# Patient Record
Sex: MALE | Race: BLACK OR AFRICAN AMERICAN | Employment: OTHER | URBAN - METROPOLITAN AREA
[De-identification: names, ages, dates, MRNs, and addresses within clinical notes are randomized per-mention and may not be internally consistent; named-entity substitution may affect disease eponyms.]

---

## 2020-09-08 RX ORDER — BENZTROPINE MESYLATE 0.5 MG/1
TABLET ORAL
Qty: 90 TAB | Refills: 0 | Status: SHIPPED | OUTPATIENT
Start: 2020-09-08 | End: 2020-11-23

## 2020-09-08 RX ORDER — TRAZODONE HYDROCHLORIDE 100 MG/1
TABLET ORAL
Qty: 180 TAB | Refills: 0 | Status: SHIPPED | OUTPATIENT
Start: 2020-09-08 | End: 2020-11-23

## 2020-10-12 RX ORDER — BUSPIRONE HYDROCHLORIDE 5 MG/1
TABLET ORAL
Qty: 180 TAB | Refills: 0 | Status: SHIPPED | OUTPATIENT
Start: 2020-10-12 | End: 2020-12-22

## 2020-10-13 PROBLEM — G47.33 OBSTRUCTIVE SLEEP APNEA: Status: ACTIVE | Noted: 2020-10-13

## 2020-10-13 PROBLEM — F31.31: Status: ACTIVE | Noted: 2020-10-13

## 2020-10-13 RX ORDER — RISPERIDONE 2 MG/1
2 TABLET, FILM COATED ORAL 2 TIMES DAILY
COMMUNITY
Start: 2020-08-26 | End: 2020-12-20

## 2020-10-13 RX ORDER — ISOSORBIDE DINITRATE 5 MG/1
5 TABLET ORAL DAILY
COMMUNITY
Start: 2020-09-21

## 2020-10-13 RX ORDER — MONTELUKAST SODIUM 10 MG/1
10 TABLET ORAL DAILY
COMMUNITY
Start: 2020-08-14

## 2020-10-13 RX ORDER — AMLODIPINE BESYLATE 10 MG/1
10 TABLET ORAL DAILY
COMMUNITY
Start: 2020-08-18

## 2020-10-13 RX ORDER — ALBUTEROL SULFATE 90 UG/1
AEROSOL, METERED RESPIRATORY (INHALATION)
COMMUNITY
Start: 2020-08-18

## 2020-10-13 RX ORDER — SERTRALINE HYDROCHLORIDE 100 MG/1
200 TABLET, FILM COATED ORAL DAILY
COMMUNITY
Start: 2020-08-29 | End: 2021-04-19 | Stop reason: SDUPTHER

## 2020-10-13 RX ORDER — PANTOPRAZOLE SODIUM 40 MG/1
40 TABLET, DELAYED RELEASE ORAL DAILY
COMMUNITY
Start: 2020-08-19

## 2020-10-14 ENCOUNTER — VIRTUAL VISIT (OUTPATIENT)
Dept: BEHAVIORAL/MENTAL HEALTH CLINIC | Age: 55
End: 2020-10-14

## 2020-10-14 DIAGNOSIS — F33.9 EPISODE OF RECURRENT MAJOR DEPRESSIVE DISORDER, UNSPECIFIED DEPRESSION EPISODE SEVERITY (HCC): Primary | ICD-10-CM

## 2020-10-15 NOTE — PROGRESS NOTES
Law Lam is a 62 y.o. male who presents today for the following:  Chief Complaint   Patient presents with    Follow-up     Patient was unable for appointment. Allergies   Allergen Reactions    Codeine Unknown (comments)    Penicillins Unknown (comments)       Current Outpatient Medications   Medication Sig    albuterol (PROVENTIL HFA, VENTOLIN HFA, PROAIR HFA) 90 mcg/actuation inhaler     amLODIPine (NORVASC) 10 mg tablet Take 10 mg by mouth daily.  isosorbide dinitrate (ISORDIL) 5 mg tablet Take 5 mg by mouth daily.  montelukast (SINGULAIR) 10 mg tablet Take 10 mg by mouth daily.  pantoprazole (PROTONIX) 40 mg tablet Take 40 mg by mouth daily.  risperiDONE (RisperDAL) 2 mg tablet Take 2 mg by mouth two (2) times a day.  sertraline (ZOLOFT) 100 mg tablet Take 200 mg by mouth daily.  busPIRone (BUSPAR) 5 mg tablet TAKE 1 TABLET TWICE DAILY    traZODone (DESYREL) 100 mg tablet TAKE 2 TABLETS AT BEDTIME    benztropine (COGENTIN) 0.5 mg tablet TAKE 1 TABLET AT BEDTIME     No current facility-administered medications for this visit. Past Medical History:   Diagnosis Date    Anxiety     Arthritis     GERD (gastroesophageal reflux disease)     Hypercholesterolemia     Hypertension     Mild depressed bipolar 1 disorder ([facility] 10/13/2020    Obstructive sleep apnea 10/13/2020    [facility]     Seizures ([facility]     [facility]        No past surgical history on file.     Family History   Problem Relation Age of Onset    Depression Mother     No Known Problems Father        Social History     Socioeconomic History    Marital status:      Spouse name: Not on file    Number of children: Not on file    Years of education: Not on file    Highest education level: Not on file   Occupational History    Not on file   Social Needs    Financial resource strain: Not on file    Food insecurity     Worry: Not on file     Inability: Not on file   Daniela Angel Transportation needs     Medical: Not on file     Non-medical: Not on file   Tobacco Use    Smoking status: Former Smoker    Smokeless tobacco: Never Used   Substance and Sexual Activity    Alcohol use: Not on file    Drug use: Not on file    Sexual activity: Not on file   Lifestyle    Physical activity     Days per week: Not on file     Minutes per session: Not on file    Stress: Not on file   Relationships    Social connections     Talks on phone: Not on file     Gets together: Not on file     Attends Synagogue service: Not on file     Active member of club or organization: Not on file     Attends meetings of clubs or organizations: Not on file     Relationship status: Not on file    Intimate partner violence     Fear of current or ex partner: Not on file     Emotionally abused: Not on file     Physically abused: Not on file     Forced sexual activity: Not on file   Other Topics Concern    Not on file   Social History Narrative    Not on file         HPI      ROS      There were no vitals taken for this visit. Physical Exam       There are no diagnoses linked to this encounter.

## 2020-11-09 ENCOUNTER — HOSPITAL ENCOUNTER (OUTPATIENT)
Age: 55
Setting detail: OBSERVATION
Discharge: HOME | End: 2020-11-10
Attending: EMERGENCY MEDICINE | Admitting: HOSPITALIST
Payer: MEDICARE

## 2020-11-09 ENCOUNTER — APPOINTMENT (OUTPATIENT)
Dept: GENERAL RADIOLOGY | Age: 55
End: 2020-11-09
Attending: EMERGENCY MEDICINE
Payer: MEDICARE

## 2020-11-09 DIAGNOSIS — R07.9 CHEST PAIN, UNSPECIFIED TYPE: Primary | ICD-10-CM

## 2020-11-09 LAB
ALBUMIN SERPL-MCNC: 3.5 G/DL (ref 3.5–5)
ALBUMIN/GLOB SERPL: 1.1 {RATIO} (ref 1.1–2.2)
ALP SERPL-CCNC: 59 U/L (ref 45–117)
ALT SERPL-CCNC: 21 U/L (ref 12–78)
AMPHET UR QL SCN: NEGATIVE
ANION GAP SERPL CALC-SCNC: 6 MMOL/L (ref 5–15)
APPEARANCE UR: CLEAR
AST SERPL W P-5'-P-CCNC: 19 U/L (ref 15–37)
ATRIAL RATE: 63 BPM
BARBITURATES UR QL SCN: NEGATIVE
BASOPHILS # BLD: 0 K/UL (ref 0–0.2)
BASOPHILS NFR BLD: 1 % (ref 0–2.5)
BENZODIAZ UR QL: NEGATIVE
BILIRUB SERPL-MCNC: 0.4 MG/DL (ref 0.2–1)
BILIRUB UR QL: NEGATIVE
BNP SERPL-MCNC: 131 PG/ML
BUN SERPL-MCNC: 11 MG/DL (ref 6–20)
BUN/CREAT SERPL: 9 (ref 12–20)
CA-I BLD-MCNC: 8.7 MG/DL (ref 8.5–10.1)
CALCULATED P AXIS, ECG09: 42 DEGREES
CALCULATED R AXIS, ECG10: 15 DEGREES
CALCULATED T AXIS, ECG11: 51 DEGREES
CANNABINOIDS UR QL SCN: NEGATIVE
CHLORIDE SERPL-SCNC: 100 MMOL/L (ref 97–108)
CO2 SERPL-SCNC: 31 MMOL/L (ref 21–32)
COCAINE UR QL SCN: POSITIVE
COLOR UR: NORMAL
CREAT SERPL-MCNC: 1.2 MG/DL (ref 0.7–1.3)
DIAGNOSIS, 93000: NORMAL
DRUG SCRN COMMENT,DRGCM: ABNORMAL
ECSTASY, ECST: POSITIVE
EOSINOPHIL # BLD: 0.1 K/UL (ref 0–0.7)
EOSINOPHIL NFR BLD: 3 % (ref 0.9–2.9)
ERYTHROCYTE [DISTWIDTH] IN BLOOD BY AUTOMATED COUNT: 13.4 % (ref 11.5–14.5)
GLOBULIN SER CALC-MCNC: 3.1 G/DL (ref 2–4)
GLUCOSE SERPL-MCNC: 172 MG/DL (ref 65–100)
GLUCOSE UR STRIP.AUTO-MCNC: NEGATIVE MG/DL
HCT VFR BLD AUTO: 39.6 % (ref 41–53)
HGB BLD-MCNC: 13.6 G/DL (ref 13.5–17.5)
HGB UR QL STRIP: NEGATIVE
INR PPP: 1.1 (ref 0.9–1.1)
KETONES UR QL STRIP.AUTO: NEGATIVE MG/DL
LEUKOCYTE ESTERASE UR QL STRIP.AUTO: NEGATIVE
LYMPHOCYTES # BLD: 1.1 K/UL (ref 1–4.8)
LYMPHOCYTES NFR BLD: 32 % (ref 20.5–51.1)
MAGNESIUM SERPL-MCNC: 1.9 MG/DL (ref 1.6–2.4)
MCH RBC QN AUTO: 33 PG (ref 31–34)
MCHC RBC AUTO-ENTMCNC: 34.4 G/DL (ref 31–36)
MCV RBC AUTO: 96.2 FL (ref 80–100)
METHADONE UR QL: NEGATIVE
MONOCYTES # BLD: 0.3 K/UL (ref 0.2–2.4)
MONOCYTES NFR BLD: 9 % (ref 1.7–9.3)
NEUTS SEG # BLD: 1.9 K/UL (ref 1.8–7.7)
NEUTS SEG NFR BLD: 55 % (ref 42–75)
NITRITE UR QL STRIP.AUTO: NEGATIVE
NRBC # BLD: 0 K/UL
NRBC BLD-RTO: 0 PER 100 WBC
OPIATES UR QL: NEGATIVE
P-R INTERVAL, ECG05: 170 MS
PCP UR QL: NEGATIVE
PH UR STRIP: 5.5 [PH] (ref 5–8)
PLATELET # BLD AUTO: 160 K/UL
PMV BLD AUTO: 7.9 FL (ref 6.5–11.5)
POTASSIUM SERPL-SCNC: 3 MMOL/L (ref 3.5–5.1)
PROT SERPL-MCNC: 6.6 G/DL (ref 6.4–8.2)
PROT UR STRIP-MCNC: NEGATIVE MG/DL
PROTHROMBIN TIME: 10.8 SEC (ref 9–11.1)
Q-T INTERVAL, ECG07: 484 MS
QRS DURATION, ECG06: 104 MS
QTC CALCULATION (BEZET), ECG08: 500 MS
RBC # BLD AUTO: 4.12 M/UL (ref 4.5–5.9)
SODIUM SERPL-SCNC: 137 MMOL/L (ref 136–145)
SP GR UR REFRACTOMETRY: 1.02 (ref 1–1.03)
TROPONIN I SERPL-MCNC: <0.05 NG/ML
UROBILINOGEN UR QL STRIP.AUTO: 0.2 EU/DL (ref 0.2–1)
VENTRICULAR RATE, ECG03: 64 BPM
WBC # BLD AUTO: 3.4 K/UL (ref 4.4–11.3)

## 2020-11-09 PROCEDURE — 96372 THER/PROPH/DIAG INJ SC/IM: CPT

## 2020-11-09 PROCEDURE — 74011250636 HC RX REV CODE- 250/636: Performed by: NURSE PRACTITIONER

## 2020-11-09 PROCEDURE — 74011250637 HC RX REV CODE- 250/637: Performed by: EMERGENCY MEDICINE

## 2020-11-09 PROCEDURE — 84484 ASSAY OF TROPONIN QUANT: CPT

## 2020-11-09 PROCEDURE — 99285 EMERGENCY DEPT VISIT HI MDM: CPT

## 2020-11-09 PROCEDURE — 36415 COLL VENOUS BLD VENIPUNCTURE: CPT

## 2020-11-09 PROCEDURE — 80307 DRUG TEST PRSMV CHEM ANLYZR: CPT

## 2020-11-09 PROCEDURE — 93005 ELECTROCARDIOGRAM TRACING: CPT

## 2020-11-09 PROCEDURE — 83735 ASSAY OF MAGNESIUM: CPT

## 2020-11-09 PROCEDURE — 81003 URINALYSIS AUTO W/O SCOPE: CPT

## 2020-11-09 PROCEDURE — 99218 HC RM OBSERVATION: CPT

## 2020-11-09 PROCEDURE — 74011250637 HC RX REV CODE- 250/637: Performed by: NURSE PRACTITIONER

## 2020-11-09 PROCEDURE — 83880 ASSAY OF NATRIURETIC PEPTIDE: CPT

## 2020-11-09 PROCEDURE — 80053 COMPREHEN METABOLIC PANEL: CPT

## 2020-11-09 PROCEDURE — 71045 X-RAY EXAM CHEST 1 VIEW: CPT

## 2020-11-09 PROCEDURE — 85025 COMPLETE CBC W/AUTO DIFF WBC: CPT

## 2020-11-09 PROCEDURE — 85610 PROTHROMBIN TIME: CPT

## 2020-11-09 RX ORDER — POTASSIUM CHLORIDE 750 MG/1
40 TABLET, FILM COATED, EXTENDED RELEASE ORAL
Status: COMPLETED | OUTPATIENT
Start: 2020-11-09 | End: 2020-11-09

## 2020-11-09 RX ORDER — MONTELUKAST SODIUM 10 MG/1
10 TABLET ORAL DAILY
Status: DISCONTINUED | OUTPATIENT
Start: 2020-11-10 | End: 2020-11-10 | Stop reason: HOSPADM

## 2020-11-09 RX ORDER — NITROGLYCERIN 0.4 MG/1
0.4 TABLET SUBLINGUAL
Status: DISCONTINUED | OUTPATIENT
Start: 2020-11-09 | End: 2020-11-10 | Stop reason: HOSPADM

## 2020-11-09 RX ORDER — RISPERIDONE 1 MG/1
2 TABLET, FILM COATED ORAL EVERY 12 HOURS
Status: DISCONTINUED | OUTPATIENT
Start: 2020-11-09 | End: 2020-11-10 | Stop reason: HOSPADM

## 2020-11-09 RX ORDER — MORPHINE SULFATE 2 MG/ML
2 INJECTION, SOLUTION INTRAMUSCULAR; INTRAVENOUS
Status: DISCONTINUED | OUTPATIENT
Start: 2020-11-09 | End: 2020-11-10 | Stop reason: HOSPADM

## 2020-11-09 RX ORDER — SODIUM CHLORIDE 0.9 % (FLUSH) 0.9 %
5-40 SYRINGE (ML) INJECTION AS NEEDED
Status: DISCONTINUED | OUTPATIENT
Start: 2020-11-09 | End: 2020-11-10 | Stop reason: HOSPADM

## 2020-11-09 RX ORDER — GUAIFENESIN 100 MG/5ML
81 LIQUID (ML) ORAL DAILY
Status: DISCONTINUED | OUTPATIENT
Start: 2020-11-10 | End: 2020-11-10 | Stop reason: HOSPADM

## 2020-11-09 RX ORDER — ACETAMINOPHEN 325 MG/1
650 TABLET ORAL
Status: DISCONTINUED | OUTPATIENT
Start: 2020-11-09 | End: 2020-11-10 | Stop reason: HOSPADM

## 2020-11-09 RX ORDER — ENOXAPARIN SODIUM 100 MG/ML
40 INJECTION SUBCUTANEOUS EVERY 24 HOURS
Status: DISCONTINUED | OUTPATIENT
Start: 2020-11-09 | End: 2020-11-10 | Stop reason: HOSPADM

## 2020-11-09 RX ORDER — SERTRALINE HYDROCHLORIDE 50 MG/1
200 TABLET, FILM COATED ORAL DAILY
Status: DISCONTINUED | OUTPATIENT
Start: 2020-11-10 | End: 2020-11-10 | Stop reason: HOSPADM

## 2020-11-09 RX ORDER — TRAZODONE HYDROCHLORIDE 50 MG/1
100 TABLET ORAL
Status: DISCONTINUED | OUTPATIENT
Start: 2020-11-09 | End: 2020-11-10 | Stop reason: HOSPADM

## 2020-11-09 RX ORDER — PANTOPRAZOLE SODIUM 40 MG/1
40 TABLET, DELAYED RELEASE ORAL
Status: DISCONTINUED | OUTPATIENT
Start: 2020-11-10 | End: 2020-11-10 | Stop reason: HOSPADM

## 2020-11-09 RX ORDER — ISOSORBIDE DINITRATE 10 MG/1
5 TABLET ORAL DAILY
Status: DISCONTINUED | OUTPATIENT
Start: 2020-11-10 | End: 2020-11-10 | Stop reason: HOSPADM

## 2020-11-09 RX ORDER — BUSPIRONE HYDROCHLORIDE 5 MG/1
5 TABLET ORAL EVERY 12 HOURS
Status: DISCONTINUED | OUTPATIENT
Start: 2020-11-09 | End: 2020-11-10 | Stop reason: HOSPADM

## 2020-11-09 RX ORDER — AMLODIPINE BESYLATE 10 MG/1
10 TABLET ORAL DAILY
Status: DISCONTINUED | OUTPATIENT
Start: 2020-11-10 | End: 2020-11-10 | Stop reason: HOSPADM

## 2020-11-09 RX ORDER — ALBUTEROL SULFATE 90 UG/1
1 AEROSOL, METERED RESPIRATORY (INHALATION)
Status: DISCONTINUED | OUTPATIENT
Start: 2020-11-09 | End: 2020-11-09

## 2020-11-09 RX ORDER — ONDANSETRON 4 MG/1
4 TABLET, ORALLY DISINTEGRATING ORAL
Status: DISCONTINUED | OUTPATIENT
Start: 2020-11-09 | End: 2020-11-10 | Stop reason: HOSPADM

## 2020-11-09 RX ORDER — NITROGLYCERIN 0.4 MG/1
0.4 TABLET SUBLINGUAL
Status: COMPLETED | OUTPATIENT
Start: 2020-11-09 | End: 2020-11-09

## 2020-11-09 RX ORDER — BENZTROPINE MESYLATE 0.5 MG/1
0.5 TABLET ORAL
Status: DISCONTINUED | OUTPATIENT
Start: 2020-11-09 | End: 2020-11-10 | Stop reason: HOSPADM

## 2020-11-09 RX ORDER — ASPIRIN 325 MG
325 TABLET ORAL ONCE
Status: DISCONTINUED | OUTPATIENT
Start: 2020-11-09 | End: 2020-11-09

## 2020-11-09 RX ADMIN — BUSPIRONE HYDROCHLORIDE 5 MG: 5 TABLET ORAL at 20:52

## 2020-11-09 RX ADMIN — NITROGLYCERIN 0.4 MG: 0.4 TABLET, ORALLY DISINTEGRATING SUBLINGUAL at 11:29

## 2020-11-09 RX ADMIN — BENZTROPINE MESYLATE 0.5 MG: 0.5 TABLET ORAL at 20:53

## 2020-11-09 RX ADMIN — ENOXAPARIN SODIUM 40 MG: 40 INJECTION SUBCUTANEOUS at 18:26

## 2020-11-09 RX ADMIN — RISPERIDONE 2 MG: 1 TABLET, FILM COATED ORAL at 20:52

## 2020-11-09 RX ADMIN — POTASSIUM CHLORIDE 40 MEQ: 750 TABLET, FILM COATED, EXTENDED RELEASE ORAL at 18:26

## 2020-11-09 RX ADMIN — TRAZODONE HYDROCHLORIDE 100 MG: 50 TABLET ORAL at 20:52

## 2020-11-09 NOTE — ED TRIAGE NOTES
Pt was on the way home from office visit this morning, started having chest pain  Given 324 aspirin  Pt reports chest pain, non radiating, sharp but now dull  Cardiac hx

## 2020-11-09 NOTE — ROUTINE PROCESS
Pt admitted to room 211. Monitor applied. Ate well. No pain. No n/v. No resp distress. Sitting up in bed.

## 2020-11-09 NOTE — ED PROVIDER NOTES
EMERGENCY DEPARTMENT HISTORY AND PHYSICAL EXAM      Date: 11/9/2020  Patient Name: Dipak Castanon      History of Presenting Illness     Chief Complaint   Patient presents with    Chest Pain       History Provided By: Patient    HPI: Dipak Castanon, 62 y.o. male with a past medical history significant hypertension, hyperlipidemia, myocardial infarction and stroke presents to the ED with cc of chest pain sharp mid substernal nonradiating initially 7 out of 10; patient was a passenger in a car when symptoms initially started; patient denies sensation of nausea, no shortness of breath, no dizziness; patient received 4 aspirins in route pain now 3 out of 10; patient reports swelling in in feet over the last week and a half; patient had a similar episode 2 months ago but was not evaluated by any medical professional    There are no other complaints, changes, or physical findings at this time. PCP: Casey De La Rosa MD    Current Facility-Administered Medications   Medication Dose Route Frequency Provider Last Rate Last Dose    nitroglycerin (NITROSTAT) tablet 0.4 mg  0.4 mg SubLINGual NOW Denise Cosby MD         Current Outpatient Medications   Medication Sig Dispense Refill    albuterol (PROVENTIL HFA, VENTOLIN HFA, PROAIR HFA) 90 mcg/actuation inhaler       amLODIPine (NORVASC) 10 mg tablet Take 10 mg by mouth daily.  isosorbide dinitrate (ISORDIL) 5 mg tablet Take 5 mg by mouth daily.  montelukast (SINGULAIR) 10 mg tablet Take 10 mg by mouth daily.  pantoprazole (PROTONIX) 40 mg tablet Take 40 mg by mouth daily.  risperiDONE (RisperDAL) 2 mg tablet Take 2 mg by mouth two (2) times a day.  sertraline (ZOLOFT) 100 mg tablet Take 200 mg by mouth daily.       busPIRone (BUSPAR) 5 mg tablet TAKE 1 TABLET TWICE DAILY 180 Tab 0    traZODone (DESYREL) 100 mg tablet TAKE 2 TABLETS AT BEDTIME 180 Tab 0    benztropine (COGENTIN) 0.5 mg tablet TAKE 1 TABLET AT BEDTIME 90 Tab 0       Past History     Past Medical History:  Past Medical History:   Diagnosis Date    Anxiety     Arthritis     GERD (gastroesophageal reflux disease)     Hypercholesterolemia     Hypertension     Mild depressed bipolar [facility] disorder ([facility] 10/13/2020    Myocardial infarction ([facility]     Obstructive sleep apnea 10/13/2020    Psychotic disorder (HCC)     Seizures (HCC)     Stroke [facility])        Past Surgical History:  Past Surgical History:   Procedure Laterality Date    ABDOMEN SURGERY PROC UNLISTED         Family History:  Family History   Problem Relation Age of Onset    Depression Mother     No Known Problems Father        Social History:  Social History     Tobacco Use    Smoking status: Former Smoker    Smokeless tobacco: Never Used   Substance Use Topics    Alcohol use: Yes     Alcohol/week: 2.0 standard drinks     Types: 2 Cans of beer per week    Drug use: Never       Allergies: Allergies   Allergen Reactions    Codeine Unknown (comments)    Penicillins Unknown (comments)         Review of Systems     Review of Systems   Constitutional: Negative for chills and fever. HENT: Negative for rhinorrhea and sore throat. Eyes: Negative for pain and visual disturbance. Respiratory: Negative for chest tightness and shortness of breath. Cardiovascular: Positive for chest pain and leg swelling. Negative for palpitations. Gastrointestinal: Negative for abdominal pain and nausea. Endocrine: Negative for polydipsia and polyuria. Genitourinary: Negative for dysuria and urgency. Musculoskeletal: Negative for arthralgias and myalgias. Skin: Negative for color change and pallor. Neurological: Negative for weakness and numbness. Psychiatric/Behavioral: Negative. Physical Exam     Physical Exam  Vitals signs and nursing note reviewed. HENT:      Head: Normocephalic and atraumatic. Eyes:      Extraocular Movements: Extraocular movements intact.       Pupils: Pupils are equal, round, and reactive to light. Neck:      Musculoskeletal: Normal range of motion and neck supple. Cardiovascular:      Rate and Rhythm: Normal rate and regular rhythm. Heart sounds: Normal heart sounds. Pulmonary:      Effort: Pulmonary effort is normal.      Breath sounds: Normal breath sounds. Chest:      Chest wall: No tenderness. Abdominal:      General: Bowel sounds are normal.      Palpations: Abdomen is soft. Musculoskeletal: Normal range of motion. Right lower le+ Edema present. Left lower le+ Edema present. Skin:     General: Skin is warm and dry. Capillary Refill: Capillary refill takes less than 2 seconds. Neurological:      General: No focal deficit present. Mental Status: He is alert and oriented to person, place, and time. Psychiatric:         Mood and Affect: Mood normal.         Behavior: Behavior normal.         Lab and Diagnostic Study Results     Labs -     Recent Results (from the past 12 hour(s))   EKG, 12 LEAD, INITIAL    Collection Time: 20 10:55 AM   Result Value Ref Range    Ventricular Rate 64 BPM    Atrial Rate 63 BPM    P-R Interval 170 ms    QRS Duration 104 ms    Q-T Interval 484 ms    QTC Calculation (Bezet) 500 ms    Calculated P Axis 42 degrees    Calculated R Axis 15 degrees    Calculated T Axis 51 degrees    Diagnosis Sinus rhythm  Borderline prolonged QT interval          Radiologic Studies -   [unfilled]  CT Results  (Last 48 hours)    None        CXR Results  (Last 48 hours)    None          Medical Decision Making and ED Course   - I am the first and primary provider for this patient. - I reviewed the vital signs, available nursing notes, past medical history, past surgical history, family history and social history. - Initial assessment performed. The patients presenting problems have been discussed, and the staff are in agreement with the care plan formulated and outlined with them.   I have encouraged them to ask questions as they arise throughout their visit. Vital Signs-Reviewed the patient's vital signs. Patient Vitals for the past 12 hrs:   Temp Pulse Resp BP SpO2   11/09/20 1054 98.4 °F (36.9 °C) 64 18 (!) 142/89 98 %             Records Reviewed: Nursing Notes    The patient presents with back pain with a differential diagnosis of ACS, myocardial infarction, pneumonia    ED Course:       ED Course as of Nov 09 1559   Mon Nov 09, 2020   1100 EKG rate 64, NM interval 170, QT interval 500, normal axis, no ST elevations and depressions    [SB]      ED Course User Index  [SB] Cedrick Doty MD         Provider Notes (Medical Decision Making): MDM           Consultations:       Consultations: -    Hospitalist Consultant: Dr. Danielle Garcia: We have asked for emergent assistance with regard to this patient. We have discussed the patients HPI, ROS, PE and results this far. They will come and evaluate the patient for admission. Procedures and Critical Care       Performed by: Mavis Hi MD  PROCEDURES:  Procedures             Mavis Hi MD        Disposition     Disposition: Condition stable and improved  Admitted to Observation Unit the case was discussed with the admitting physician MIGUEL        Remove if not discharged  DISCHARGE PLAN:  1. Current Discharge Medication List      CONTINUE these medications which have NOT CHANGED    Details   albuterol (PROVENTIL HFA, VENTOLIN HFA, PROAIR HFA) 90 mcg/actuation inhaler       amLODIPine (NORVASC) 10 mg tablet Take 10 mg by mouth daily. isosorbide dinitrate (ISORDIL) 5 mg tablet Take 5 mg by mouth daily. montelukast (SINGULAIR) 10 mg tablet Take 10 mg by mouth daily. pantoprazole (PROTONIX) 40 mg tablet Take 40 mg by mouth daily. risperiDONE (RisperDAL) 2 mg tablet Take 2 mg by mouth two (2) times a day. sertraline (ZOLOFT) 100 mg tablet Take 200 mg by mouth daily.       busPIRone (BUSPAR) 5 mg tablet TAKE 1 TABLET TWICE DAILY  Qty: 180 Tab, Refills: 0      traZODone (DESYREL) 100 mg tablet TAKE 2 TABLETS AT BEDTIME  Qty: 180 Tab, Refills: 0      benztropine (COGENTIN) 0.5 mg tablet TAKE 1 TABLET AT BEDTIME  Qty: 90 Tab, Refills: 0           2. Follow-up Information    None       3. Return to ED if worse   4. Current Discharge Medication List          Diagnosis     Clinical Impression: No diagnosis found. Attestations:    Zain Cade MD    Please note that this dictation was completed with [employer], the [employer] voice recognition software. Quite often unanticipated grammatical, syntax, homophones, and other interpretive errors are inadvertently transcribed by the computer software. Please disregard these errors. Please excuse any errors that have escaped final proofreading. Thank you.

## 2020-11-09 NOTE — H&P
History and Physical    Subjective:     Harjinder Herrera is a 62 y.o. male  with a PMHx significant for HTN, MI, CABG, Stroke, Dyslipidemia, GERD, and DANNIELLE (uses Cpap), who presents to the ED today with complaints of chest pain onset since 7 am today, described as sharp mid substernal nonradiating initially 7 out of 10; patient was a passenger in a car when symptoms initially started; patient denies any associated nausea, vomiting, shortness of breath, or dizziness, patient received 4 aspirins en route, chest pain currently rated 2 out of 10; patient reported similar episode 2 months ago but was not evaluated by any medical professional, he however denies any fever, chills, or general malaise. No sick contacts. In the ED, his ECG shows a SR, no ischemic changes, his troponin is < 0.05 x 2, bnp 131. CXR unremarkable. Hospitalist was summoned for further evaluation. Past Medical History:   Diagnosis Date    Anxiety     Arthritis     GERD (gastroesophageal reflux disease)     Hypercholesterolemia     Hypertension     Mild depressed bipolar 1 disorder ([facility] 10/13/2020    [facility]     Obstructive sleep apnea 10/13/2020    Psychotic disorder ([facility]     Seizures (HCC)     Stroke [facility])       Past Surgical History:   Procedure Laterality Date    ABDOMEN SURGERY PROC UNLISTED       Family History   Problem Relation Age of Onset    Depression Mother     No Known Problems Father       Social History     Tobacco Use    Smoking status: Former Smoker    Smokeless tobacco: Never Used   Substance Use Topics    Alcohol use: Yes     Alcohol/week: 2.0 standard drinks     Types: 2 Cans of beer per week       Prior to Admission medications    Medication Sig Start Date End Date Taking? Authorizing Provider   albuterol (PROVENTIL HFA, VENTOLIN HFA, PROAIR HFA) 90 mcg/actuation inhaler  8/18/20   Provider, Historical   amLODIPine (NORVASC) 10 mg tablet Take 10 mg by mouth daily.  8/18/20 Provider, Historical   isosorbide dinitrate (ISORDIL) 5 mg tablet Take 5 mg by mouth daily. 9/21/20   Provider, Historical   montelukast (SINGULAIR) 10 mg tablet Take 10 mg by mouth daily. 8/14/20   Provider, Historical   pantoprazole (PROTONIX) 40 mg tablet Take 40 mg by mouth daily. 8/19/20   Provider, Historical   risperiDONE (RisperDAL) 2 mg tablet Take 2 mg by mouth two (2) times a day. 8/26/20   Provider, Historical   sertraline (ZOLOFT) 100 mg tablet Take 200 mg by mouth daily. 8/29/20   Provider, Historical   busPIRone (BUSPAR) 5 mg tablet TAKE 1 TABLET TWICE DAILY 10/12/20 1/10/21  Camille Paige, NP   traZODone (DESYREL) 100 mg tablet TAKE 2 TABLETS AT BEDTIME 9/8/20 12/7/20  Alivia Alejo, NP   benztropine (COGENTIN) 0.5 mg tablet TAKE 1 TABLET AT BEDTIME 9/8/20   Camille Paige, JOSE     Allergies   Allergen Reactions    Codeine Unknown (comments)    Penicillins Unknown (comments)        Review of Systems:  14 point ROS reviewed and was reported negative except as mentioned in HPI. Objective:     Vitals:  Visit Vitals  BP (!) 155/102   Pulse (!) 56   Temp 98.4 °F (36.9 °C)   Resp 18   Ht 6' 2\" (1.88 m)   Wt 110.7 kg (244 lb)   SpO2 96%   BMI 31.33 kg/m²       Physical Exam:  General: Alert, awake, oriented x 4, cooperative, no acute distress. Head:  Normocephalic, without obvious abnormality, atraumatic. Eyes:  Conjunctivae/corneas clear. Pupils equal, round, reactive to light. Extraocular movements intact. Lungs:  Clear to auscultation bilaterally, no wheezes, no crackles. Chest wall: No tenderness or deformity. Heart:  Regular rate and rhythm, S1, S2 normal, no murmur, click, rub, or gallop. Abdomen:  Soft, non-tender. Hx abdominal hernia, Bowel sounds normal. No masses. No organomegaly. Back:  No spine tenderness to palpation  Extremities: Extremities normal, atraumatic, no cyanosis or peripheral edema. Pulses: Symmetric all extremities.   Skin: Skin color, texture, turgor normal.   Lymph nodes: Cervical nodes normal.  Neurologic: Awake and oriented,  X 4. CN II-XII intact. No focal neuro deficits. Labs:  Recent Results (from the past 24 hour(s))   EKG, 12 LEAD, INITIAL    Collection Time: 11/09/20 10:55 AM   Result Value Ref Range    Ventricular Rate 64 BPM    Atrial Rate 63 BPM    P-R Interval 170 ms    QRS Duration 104 ms    Q-T Interval 484 ms    QTC Calculation (Bezet) 500 ms    Calculated P Axis 42 degrees    Calculated R Axis 15 degrees    Calculated T Axis 51 degrees    Diagnosis       Sinus rhythm  Borderline prolonged QT interval    Confirmed by Tiffanie Pruitt ([id number]) on 11/9/2020 2:05:25 PM     DRUG SCREEN, URINE    Collection Time: 11/09/20 11:15 AM   Result Value Ref Range    AMPHETAMINES Negative Negative      BARBITURATES Negative Negative      BENZODIAZEPINES Negative Negative      COCAINE Positive (A) Negative      ECSTASY, MDMA Positive (A) Negative      METHADONE Negative Negative      OPIATES Negative Negative      PCP(PHENCYCLIDINE) Negative Negative      THC (TH-CANNABINOL) Negative Negative      Drug screen comment        This test is a screen for drugs of abuse in a medical setting only (i.e., they are unconfirmed results and as such must not be used for non-medical purposes, e.g.,employment testing, legal testing). Due to its inherent nature, false positive (FP) and false negative (FN) results may be obtained. Therefore, if necessary for medical care, recommend confirmation of positive findings by GC/MS.    URINALYSIS W/ RFLX MICROSCOPIC    Collection Time: 11/09/20 11:15 AM   Result Value Ref Range    Color Yellow/Straw      Appearance Clear Clear      Specific gravity 1.020 1.003 - 1.030      pH (UA) 5.5 5.0 - 8.0      Protein Negative Negative mg/dL    Glucose Negative Negative mg/dL    Ketone Negative Negative mg/dL    Bilirubin Negative Negative      Blood Negative Negative      Urobilinogen 0.2 0.2 - 1.0 EU/dL    Nitrites Negative Negative Leukocyte Esterase Negative Negative     CBC WITH AUTOMATED DIFF    Collection Time: 11/09/20 11:19 AM   Result Value Ref Range    WBC 3.4 (L) 4.4 - 11.3 K/uL    RBC 4.12 (L) 4.50 - 5.90 M/uL    HGB 13.6 13.5 - 17.5 g/dL    HCT 39.6 (L) 41 - 53 %    MCV 96.2 80 - 100 FL    MCH 33.0 31 - 34 PG    MCHC 34.4 31.0 - 36.0 g/dL    RDW 13.4 11.5 - 14.5 %    PLATELET 226 K/uL    MPV 7.9 6.5 - 11.5 FL    NRBC 0.0  WBC    ABSOLUTE NRBC 0.00 K/uL    NEUTROPHILS 55 42 - 75 %    LYMPHOCYTES 32 20.5 - 51.1 %    MONOCYTES 9 1.7 - 9.3 %    EOSINOPHILS 3 (H) 0.9 - 2.9 %    BASOPHILS 1 0.0 - 2.5 %    ABS. NEUTROPHILS 1.9 1.8 - 7.7 K/UL    ABS. LYMPHOCYTES 1.1 1.0 - 4.8 K/UL    ABS. MONOCYTES 0.3 0.2 - 2.4 K/UL    ABS. EOSINOPHILS 0.1 0.0 - 0.7 K/UL    ABS. BASOPHILS 0.0 0.0 - 0.2 K/UL   PROTHROMBIN TIME + INR    Collection Time: 11/09/20 11:19 AM   Result Value Ref Range    Prothrombin time 10.8 9.0 - 11.1 sec    INR 1.1 0.9 - 1.1     METABOLIC PANEL, COMPREHENSIVE    Collection Time: 11/09/20 11:19 AM   Result Value Ref Range    Sodium 137 136 - 145 mmol/L    Potassium 3.0 (L) 3.5 - 5.1 mmol/L    Chloride 100 97 - 108 mmol/L    CO2 31 21 - 32 mmol/L    Anion gap 6 5 - 15 mmol/L    Glucose 172 (H) 65 - 100 mg/dL    BUN 11 6 - 20 mg/dL    Creatinine 1.20 0.70 - 1.30 mg/dL    BUN/Creatinine ratio 9 (L) 12 - 20      GFR est AA >60 >60 ml/min/1.73m2    GFR est non-AA >60 >60 ml/min/1.73m2    Calcium 8.7 8.5 - 10.1 mg/dL    Bilirubin, total 0.4 0.2 - 1.0 mg/dL    AST (SGOT) 19 15 - 37 U/L    ALT (SGPT) 21 12 - 78 U/L    Alk.  phosphatase 59 45 - 117 U/L    Protein, total 6.6 6.4 - 8.2 g/dL    Albumin 3.5 3.5 - 5.0 g/dL    Globulin 3.1 2.0 - 4.0 g/dL    A-G Ratio 1.1 1.1 - 2.2     TROPONIN I    Collection Time: 11/09/20 11:19 AM   Result Value Ref Range    Troponin-I, Qt. <0.05 <0.05 ng/mL   MAGNESIUM    Collection Time: 11/09/20 11:19 AM   Result Value Ref Range    Magnesium 1.9 1.6 - 2.4 mg/dL   BNP    Collection Time: 11/09/20 11:19 AM   Result Value Ref Range    NT pro- (H) <125 pg/mL   TROPONIN I    Collection Time: 11/09/20  1:03 PM   Result Value Ref Range    Troponin-I, Qt. <0.05 <0.05 ng/mL       Imaging:  Xr Chest Port    Result Date: 11/9/2020  Chest, frontal view, 11/9/2020 History: Chest pain. Comparison: Including chest 2/8/2019. Findings: The patient is status post median sternotomy. The cardiac silhouette is within normal limits. The lungs are adequately expanded. Emphysematous changes are suspected. No hydrostatic edema is present. There is blunting of the left costophrenic angle which could be due to overlying body habitus or atelectasis. Pleural effusion is not excluded. No pneumothorax is identified. Degenerative changes are present in the right shoulder. Impression: Blunting of the left costophrenic angle as above. Assessment & Plan:     #1: Chest Pain:  -62 yr old male with significant cardiac history MI, and CABG presenting with chest pain. -In the ED, ECG -NSR/rate 64, no ischemic changes. Trop <0.05 x 2. ,  -CXR unremarkable.   -Admit to observation, on telemetry.  -Received 4 baby aspirin en route to ED.   -Continue aspirin 81 mg daily.  -He is chronically on isosorbide dinitrate which will be continued. -Trend cardiac enzymes. -Lipid panel, HgA1c  in a.m.   -Echo in a.m.  -Cardiology consult. #2: Hypokalemia:  -Presents with k level of 3  -Give potassium 40 mEq.   -Mg level is 1.9.  -BMP in a.m. #3: Hypertension:  -Chronic condition. Controlled. -Continue amlodipine. #4: Bipolar disorder  -Chronic condition.  -Continue home medications. #5: Obstructive Sleep apnea:  -Chronic condition  -Cpap q hs per home routine. #6: Substance Use:  -UDS positive with cocaine and Ectasy. #7: GERD:  -continue PPI. VTE Prophylaxis: Lovenox  Code status: Full code.

## 2020-11-09 NOTE — ED NOTES
TRANSFER - OUT REPORT:    Verbal report given to Debra Michael  being transferred to inpatient  for routine progression of care       Report consisted of patients Situation, Background, Assessment and   Recommendations(SBAR). Information from the following report(s) SBAR was reviewed with the receiving nurse. Opportunity for questions and clarification was provided.       Patient transported with:   Registered Nurse

## 2020-11-09 NOTE — ED NOTES
Admission orders in. Patient room assigned.  Attempted report but nurse unavailable at this time, will call back

## 2020-11-10 ENCOUNTER — APPOINTMENT (OUTPATIENT)
Dept: VASCULAR SURGERY | Age: 55
End: 2020-11-10
Attending: HOSPITALIST
Payer: MEDICARE

## 2020-11-10 ENCOUNTER — APPOINTMENT (OUTPATIENT)
Dept: CT IMAGING | Age: 55
End: 2020-11-10
Attending: FAMILY MEDICINE
Payer: MEDICARE

## 2020-11-10 VITALS
OXYGEN SATURATION: 97 % | RESPIRATION RATE: 20 BRPM | BODY MASS INDEX: 31.32 KG/M2 | HEART RATE: 58 BPM | SYSTOLIC BLOOD PRESSURE: 119 MMHG | TEMPERATURE: 97.4 F | WEIGHT: 244 LBS | DIASTOLIC BLOOD PRESSURE: 85 MMHG | HEIGHT: 74 IN

## 2020-11-10 LAB
ANION GAP SERPL CALC-SCNC: 6 MMOL/L (ref 5–15)
BUN SERPL-MCNC: 11 MG/DL (ref 6–20)
BUN/CREAT SERPL: 10 (ref 12–20)
CA-I BLD-MCNC: 8.5 MG/DL (ref 8.5–10.1)
CHLORIDE SERPL-SCNC: 104 MMOL/L (ref 97–108)
CHOLEST SERPL-MCNC: 175 MG/DL
CO2 SERPL-SCNC: 30 MMOL/L (ref 21–32)
CREAT SERPL-MCNC: 1.13 MG/DL (ref 0.7–1.3)
ECHO AO ROOT DIAM: 4.05 CM
ECHO AV AREA PEAK VELOCITY: 4.44 CM2
ECHO AV AREA PEAK VELOCITY: 4.44 CM2
ECHO AV AREA VTI: 4.62 CM2
ECHO AV AREA VTI: 4.62 CM2
ECHO AV MEAN GRADIENT: 2.22 MMHG
ECHO AV MEAN VELOCITY: 68.09 CM/S
ECHO AV PEAK GRADIENT: 4.48 MMHG
ECHO AV PEAK VELOCITY: 105.86 CM/S
ECHO AV VTI: 22.15 CM
ECHO EST RA PRESSURE: 3 MMHG
ECHO LA VOL 2C: 43.23 ML (ref 18–58)
ECHO LA VOL 4C: 45.14 ML (ref 18–58)
ECHO LA VOL BP: 54.51 ML (ref 18–58)
ECHO LA VOL BP: 54.51 ML (ref 18–58)
ECHO LA VOLUME INDEX A2C: 18.28 ML/M2 (ref 16–28)
ECHO LA VOLUME INDEX A4C: 19.09 ML/M2 (ref 16–28)
ECHO LV E' LATERAL VELOCITY: 70 CM/S
ECHO LV EDV A2C: 148.96 ML
ECHO LV EDV A2C: 148.96 ML
ECHO LV EDV BP: 106.5 ML (ref 67–155)
ECHO LV EDV BP: 106.5 ML (ref 67–155)
ECHO LV EJECTION FRACTION A2C: 38 PERCENT
ECHO LV EJECTION FRACTION A2C: 38 PERCENT
ECHO LV EJECTION FRACTION A4C: 38 PERCENT
ECHO LV EJECTION FRACTION A4C: 38 PERCENT
ECHO LV EJECTION FRACTION BIPLANE: 35.1 PERCENT (ref 55–100)
ECHO LV EJECTION FRACTION BIPLANE: 35.1 PERCENT (ref 55–100)
ECHO LV ESV A2C: 74.97 ML
ECHO LV ESV BP: 69.14 ML (ref 22–58)
ECHO LV ESV BP: 69.14 ML (ref 22–58)
ECHO LV ESV INDEX A2C: 31.7 ML/M2
ECHO LV INTERNAL DIMENSION DIASTOLIC: 5.52 CM (ref 4.2–5.9)
ECHO LV INTERNAL DIMENSION SYSTOLIC: 4.75 CM
ECHO LV IVSD: 0.93 CM (ref 0.6–1)
ECHO LV MASS 2D: 208.9 G (ref 88–224)
ECHO LV MASS INDEX 2D: 88.3 G/M2 (ref 49–115)
ECHO LV POSTERIOR WALL DIASTOLIC: 1.03 CM (ref 0.6–1)
ECHO LVOT DIAM: 2.74 CM
ECHO LVOT PEAK GRADIENT: 2.54 MMHG
ECHO LVOT PEAK VELOCITY: 79.63 CM/S
ECHO LVOT SV: 61.1 ML
ECHO LVOT SV: 61.1 ML
ECHO LVOT VTI: 17.33 CM
ECHO MV A VELOCITY: 67.93 CM/S
ECHO MV AREA PHT: 3.65 CM2
ECHO MV AREA PHT: 3.65 CM2
ECHO MV E DECELERATION TIME (DT): 207.6 MS
ECHO MV E VELOCITY: 49.44 CM/S
ECHO MV E/A RATIO: 0.73
ECHO MV E/E' LATERAL: 0.71
ECHO MV PRESSURE HALF TIME (PHT): 60.2 MS
ECHO RA AREA 4C: 19 CM2
ECHO RIGHT VENTRICULAR SYSTOLIC PRESSURE (RVSP): 17 MMHG
ECHO RV INTERNAL DIMENSION: 3.2 CM
ECHO TV REGURGITANT MAX VELOCITY: 171.69 CM/S
ECHO TV REGURGITANT MAX VELOCITY: 183.44 CM/S
ECHO TV REGURGITANT MAX VELOCITY: 183.44 CM/S
ECHO TV REGURGITANT PEAK GRADIENT: 11.79 MMHG
ECHO TV REGURGITANT PEAK GRADIENT: 13.46 MMHG
ECHO TV REGURGITANT PEAK GRADIENT: 13.46 MMHG
EST. AVERAGE GLUCOSE BLD GHB EST-MCNC: 100 MG/DL
GLUCOSE SERPL-MCNC: 89 MG/DL (ref 65–100)
HBA1C MFR BLD: 5.1 % (ref 4–5.6)
HDLC SERPL-MCNC: 60 MG/DL
HDLC SERPL: 2.9 {RATIO} (ref 0–5)
LDLC SERPL CALC-MCNC: 98.2 MG/DL (ref 0–100)
LIPID PROFILE,FLP: NORMAL
LVOT MG: 1.4 MMHG
POTASSIUM SERPL-SCNC: 3.5 MMOL/L (ref 3.5–5.1)
SODIUM SERPL-SCNC: 140 MMOL/L (ref 136–145)
TRIGL SERPL-MCNC: 84 MG/DL (ref ?–150)
TROPONIN I SERPL-MCNC: 0.06 NG/ML
TROPONIN I SERPL-MCNC: <0.05 NG/ML
VLDLC SERPL CALC-MCNC: 16.8 MG/DL

## 2020-11-10 PROCEDURE — 93306 TTE W/DOPPLER COMPLETE: CPT

## 2020-11-10 PROCEDURE — 74011000636 HC RX REV CODE- 636: Performed by: HOSPITALIST

## 2020-11-10 PROCEDURE — 84484 ASSAY OF TROPONIN QUANT: CPT

## 2020-11-10 PROCEDURE — 99218 HC RM OBSERVATION: CPT

## 2020-11-10 PROCEDURE — 83036 HEMOGLOBIN GLYCOSYLATED A1C: CPT

## 2020-11-10 PROCEDURE — 71275 CT ANGIOGRAPHY CHEST: CPT

## 2020-11-10 PROCEDURE — 80061 LIPID PANEL: CPT

## 2020-11-10 PROCEDURE — 74011250637 HC RX REV CODE- 250/637: Performed by: NURSE PRACTITIONER

## 2020-11-10 PROCEDURE — 80048 BASIC METABOLIC PNL TOTAL CA: CPT

## 2020-11-10 RX ADMIN — BUSPIRONE HYDROCHLORIDE 5 MG: 5 TABLET ORAL at 08:13

## 2020-11-10 RX ADMIN — ASPIRIN 81 MG: 81 TABLET, CHEWABLE ORAL at 08:12

## 2020-11-10 RX ADMIN — IOPAMIDOL 100 ML: 755 INJECTION, SOLUTION INTRAVENOUS at 11:24

## 2020-11-10 RX ADMIN — ISOSORBIDE DINITRATE 5 MG: 10 TABLET ORAL at 08:13

## 2020-11-10 RX ADMIN — MONTELUKAST SODIUM 10 MG: 10 TABLET ORAL at 08:12

## 2020-11-10 RX ADMIN — RISPERIDONE 2 MG: 1 TABLET, FILM COATED ORAL at 08:13

## 2020-11-10 RX ADMIN — SERTRALINE HYDROCHLORIDE 200 MG: 50 TABLET ORAL at 08:13

## 2020-11-10 RX ADMIN — AMLODIPINE BESYLATE 10 MG: 10 TABLET ORAL at 08:13

## 2020-11-10 RX ADMIN — PANTOPRAZOLE SODIUM 40 MG: 40 TABLET, DELAYED RELEASE ORAL at 08:13

## 2020-11-10 NOTE — DISCHARGE INSTRUCTIONS
Patient Education   activity as tolerated  Heart healthy diet     Chest Pain: Care Instructions  Your Care Instructions     There are many things that can cause chest pain. Some are not serious and will get better on their own in a few days. But some kinds of chest pain need more testing and treatment. Your doctor may have recommended a follow-up visit in the next 8 to 12 hours. If you are not getting better, you may need more tests or treatment. Even though your doctor has released you, you still need to watch for any problems. The doctor carefully checked you, but sometimes problems can develop later. If you have new symptoms or if your symptoms do not get better, get medical care right away. If you have worse or different chest pain or pressure that lasts more than 5 minutes or you passed out (lost consciousness), call 911 or seek other emergency help right away. A medical visit is only one step in your treatment. Even if you feel better, you still need to do what your doctor recommends, such as going to all suggested follow-up appointments and taking medicines exactly as directed. This will help you recover and help prevent future problems. How can you care for yourself at home? · Rest until you feel better. · Take your medicine exactly as prescribed. Call your doctor if you think you are having a problem with your medicine. · Do not drive after taking a prescription pain medicine. When should you call for help? Call 911 if:     · You passed out (lost consciousness).     · You have severe difficulty breathing.     · You have symptoms of a heart attack. These may include:  ? Chest pain or pressure, or a strange feeling in your chest.  ? Sweating. ? Shortness of breath. ? Nausea or vomiting. ? Pain, pressure, or a strange feeling in your back, neck, jaw, or upper belly or in one or both shoulders or arms. ? Lightheadedness or sudden weakness. ? A fast or irregular heartbeat.   After you call 911, the  may tell you to chew 1 adult-strength or 2 to 4 low-dose aspirin. Wait for an ambulance. Do not try to drive yourself. Call your doctor today if:     · You have any trouble breathing.     · Your chest pain gets worse.     · You are dizzy or lightheaded, or you feel like you may faint.     · You are not getting better as expected.     · You are having new or different chest pain. Where can you learn more? Go to http://www.murray.com/  Enter A120 in the search box to learn more about \"Chest Pain: Care Instructions. \"  Current as of: June 26, 2019               Content Version: 12.6  © 1418-1305 [employer]. Care instructions adapted under license by [employer] (which disclaims liability or warranty for this information). If you have questions about a medical condition or this instruction, always ask your healthcare professional. [address] any warranty or liability for your use of this information.

## 2020-11-10 NOTE — PROGRESS NOTES
Spiritual Care Assessment/Progress Note  [facility]      NAME: Bryce Rodas      MRN: [id number]  AGE: 62 y.o.  SEX: male  Congregation Affiliation: No preference   Language: English     11/10/2020     Total Time (in minutes): 20     Spiritual Assessment begun in [facility] [address] through conversation with:         [x]Patient        [] Family    [] Friend(s)        Reason for Consult: Initial/Spiritual assessment, patient floor     Spiritual beliefs: (Please include comment if needed)     [] Identifies with a tami tradition:         [] Supported by a tami community:            [] Claims no spiritual orientation:           [] Seeking spiritual identity:                [] Adheres to an individual form of spirituality:           [x] Not able to assess:                           Identified resources for coping:      [] Prayer                               [] Music                  [] Guided Imagery     [x] Family/friends                 [] Pet visits     [] Devotional reading                         [] Unknown     [] Other:                                               Interventions offered during this visit: (See comments for more details)    Patient Interventions: Affirmation of emotions/emotional suffering, Bridging, Catharsis/review of pertinent events in supportive environment, Initial/Spiritual assessment, patient floor, Interdisciplinary rounds, Normalization of emotional/spiritual concerns, Prayer (actual)           Plan of Care:     [] Support spiritual and/or cultural needs    [] Support AMD and/or advance care planning process      [] Support grieving process   [] Coordinate Rites and/or Rituals    [] Coordination with community clergy   [] No spiritual needs identified at this time   [] Detailed Plan of Care below (See Comments)  [] Make referral to Music Therapy  [] Make referral to Pet Therapy     [] Make referral to Addiction services  [] Make referral to Psychiatric hospital Passages  [] Make referral to Spiritual Care Partner  [] No future visits requested        [x] Follow up visits as needed     Comments:  Visited patient in [facility] acute care [facility]. Only patient was present during the visit. Patient shared about his present medical condition as well as his medical history which seemed to be his main concern. Sierra Anderson also shared about his relationship with his brother who seemed to be a source of support. Sierra Anderson said he has hard time hearing. IDT came in during visit and gave him an update. I provided silent support while nurse and IDT provided care. I facilitated storytelling and provided empathic presence and listening. I normalized his concern for well being. I offered to pray which he accepted and provided prayer. I advised of  availability. Chaplains will follow as able and/or needed. rylee Escalona M.Div.    can be reached by calling the  at [facility]  [phone]

## 2020-11-10 NOTE — ROUTINE PROCESS
Bedside shift change report given to [facility] LPN (oncoming nurse) by Joel Conklin RN (offgoing nurse). Report included the following information SBAR.

## 2020-11-10 NOTE — CONSULTS
CONSULTATION    REASON FOR CONSULT:  Chest Pain    REQUESTING PROVIDER:  Lewis Chao NP [facility] HM Team)    CHIEF COMPLAINT:    Chief Complaint   Patient presents with    Chest Pain         HISTORY OF PRESENT ILLNESS:  Mary Reese is a 62 y.o. 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 Pain improved to 2 out of 10 with ASA administration. Patient reported to admitting provider similar episode 2 months ago but did not seek tx. Workup in ED showed EKG with SR and no acute ischemic changes, negative troponin trends, , Potassium 3.0, unremarkable CXR, and UDS + for Cocaine and Ecstasy. Though Ecstasy likely s/t Antipsychotics on board. Patient denies any sx this am.  Reports he is feeling better. Used to follow with Dr. Leslie Chan [facility]), but has not seen him in over a year. Records from hospital admission course thus far reviewed. Telemetry Review: SR with occ PVC      PAST MEDICAL HISTORY:    Past Medical History:   Diagnosis Date    Anxiety     Arthritis     GERD (gastroesophageal reflux disease)     Hypercholesterolemia     Hypertension     Mild depressed bipolar 1 disorder ([facility] [address] 10/13/2020    Myocardial infarction ([facility] [address]     Obstructive sleep apnea 10/13/2020    Psychotic disorder (HCC)     Seizures ([facility] [address]     Stroke [facility])          HOME MEDICATIONS:    Prior to Admission Medications   Prescriptions Last Dose Informant Patient Reported? Taking?    albuterol (PROVENTIL HFA, VENTOLIN HFA, PROAIR HFA) 90 mcg/actuation inhaler   Yes No   amLODIPine (NORVASC) 10 mg tablet 11/9/2020 at Unknown time  Yes Yes   Sig: Take 10 mg by mouth daily. benztropine (COGENTIN) 0.5 mg tablet 11/8/2020 at Unknown time  No Yes   Sig: TAKE 1 TABLET AT BEDTIME   busPIRone (BUSPAR) 5 mg tablet 11/8/2020 at Unknown time  No Yes   Sig: TAKE 1 TABLET TWICE DAILY   isosorbide dinitrate (ISORDIL) 5 mg tablet 11/8/2020 at Unknown time  Yes Yes   Sig: Take 5 mg by mouth daily. montelukast (SINGULAIR) 10 mg tablet 11/8/2020 at Unknown time  Yes Yes   Sig: Take 10 mg by mouth daily. pantoprazole (PROTONIX) 40 mg tablet 11/8/2020 at Unknown time  Yes Yes   Sig: Take 40 mg by mouth daily. risperiDONE (RisperDAL) 2 mg tablet 11/8/2020 at Unknown time  Yes Yes   Sig: Take 2 mg by mouth two (2) times a day. sertraline (ZOLOFT) 100 mg tablet 11/8/2020 at Unknown time  Yes Yes   Sig: Take 200 mg by mouth daily. traZODone (DESYREL) 100 mg tablet 11/8/2020 at Unknown time  No Yes   Sig: TAKE 2 TABLETS AT BEDTIME      Facility-Administered Medications: None         ALLERGIES:    Allergies   Allergen Reactions    Codeine Unknown (comments)    Penicillins Unknown (comments)         FAMILY HISTORY:    Family History   Problem Relation Age of Onset    Depression Mother     No Known Problems Father          SOCIAL HISTORY:    Tobacco: + former  Drugs: + (UDS + for Cocaine)  ETOH: Beer weekly      REVIEW OF SYSTEMS:  Complete review of systems performed, pertinents noted above, all other systems are negative.     Patient Vitals for the past 24 hrs:   Temp Pulse Resp BP SpO2   11/10/20 0800  63      11/10/20 0728 97.7 °F (36.5 °C) 63 20 (!) 138/103 98 %   11/10/20 0400  63      11/10/20 0000  67      11/09/20 2000  68      11/09/20 1801 98 °F (36.7 °C) (!) 55 18 (!) 151/98 97 %   11/09/20 1630  (!) 56  (!) 155/102 96 %   11/09/20 1530  (!) 53  (!) 148/104 97 %   11/09/20 1330  (!) 56  (!) 147/98 99 %   11/09/20 1230  60  (!) 138/94 98 %   11/09/20 1054 98.4 °F (36.9 °C) 64 18 (!) 142/89 98 %       PHYSICAL EXAMINATION:   General: Well nourished lying in bed, NAD, A&O  HEENT: Normocephalic, PERRL, no drainage  Neck: Supple, Trachea midline, No JVD  RESP: CTA bilaterally with symmetrical chest movement. No SOB or distress. On RA  Cardiovascular: RRR no MRG  PVS: No rubor, cyanosis, no edema, Radial, DP, PT pulses equal bilaterally  ABD: normal , soft NT, Normoactive BS  Derm: Warm/Dry/Intact with no lesions, normal turgor  Neuro: A&O PPTS, cranial nerves II- XII grossly intact via interaction with patient. No focal deficits  PSYCH: No anxiety or agitation        Recent labs results and imaging reviewed. Recent Results (from the past 24 hour(s))   EKG, 12 LEAD, INITIAL    Collection Time: 11/09/20 10:55 AM   Result Value Ref Range    Ventricular Rate 64 BPM    Atrial Rate 63 BPM    P-R Interval 170 ms    QRS Duration 104 ms    Q-T Interval 484 ms    QTC Calculation (Bezet) 500 ms    Calculated P Axis 42 degrees    Calculated R Axis 15 degrees    Calculated T Axis 51 degrees    Diagnosis       Sinus rhythm  Borderline prolonged QT interval    Confirmed by [place] Masters ([id number]) on 11/9/2020 2:05:25 PM     DRUG SCREEN, URINE    Collection Time: 11/09/20 11:15 AM   Result Value Ref Range    AMPHETAMINES Negative Negative      BARBITURATES Negative Negative      BENZODIAZEPINES Negative Negative      COCAINE Positive (A) Negative      ECSTASY, MDMA Positive (A) Negative      METHADONE Negative Negative      OPIATES Negative Negative      PCP(PHENCYCLIDINE) Negative Negative      THC (TH-CANNABINOL) Negative Negative      Drug screen comment        This test is a screen for drugs of abuse in a medical setting only (i.e., they are unconfirmed results and as such must not be used for non-medical purposes, e.g.,employment testing, legal testing). Due to its inherent nature, false positive (FP) and false negative (FN) results may be obtained. Therefore, if necessary for medical care, recommend confirmation of positive findings by GC/MS.    URINALYSIS W/ RFLX MICROSCOPIC    Collection Time: 11/09/20 11:15 AM   Result Value Ref Range    Color Yellow/Straw      Appearance Clear Clear      Specific gravity 1.020 1.003 - 1.030      pH (UA) 5.5 5.0 - 8.0      Protein Negative Negative mg/dL    Glucose Negative Negative mg/dL    Ketone Negative Negative mg/dL    Bilirubin Negative Negative      Blood Negative Negative      Urobilinogen 0.2 0.2 - 1.0 EU/dL    Nitrites Negative Negative      Leukocyte Esterase Negative Negative     CBC WITH AUTOMATED DIFF    Collection Time: 11/09/20 11:19 AM   Result Value Ref Range    WBC 3.4 (L) 4.4 - 11.3 K/uL    RBC 4.12 (L) 4.50 - 5.90 M/uL    HGB 13.6 13.5 - 17.5 g/dL    HCT 39.6 (L) 41 - 53 %    MCV 96.2 80 - 100 FL    MCH 33.0 31 - 34 PG    MCHC 34.4 31.0 - 36.0 g/dL    RDW 13.4 11.5 - 14.5 %    PLATELET 991 K/uL    MPV 7.9 6.5 - 11.5 FL    NRBC 0.0  WBC    ABSOLUTE NRBC 0.00 K/uL    NEUTROPHILS 55 42 - 75 %    LYMPHOCYTES 32 20.5 - 51.1 %    MONOCYTES 9 1.7 - 9.3 %    EOSINOPHILS 3 (H) 0.9 - 2.9 %    BASOPHILS 1 0.0 - 2.5 %    ABS. NEUTROPHILS 1.9 1.8 - 7.7 K/UL    ABS. LYMPHOCYTES 1.1 1.0 - 4.8 K/UL    ABS. MONOCYTES 0.3 0.2 - 2.4 K/UL    ABS. EOSINOPHILS 0.1 0.0 - 0.7 K/UL    ABS.  BASOPHILS 0.0 0.0 - 0.2 K/UL   PROTHROMBIN TIME + INR    Collection Time: 11/09/20 11:19 AM   Result Value Ref Range    Prothrombin time 10.8 9.0 - 11.1 sec    INR 1.1 0.9 - 1.1     METABOLIC PANEL, COMPREHENSIVE    Collection Time: 11/09/20 11:19 AM   Result Value Ref Range    Sodium 137 136 - 145 mmol/L    Potassium 3.0 (L) 3.5 - 5.1 mmol/L    Chloride 100 97 - 108 mmol/L    CO2 31 21 - 32 mmol/L    Anion gap 6 5 - 15 mmol/L    Glucose 172 (H) 65 - 100 mg/dL    BUN 11 6 - 20 mg/dL    Creatinine 1.20 0.70 - 1.30 mg/dL    BUN/Creatinine ratio 9 (L) 12 - 20      GFR est AA >60 >60 ml/min/1.73m2    GFR est non-AA >60 >60 ml/min/1.73m2    Calcium 8.7 8.5 - 10.1 mg/dL    Bilirubin, total 0.4 0.2 - 1.0 mg/dL    AST (SGOT) 19 15 - 37 U/L    ALT (SGPT) 21 12 - 78 U/L    Alk. phosphatase 59 45 - 117 U/L    Protein, total 6.6 6.4 - 8.2 g/dL    Albumin 3.5 3.5 - 5.0 g/dL    Globulin 3.1 2.0 - 4.0 g/dL    A-G Ratio 1.1 1.1 - 2.2     TROPONIN I    Collection Time: 11/09/20 11:19 AM   Result Value Ref Range    Troponin-I, Qt. <0.05 <0.05 ng/mL   MAGNESIUM    Collection Time: 11/09/20 11:19 AM   Result Value Ref Range    Magnesium 1.9 1.6 - 2.4 mg/dL   BNP    Collection Time: 11/09/20 11:19 AM   Result Value Ref Range    NT pro- (H) <125 pg/mL   TROPONIN I    Collection Time: 11/09/20  1:03 PM   Result Value Ref Range    Troponin-I, Qt. <0.05 <0.05 ng/mL   TROPONIN I    Collection Time: 11/09/20  8:00 PM   Result Value Ref Range    Troponin-I, Qt. <0.05 <0.05 ng/mL   TROPONIN I    Collection Time: 11/10/20  6:13 AM   Result Value Ref Range    Troponin-I, Qt. <0.05 <2.94 ng/mL   METABOLIC PANEL, BASIC    Collection Time: 11/10/20  6:13 AM   Result Value Ref Range    Sodium 140 136 - 145 mmol/L    Potassium 3.5 3.5 - 5.1 mmol/L    Chloride 104 97 - 108 mmol/L    CO2 30 21 - 32 mmol/L    Anion gap 6 5 - 15 mmol/L    Glucose 89 65 - 100 mg/dL    BUN 11 6 - 20 mg/dL    Creatinine 1.13 0.70 - 1.30 mg/dL    BUN/Creatinine ratio 10 (L) 12 - 20      GFR est AA >60 >60 ml/min/1.73m2    GFR est non-AA >60 >60 ml/min/1.73m2    Calcium 8.5 8.5 - 10.1 mg/dL   TROPONIN I    Collection Time: 11/10/20  7:47 AM   Result Value Ref Range    Troponin-I, Qt. 0.06 (H) <0.05 ng/mL   ECHO ADULT COMPLETE    Collection Time: 11/10/20  9:06 AM   Result Value Ref Range    LV ED Vol A2C 148. 96 mL    IVSd 0.93 0.6 - 1.0 cm    LVIDd 5.52 4.2 - 5.9 cm    LVIDs 4.75 cm    LVOT d 2.74 cm    LVPWd 1.03 (A) 0.6 - 1.0 cm    LVOT SV 61.1 mL    LVOT SV 61.1 mL    BP EF 35.1 (A) 55 - 100 percent    LV Ejection Fraction MOD 2C 38 percent    LV Ejection Fraction MOD 4C 38 percent    LV ED Vol .50 67 - 155 mL    LV ES Vol A2C 74.97 mL    LV ES Vol BP 69.14 (A) 22 - 58 mL    LVOT Peak Gradient 2.54 mmHg    Left Ventricular Outflow Tract Mean Gradient 1.40 mmHg    LVOT Peak Velocity 79.63 cm/s    LVOT VTI 17.33 cm    LA Volume 54.51 18 - 58 mL    LA Vol 2C 43.23 18 - 58 mL    LA Vol 4C 45.14 18 - 58 mL    Aortic Valve Area by Continuity of Peak Velocity 4.44 cm2    Aortic Valve Area by Continuity of VTI 4.62 cm2    AoV PG 4.48 mmHg    Aortic Valve Systolic Mean Gradient 9.62 mmHg    Aortic Valve Systolic Peak Velocity 240.08 cm/s    Aortic valve mean velocity 68.09 cm/s    AoV VTI 22.15 cm    MV A Vlad 67.93 cm/s    Mitral Valve E Wave Deceleration Time 207.60 ms    MV E Vlad 49.44 cm/s    MV E/A 0.73     Mitral Valve Pressure Half-time 60.20 ms    MVA (PHT) 3.65 cm2    Triscuspid Valve Regurgitation Peak Gradient 13.46 mmHg    TR Max Velocity 183.44 cm/s    Ao Root D 4.05 cm    LV E' Lateral Velocity 70.00 cm/s    RVIDd 3.2 cm    LV Mass .9 88 - 224 g    LV Mass AL Index 88.3 49 - 115 g/m2    E/E' lateral 0.71     Right Atrial Area 4C 19.0 cm2    RVSP 17.0 mmHg    LVES Vol Index BP 29.2 mL/m2    LVED Vol Index BP 45.0 mL/m2    Est. RA Pressure 3.0 mmHg    LA Vol Index 23.05 16 - 28 ml/m2    LA Vol Index 18.28 16 - 28 ml/m2    LA Vol Index 19.09 16 - 28 ml/m2    LVED Vol Index A2C 63.0 mL/m2    LVES Vol Index A2C 31.7 mL/m2    GAVINO/BSA Pk Vlad 1.9 cm2/m2    GAVINO/BSA VTI 2.0 cm2/m2       XR Results (maximum last 3): Results from [place] encounter on 11/09/20   XR CHEST PORT    Impression Impression: Blunting of the left costophrenic angle as above.            Current Facility-Administered Medications:     sodium chloride (NS) flush 5-40 mL, 5-40 mL, IntraVENous, PRN, Oba, Oluwabunmi S, NP    nitroglycerin (NITROSTAT) tablet 0.4 mg, 0.4 mg, SubLINGual, Q5MIN PRN, Oba, [place], NP    acetaminophen (TYLENOL) tablet 650 mg, 650 mg, Oral, Q4H PRN, Oba, Evertonuwabunmi S, NP    morphine injection 2 mg, 2 mg, IntraVENous, Q4H PRN, Oba, Evertonuwablindsay VAUGHN, NP    ondansetron (ZOFRAN ODT) tablet 4 mg, 4 mg, Oral, Q6H PRN, Oba, Oluwabunmi S, NP    enoxaparin (LOVENOX) injection 40 mg, 40 mg, SubCUTAneous, Q24H, Oba, Oluwabunmi S, NP, 40 mg at 11/09/20 1826    amLODIPine (NORVASC) tablet 10 mg, 10 mg, Oral, DAILY, Oba, Oluwabunmi S, NP, 10 mg at 11/10/20 0813    benztropine (COGENTIN) tablet 0.5 mg, 0.5 mg, Oral, QHS, Oba, Oluwabunmi S, NP, 0.5 mg at 11/09/20 2053    busPIRone (BUSPAR) tablet 5 mg, 5 mg, Oral, Q12H, Oba, Oluwabunmi S, NP, 5 mg at 11/10/20 0813    isosorbide dinitrate (ISORDIL) tablet 5 mg, 5 mg, Oral, DAILY, Oba, Oluwabunmi S, NP, 5 mg at 11/10/20 0813    montelukast (SINGULAIR) tablet 10 mg, 10 mg, Oral, DAILY, Oba, Oluwabunmi S, NP, 10 mg at 11/10/20 0956    pantoprazole (PROTONIX) tablet 40 mg, 40 mg, Oral, ACB, Oba, Oluwabunmi S, NP, 40 mg at 11/10/20 0813    risperiDONE (RisperDAL) tablet 2 mg, 2 mg, Oral, Q12H, Oba, Oluwabunmi S, NP, 2 mg at 11/10/20 0813    sertraline (ZOLOFT) tablet 200 mg, 200 mg, Oral, DAILY, Oba, Oluwabunmi S, NP, 200 mg at 11/10/20 0813    traZODone (DESYREL) tablet 100 mg, 100 mg, Oral, QHS, Oba, Oluwabunmi S, NP, 100 mg at 11/09/20 2052    aspirin chewable tablet 81 mg, 81 mg, Oral, DAILY, Oba, Oluwabunmi S, NP, 81 mg at 11/10/20 3096          Case discussed with collaborating physician Dr. Arline Robisn and our impression and recommendations are as follows:   1. Chest Pain - ? Drug related as UDS + cocaine. ACS has been ruled out with EKG and Troponin critieria. Echo showing decreased EF 35%, but unknown patient's baseline as he is overall poor historian. Would recommend close follow-up with his established Cardiologist in next 1-2 weeks for further workup. Continue nitrates and risk factor modification in mean time. Discussed drug abstinence. 2. Hypokalemia - replete to goal 4.5 - 5.   3. HTN - BP above goal. Would increase Isosorbide Dinitrate to 10mg PO daily. 4. HLD - awaiting Lipid panel. Further adjustment to regimen @ OP f/u.   5. HFrEF - ?chronicity. Well compensated currently and euvolemic. Recommend further workup in OP setting. Continue GDMT. Avoid BB currently given Cocaine use. Consider starting ACE/ARB @ OP follow-up. 6. Congenital heart defect - reports open heart surgery but again pt not aware of specifics. Aortic root mildly dilated on Echo this am.  Given sharp nature of chest pain recommend CTA  Chest prior to DC to ensure stability of great vessels. Thank you for involving us in the care of this patient. Please do not hesitate to call if additional questions arise.  If after hours please call [phone]

## 2020-11-10 NOTE — DISCHARGE SUMMARY
Discharge Summary       PATIENT ID: Mary Reese  MRN: [id number]   YOB: 1965    DATE OF ADMISSION: 11/9/2020 10:51 AM    DATE OF DISCHARGE: 11/10/2020   PRIMARY CARE PROVIDER: Bebo Kitchen MD     ATTENDING PHYSICIAN: Valerie Gottlieb  DISCHARGING PROVIDER: Liana Wells MD    To contact this individual call [phone] and ask the  to page. If unavailable ask to be transferred the Adult Hospitalist Department. CONSULTATIONS: IP CONSULT TO CARDIOLOGY    PROCEDURES/SURGERIES: * No surgery found *    ADMITTING [address] [place] [address] COURSE:     HPI  Mary Reese is a 62 y.o. male  with a PMHx significant for HTN, MI, CABG, Stroke, Dyslipidemia, GERD, and DANNIELLE (uses Cpap), who presents to the ED today with complaints of chest pain onset since 7 am today, described as sharp mid substernal nonradiating initially 7 out of 10; patient was a passenger in a car when symptoms initially started; patient denies any associated nausea, vomiting, shortness of breath, or dizziness, patient received 4 aspirins en route, chest pain currently rated 2 out of 10; patient reported similar episode 2 months ago but was not evaluated by any medical professional, he however denies any fever, chills, or general malaise. No sick contacts. In the ED, his ECG shows a SR, no ischemic changes, his troponin is < 0.05 x 2, bnp 131. CXR unremarkable. Hospitalist was summoned for further evaluation. Hospital Course  And presents with onset of chest pain in the morning of the date of admission. Patient's chest pain has resolved after the admission. Patient with negative cardiac enzymes and EKG unremarkable for ischemic changes. Echo shows EF of 35%, no prior dated to compare. Patient follows with a cardiologist at [facility]. Appears compensated without signs or symptoms of fluid overload. Patient had CT of the chest shows no PE and shows mild dilated aortic root without dissection.   Patient discharged to home and recommended follow-up with his cardiologist for further evaluation if the pain persists. DISCHARGE DIAGNOSES / PLAN:      1. Chest pain  2. Aortic root dilatation  3. Cardiomyopathy  4. Hypertension  5. Bipolar disorder  6. Obstructive sleep apnea  7. Drug abuse     ADDITIONAL CARE RECOMMENDATIONS:     None    PENDING TEST RESULTS:   At the time of discharge the following test results are still pending: None    FOLLOW UP APPOINTMENTS:    Follow-up Information     Follow up With Specialties Details Why Contact Info    Geremias Matt MD Internal Medicine   [facility]  [phone]               DIET: Cardiac Diet  Oral Nutritional Supplements: No Oral Supplement prescribed    ACTIVITY: Activity as tolerated    WOUND CARE: None    EQUIPMENT needed: None      DISCHARGE MEDICATIONS:  Current Discharge Medication List      CONTINUE these medications which have NOT CHANGED    Details   amLODIPine (NORVASC) 10 mg tablet Take 10 mg by mouth daily. isosorbide dinitrate (ISORDIL) 5 mg tablet Take 5 mg by mouth daily. montelukast (SINGULAIR) 10 mg tablet Take 10 mg by mouth daily. pantoprazole (PROTONIX) 40 mg tablet Take 40 mg by mouth daily. risperiDONE (RisperDAL) 2 mg tablet Take 2 mg by mouth two (2) times a day. sertraline (ZOLOFT) 100 mg tablet Take 200 mg by mouth daily. busPIRone (BUSPAR) 5 mg tablet TAKE 1 TABLET TWICE DAILY  Qty: 180 Tab, Refills: 0      traZODone (DESYREL) 100 mg tablet TAKE 2 TABLETS AT BEDTIME  Qty: 180 Tab, Refills: 0      benztropine (COGENTIN) 0.5 mg tablet TAKE 1 TABLET AT BEDTIME  Qty: 90 Tab, Refills: 0      albuterol (PROVENTIL HFA, VENTOLIN HFA, PROAIR HFA) 90 mcg/actuation inhaler                NOTIFY YOUR PHYSICIAN FOR ANY OF THE FOLLOWING:   Fever over 101 degrees for 24 hours. Chest pain, shortness of breath, fever, chills, nausea, vomiting, diarrhea, change in mentation, falling, weakness, bleeding.  Severe pain or pain not relieved by medications. Or, any other signs or symptoms that you may have questions about. DISPOSITION:    Home With:   OT  PT  HH  RN       Long term SNF/Inpatient Rehab    Independent/assisted living    Hospice    Other:       PATIENT CONDITION AT DISCHARGE:     Functional status    Poor     Deconditioned     Independent      Cognition     Lucid     Forgetful     Dementia      Catheters/lines (plus indication)    Jimenez     PICC     PEG     None      Code status     Full code     DNR      PHYSICAL EXAMINATION AT DISCHARGE:  General:          Alert, cooperative, no distress, appears stated age. HEENT:           Atraumatic, anicteric sclerae, pink conjunctivae                          No oral ulcers, mucosa moist, throat clear, dentition fair  Neck:               Supple, symmetrical  Lungs:             Clear to auscultation bilaterally. No Wheezing or Rhonchi. No rales. Chest wall:      No tenderness  No Accessory muscle use. Heart:              Regular  rhythm,  No  murmur   No edema  Abdomen:        Soft, non-tender. Not distended. Bowel sounds normal  Extremities:     No cyanosis. No clubbing,                            Skin turgor normal, Capillary refill normal  Skin:                Not pale. Not Jaundiced  No rashes   Psych:             Not anxious or agitated.   Neurologic:      Alert, moves all extremities, answers questions appropriately and responds to commands       CHRONIC MEDICAL DIAGNOSES:  Problem List as of 11/10/2020 Never Reviewed          Codes Class Noted - Resolved    Chest pain ICD-10-CM: R07.9  ICD-9-CM: 786.50  11/9/2020 - Present        Mild depressed bipolar 1 disorder ([facility] ICD-10-CM: F31.31  ICD-9-CM: 296.51  10/13/2020 - Present        Obstructive sleep apnea ICD-10-CM: G47.33  ICD-9-CM: 327.23  10/13/2020 - Present              Greater than 60 minutes were spent with the patient on counseling and coordination of care    Signed:   Jeison James MD  11/10/2020  12:31 PM

## 2020-11-23 RX ORDER — BENZTROPINE MESYLATE 0.5 MG/1
TABLET ORAL
Qty: 90 TAB | Refills: 0 | Status: SHIPPED | OUTPATIENT
Start: 2020-11-23 | End: 2021-01-28

## 2020-11-23 RX ORDER — TRAZODONE HYDROCHLORIDE 100 MG/1
TABLET ORAL
Qty: 180 TAB | Refills: 0 | Status: SHIPPED | OUTPATIENT
Start: 2020-11-23 | End: 2021-01-28

## 2020-12-20 RX ORDER — RISPERIDONE 2 MG/1
TABLET, FILM COATED ORAL
Qty: 180 TAB | Refills: 0 | Status: SHIPPED | OUTPATIENT
Start: 2020-12-20 | End: 2021-02-18

## 2020-12-22 RX ORDER — BUSPIRONE HYDROCHLORIDE 5 MG/1
TABLET ORAL
Qty: 180 TAB | Refills: 0 | Status: SHIPPED | OUTPATIENT
Start: 2020-12-22 | End: 2021-02-23

## 2021-01-28 RX ORDER — BENZTROPINE MESYLATE 0.5 MG/1
TABLET ORAL
Qty: 90 TAB | Refills: 0 | Status: SHIPPED | OUTPATIENT
Start: 2021-01-28 | End: 2021-04-19 | Stop reason: SDUPTHER

## 2021-01-28 RX ORDER — TRAZODONE HYDROCHLORIDE 100 MG/1
TABLET ORAL
Qty: 180 TAB | Refills: 0 | Status: SHIPPED | OUTPATIENT
Start: 2021-01-28 | End: 2021-04-19 | Stop reason: SDUPTHER

## 2021-02-18 RX ORDER — RISPERIDONE 2 MG/1
TABLET, FILM COATED ORAL
Qty: 180 TAB | Refills: 0 | Status: SHIPPED | OUTPATIENT
Start: 2021-02-18 | End: 2021-04-19 | Stop reason: SDUPTHER

## 2021-02-23 RX ORDER — BUSPIRONE HYDROCHLORIDE 5 MG/1
TABLET ORAL
Qty: 180 TAB | Refills: 0 | Status: SHIPPED | OUTPATIENT
Start: 2021-02-23 | End: 2021-04-19 | Stop reason: SDUPTHER

## 2021-04-19 RX ORDER — TRAZODONE HYDROCHLORIDE 100 MG/1
TABLET ORAL
Qty: 180 TAB | Refills: 0 | Status: SHIPPED | OUTPATIENT
Start: 2021-04-19 | End: 2021-08-24

## 2021-04-19 RX ORDER — SERTRALINE HYDROCHLORIDE 100 MG/1
200 TABLET, FILM COATED ORAL DAILY
Qty: 180 TAB | Refills: 0 | Status: SHIPPED | OUTPATIENT
Start: 2021-04-19 | End: 2021-07-18

## 2021-04-19 RX ORDER — RISPERIDONE 2 MG/1
TABLET, FILM COATED ORAL
Qty: 180 TAB | Refills: 0 | Status: SHIPPED | OUTPATIENT
Start: 2021-04-19 | End: 2021-08-24

## 2021-04-19 RX ORDER — BUSPIRONE HYDROCHLORIDE 5 MG/1
TABLET ORAL
Qty: 180 TAB | Refills: 0 | Status: SHIPPED | OUTPATIENT
Start: 2021-04-19 | End: 2021-06-21

## 2021-04-19 RX ORDER — BENZTROPINE MESYLATE 0.5 MG/1
TABLET ORAL
Qty: 90 TAB | Refills: 0 | Status: SHIPPED | OUTPATIENT
Start: 2021-04-19 | End: 2021-08-24

## 2021-04-19 NOTE — TELEPHONE ENCOUNTER
Requested Prescriptions     Pending Prescriptions Disp Refills    traZODone (DESYREL) 100 mg tablet 180 Tab 0    sertraline (ZOLOFT) 100 mg tablet       Sig: Take 2 Tabs by mouth daily.  risperiDONE (RisperDAL) 2 mg tablet 180 Tab 0    benztropine (COGENTIN) 0.5 mg tablet 90 Tab 0    busPIRone (BUSPAR) 5 mg tablet 180 Tab 0     Patient said that he was out of all his medication but did not have all the names of them. Please check and see if these are correct and see if I missed anything.

## 2021-06-21 RX ORDER — BUSPIRONE HYDROCHLORIDE 5 MG/1
TABLET ORAL
Qty: 180 TABLET | Refills: 0 | Status: SHIPPED | OUTPATIENT
Start: 2021-06-21 | End: 2021-06-28 | Stop reason: SDUPTHER

## 2021-06-28 ENCOUNTER — OFFICE VISIT (OUTPATIENT)
Dept: BEHAVIORAL/MENTAL HEALTH CLINIC | Age: 56
End: 2021-06-28
Payer: MEDICARE

## 2021-06-28 VITALS — BODY MASS INDEX: 32.69 KG/M2 | WEIGHT: 254.6 LBS

## 2021-06-28 DIAGNOSIS — F33.1 MODERATE EPISODE OF RECURRENT MAJOR DEPRESSIVE DISORDER (HCC): Primary | ICD-10-CM

## 2021-06-28 PROCEDURE — G9717 DOC PT DX DEP/BP F/U NT REQ: HCPCS | Performed by: NURSE PRACTITIONER

## 2021-06-28 PROCEDURE — 99214 OFFICE O/P EST MOD 30 MIN: CPT | Performed by: NURSE PRACTITIONER

## 2021-06-28 PROCEDURE — G8427 DOCREV CUR MEDS BY ELIG CLIN: HCPCS | Performed by: NURSE PRACTITIONER

## 2021-06-28 PROCEDURE — G8417 CALC BMI ABV UP PARAM F/U: HCPCS | Performed by: NURSE PRACTITIONER

## 2021-06-28 PROCEDURE — 3017F COLORECTAL CA SCREEN DOC REV: CPT | Performed by: NURSE PRACTITIONER

## 2021-06-28 RX ORDER — BUSPIRONE HYDROCHLORIDE 10 MG/1
TABLET ORAL
Qty: 180 TABLET | Refills: 0 | Status: SHIPPED | OUTPATIENT
Start: 2021-06-28 | End: 2021-08-24

## 2021-06-28 NOTE — PROGRESS NOTES
Narciso Mathur is a 60 y.o. male who presents today for the following:  Chief Complaint   Patient presents with    Follow-up     \"I'm so so. I've been getting depression every now and then. \"    Depression    Anxiety       Allergies   Allergen Reactions    Codeine Unknown (comments)    Penicillins Unknown (comments)       Current Outpatient Medications   Medication Sig    busPIRone (BUSPAR) 10 mg tablet Take 1 tablet twice daily    traZODone (DESYREL) 100 mg tablet TAKE 2 TABLETS AT BEDTIME    sertraline (ZOLOFT) 100 mg tablet Take 2 Tabs by mouth daily for 90 days.  risperiDONE (RisperDAL) 2 mg tablet TAKE 1 TABLET TWICE DAILY    benztropine (COGENTIN) 0.5 mg tablet TAKE 1 TABLET EVERY NIGHT AT BEDTIME    albuterol (PROVENTIL HFA, VENTOLIN HFA, PROAIR HFA) 90 mcg/actuation inhaler     amLODIPine (NORVASC) 10 mg tablet Take 10 mg by mouth daily.  isosorbide dinitrate (ISORDIL) 5 mg tablet Take 5 mg by mouth daily.  montelukast (SINGULAIR) 10 mg tablet Take 10 mg by mouth daily.  pantoprazole (PROTONIX) 40 mg tablet Take 40 mg by mouth daily. No current facility-administered medications for this visit.        Past Medical History:   Diagnosis Date    Anxiety     Arthritis     GERD (gastroesophageal reflux disease)     Hypercholesterolemia     Hypertension     Mild depressed bipolar 1 disorder ([facility] 10/13/2020    Myocardial infarction ([facility]     Obstructive sleep apnea 10/13/2020    Psychotic disorder (HCC)     Seizures (HCC)     Stroke [facility])        Past Surgical History:   Procedure Laterality Date    [place] ABDOMEN SURGERY PROC UNLISTED         Family History   Problem Relation Age of Onset    Depression Mother     No Known Problems Father        Social History     Socioeconomic History    Marital status:      Spouse name: Not on file    Number of children: Not on file    Years of education: Not on file    Highest education level: Not on file   Occupational History    Not on file   Tobacco Use    Smoking status: Former Smoker    Smokeless tobacco: Never Used   Substance and Sexual Activity    Alcohol use: Yes     Alcohol/week: 2.0 standard drinks     Types: 2 Cans of beer per week    Drug use: Never    Sexual activity: Yes   Other Topics Concern    Not on file   Social History Narrative    Not on file     Social Determinants of Health     Financial Resource Strain:     Difficulty of Paying Living Expenses:    Food Insecurity:     Worried About Running Out of Food in the Last Year:     [address] in the Last Year:    Transportation Needs:     Lack of Transportation (Medical):  Lack of Transportation (Non-Medical):    Physical Activity:     Days of Exercise per Week:     Minutes of Exercise per Session:    Stress:     Feeling of Stress :    Social Connections:     Frequency of Communication with Friends and Family:     Frequency of Social Gatherings with Friends and Family:     Attends Yazidi Services:     Active Member of Clubs or Organizations:     Attends Club or Organization Meetings:     Marital Status:    Intimate Partner Violence:     Fear of Current or Ex-Partner:     Emotionally Abused:     Physically Abused:     Sexually Abused:          Mr. Lana Matos follows up in clinic for depressed bipolar 1 disorder. Patient last visited the clinic January 24, 2020 which BuSpar was added for depression and anxiety symptoms. He continues Zoloft 100 mg take 2 tablets daily, risperidone 2 mg tablet take 1 tablet twice daily and trazodone 100 mg take 2 tablets at bedtime as needed for sleep. Presents to the clinic unaccompanied, fully alert and oriented. Gait is stable with assistance of a walker. Appearance-mildly disheveled. Patient reports that he has been depressed since the loss of his cousin about 2 months ago. He reports having crying spells and feeling depressed and having trouble sleeping. Appetite-is stable.   He reports seeing shadows occasionally. He denies suicidal/homicidal thinking, risk for suicide is low to moderate based on history but there is no acute concern at this time. No observations of patient responding to internal stimuli. Patient is requesting \"upping\" his medication to help deal with depressive symptoms. He reports good family support. It is noted patient lives alone and family lives close by so he can visit them regularly. No alcohol or drug use reported. Review of Systems   HENT: Positive for hearing loss. Musculoskeletal: Positive for back pain and joint pain (knees). Psychiatric/Behavioral: Positive for depression. All other systems reviewed and are negative. Visit Vitals  Wt 115.5 kg (254 lb 9.6 oz)   BMI 32.69 kg/m²     Physical Exam  Psychiatric:         Attention and Perception: Attention and perception normal.         Mood and Affect: Mood is depressed. Affect is flat. Speech: Speech normal.         Behavior: Behavior is withdrawn. Thought Content: Thought content normal.         Cognition and Memory: Cognition and memory normal.         Judgment: Judgment normal.            Plan:    Change BuSpar to 10 mg take 1 tablet twice daily. He may continue the above medications as prescribed. Advised patient to avoid smoking, alcohol and drug use. Follow-up with medical provider as appropriate. For emergencies-call 911 or go to the emergency department for suicidal/homicidal thinking.

## 2021-08-23 DIAGNOSIS — F33.1 MODERATE EPISODE OF RECURRENT MAJOR DEPRESSIVE DISORDER (HCC): ICD-10-CM

## 2021-08-24 RX ORDER — BUSPIRONE HYDROCHLORIDE 10 MG/1
TABLET ORAL
Qty: 180 TABLET | Refills: 0 | Status: SHIPPED | OUTPATIENT
Start: 2021-08-24 | End: 2021-12-09 | Stop reason: SDUPTHER

## 2021-08-24 RX ORDER — RISPERIDONE 2 MG/1
TABLET, FILM COATED ORAL
Qty: 180 TABLET | Refills: 0 | Status: SHIPPED | OUTPATIENT
Start: 2021-08-24 | End: 2021-12-09 | Stop reason: SDUPTHER

## 2021-08-24 RX ORDER — TRAZODONE HYDROCHLORIDE 100 MG/1
TABLET ORAL
Qty: 180 TABLET | Refills: 0 | Status: SHIPPED | OUTPATIENT
Start: 2021-08-24 | End: 2021-12-09 | Stop reason: SDUPTHER

## 2021-08-24 RX ORDER — BENZTROPINE MESYLATE 0.5 MG/1
TABLET ORAL
Qty: 90 TABLET | Refills: 0 | Status: SHIPPED | OUTPATIENT
Start: 2021-08-24 | End: 2021-12-09 | Stop reason: SDUPTHER

## 2021-09-16 RX ORDER — SERTRALINE HYDROCHLORIDE 100 MG/1
TABLET, FILM COATED ORAL
Qty: 180 TABLET | Refills: 1 | Status: SHIPPED | OUTPATIENT
Start: 2021-09-16 | End: 2022-05-23

## 2021-12-09 DIAGNOSIS — G47.33 OBSTRUCTIVE SLEEP APNEA: ICD-10-CM

## 2021-12-09 DIAGNOSIS — F31.31 MILD DEPRESSED BIPOLAR 1 DISORDER (HCC): Primary | ICD-10-CM

## 2021-12-09 DIAGNOSIS — F33.1 MODERATE EPISODE OF RECURRENT MAJOR DEPRESSIVE DISORDER (HCC): ICD-10-CM

## 2021-12-09 RX ORDER — BUSPIRONE HYDROCHLORIDE 10 MG/1
10 TABLET ORAL 2 TIMES DAILY
Qty: 180 TABLET | Refills: 0 | Status: SHIPPED | OUTPATIENT
Start: 2021-12-09 | End: 2022-03-08

## 2021-12-09 RX ORDER — BENZTROPINE MESYLATE 0.5 MG/1
TABLET ORAL
Qty: 90 TABLET | Refills: 0 | Status: SHIPPED | OUTPATIENT
Start: 2021-12-09 | End: 2022-03-08

## 2021-12-09 RX ORDER — RISPERIDONE 2 MG/1
2 TABLET, FILM COATED ORAL 2 TIMES DAILY
Qty: 180 TABLET | Refills: 0 | Status: SHIPPED | OUTPATIENT
Start: 2021-12-09 | End: 2022-03-08

## 2021-12-09 RX ORDER — TRAZODONE HYDROCHLORIDE 100 MG/1
TABLET ORAL
Qty: 180 TABLET | Refills: 0 | Status: SHIPPED | OUTPATIENT
Start: 2021-12-09 | End: 2022-03-08

## 2022-03-07 DIAGNOSIS — F31.31 MILD DEPRESSED BIPOLAR 1 DISORDER (HCC): ICD-10-CM

## 2022-03-07 DIAGNOSIS — F33.1 MODERATE EPISODE OF RECURRENT MAJOR DEPRESSIVE DISORDER (HCC): ICD-10-CM

## 2022-03-07 DIAGNOSIS — G47.33 OBSTRUCTIVE SLEEP APNEA: ICD-10-CM

## 2022-03-08 RX ORDER — BUSPIRONE HYDROCHLORIDE 10 MG/1
TABLET ORAL
Qty: 180 TABLET | Refills: 0 | Status: SHIPPED | OUTPATIENT
Start: 2022-03-08 | End: 2022-05-23

## 2022-03-08 RX ORDER — TRAZODONE HYDROCHLORIDE 100 MG/1
TABLET ORAL
Qty: 180 TABLET | Refills: 0 | Status: SHIPPED | OUTPATIENT
Start: 2022-03-08 | End: 2022-05-23

## 2022-03-08 RX ORDER — BENZTROPINE MESYLATE 0.5 MG/1
TABLET ORAL
Qty: 90 TABLET | Refills: 0 | Status: SHIPPED | OUTPATIENT
Start: 2022-03-08 | End: 2022-05-23

## 2022-03-08 RX ORDER — RISPERIDONE 2 MG/1
TABLET, FILM COATED ORAL
Qty: 180 TABLET | Refills: 0 | Status: SHIPPED | OUTPATIENT
Start: 2022-03-08 | End: 2022-05-23

## 2022-03-18 PROBLEM — F31.31: Status: ACTIVE | Noted: 2020-10-13

## 2022-03-19 PROBLEM — G47.33 OBSTRUCTIVE SLEEP APNEA: Status: ACTIVE | Noted: 2020-10-13

## 2022-03-19 PROBLEM — R07.9 CHEST PAIN: Status: ACTIVE | Noted: 2020-11-09

## 2022-05-20 DIAGNOSIS — F31.31 MILD DEPRESSED BIPOLAR 1 DISORDER (HCC): ICD-10-CM

## 2022-05-20 DIAGNOSIS — F33.1 MODERATE EPISODE OF RECURRENT MAJOR DEPRESSIVE DISORDER (HCC): ICD-10-CM

## 2022-05-20 DIAGNOSIS — G47.33 OBSTRUCTIVE SLEEP APNEA: ICD-10-CM

## 2022-05-23 RX ORDER — BENZTROPINE MESYLATE 0.5 MG/1
TABLET ORAL
Qty: 90 TABLET | Refills: 0 | Status: SHIPPED | OUTPATIENT
Start: 2022-05-23

## 2022-05-23 RX ORDER — RISPERIDONE 2 MG/1
TABLET, FILM COATED ORAL
Qty: 180 TABLET | Refills: 0 | Status: SHIPPED | OUTPATIENT
Start: 2022-05-23 | End: 2022-06-14 | Stop reason: SDUPTHER

## 2022-05-23 RX ORDER — SERTRALINE HYDROCHLORIDE 100 MG/1
TABLET, FILM COATED ORAL
Qty: 180 TABLET | Refills: 1 | Status: SHIPPED | OUTPATIENT
Start: 2022-05-23 | End: 2022-06-14 | Stop reason: SDUPTHER

## 2022-05-23 RX ORDER — BUSPIRONE HYDROCHLORIDE 10 MG/1
TABLET ORAL
Qty: 180 TABLET | Refills: 0 | Status: SHIPPED | OUTPATIENT
Start: 2022-05-23 | End: 2022-06-14 | Stop reason: SDUPTHER

## 2022-05-23 RX ORDER — TRAZODONE HYDROCHLORIDE 100 MG/1
TABLET ORAL
Qty: 180 TABLET | Refills: 0 | Status: SHIPPED | OUTPATIENT
Start: 2022-05-23 | End: 2022-06-14 | Stop reason: SDUPTHER

## 2022-06-14 ENCOUNTER — OFFICE VISIT (OUTPATIENT)
Dept: BEHAVIORAL/MENTAL HEALTH CLINIC | Age: 57
End: 2022-06-14
Payer: MEDICARE

## 2022-06-14 VITALS — WEIGHT: 236 LBS | BODY MASS INDEX: 30.3 KG/M2

## 2022-06-14 DIAGNOSIS — G47.33 OBSTRUCTIVE SLEEP APNEA: ICD-10-CM

## 2022-06-14 DIAGNOSIS — F33.1 MODERATE EPISODE OF RECURRENT MAJOR DEPRESSIVE DISORDER (HCC): ICD-10-CM

## 2022-06-14 DIAGNOSIS — F31.31 MILD DEPRESSED BIPOLAR 1 DISORDER (HCC): ICD-10-CM

## 2022-06-14 PROCEDURE — G8427 DOCREV CUR MEDS BY ELIG CLIN: HCPCS | Performed by: NURSE PRACTITIONER

## 2022-06-14 PROCEDURE — 99214 OFFICE O/P EST MOD 30 MIN: CPT | Performed by: NURSE PRACTITIONER

## 2022-06-14 PROCEDURE — 3017F COLORECTAL CA SCREEN DOC REV: CPT | Performed by: NURSE PRACTITIONER

## 2022-06-14 PROCEDURE — G9717 DOC PT DX DEP/BP F/U NT REQ: HCPCS | Performed by: NURSE PRACTITIONER

## 2022-06-14 PROCEDURE — G8417 CALC BMI ABV UP PARAM F/U: HCPCS | Performed by: NURSE PRACTITIONER

## 2022-06-14 RX ORDER — RISPERIDONE 2 MG/1
2 TABLET, FILM COATED ORAL 2 TIMES DAILY
Qty: 180 TABLET | Refills: 0 | Status: SHIPPED | OUTPATIENT
Start: 2022-06-14 | End: 2022-09-12

## 2022-06-14 RX ORDER — TRAZODONE HYDROCHLORIDE 100 MG/1
TABLET ORAL
Qty: 180 TABLET | Refills: 1 | Status: SHIPPED | OUTPATIENT
Start: 2022-06-14

## 2022-06-14 RX ORDER — BUSPIRONE HYDROCHLORIDE 15 MG/1
15 TABLET ORAL 2 TIMES DAILY
Qty: 180 TABLET | Refills: 1 | Status: SHIPPED | OUTPATIENT
Start: 2022-06-14 | End: 2022-09-12

## 2022-06-14 RX ORDER — SERTRALINE HYDROCHLORIDE 100 MG/1
TABLET, FILM COATED ORAL
Qty: 180 TABLET | Refills: 1 | Status: SHIPPED | OUTPATIENT
Start: 2022-06-14

## 2022-06-14 NOTE — PROGRESS NOTES
Franko Borja is a 60 y.o. male who presents today for the following:  Chief Complaint   Patient presents with    Depression     \"I think I'm immune to the medicine, certain things can make me cry. \"    Follow-up    Medication Management       Allergies   Allergen Reactions    Codeine Unknown (comments)    Penicillins Unknown (comments)       Current Outpatient Medications   Medication Sig    busPIRone (BUSPAR) 15 mg tablet Take 1 Tablet by mouth two (2) times a day for 90 days.  risperiDONE (RisperDAL) 2 mg tablet Take 1 Tablet by mouth two (2) times a day for 90 days.  sertraline (ZOLOFT) 100 mg tablet TAKE 2 TABLETS EVERY DAY    traZODone (DESYREL) 100 mg tablet Take 2 tablets by mouth at bedtime as needed for sleep    benztropine (COGENTIN) 0.5 mg tablet TAKE 1 TABLET AT BEDTIME    albuterol (PROVENTIL HFA, VENTOLIN HFA, PROAIR HFA) 90 mcg/actuation inhaler     amLODIPine (NORVASC) 10 mg tablet Take 10 mg by mouth daily.  isosorbide dinitrate (ISORDIL) 5 mg tablet Take 5 mg by mouth daily.  montelukast (SINGULAIR) 10 mg tablet Take 10 mg by mouth daily.  pantoprazole (PROTONIX) 40 mg tablet Take 40 mg by mouth daily. No current facility-administered medications for this visit.        Past Medical History:   Diagnosis Date    Anxiety     Arthritis     GERD (gastroesophageal reflux disease)     Hypercholesterolemia     Hypertension     Mild depressed bipolar 1 disorder ([facility] 10/13/2020    Myocardial infarction ([facility]     Obstructive sleep apnea 10/13/2020    Psychotic disorder (HCC)     Seizures (HCC)     Stroke [facility])        Past Surgical History:   Procedure Laterality Date    [place] ABDOMEN SURGERY PROC UNLISTED         Family History   Problem Relation Age of Onset    Depression Mother     No Known Problems Father        Social History     Socioeconomic History    Marital status:      Spouse name: Not on file    Number of children: Not on file    Years of education: Not on file    Highest education level: Not on file   Occupational History    Not on file   Tobacco Use    Smoking status: Former Smoker    Smokeless tobacco: Never Used   Substance and Sexual Activity    Alcohol use: Yes     Alcohol/week: 2.0 standard drinks     Types: 2 Cans of beer per week    Drug use: Never    Sexual activity: Yes   Other Topics Concern    Not on file   Social History Narrative    Not on file     Social Determinants of Health     Financial Resource Strain:     Difficulty of Paying Living Expenses: Not on file   Food Insecurity:     Worried About Running Out of Food in the Last Year: Not on file    [place] of Food in the Last Year: Not on file   Transportation Needs:     Lack of Transportation (Medical): Not on file    Lack of Transportation (Non-Medical): Not on file   Physical Activity:     Days of Exercise per Week: Not on file    Minutes of Exercise per Session: Not on file   Stress:     Feeling of Stress : Not on file   Social Connections:     Frequency of Communication with Friends and Family: Not on file    Frequency of Social Gatherings with Friends and Family: Not on file    Attends Druze Services: Not on file    Active Member of [address] [place] [address] [employer] or Organizations: Not on file    Attends Club or Organization Meetings: Not on file    Marital Status: Not on file   Intimate Partner Violence:     Fear of Current or Ex-Partner: Not on file    Emotionally Abused: Not on file    Physically Abused: Not on file    Sexually Abused: Not on file   Housing Stability:     Unable to Pay for Housing in the Last Year: Not on file    Number of [place] in the Last Year: Not on file    Unstable Housing in the Last Year: Not on file         Mr. Cummins follows up in clinic for depressed bipolar 1 disorder and anxiety disorder. Patient last visited the clinic June 28, 2021.   He continues Zoloft 100 mg take 2 tablets daily, risperidone 2 mg tablet take 1 tablet twice daily, BuSpar 10 mg take 1 tablet twice daily, and trazodone 100 mg take 2 tablets at bedtime as needed for sleep. Patient presents to the clinic unaccompanied, fully alert and oriented. Gait is stable. Mood is mildly depressed and anxious without suicidal/homicidal thinking, risk for suicide is moderate based on history but there is no acute concern at this time. He reports feeling depressed for the past 2 months. He reports reduced appetite and fluctuating sleep. No psychotic symptoms observed or reported. He is tolerating medications well. He shows no signs of EPS or TD. On today's visit, he is requesting medication adjustment. No drug or alcohol use reported. Patient lives at home with his brother in a stable home environment. Review of Systems   Gastrointestinal: Positive for abdominal pain. Psychiatric/Behavioral: Positive for depression. The patient has insomnia (\"on and off.\"). All other systems reviewed and are negative. Visit Vitals  Wt 107 kg (236 lb)   BMI 30.30 kg/m²     Physical Exam  Psychiatric:         Attention and Perception: Attention and perception normal.         Mood and Affect: Mood is depressed. Speech: Speech normal.         Behavior: Behavior normal. Behavior is cooperative. Thought Content: Thought content normal.         Cognition and Memory: Cognition and memory normal.         Judgment: Judgment normal.            Plan:    Change BuSpar to 15 mg take 1 tablet twice daily. He may continue all other medications as prescribed. Patient mentions that he has not refilled his blood pressure/heart medications after two requests made by the pharmacy. Patient follows up with Mrs. Renato Krueger for primary care which her office was notified. Nurse reported that patient no showed for his last appointment, however they will refill his medication for a month until he can make his next appointment.   We discussed the importance of following up with appointments as appropriate in order to continue prescriptions as prescribed. For emergencies-call 911 or go to the emergency department. Therapy is recommended. Follow-up with medical provider as appropriate.

## 2022-10-06 ENCOUNTER — APPOINTMENT (OUTPATIENT)
Dept: CT IMAGING | Age: 57
End: 2022-10-06
Attending: EMERGENCY MEDICINE
Payer: MEDICARE

## 2022-10-06 ENCOUNTER — HOSPITAL ENCOUNTER (EMERGENCY)
Age: 57
Discharge: HOME | End: 2022-10-06
Attending: EMERGENCY MEDICINE
Payer: MEDICARE

## 2022-10-06 VITALS
TEMPERATURE: 98 F | BODY MASS INDEX: 28.21 KG/M2 | WEIGHT: 219.8 LBS | RESPIRATION RATE: 22 BRPM | HEART RATE: 50 BPM | HEIGHT: 74 IN | DIASTOLIC BLOOD PRESSURE: 104 MMHG | SYSTOLIC BLOOD PRESSURE: 168 MMHG | OXYGEN SATURATION: 98 %

## 2022-10-06 DIAGNOSIS — R10.84 GENERALIZED ABDOMINAL PAIN: Primary | ICD-10-CM

## 2022-10-06 DIAGNOSIS — N30.90 CYSTITIS: ICD-10-CM

## 2022-10-06 LAB
ALBUMIN SERPL-MCNC: 4 G/DL (ref 3.5–5)
ALBUMIN/GLOB SERPL: 1.2 {RATIO} (ref 1.1–2.2)
ALP SERPL-CCNC: 60 U/L (ref 45–117)
ALT SERPL-CCNC: 17 U/L (ref 12–78)
ANION GAP SERPL CALC-SCNC: 12 MMOL/L (ref 5–15)
APPEARANCE UR: CLEAR
AST SERPL W P-5'-P-CCNC: 19 U/L (ref 15–37)
BACTERIA URNS QL MICRO: ABNORMAL /HPF
BASOPHILS # BLD: 0 K/UL (ref 0–0.1)
BASOPHILS NFR BLD: 1 % (ref 0–1)
BILIRUB SERPL-MCNC: 0.7 MG/DL (ref 0.2–1)
BILIRUB UR QL CFM: NEGATIVE
BUN SERPL-MCNC: 11 MG/DL (ref 6–20)
BUN/CREAT SERPL: 8 (ref 12–20)
CA-I BLD-MCNC: 9.2 MG/DL (ref 8.5–10.1)
CHLORIDE SERPL-SCNC: 101 MMOL/L (ref 97–108)
CO2 SERPL-SCNC: 27 MMOL/L (ref 21–32)
COLOR UR: ABNORMAL
CREAT SERPL-MCNC: 1.43 MG/DL (ref 0.7–1.3)
DIFFERENTIAL METHOD BLD: ABNORMAL
EOSINOPHIL # BLD: 0 K/UL (ref 0–0.4)
EOSINOPHIL NFR BLD: 0 % (ref 0–7)
ERYTHROCYTE [DISTWIDTH] IN BLOOD BY AUTOMATED COUNT: 12.6 % (ref 11.5–14.5)
GLOBULIN SER CALC-MCNC: 3.4 G/DL (ref 2–4)
GLUCOSE SERPL-MCNC: 117 MG/DL (ref 65–100)
GLUCOSE UR STRIP.AUTO-MCNC: NEGATIVE MG/DL
HCT VFR BLD AUTO: 41.8 % (ref 36.6–50.3)
HGB BLD-MCNC: 14.4 G/DL (ref 12.1–17)
HGB UR QL STRIP: NEGATIVE
IMM GRANULOCYTES # BLD AUTO: 0 K/UL (ref 0–0.04)
IMM GRANULOCYTES NFR BLD AUTO: 0 % (ref 0–0.5)
KETONES UR QL STRIP.AUTO: 15 MG/DL
LACTATE SERPL-SCNC: 1.5 MMOL/L (ref 0.4–2)
LEUKOCYTE ESTERASE UR QL STRIP.AUTO: NEGATIVE
LIPASE SERPL-CCNC: 35 U/L (ref 73–393)
LYMPHOCYTES # BLD: 0.6 K/UL (ref 0.8–3.5)
LYMPHOCYTES NFR BLD: 15 % (ref 12–49)
MCH RBC QN AUTO: 32.1 PG (ref 26–34)
MCHC RBC AUTO-ENTMCNC: 34.4 G/DL (ref 30–36.5)
MCV RBC AUTO: 93.1 FL (ref 80–99)
MONOCYTES # BLD: 0.4 K/UL (ref 0–1)
MONOCYTES NFR BLD: 9 % (ref 5–13)
NEUTS SEG # BLD: 2.8 K/UL (ref 1.8–8)
NEUTS SEG NFR BLD: 75 % (ref 32–75)
NITRITE UR QL STRIP.AUTO: NEGATIVE
NRBC # BLD: 0 K/UL (ref 0–0.01)
NRBC BLD-RTO: 0 PER 100 WBC
PH UR STRIP: 6 [PH] (ref 5–8)
PLATELET # BLD AUTO: 194 K/UL (ref 150–400)
PMV BLD AUTO: 9.8 FL (ref 8.9–12.9)
POTASSIUM SERPL-SCNC: 3.1 MMOL/L (ref 3.5–5.1)
PROT SERPL-MCNC: 7.4 G/DL (ref 6.4–8.2)
PROT UR STRIP-MCNC: 30 MG/DL
RBC # BLD AUTO: 4.49 M/UL (ref 4.1–5.7)
RBC #/AREA URNS HPF: ABNORMAL /HPF (ref 0–3)
SODIUM SERPL-SCNC: 140 MMOL/L (ref 136–145)
SP GR UR REFRACTOMETRY: 1.02 (ref 1–1.03)
TROPONIN-HIGH SENSITIVITY: 13 NG/L (ref 0–76)
UROBILINOGEN UR QL STRIP.AUTO: 1 EU/DL (ref 0.2–1)
WBC # BLD AUTO: 3.8 K/UL (ref 4.1–11.1)
WBC URNS QL MICRO: ABNORMAL /HPF (ref 0–5)

## 2022-10-06 PROCEDURE — 96375 TX/PRO/DX INJ NEW DRUG ADDON: CPT

## 2022-10-06 PROCEDURE — 99285 EMERGENCY DEPT VISIT HI MDM: CPT

## 2022-10-06 PROCEDURE — 83605 ASSAY OF LACTIC ACID: CPT

## 2022-10-06 PROCEDURE — 85025 COMPLETE CBC W/AUTO DIFF WBC: CPT

## 2022-10-06 PROCEDURE — 74011250636 HC RX REV CODE- 250/636: Performed by: EMERGENCY MEDICINE

## 2022-10-06 PROCEDURE — 74011000636 HC RX REV CODE- 636: Performed by: EMERGENCY MEDICINE

## 2022-10-06 PROCEDURE — 36415 COLL VENOUS BLD VENIPUNCTURE: CPT

## 2022-10-06 PROCEDURE — 80053 COMPREHEN METABOLIC PANEL: CPT

## 2022-10-06 PROCEDURE — 96376 TX/PRO/DX INJ SAME DRUG ADON: CPT

## 2022-10-06 PROCEDURE — 81001 URINALYSIS AUTO W/SCOPE: CPT

## 2022-10-06 PROCEDURE — 74011250637 HC RX REV CODE- 250/637: Performed by: EMERGENCY MEDICINE

## 2022-10-06 PROCEDURE — 74177 CT ABD & PELVIS W/CONTRAST: CPT

## 2022-10-06 PROCEDURE — 83690 ASSAY OF LIPASE: CPT

## 2022-10-06 PROCEDURE — 84484 ASSAY OF TROPONIN QUANT: CPT

## 2022-10-06 PROCEDURE — 96374 THER/PROPH/DIAG INJ IV PUSH: CPT

## 2022-10-06 RX ORDER — ONDANSETRON 4 MG/1
4 TABLET, ORALLY DISINTEGRATING ORAL
Qty: 15 TABLET | Refills: 0 | Status: SHIPPED | OUTPATIENT
Start: 2022-10-06

## 2022-10-06 RX ORDER — MORPHINE SULFATE 4 MG/ML
4 INJECTION INTRAVENOUS ONCE
Status: COMPLETED | OUTPATIENT
Start: 2022-10-06 | End: 2022-10-06

## 2022-10-06 RX ORDER — ACETAMINOPHEN 500 MG
1000 TABLET ORAL
Status: COMPLETED | OUTPATIENT
Start: 2022-10-06 | End: 2022-10-06

## 2022-10-06 RX ORDER — DOXYCYCLINE HYCLATE 100 MG
100 TABLET ORAL 2 TIMES DAILY
Qty: 20 TABLET | Refills: 0 | Status: SHIPPED | OUTPATIENT
Start: 2022-10-06 | End: 2022-10-16

## 2022-10-06 RX ORDER — ONDANSETRON 2 MG/ML
4 INJECTION INTRAMUSCULAR; INTRAVENOUS
Status: COMPLETED | OUTPATIENT
Start: 2022-10-06 | End: 2022-10-06

## 2022-10-06 RX ORDER — SULFAMETHOXAZOLE AND TRIMETHOPRIM 800; 160 MG/1; MG/1
1 TABLET ORAL EVERY 12 HOURS
Status: DISCONTINUED | OUTPATIENT
Start: 2022-10-06 | End: 2022-10-07 | Stop reason: HOSPADM

## 2022-10-06 RX ORDER — ONDANSETRON 2 MG/ML
4 INJECTION INTRAMUSCULAR; INTRAVENOUS ONCE
Status: COMPLETED | OUTPATIENT
Start: 2022-10-06 | End: 2022-10-06

## 2022-10-06 RX ADMIN — ACETAMINOPHEN 1000 MG: 500 TABLET ORAL at 19:56

## 2022-10-06 RX ADMIN — IOPAMIDOL 100 ML: 755 INJECTION, SOLUTION INTRAVENOUS at 18:30

## 2022-10-06 RX ADMIN — ONDANSETRON 4 MG: 2 INJECTION INTRAMUSCULAR; INTRAVENOUS at 17:30

## 2022-10-06 RX ADMIN — ONDANSETRON 4 MG: 2 INJECTION INTRAMUSCULAR; INTRAVENOUS at 22:03

## 2022-10-06 RX ADMIN — MORPHINE SULFATE 4 MG: 4 INJECTION, SOLUTION INTRAMUSCULAR; INTRAVENOUS at 17:29

## 2022-10-06 RX ADMIN — SULFAMETHOXAZOLE AND TRIMETHOPRIM 1 TABLET: 800; 160 TABLET ORAL at 23:18

## 2022-10-06 NOTE — ED TRIAGE NOTES
Patient reports having mold on his wall at his residence that has not been taken care of. Patient reports he is nauseous with vomiting, patient reports abdominal pain and headache as well.   Same has been going on x2 months

## 2022-10-07 NOTE — DISCHARGE INSTRUCTIONS
Follow-up with primary care doctor next week and urologist Dr. Carley Lopez urologist. Call [phone] to get an appointment with the urologist.

## 2022-10-07 NOTE — ED PROVIDER NOTES
EMERGENCY DEPARTMENT HISTORY AND PHYSICAL EXAM      Date: 10/6/2022  Patient Name: Laura Campos    History of Presenting Illness     Chief Complaint   Patient presents with    Vomiting    Abdominal Pain       History Provided By: Patient    HPI: Laura Campos, 60 y.o. male with a history of depression, seizure, hypertension, GERD and anxiety and stroke and CAD who presents to the emergency department today complaining of multiple things mostly of nausea and vomiting as well as some lower abdominal pains. States this has been ongoing for the past 2 months but has been worse over the last several days. He believes his symptoms are associated with mold on his walls at his home. He does not associate his symptoms with any particular time of day or being in his home versus not being in his home. No chest pain though he does report some intermittent shortness of breath none currently. Nothing seems to make his symptoms worse or better. There are no other complaints, changes, or physical findings at this time. PCP: Bobby Bond MD    No current facility-administered medications on file prior to encounter. Current Outpatient Medications on File Prior to Encounter   Medication Sig Dispense Refill    sertraline (ZOLOFT) 100 mg tablet TAKE 2 TABLETS EVERY  Tablet 1    traZODone (DESYREL) 100 mg tablet Take 2 tablets by mouth at bedtime as needed for sleep 180 Tablet 1    benztropine (COGENTIN) 0.5 mg tablet TAKE 1 TABLET AT BEDTIME 90 Tablet 0    albuterol (PROVENTIL HFA, VENTOLIN HFA, PROAIR HFA) 90 mcg/actuation inhaler       amLODIPine (NORVASC) 10 mg tablet Take 10 mg by mouth daily. isosorbide dinitrate (ISORDIL) 5 mg tablet Take 5 mg by mouth daily. montelukast (SINGULAIR) 10 mg tablet Take 10 mg by mouth daily. pantoprazole (PROTONIX) 40 mg tablet Take 40 mg by mouth daily.          Past History     Past Medical History:  Past Medical History:   Diagnosis Date Anxiety     Arthritis     GERD (gastroesophageal reflux disease)     Hypercholesterolemia     Hypertension     Mild depressed bipolar 1 disorder ([facility] [address] 10/13/2020    Myocardial infarction [facility])     Obstructive sleep apnea 10/13/2020    [facility]otic disorder ([facility]     Seizures ([facility]     Stroke [facility])        Past Surgical History:  Past Surgical History:   Procedure Laterality Date    UT ABDOMEN SURGERY PROC UNLISTED         Family History:  Family History   Problem Relation Age of Onset    Depression Mother     No Known Problems Father        Social History:  Social History     Tobacco Use    Smoking status: Former    Smokeless tobacco: Never   Substance Use Topics    Alcohol use: Yes     Alcohol/week: 2.0 standard drinks     Types: 2 Cans of beer per week    Drug use: Never       Allergies: Allergies   Allergen Reactions    Codeine Unknown (comments)    Penicillins Unknown (comments)       Review of Systems   Review of Systems   Constitutional:  Negative for chills and fever. HENT:  Negative for rhinorrhea and sore throat. Eyes:  Negative for pain and visual disturbance. Respiratory:  Negative for chest tightness. Cardiovascular:  Negative for chest pain, palpitations and leg swelling. Gastrointestinal:  Positive for abdominal pain, diarrhea and nausea. Endocrine: Negative for polydipsia and polyuria. Genitourinary:  Negative for dysuria and urgency. Musculoskeletal:  Negative for arthralgias and myalgias. Skin:  Negative for color change and pallor. Neurological:  Negative for weakness and numbness. Psychiatric/Behavioral: Negative. Physical Exam   Physical Exam  Vitals and nursing note reviewed. HENT:      Head: Normocephalic and atraumatic. Eyes:      Extraocular Movements: Extraocular movements intact. Pupils: Pupils are equal, round, and reactive to light. Cardiovascular:      Rate and Rhythm: Normal rate and regular rhythm. Heart sounds: Normal heart sounds. Pulmonary:      Effort: Pulmonary effort is normal.      Breath sounds: Normal breath sounds. Chest:      Chest wall: No tenderness. Abdominal:      General: Bowel sounds are normal.      Palpations: Abdomen is soft. Tenderness: There is abdominal tenderness in the left upper quadrant and left lower quadrant. Comments: Pain seems somewhat out of proportion to exam.  Tenderness with even light touch   Musculoskeletal:         General: Normal range of motion. Cervical back: Normal range of motion and neck supple. Skin:     General: Skin is warm and dry. Capillary Refill: Capillary refill takes less than 2 seconds. Neurological:      General: No focal deficit present. Mental Status: He is alert and oriented to person, place, and time. Psychiatric:         Mood and Affect: Mood normal.         Behavior: Behavior normal.       Lab and Diagnostic Study Results   Labs -     Recent Results (from the past 12 hour(s))   CBC WITH AUTOMATED DIFF    Collection Time: 10/06/22  5:26 PM   Result Value Ref Range    WBC 3.8 (L) 4.1 - 11.1 K/uL    RBC 4.49 4. 10 - 5.70 M/uL    HGB 14.4 12.1 - 17.0 g/dL    HCT 41.8 36.6 - 50.3 %    MCV 93.1 80.0 - 99.0 FL    MCH 32.1 26.0 - 34.0 PG    MCHC 34.4 30.0 - 36.5 g/dL    RDW 12.6 11.5 - 14.5 %    PLATELET 889 439 - 419 K/uL    MPV 9.8 8.9 - 12.9 FL    NRBC 0.0 0.0  WBC    ABSOLUTE NRBC 0.00 0.00 - 0.01 K/uL    NEUTROPHILS 75 32 - 75 %    LYMPHOCYTES 15 12 - 49 %    MONOCYTES 9 5 - 13 %    EOSINOPHILS 0 0 - 7 %    BASOPHILS 1 0 - 1 %    IMMATURE GRANULOCYTES 0 0 - 0.5 %    ABS. NEUTROPHILS 2.8 1.8 - 8.0 K/UL    ABS. LYMPHOCYTES 0.6 (L) 0.8 - 3.5 K/UL    ABS. MONOCYTES 0.4 0.0 - 1.0 K/UL    ABS. EOSINOPHILS 0.0 0.0 - 0.4 K/UL    ABS. BASOPHILS 0.0 0.0 - 0.1 K/UL    ABS. IMM.  GRANS. 0.0 0.00 - 0.04 K/UL    DF AUTOMATED     METABOLIC PANEL, COMPREHENSIVE    Collection Time: 10/06/22  5:26 PM   Result Value Ref Range    Sodium 140 136 - 145 mmol/L Potassium 3.1 (L) 3.5 - 5.1 mmol/L    Chloride 101 97 - 108 mmol/L    CO2 27 21 - 32 mmol/L    Anion gap 12 5 - 15 mmol/L    Glucose 117 (H) 65 - 100 mg/dL    BUN 11 6 - 20 mg/dL    Creatinine 1.43 (H) 0.70 - 1.30 mg/dL    BUN/Creatinine ratio 8 (L) 12 - 20      eGFR 57 (L) >60 ml/min/1.73m2    Calcium 9.2 8.5 - 10.1 mg/dL    Bilirubin, total 0.7 0.2 - 1.0 mg/dL    AST (SGOT) 19 15 - 37 U/L    ALT (SGPT) 17 12 - 78 U/L    Alk. phosphatase 60 45 - 117 U/L    Protein, total 7.4 6.4 - 8.2 g/dL    Albumin 4.0 3.5 - 5.0 g/dL    Globulin 3.4 2.0 - 4.0 g/dL    A-G Ratio 1.2 1.1 - 2.2     TROPONIN-HIGH SENSITIVITY    Collection Time: 10/06/22  5:26 PM   Result Value Ref Range    Troponin-High Sensitivity 13 0 - 76 ng/L   LIPASE    Collection Time: 10/06/22  5:26 PM   Result Value Ref Range    Lipase 35 (L) 73 - 393 U/L   LACTIC ACID    Collection Time: 10/06/22  5:26 PM   Result Value Ref Range    Lactic acid 1.5 0.4 - 2.0 mmol/L       Radiologic Studies -   @[email or web address]@  CT Results  (Last 48 hours)                 10/06/22 1829  CT ABD PELV W CONT Final result    Impression:  Markedly limited by motion   1. Mural thickening in the bladder; recommend clinical correlation for cystitis. 2. Incidental findings as above. Narrative:  EXAM:  CT ABDOMEN PELVIS WITH CONTRAST   INDICATION:  Left lower quadrant pain. Pain out of proportion to exam..   Additional history:   COMPARISON: None. Limitations: Markedly limited by motion   . TECHNIQUE:    Multislice helical CT was performed from the diaphragm to the symphysis pubis   with oral and intravenous contrast administration. Contiguous 5 mm axial images   were reconstructed and lung and soft tissue windows were generated. Coronal and   sagittal reformations were generated. CT dose reduction was achieved through use of a standardized protocol tailored   for this examination and automatic exposure control for dose modulation. Tari Campuzano    FINDINGS:   INCIDENTALLY IMAGED CHEST:   Heart/vessels: Medically. Lungs/Pleura: Respiratory motion without gross abnormality. .   ABDOMEN:   Liver: There are subcentimeter, low-attenuation lesions which are too small to   accurately characterize and statistically likely benign. Diffuse, diminished   attenuation throughout the liver suggesting hepatic steatosis   Gallbladder/Biliary: The gallbladder is not well visualized. Spleen: Grossly unremarkable. Pancreas: Grossly unremarkable. Adrenals: Grossly unremarkable. Kidneys: Grossly unremarkable. Peritoneum/Mesenteries: Within normal limits. Extraperitoneum: Within normal limits. Gastrointestinal tract: Anastomotic suture line at the junction of the   descending and sigmoid colon. Diverticulosis in the distal descending colon. Vascular: Within normal limits. Connie [place] PELVIS:   Extraperitoneum: Within normal limits. Ureters: Within normal limits. Bladder: Mural thickening. Reproductive System: Calcifications in the prostate. .   MSK:    Anterior abdominal wall hernia containing fat and loops of bowel. Degenerative   disc disease at L4/L5 and L5/S1   . CXR Results  (Last 48 hours)      None            Medical Decision Making and ED Course   Differential Diagnosis & Medical Decision Making Provider Note:   47-year-old male presenting to the emergency department with diffuse abdominal pain complaints. CT scan is showing some over distention of the bladder. After additional discussion he does note some urinary frequency over the past several days as well however states he has trouble giving urine currently. Reports that the pain in his abdomen is better however he now he has a headache. White count is low suggesting potential viral etiology for his symptoms however he reports has been ongoing for several months. CT of the abdomen shows no acute abnormalities. If UA is negative will consider discharge home.     Sign ou to Dr. Loly Rocha pending UA      - I am the first provider for this patient. I reviewed the vital signs, available nursing notes, past medical history, past surgical history, family history and social history. The patients presenting problems have been discussed, and they are in agreement with the care plan formulated and outlined with them. I have encouraged them to ask questions as they arise throughout their visit. Vital Signs-Reviewed the patient's vital signs. Patient Vitals for the past 12 hrs:   Temp Pulse Resp BP SpO2   10/06/22 1608 98 °F (36.7 °C) 86 20 (!) 165/110 98 %       ED Course:           Disposition   Disposition: Condition stable  DC- Adult Discharges: All of the diagnostic tests were reviewed and questions answered. Diagnosis, care plan and treatment options were discussed. The patient understands the instructions and will follow up as directed. The patients results have been reviewed with them. They have been counseled regarding their diagnosis. The patient verbally convey understanding and agreement of the signs, symptoms, diagnosis, treatment and prognosis and additionally agrees to follow up as recommended with their PCP in 24 - 48 hours. They also agree with the care-plan and convey that all of their questions have been answered. I have also put together some discharge instructions for them that include: 1) educational information regarding their diagnosis, 2) how to care for their diagnosis at home, as well a 3) list of reasons why they would want to return to the ED prior to their follow-up appointment, should their condition change. DC-The patient was given verbal abdominal pain warning signs and, dehydration, and follow-up instructions  DC- Pain Control DC Home plan: Nonsteroidals, Tylenol, and Referral Family Medicine/PCP    DISCHARGE PLAN:  1.    Current Discharge Medication List        CONTINUE these medications which have NOT CHANGED    Details   sertraline (ZOLOFT) 100 mg tablet TAKE 2 TABLETS EVERY DAY  Qty: 180 Tablet, Refills: 1    Associated Diagnoses: Moderate episode of recurrent major depressive disorder (HCC)      traZODone (DESYREL) 100 mg tablet Take 2 tablets by mouth at bedtime as needed for sleep  Qty: 180 Tablet, Refills: 1    Associated Diagnoses: Obstructive sleep apnea      benztropine (COGENTIN) 0.5 mg tablet TAKE 1 TABLET AT BEDTIME  Qty: 90 Tablet, Refills: 0    Associated Diagnoses: Moderate episode of recurrent major depressive disorder (HCC)      albuterol (PROVENTIL HFA, VENTOLIN HFA, PROAIR HFA) 90 mcg/actuation inhaler       amLODIPine (NORVASC) 10 mg tablet Take 10 mg by mouth daily. isosorbide dinitrate (ISORDIL) 5 mg tablet Take 5 mg by mouth daily. montelukast (SINGULAIR) 10 mg tablet Take 10 mg by mouth daily. pantoprazole (PROTONIX) 40 mg tablet Take 40 mg by mouth daily. 2.   Follow-up Information    None       3. Return to ED if worse   4. Current Discharge Medication List         Remove if admitted/transferred    Diagnosis/Clinical Impression     Clinical Impression:     ICD-10-CM ICD-9-CM    1. Generalized abdominal pain  R10.84 789.07       2. Cystitis  N30.90 595.9              Attestations: Vivian Ledezma MD, am the primary clinician of record. Please note that this dictation was completed with [employer], the [email or web address] voice recognition software. Quite often unanticipated grammatical, syntax, homophones, and other interpretive errors are inadvertently transcribed by the computer software. Please disregard these errors. Please excuse any errors that have escaped final proofreading. Thank you.

## 2022-10-23 ENCOUNTER — HOSPITAL ENCOUNTER (EMERGENCY)
Age: 57
Discharge: HOME | End: 2022-10-24
Attending: EMERGENCY MEDICINE
Payer: MEDICARE

## 2022-10-23 ENCOUNTER — APPOINTMENT (OUTPATIENT)
Dept: CT IMAGING | Age: 57
End: 2022-10-23
Attending: EMERGENCY MEDICINE
Payer: MEDICARE

## 2022-10-23 ENCOUNTER — APPOINTMENT (OUTPATIENT)
Dept: GENERAL RADIOLOGY | Age: 57
End: 2022-10-23
Attending: EMERGENCY MEDICINE
Payer: MEDICARE

## 2022-10-23 VITALS
DIASTOLIC BLOOD PRESSURE: 113 MMHG | HEART RATE: 72 BPM | BODY MASS INDEX: 29.13 KG/M2 | TEMPERATURE: 98.4 F | HEIGHT: 74 IN | SYSTOLIC BLOOD PRESSURE: 182 MMHG | WEIGHT: 227 LBS | OXYGEN SATURATION: 100 % | RESPIRATION RATE: 20 BRPM

## 2022-10-23 DIAGNOSIS — T07.XXXA MULTIPLE ABRASIONS: ICD-10-CM

## 2022-10-23 DIAGNOSIS — S62.91XA CLOSED FRACTURE OF RIGHT HAND, INITIAL ENCOUNTER: Primary | ICD-10-CM

## 2022-10-23 PROCEDURE — 70450 CT HEAD/BRAIN W/O DYE: CPT

## 2022-10-23 PROCEDURE — 90714 TD VACC NO PRESV 7 YRS+ IM: CPT | Performed by: EMERGENCY MEDICINE

## 2022-10-23 PROCEDURE — 73130 X-RAY EXAM OF HAND: CPT

## 2022-10-23 PROCEDURE — 72125 CT NECK SPINE W/O DYE: CPT

## 2022-10-23 PROCEDURE — 74011250637 HC RX REV CODE- 250/637: Performed by: EMERGENCY MEDICINE

## 2022-10-23 PROCEDURE — 99284 EMERGENCY DEPT VISIT MOD MDM: CPT

## 2022-10-23 PROCEDURE — 90471 IMMUNIZATION ADMIN: CPT

## 2022-10-23 PROCEDURE — 74011250636 HC RX REV CODE- 250/636: Performed by: EMERGENCY MEDICINE

## 2022-10-23 PROCEDURE — 75810000053 HC SPLINT APPLICATION

## 2022-10-23 RX ORDER — HYDROCODONE BITARTRATE AND ACETAMINOPHEN 5; 325 MG/1; MG/1
1 TABLET ORAL
Qty: 12 TABLET | Refills: 0 | Status: SHIPPED | OUTPATIENT
Start: 2022-10-23 | End: 2022-10-26

## 2022-10-23 RX ORDER — HYDROCODONE BITARTRATE AND ACETAMINOPHEN 5; 325 MG/1; MG/1
1 TABLET ORAL
Status: COMPLETED | OUTPATIENT
Start: 2022-10-23 | End: 2022-10-23

## 2022-10-23 RX ADMIN — TETANUS AND DIPHTHERIA TOXOIDS ADSORBED 0.5 ML: 2; 2 INJECTION INTRAMUSCULAR at 22:47

## 2022-10-23 RX ADMIN — HYDROCODONE BITARTRATE AND ACETAMINOPHEN 1 TABLET: 5; 325 TABLET ORAL at 22:47

## 2022-10-24 PROCEDURE — 75810000053 HC SPLINT APPLICATION

## 2022-10-24 NOTE — ED NOTES
Pt a/o x4. Without notable acute distress. Verbalized understanding of discharge instructions and pt education. Ambulated out of ED with steady gait.

## 2022-10-24 NOTE — ED TRIAGE NOTES
Two small scratches to right upper cheek area. Approximately 0.5 inch in length. Without active bleeding. Cleansed with wound cleanse. Left open to air. Upper lip with small laceration approximately 0.5 inch in length. Cleansed with wound cleanser left open to air. Right hand with notable swelling. Appearing small hematoma. Ice applied under cloth. Pt reports all injuries occurred with fall. This evening.

## 2022-10-24 NOTE — ED PROVIDER NOTES
EMERGENCY DEPARTMENT HISTORY AND PHYSICAL EXAM  ?    Date: 10/23/2022  Patient Name: Yonas Strauss    History of Presenting Illness    Patient presents with:  Fall      History Provided By: Patient    HPI: Yonas Strauss, 60 y.o. male with a past medical history significant for hypertension, hyperlipidemia, myocardial infarction, stroke, COPD, and seizure presents to the ED with cc of injuries from a fall tonight. He was walking and fell off the sidewalk and landed on the right side of his face and right hand, posterior neck pain. Hand pain is the worse and is rated 9 out of 10. Neck pain is mild. He is not on anticoagulant. He is unsure about LOC. There are no other complaints, changes, or physical findings at this time. PCP: Marya Zuluaga MD    Current Outpatient Medications:  ondansetron (Zofran ODT) 4 mg disintegrating tablet, Take 1 Tablet by mouth every eight (8) hours as needed for Nausea or Vomiting., Disp: 15 Tablet, Rfl: 0  sertraline (ZOLOFT) 100 mg tablet, TAKE 2 TABLETS EVERY DAY, Disp: 180 Tablet, Rfl: 1  traZODone (DESYREL) 100 mg tablet, Take 2 tablets by mouth at bedtime as needed for sleep, Disp: 180 Tablet, Rfl: 1  benztropine (COGENTIN) 0.5 mg tablet, TAKE 1 TABLET AT BEDTIME, Disp: 90 Tablet, Rfl: 0  albuterol (PROVENTIL HFA, VENTOLIN HFA, PROAIR HFA) 90 mcg/actuation inhaler, , Disp: , Rfl:   amLODIPine (NORVASC) 10 mg tablet, Take 10 mg by mouth daily. , Disp: , Rfl:    isosorbide dinitrate (ISORDIL) 5 mg tablet, Take 5 mg by mouth daily. , Disp: , Rfl:   montelukast (SINGULAIR) 10 mg tablet, Take 10 mg by mouth daily. , Disp: , Rfl:   pantoprazole (PROTONIX) 40 mg tablet, Take 40 mg by mouth daily. , Disp: , Rfl:         Past History    Past Medical History:  Past Medical History:  No date:  Anxiety  No date: Arthritis  No date: GERD (gastroesophageal reflux disease)  No date: Hypercholesterolemia  No date: Hypertension  10/13/2020: Mild depressed bipolar 1 disorder ([facility]  No date: Myocardial infarction ([facility]  10/13/2020: Obstructive sleep apnea  No date: Psychotic disorder (HCC)  No date: Seizures ([facility]  No date: Stroke [facility])    Past Surgical History:  Past Surgical History:  No date: PA ABDOMEN SURGERY PROC UNLISTED    Family History:  Review of patient's family history indicates:  Problem: Depression      Relation: Mother          Age of Onset: (Not Specified)  Problem: No Known Problems      Relation: Father          Age of Onset: (Not Specified)      Social History:  Social History    Tobacco Use      Smoking status: Former      Smokeless tobacco: Never    Alcohol use: Yes      Alcohol/week: 2.0 standard drinks      Types: 2 Cans of beer per week    Drug use: Never      Allergies:   -- Codeine -- Unknown (comments)   -- Penicillins -- Unknown (comments)      Review of Systems  @[facility]@    Physical Exam  @[facility]    Diagnostic Study Results    Labs -   No results found for this or any previous visit (from the past 12 hour(s)). Radiologic Studies -   XR HAND RT MIN 3 V    (Results Pending)  CT SPINE CERV WO CONT    (Results Pending)  CT HEAD WO CONT    (Results Pending)  CT Results  (Last 48 hours)    None      CXR Results  (Last 48 hours)    None        Medical Decision Making and ED Course  I am the first provider for this patient. I reviewed the vital signs, available nursing notes, past medical history, past surgical history, family history and social history. Vital Signs-Reviewed the patient's vital signs. Empty flowsheet group. Records Reviewed: Nursing Notes    Provider Notes (Medical Decision Making):   CT head. Xray hand. ED Course:   Initial assessment performed. The patients presenting problems have been discussed, and they are in agreement with the care plan formulated and outlined with them. I have encouraged them to ask questions as they arise throughout their visit.              Disposition    Discharged        DISCHARGE PLAN:  1. Current Discharge Medication List    CONTINUE these medications which have NOT CHANGED    ondansetron (Zofran ODT) 4 mg disintegrating tablet  Take 1 Tablet by mouth every eight (8) hours as needed for Nausea or Vomiting. Qty: 15 Tablet Refills: 0    sertraline (ZOLOFT) 100 mg tablet  TAKE 2 TABLETS EVERY DAY  Qty: 180 Tablet Refills: 1  Associated Diagnoses: Moderate episode of recurrent major depressive disorder (HCC)    traZODone (DESYREL) 100 mg tablet  Take 2 tablets by mouth at bedtime as needed for sleep  Qty: 180 Tablet Refills: 1  Associated Diagnoses:Obstructive sleep apnea    benztropine (COGENTIN) 0.5 mg tablet  TAKE 1 TABLET AT BEDTIME  Qty: 90 Tablet Refills: 0  Associated Diagnoses: Moderate episode of recurrent major depressive disorder (HCC)    albuterol (PROVENTIL HFA, VENTOLIN HFA, PROAIR HFA) 90 mcg/actuation inhaler       amLODIPine (NORVASC) 10 mg tablet  Take 10 mg by mouth daily. isosorbide dinitrate (ISORDIL) 5 mg tablet  Take 5 mg by mouth daily. montelukast (SINGULAIR) 10 mg tablet  Take 10 mg by mouth daily. pantoprazole (PROTONIX) 40 mg tablet  Take 40 mg by mouth daily. 2. Follow-up Information    None     3. Return to ED if worse     Diagnosis    Clinical Impression: Facial abrasions, right fifth metacarpal bone fracture  Attestations:    Dejah Littlejohn MD    Please note that this dictation was completed with [employer], the computer voice recognition software. Quite often unanticipated grammatical, syntax, homophones, and other interpretive errors are inadvertently transcribed by the computer software. Please disregard these errors. Please excuse any errors that have escaped final proofreading. Thank you.    ?           Past Medical History:   Diagnosis Date    Anxiety     Arthritis     GERD (gastroesophageal reflux disease)     Hypercholesterolemia     Hypertension     Mild depressed bipolar 1 disorder ([facility] [address] 10/13/2020    Myocardial infarction ([facility] [address] Obstructive sleep apnea 10/13/2020    Psychotic disorder (HCC)     Seizures (HCC)     Stroke [facility])        Past Surgical History:   Procedure Laterality Date    PA ABDOMEN SURGERY PROC UNLISTED           Family History:   Problem Relation Age of Onset    Depression Mother     No Known Problems Father        Social History     Socioeconomic History    Marital status:      Spouse name: Not on file    Number of children: Not on file    Years of education: Not on file    Highest education level: Not on file   Occupational History    Not on file   Tobacco Use    Smoking status: Former    Smokeless tobacco: Never   Substance and Sexual Activity    Alcohol use: Yes     Alcohol/week: 2.0 standard drinks     Types: 2 Cans of beer per week    Drug use: Never    Sexual activity: Yes   Other Topics Concern    Not on file   Social History Narrative    Not on file     Social Determinants of Health     Financial Resource Strain: Not on file   Food Insecurity: Not on file   Transportation Needs: Not on file   Physical Activity: Not on file   Stress: Not on file   Social Connections: Not on file   Intimate Partner Violence: Not on file   Housing Stability: Not on file         ALLERGIES: Codeine and Penicillins    Review of Systems   Constitutional: Negative. HENT:          Facial pain    Posterior neck pain   Eyes: Negative. Respiratory: Negative. Cardiovascular: Negative. Gastrointestinal: Negative. Endocrine: Negative. Genitourinary: Negative. Musculoskeletal:         Right hand pain   Allergic/Immunologic: Negative. Neurological: Negative. Hematological: Negative. Psychiatric/Behavioral: Negative. Vitals:    10/23/22 1949 10/23/22 1957   BP:  (!) 182/113   Pulse: 72    Resp: 20    Temp: 98.4 °F (36.9 °C)    SpO2: 100%    Weight: 103 kg (227 lb)    Height: 6' 2\" (1.88 m)             Physical Exam  Vitals and nursing note reviewed. Constitutional:       Appearance: Normal appearance.  He is normal weight. HENT:      Head: Normocephalic. Abrasion present. Right Ear: Tympanic membrane, ear canal and external ear normal.      Left Ear: Tympanic membrane, ear canal and external ear normal.      Nose: Nose normal.      Mouth/Throat:      Mouth: Mucous membranes are moist.      Pharynx: Oropharynx is clear. Comments: abrasion  Eyes:      Extraocular Movements: Extraocular movements intact. Conjunctiva/sclera: Conjunctivae normal.      Pupils: Pupils are equal, round, and reactive to light. Neck:      Trachea: Trachea normal.     Cardiovascular:      Rate and Rhythm: Normal rate and regular rhythm. Pulses: Normal pulses. Heart sounds: Normal heart sounds. Pulmonary:      Effort: Pulmonary effort is normal.      Breath sounds: Normal breath sounds. Abdominal:      General: Abdomen is flat. Bowel sounds are normal.      Palpations: Abdomen is soft. Musculoskeletal:         General: Swelling present. Normal range of motion. Hands:       Cervical back: Normal range of motion and neck supple. Tenderness present. Spinous process tenderness present. No muscular tenderness. Skin:     General: Skin is warm and dry. Neurological:      General: No focal deficit present. Mental Status: He is alert and oriented to person, place, and time.    Psychiatric:         Mood and Affect: Mood normal.         Behavior: Behavior normal.        MDM  Risk of Complications, Morbidity, and/or Mortality  Presenting problems: high  Diagnostic procedures: high  Management options: moderate    Patient Progress  Patient progress: stable         Procedures

## 2023-05-24 RX ORDER — BENZTROPINE MESYLATE 0.5 MG/1
1 TABLET ORAL NIGHTLY
Status: ON HOLD | COMMUNITY
Start: 2022-05-23 | End: 2023-07-14 | Stop reason: HOSPADM

## 2023-05-24 RX ORDER — SERTRALINE HYDROCHLORIDE 100 MG/1
TABLET, FILM COATED ORAL
Status: ON HOLD | COMMUNITY
Start: 2022-06-14 | End: 2023-07-14 | Stop reason: HOSPADM

## 2023-05-24 RX ORDER — TRAZODONE HYDROCHLORIDE 100 MG/1
TABLET ORAL
Status: ON HOLD | COMMUNITY
Start: 2022-06-14 | End: 2023-07-14 | Stop reason: HOSPADM

## 2023-05-24 RX ORDER — PANTOPRAZOLE SODIUM 40 MG/1
40 TABLET, DELAYED RELEASE ORAL DAILY
COMMUNITY
Start: 2020-08-19

## 2023-05-24 RX ORDER — ISOSORBIDE DINITRATE 5 MG/1
5 TABLET ORAL DAILY
COMMUNITY
Start: 2020-09-21

## 2023-05-24 RX ORDER — MONTELUKAST SODIUM 10 MG/1
10 TABLET ORAL DAILY
COMMUNITY
Start: 2020-08-14

## 2023-05-24 RX ORDER — ONDANSETRON 4 MG/1
4 TABLET, ORALLY DISINTEGRATING ORAL EVERY 8 HOURS PRN
Status: ON HOLD | COMMUNITY
Start: 2022-10-06 | End: 2023-07-14 | Stop reason: HOSPADM

## 2023-05-24 RX ORDER — ALBUTEROL SULFATE 90 UG/1
AEROSOL, METERED RESPIRATORY (INHALATION)
COMMUNITY
Start: 2020-08-18

## 2023-05-24 RX ORDER — AMLODIPINE BESYLATE 10 MG/1
10 TABLET ORAL DAILY
Status: ON HOLD | COMMUNITY
Start: 2020-08-18 | End: 2023-07-14 | Stop reason: HOSPADM

## 2023-07-04 ENCOUNTER — HOSPITAL ENCOUNTER (INPATIENT)
Facility: HOSPITAL | Age: 58
LOS: 10 days | Discharge: HOME | DRG: 885 | End: 2023-07-14
Attending: PSYCHIATRY & NEUROLOGY | Admitting: PSYCHIATRY & NEUROLOGY
Payer: MEDICARE

## 2023-07-04 ENCOUNTER — HOSPITAL ENCOUNTER (INPATIENT)
Facility: HOSPITAL | Age: 58
LOS: 1 days | Discharge: PSYCHIATRIC OR SUBSTANCE USE FACILITY | DRG: 885 | End: 2023-07-04
Attending: EMERGENCY MEDICINE | Admitting: PSYCHIATRY & NEUROLOGY
Payer: MEDICARE

## 2023-07-04 VITALS
HEIGHT: 74 IN | BODY MASS INDEX: 27.85 KG/M2 | WEIGHT: 217 LBS | OXYGEN SATURATION: 100 % | TEMPERATURE: 97.5 F | DIASTOLIC BLOOD PRESSURE: 97 MMHG | RESPIRATION RATE: 20 BRPM | SYSTOLIC BLOOD PRESSURE: 142 MMHG | HEART RATE: 73 BPM

## 2023-07-04 DIAGNOSIS — F14.90 COCAINE USE: ICD-10-CM

## 2023-07-04 DIAGNOSIS — R45.851 DEPRESSION WITH SUICIDAL IDEATION: Primary | ICD-10-CM

## 2023-07-04 DIAGNOSIS — F32.A DEPRESSION WITH SUICIDAL IDEATION: Primary | ICD-10-CM

## 2023-07-04 PROBLEM — I25.10 CVD (CARDIOVASCULAR DISEASE): Status: ACTIVE | Noted: 2023-07-04

## 2023-07-04 PROBLEM — I67.9 CVD (CEREBROVASCULAR DISEASE): Chronic | Status: ACTIVE | Noted: 2023-07-04

## 2023-07-04 PROBLEM — E78.5 HYPERLIPIDEMIA: Chronic | Status: ACTIVE | Noted: 2023-07-04

## 2023-07-04 PROBLEM — F33.9 MAJOR DEPRESSIVE DISORDER, RECURRENT (HCC): Status: ACTIVE | Noted: 2023-07-04

## 2023-07-04 PROBLEM — Z98.890 H/O AORTIC ROOT REPAIR: Chronic | Status: ACTIVE | Noted: 2023-07-04

## 2023-07-04 PROBLEM — G47.33 OBSTRUCTIVE SLEEP APNEA: Status: ACTIVE | Noted: 2020-10-13

## 2023-07-04 PROBLEM — I10 HTN (HYPERTENSION): Chronic | Status: ACTIVE | Noted: 2023-07-04

## 2023-07-04 PROBLEM — G43.909 MIGRAINE: Chronic | Status: ACTIVE | Noted: 2023-07-04

## 2023-07-04 LAB
ALBUMIN SERPL-MCNC: 3.4 G/DL (ref 3.5–5)
ALBUMIN/GLOB SERPL: 1.2 (ref 1.1–2.2)
ALP SERPL-CCNC: 67 U/L (ref 45–117)
ALT SERPL-CCNC: 32 U/L (ref 12–78)
AMPHET UR QL SCN: NEGATIVE
ANION GAP SERPL CALC-SCNC: 11 MMOL/L (ref 5–15)
APAP SERPL-MCNC: <10 UG/ML (ref 10–30)
APPEARANCE UR: CLEAR
AST SERPL W P-5'-P-CCNC: 31 U/L (ref 15–37)
BACTERIA URNS QL MICRO: NEGATIVE /HPF
BARBITURATES UR QL SCN: NEGATIVE
BASOPHILS # BLD: 0 K/UL (ref 0–0.1)
BASOPHILS NFR BLD: 1 % (ref 0–1)
BENZODIAZ UR QL: NEGATIVE
BILIRUB SERPL-MCNC: 0.8 MG/DL (ref 0.2–1)
BILIRUB UR QL: NEGATIVE
BUN SERPL-MCNC: 14 MG/DL (ref 6–20)
BUN/CREAT SERPL: 13 (ref 12–20)
CA-I BLD-MCNC: 8.2 MG/DL (ref 8.5–10.1)
CANNABINOIDS UR QL SCN: NEGATIVE
CHLORIDE SERPL-SCNC: 104 MMOL/L (ref 97–108)
CO2 SERPL-SCNC: 27 MMOL/L (ref 21–32)
COCAINE UR QL SCN: POSITIVE
COLOR UR: ABNORMAL
CREAT SERPL-MCNC: 1.08 MG/DL (ref 0.7–1.3)
DATE LAST DOSE: ABNORMAL
DATE LAST DOSE: ABNORMAL
DIFFERENTIAL METHOD BLD: ABNORMAL
DOSE AMOUNT: ABNORMAL UNITS
DOSE AMOUNT: ABNORMAL UNITS
EOSINOPHIL # BLD: 0.1 K/UL (ref 0–0.4)
EOSINOPHIL NFR BLD: 2 % (ref 0–7)
ERYTHROCYTE [DISTWIDTH] IN BLOOD BY AUTOMATED COUNT: 13.2 % (ref 11.5–14.5)
ETHANOL SERPL-MCNC: <10 MG/DL (ref 0–0.08)
GLOBULIN SER CALC-MCNC: 2.9 G/DL (ref 2–4)
GLUCOSE SERPL-MCNC: 97 MG/DL (ref 65–100)
GLUCOSE UR STRIP.AUTO-MCNC: NEGATIVE MG/DL
HCT VFR BLD AUTO: 34.1 % (ref 36.6–50.3)
HGB BLD-MCNC: 11.8 G/DL (ref 12.1–17)
HGB UR QL STRIP: NEGATIVE
IMM GRANULOCYTES # BLD AUTO: 0 K/UL (ref 0–0.04)
IMM GRANULOCYTES NFR BLD AUTO: 0 % (ref 0–0.5)
KETONES UR QL STRIP.AUTO: NEGATIVE MG/DL
LEUKOCYTE ESTERASE UR QL STRIP.AUTO: NEGATIVE
LYMPHOCYTES # BLD: 1.5 K/UL (ref 0.8–3.5)
LYMPHOCYTES NFR BLD: 39 % (ref 12–49)
Lab: ABNORMAL
MAGNESIUM SERPL-MCNC: 1.9 MG/DL (ref 1.6–2.4)
MCH RBC QN AUTO: 33.4 PG (ref 26–34)
MCHC RBC AUTO-ENTMCNC: 34.6 G/DL (ref 30–36.5)
MCV RBC AUTO: 96.6 FL (ref 80–99)
MDMA URINE: NEGATIVE
METHADONE UR QL: NEGATIVE
MONOCYTES # BLD: 0.5 K/UL (ref 0–1)
MONOCYTES NFR BLD: 13 % (ref 5–13)
NEUTS SEG # BLD: 1.8 K/UL (ref 1.8–8)
NEUTS SEG NFR BLD: 45 % (ref 32–75)
NITRITE UR QL STRIP.AUTO: NEGATIVE
NRBC # BLD: 0 K/UL (ref 0–0.01)
NRBC BLD-RTO: 0 PER 100 WBC
OPIATES UR QL: NEGATIVE
PCP UR QL: NEGATIVE
PH UR STRIP: 6 (ref 5–8)
PLATELET # BLD AUTO: 139 K/UL (ref 150–400)
PMV BLD AUTO: 9 FL (ref 8.9–12.9)
POTASSIUM SERPL-SCNC: 3.1 MMOL/L (ref 3.5–5.1)
PROT SERPL-MCNC: 6.3 G/DL (ref 6.4–8.2)
PROT UR STRIP-MCNC: ABNORMAL MG/DL
RBC # BLD AUTO: 3.53 M/UL (ref 4.1–5.7)
RBC #/AREA URNS HPF: NORMAL /HPF (ref 0–3)
SALICYLATES SERPL-MCNC: 2.3 MG/DL (ref 2.8–20)
SARS-COV-2 RNA RESP QL NAA+PROBE: NOT DETECTED
SODIUM SERPL-SCNC: 142 MMOL/L (ref 136–145)
SP GR UR REFRACTOMETRY: >1.03 (ref 1–1.03)
SPECIMEN SOURCE: NORMAL
UROBILINOGEN UR QL STRIP.AUTO: 1 EU/DL (ref 0.2–1)
WBC # BLD AUTO: 4 K/UL (ref 4.1–11.1)
WBC URNS QL MICRO: NORMAL /HPF (ref 0–5)

## 2023-07-04 PROCEDURE — 93005 ELECTROCARDIOGRAM TRACING: CPT | Performed by: EMERGENCY MEDICINE

## 2023-07-04 PROCEDURE — 99285 EMERGENCY DEPT VISIT HI MDM: CPT

## 2023-07-04 PROCEDURE — 85025 COMPLETE CBC W/AUTO DIFF WBC: CPT

## 2023-07-04 PROCEDURE — 80307 DRUG TEST PRSMV CHEM ANLYZR: CPT

## 2023-07-04 PROCEDURE — 83735 ASSAY OF MAGNESIUM: CPT

## 2023-07-04 PROCEDURE — 1100000000 HC RM PRIVATE

## 2023-07-04 PROCEDURE — 87635 SARS-COV-2 COVID-19 AMP PRB: CPT

## 2023-07-04 PROCEDURE — 81001 URINALYSIS AUTO W/SCOPE: CPT

## 2023-07-04 PROCEDURE — 80053 COMPREHEN METABOLIC PANEL: CPT

## 2023-07-04 PROCEDURE — 80179 DRUG ASSAY SALICYLATE: CPT

## 2023-07-04 PROCEDURE — 1140000000 HC RM PRIVATE PSYCH

## 2023-07-04 PROCEDURE — 6370000000 HC RX 637 (ALT 250 FOR IP): Performed by: INTERNAL MEDICINE

## 2023-07-04 PROCEDURE — 6370000000 HC RX 637 (ALT 250 FOR IP): Performed by: PSYCHIATRY & NEUROLOGY

## 2023-07-04 PROCEDURE — 6370000000 HC RX 637 (ALT 250 FOR IP): Performed by: EMERGENCY MEDICINE

## 2023-07-04 PROCEDURE — 82077 ASSAY SPEC XCP UR&BREATH IA: CPT

## 2023-07-04 PROCEDURE — 80143 DRUG ASSAY ACETAMINOPHEN: CPT

## 2023-07-04 RX ORDER — AMLODIPINE BESYLATE 10 MG/1
10 TABLET ORAL DAILY
Status: DISCONTINUED | OUTPATIENT
Start: 2023-07-04 | End: 2023-07-14 | Stop reason: HOSPADM

## 2023-07-04 RX ORDER — AMLODIPINE BESYLATE 10 MG/1
10 TABLET ORAL DAILY
Qty: 30 TABLET | Refills: 11 | Status: ON HOLD | COMMUNITY
Start: 2023-03-10 | End: 2024-03-09

## 2023-07-04 RX ORDER — DIPHENHYDRAMINE HYDROCHLORIDE 50 MG/ML
50 INJECTION INTRAMUSCULAR; INTRAVENOUS EVERY 4 HOURS PRN
Status: DISCONTINUED | OUTPATIENT
Start: 2023-07-04 | End: 2023-07-06

## 2023-07-04 RX ORDER — SPIRONOLACTONE 25 MG/1
25 TABLET ORAL DAILY
Status: DISCONTINUED | OUTPATIENT
Start: 2023-07-04 | End: 2023-07-14 | Stop reason: HOSPADM

## 2023-07-04 RX ORDER — ASPIRIN 81 MG/1
81 TABLET ORAL DAILY
Status: DISCONTINUED | OUTPATIENT
Start: 2023-07-04 | End: 2023-07-14 | Stop reason: HOSPADM

## 2023-07-04 RX ORDER — MONTELUKAST SODIUM 10 MG/1
10 TABLET ORAL DAILY
Status: DISCONTINUED | OUTPATIENT
Start: 2023-07-04 | End: 2023-07-14 | Stop reason: HOSPADM

## 2023-07-04 RX ORDER — LISINOPRIL 10 MG/1
20 TABLET ORAL DAILY
Status: DISCONTINUED | OUTPATIENT
Start: 2023-07-04 | End: 2023-07-14 | Stop reason: HOSPADM

## 2023-07-04 RX ORDER — CYCLOBENZAPRINE HCL 10 MG
TABLET ORAL
Status: ON HOLD | COMMUNITY
Start: 2023-07-02

## 2023-07-04 RX ORDER — TRAZODONE HYDROCHLORIDE 50 MG/1
50 TABLET ORAL NIGHTLY PRN
Status: DISCONTINUED | OUTPATIENT
Start: 2023-07-04 | End: 2023-07-08

## 2023-07-04 RX ORDER — HALOPERIDOL 5 MG/ML
5 INJECTION INTRAMUSCULAR EVERY 4 HOURS PRN
Status: DISCONTINUED | OUTPATIENT
Start: 2023-07-04 | End: 2023-07-06

## 2023-07-04 RX ORDER — ATORVASTATIN CALCIUM 40 MG/1
TABLET, FILM COATED ORAL
Status: ON HOLD | COMMUNITY
Start: 2023-06-26

## 2023-07-04 RX ORDER — POLYETHYLENE GLYCOL 3350 17 G/17G
17 POWDER, FOR SOLUTION ORAL DAILY PRN
Status: DISCONTINUED | OUTPATIENT
Start: 2023-07-04 | End: 2023-07-14 | Stop reason: HOSPADM

## 2023-07-04 RX ORDER — CYCLOBENZAPRINE HCL 10 MG
10 TABLET ORAL 3 TIMES DAILY PRN
Status: DISCONTINUED | OUTPATIENT
Start: 2023-07-04 | End: 2023-07-14 | Stop reason: HOSPADM

## 2023-07-04 RX ORDER — ATORVASTATIN CALCIUM 40 MG/1
40 TABLET, FILM COATED ORAL NIGHTLY
Status: DISCONTINUED | OUTPATIENT
Start: 2023-07-04 | End: 2023-07-14 | Stop reason: HOSPADM

## 2023-07-04 RX ORDER — MAGNESIUM HYDROXIDE/ALUMINUM HYDROXICE/SIMETHICONE 120; 1200; 1200 MG/30ML; MG/30ML; MG/30ML
30 SUSPENSION ORAL EVERY 6 HOURS PRN
Status: DISCONTINUED | OUTPATIENT
Start: 2023-07-04 | End: 2023-07-14 | Stop reason: HOSPADM

## 2023-07-04 RX ORDER — ISOSORBIDE DINITRATE 10 MG/1
5 TABLET ORAL 3 TIMES DAILY
Status: DISCONTINUED | OUTPATIENT
Start: 2023-07-04 | End: 2023-07-14 | Stop reason: HOSPADM

## 2023-07-04 RX ORDER — ACETAMINOPHEN 325 MG/1
650 TABLET ORAL EVERY 4 HOURS PRN
Status: DISCONTINUED | OUTPATIENT
Start: 2023-07-04 | End: 2023-07-14 | Stop reason: HOSPADM

## 2023-07-04 RX ORDER — POTASSIUM CHLORIDE 20 MEQ/1
40 TABLET, EXTENDED RELEASE ORAL
Status: COMPLETED | OUTPATIENT
Start: 2023-07-04 | End: 2023-07-04

## 2023-07-04 RX ORDER — ALBUTEROL SULFATE 2.5 MG/3ML
2.5 SOLUTION RESPIRATORY (INHALATION) EVERY 6 HOURS PRN
Status: DISCONTINUED | OUTPATIENT
Start: 2023-07-04 | End: 2023-07-14 | Stop reason: HOSPADM

## 2023-07-04 RX ORDER — HALOPERIDOL 5 MG/1
5 TABLET ORAL EVERY 4 HOURS PRN
Status: DISCONTINUED | OUTPATIENT
Start: 2023-07-04 | End: 2023-07-06

## 2023-07-04 RX ORDER — HYDROXYZINE HYDROCHLORIDE 25 MG/1
50 TABLET, FILM COATED ORAL 3 TIMES DAILY PRN
Status: DISCONTINUED | OUTPATIENT
Start: 2023-07-04 | End: 2023-07-14 | Stop reason: HOSPADM

## 2023-07-04 RX ORDER — BUSPIRONE HYDROCHLORIDE 15 MG/1
TABLET ORAL
Status: ON HOLD | COMMUNITY
Start: 2023-04-13

## 2023-07-04 RX ORDER — LISINOPRIL 20 MG/1
TABLET ORAL
Status: ON HOLD | COMMUNITY
Start: 2023-05-20

## 2023-07-04 RX ORDER — ALBUTEROL SULFATE 90 UG/1
2 AEROSOL, METERED RESPIRATORY (INHALATION) EVERY 6 HOURS PRN
Status: DISCONTINUED | OUTPATIENT
Start: 2023-07-04 | End: 2023-07-04

## 2023-07-04 RX ORDER — SPIRONOLACTONE 25 MG/1
25 TABLET ORAL DAILY
Status: ON HOLD | COMMUNITY
Start: 2023-05-03

## 2023-07-04 RX ORDER — PANTOPRAZOLE SODIUM 40 MG/1
40 TABLET, DELAYED RELEASE ORAL
Status: DISCONTINUED | OUTPATIENT
Start: 2023-07-05 | End: 2023-07-14 | Stop reason: HOSPADM

## 2023-07-04 RX ORDER — POTASSIUM CHLORIDE 750 MG/1
10 TABLET, FILM COATED, EXTENDED RELEASE ORAL 2 TIMES DAILY
Status: DISPENSED | OUTPATIENT
Start: 2023-07-04 | End: 2023-07-09

## 2023-07-04 RX ADMIN — HYDROXYZINE HYDROCHLORIDE 50 MG: 25 TABLET ORAL at 20:54

## 2023-07-04 RX ADMIN — SPIRONOLACTONE 25 MG: 25 TABLET ORAL at 20:50

## 2023-07-04 RX ADMIN — POTASSIUM CHLORIDE 40 MEQ: 1500 TABLET, EXTENDED RELEASE ORAL at 08:27

## 2023-07-04 RX ADMIN — TRAZODONE HYDROCHLORIDE 50 MG: 50 TABLET ORAL at 20:50

## 2023-07-04 RX ADMIN — MONTELUKAST 10 MG: 10 TABLET, FILM COATED ORAL at 15:57

## 2023-07-04 RX ADMIN — ASPIRIN 81 MG: 81 TABLET, COATED ORAL at 20:50

## 2023-07-04 RX ADMIN — ISOSORBIDE DINITRATE 5 MG: 10 TABLET ORAL at 20:50

## 2023-07-04 RX ADMIN — POTASSIUM CHLORIDE 10 MEQ: 750 TABLET, FILM COATED, EXTENDED RELEASE ORAL at 20:50

## 2023-07-04 RX ADMIN — HALOPERIDOL 5 MG: 5 TABLET ORAL at 20:49

## 2023-07-04 RX ADMIN — ATORVASTATIN CALCIUM 40 MG: 40 TABLET, FILM COATED ORAL at 20:50

## 2023-07-04 RX ADMIN — AMLODIPINE BESYLATE 10 MG: 10 TABLET ORAL at 15:57

## 2023-07-04 RX ADMIN — LISINOPRIL 20 MG: 10 TABLET ORAL at 15:57

## 2023-07-04 ASSESSMENT — SLEEP AND FATIGUE QUESTIONNAIRES
AVERAGE NUMBER OF SLEEP HOURS: 4
SLEEP PATTERN: DIFFICULTY FALLING ASLEEP
DO YOU HAVE DIFFICULTY SLEEPING: YES
DO YOU USE A SLEEP AID: NO

## 2023-07-04 ASSESSMENT — PAIN SCALES - GENERAL
PAINLEVEL_OUTOF10: 9
PAINLEVEL_OUTOF10: 3
PAINLEVEL_OUTOF10: 8

## 2023-07-04 ASSESSMENT — LIFESTYLE VARIABLES
HOW OFTEN DO YOU HAVE A DRINK CONTAINING ALCOHOL: MONTHLY OR LESS
HOW MANY STANDARD DRINKS CONTAINING ALCOHOL DO YOU HAVE ON A TYPICAL DAY: 1 OR 2

## 2023-07-04 ASSESSMENT — PAIN - FUNCTIONAL ASSESSMENT: PAIN_FUNCTIONAL_ASSESSMENT: NONE - DENIES PAIN

## 2023-07-04 ASSESSMENT — PAIN DESCRIPTION - DESCRIPTORS
DESCRIPTORS: SHARP;OTHER (COMMENT)
DESCRIPTORS: SHARP;OTHER (COMMENT)

## 2023-07-04 ASSESSMENT — PAIN DESCRIPTION - LOCATION
LOCATION: LEG
LOCATION: LEG

## 2023-07-04 ASSESSMENT — ENCOUNTER SYMPTOMS
EYES NEGATIVE: 1
RESPIRATORY NEGATIVE: 1

## 2023-07-04 ASSESSMENT — PAIN DESCRIPTION - ORIENTATION
ORIENTATION: LEFT;RIGHT
ORIENTATION: LEFT;RIGHT

## 2023-07-04 NOTE — ED NOTES
Pt is changed into paper scrubs and hospital socks. Pt is in a safety room, property inventoried with pt and in a secure bag. Pt clothing placed in bag and contains a pair of white sneakers, khaki cargo pants, and a gray sleeveless shirt.       [id number] [place] BARKLEY, RN  07/04/23 7775

## 2023-07-04 NOTE — ED NOTES
Patient ambulated to the restroom at this without difficulty and provided with orange juice per request     Gorge Dumont RN  07/04/23 5220

## 2023-07-04 NOTE — CONSULTS
[address] Admission History & Physical        7/4/2023 6:49 PM  Patient: Elisa Calabrese 1965  PCP: Noah Chery MD    HISTORY  Chief Complaint: No chief complaint on file. HPI: 59 y.o. male presenting for admission to [facility] for further evaluation and treatment for Major depressive disorder, recurrent ([address]). He  has a past medical history of Anxiety, Arthritis, GERD (gastroesophageal reflux disease), Hypercholesterolemia, Hypertension, Mild depressed bipolar 1 disorder ([address]), Myocardial infarction ([address]), Obstructive sleep apnea, Psychotic disorder ([address]), Seizures ([address]), and Stroke ([address]). Pt presents for admission to [address] [place] [address]. Unit voluntarily in transfer from the ED at [facility]. Patient is followed by a PCP in [facility] but sees his cardiologist at [facility] in [facility]. Has had recent cardiac clearance for planned ventral hernia repair. He has a history of obstructive sleep apnea previously on CPAP therapy. CPAP unit is nonfunctional and he is attempting to reschedule for testing and treatment. He has a history of extensive cardiac disease and required major surgery to repair the aortic root and complete bypass surgery. Currently denies complaints of chest pain palpitation or dizziness. He does have some dyspnea and cough. Had recent follow-up for leg pain and was placed on Flexeril. He is not fully aware of his medications but notes he takes about 12 pills in the morning and 6 pills in the evening. He manages his medicines independently.       Past Medical History:  Past Medical History:   Diagnosis Date    Anxiety     Arthritis     GERD (gastroesophageal reflux disease)     Hypercholesterolemia     Hypertension     Mild depressed bipolar 1 disorder ([address]) 10/13/2020    Myocardial infarction [facility])     Obstructive sleep apnea 10/13/2020    Psychotic disorder ([address])     Seizures ([address] )     Stroke [facility])        Past Surgical

## 2023-07-04 NOTE — ED NOTES
Patient provided with lunch tray at this time and made aware of bed assignment    Reports called to [facility] MIESHA, RN  07/04/23 2841

## 2023-07-04 NOTE — BH NOTE
[address] [facility]  Master Treatment Plan for Shazia Bigsg    Date Treatment Plan Initiated: 07/04/2023    Treatment Plan Modalities:  Type of Modality Amount  (x minutes) Frequency (x/week) Duration (x days) Name of Responsible Staff   Community & wrap-up meetings to encourage peer interactions 30 14 1 DOMINGUEZ Kevingita Dey [address], [address] [place] [address] psychotherapy to assist in building coping skills and internal controls [address] [facility]., [facility]  Ankit Rashid., [facility]   Therapeutic activity groups to build coping skills 60 7 1 Philip Hearn., [facility]     Psychoeducation in group setting to address:   Medication education  Coping Skills  Symptom Management   60 7 1 Rios Weinstein., [facility]  Ankit Rashid., [facility]  Jacqulin Galeazzi., RN  Marcelino Heard RN   Discharge planning   60 2 1 Varghese Geiger., [facility]   Spirituality    60 2 1 Traci Estrella   Physician medication management   15 7 1 CHUCKIE Spivey MD                                         Treatment Team Signatures    I have participated in the development of this plan of treatment and agree to its implementation.     Patient Signature       Patient Printed Name Date/Time   Social Work/Therapist Signature       Social Work/Therapist Printed Name Date/Time   RN Signature    Alberto Dove RN   RN Printed Name    Alberto Dove RN Date/Time  07/04/2023 @ 9312   Other Signature     Other Signature Date/Time   MD Signature       MD Printed Name Date/Time

## 2023-07-04 NOTE — ED NOTES
[facility] EMERGENCY DEPT  EMERGENCY DEPARTMENT ENCOUNTER      Pt Name: Bailey Villar  MRN: [id number]  Birthdate 1965  Date of evaluation: 7/4/2023  Provider: Irvin Soto. MD Hubert    CHIEF COMPLAINT       Chief Complaint   Patient presents with    Suicidal         HISTORY OF PRESENT ILLNESS   (Location/Symptom, Timing/Onset, Context/Setting, Quality, Duration, Modifying Factors, Severity)  Note limiting factors. Bailey Villar is a 59 y.o. male who presents to the emergency department with complaint of depression and suicidal ideation for 1 month. He had an appointment with a psychiatrist but it was canceled on the day of appointment and not rescheduled. He is a  and disabled. He admits to having used crack in the past but not currently. Nursing Notes were reviewed. REVIEW OF SYSTEMS    (2-9 systems for level 4, 10 or more for level 5)     Review of Systems   Constitutional: Negative. HENT: Negative. Eyes: Negative. Respiratory: Negative. Cardiovascular: Negative. Endocrine: Negative. Genitourinary: Negative. Musculoskeletal: Negative. Neurological: Negative. Hematological: Negative. Psychiatric/Behavioral:  Positive for dysphoric mood and suicidal ideas. Except as noted above the remainder of the review of systems was reviewed and negative.        PAST MEDICAL HISTORY     Past Medical History:   Diagnosis Date    Anxiety     Arthritis     GERD (gastroesophageal reflux disease)     Hypercholesterolemia     Hypertension     Mild depressed bipolar 1 disorder ([address] [facility]) 10/13/2020    Myocardial infarction [facility])     Obstructive sleep apnea 10/13/2020    Psychotic disorder ([address] [facility])     Seizures ([facility])     Stroke ([address] [facility])          SURGICAL HISTORY       Past Surgical History:   Procedure Laterality Date    AR UNLISTED PROCEDURE ABDOMEN PERITONEUM & OMENTUM           CURRENT MEDICATIONS       Previous Medications    ALBUTEROL SULFATE HFA (PROVENTIL;VENTOLIN;PROAIR) 108

## 2023-07-04 NOTE — ED TRIAGE NOTES
Pt states that he is feeling depressed and is having thoughts of harming himself. Pt doesn't know how he will hurt himself but that if he goes home he might hurt himself. Pt states that he has been having these thoughts for the last month and had an appointment to see a provider about the issue but the appointment had to be rescheduled. He states that the thoughts got worse today, and when asked to explain he just states its because of everything and won't elaborate. Pt states that he did not take his meds today and that he left them at home.

## 2023-07-04 NOTE — PROGRESS NOTES
TRANSFER - IN REPORT:    Verbal report received from Valeria Villalobos RN on Destinee Navarro  @ 1212 pm per telephone being received from [facility] for routine progression of patient care      Report consisted of patient's Situation, Background, Assessment and   Recommendations(SBAR). Information from the following report(s) Nurse Handoff Report was reviewed with the receiving nurse. Opportunity for questions and clarification was provided. Assessment completed upon patient's arrival to unit and care assumed. Pt arrived to unit @ 15:07 per wheelchair with transport personal and security.

## 2023-07-04 NOTE — BSMART NOTE
Comprehensive Assessment Form Part 1      Section I - Disposition    The Medical Doctor to Psychiatrist conference was not completed. The Medical Doctor is in agreement with intake's disposition because patient presents with increased depressive Sxs, suicidal ideation, and does not feel safe to return home at this time. The plan is voluntary admission pending medical clearance. The on-call Psychiatrist consulted was Jane Ha. The admitting Psychiatrist will be Dr. Shandra Costa. The admitting Diagnosis is major depressive disorder. Section II - Integrated Summary    Patient assessed in ER room 3 at [facility] via teladoc. Patient dressed in blue paper scrubs, sitting up in hospital stretcher during assessment. Patient cooperative throughout assessment. Patient appears unkempt, but in NAD. Patient presents as depressed and somewhat guarded. Patient presents with overall flat affect. Patient presents with poor eye contact and slurred speech. Patient appears to be hard of hearing requiring this writer to repeat herself. Patient presents with blocking thought process. Patient appears to have little insight and lacks appropriate judgement. Patient states that he walked to ER from his home due to having worsening depression and SI. Patient states \"I have lost all purpose\" and \"i'd be better off dead. \" Patient denies suicide plan. He denies HI and AVH. Per chart, patient with Hx of depression and bipolar disorder. Patient states that he has been admitted to [facility] x4, [facility] x3, and [address] [facility]. Patient states that he was being followed by Dr. Aquilino Velazquez, but he retied. Patient states that he had an appointment for \"a new place in [place],\" but states that the office recently cancelled his appointment and he has not followed up to schedule new appointment. Patient states that he is compliant with his medications, but does not know the names of the medications.  Patient reports Hx of numerous suicide attempts No

## 2023-07-04 NOTE — BH NOTE
Admission Note:     Patient arrived on [facility] in patient unit @ 15:07 per wheelchair from [facility] [place], [facility] as a Voluntary admission. Patient was escorted on the unit  by [facility] and  [facility] personnel. Chacha King and Nursing Supervisor were notified of patient's arrival. Consult placed for Medical H&P done by Hospitalist,Dr. Leisa Ríos @ 18:05. Patient alert and oriented x 4. Calm and cooperative. Denies SI/HI/AVH. No delusional statements made. Patient denied having a license with [employer]. Pt denies smoking cigarettes or using any nicotine products. Patient denies ETOH use and admits cocaine substance abuse when he is depressed. Admitted for depression and increased suicidal ideation in the last month. Pt is alert and oriented x 4. Speech latent but is clear and logical.  He denies SI, HI, and hallucinations. He endorses that he feels suicidal, but not really in here at this time as pt states he feels safe in here. Stated that if he were not here he does not know if he would act on his hopeless feelings and intermittent SI. Phone provided for patient to make call to his sister.

## 2023-07-04 NOTE — BSMART NOTE
Per Sonia Cavazos, Access, pt accepted  [facility], [address]  Rm 230-1  Nurse to Nurse [phone]  Dr Browne [facility] notified

## 2023-07-04 NOTE — ED NOTES
Spoke with Guanakito Sanchez from [place] and she will call back to assess pt.       [address] BARKLEY, RN  07/04/23 2975

## 2023-07-04 NOTE — ED NOTES
Called [facility]achi to get transport for patient spoke to Doug Dhillon, she is going to call me back with an [address] [place] [address] Avenue  07/04/23 9486

## 2023-07-05 PROBLEM — F31.31: Status: RESOLVED | Noted: 2020-10-13 | Resolved: 2023-07-05

## 2023-07-05 PROBLEM — F33.2 SEVERE RECURRENT MAJOR DEPRESSION WITHOUT PSYCHOTIC FEATURES (HCC): Status: ACTIVE | Noted: 2023-07-04

## 2023-07-05 LAB
EKG ATRIAL RATE: 0 BPM
EKG DIAGNOSIS: NORMAL
EKG P AXIS: 53 DEGREES
EKG P-R INTERVAL: 196 MS
EKG Q-T INTERVAL: 517 MS
EKG QRS DURATION: 109 MS
EKG QTC CALCULATION (BAZETT): 472 MS
EKG R AXIS: 30 DEGREES
EKG T AXIS: 41 DEGREES
EKG VENTRICULAR RATE: 50 BPM

## 2023-07-05 PROCEDURE — 6370000000 HC RX 637 (ALT 250 FOR IP): Performed by: PSYCHIATRY & NEUROLOGY

## 2023-07-05 PROCEDURE — 90792 PSYCH DIAG EVAL W/MED SRVCS: CPT | Performed by: PSYCHIATRY & NEUROLOGY

## 2023-07-05 PROCEDURE — 6370000000 HC RX 637 (ALT 250 FOR IP): Performed by: INTERNAL MEDICINE

## 2023-07-05 PROCEDURE — 1140000000 HC RM PRIVATE PSYCH

## 2023-07-05 RX ORDER — VENLAFAXINE HYDROCHLORIDE 75 MG/1
75 CAPSULE, EXTENDED RELEASE ORAL
Status: DISCONTINUED | OUTPATIENT
Start: 2023-07-06 | End: 2023-07-07

## 2023-07-05 RX ORDER — VENLAFAXINE HYDROCHLORIDE 37.5 MG/1
37.5 CAPSULE, EXTENDED RELEASE ORAL
Status: COMPLETED | OUTPATIENT
Start: 2023-07-05 | End: 2023-07-05

## 2023-07-05 RX ADMIN — MONTELUKAST 10 MG: 10 TABLET, FILM COATED ORAL at 09:00

## 2023-07-05 RX ADMIN — SPIRONOLACTONE 25 MG: 25 TABLET ORAL at 09:30

## 2023-07-05 RX ADMIN — CYCLOBENZAPRINE 10 MG: 10 TABLET, FILM COATED ORAL at 02:38

## 2023-07-05 RX ADMIN — ATORVASTATIN CALCIUM 40 MG: 40 TABLET, FILM COATED ORAL at 21:20

## 2023-07-05 RX ADMIN — LISINOPRIL 20 MG: 10 TABLET ORAL at 08:59

## 2023-07-05 RX ADMIN — ACETAMINOPHEN 650 MG: 325 TABLET ORAL at 19:50

## 2023-07-05 RX ADMIN — ACETAMINOPHEN 650 MG: 325 TABLET ORAL at 00:27

## 2023-07-05 RX ADMIN — HYDROXYZINE HYDROCHLORIDE 50 MG: 25 TABLET ORAL at 12:40

## 2023-07-05 RX ADMIN — POTASSIUM CHLORIDE 10 MEQ: 750 TABLET, FILM COATED, EXTENDED RELEASE ORAL at 21:20

## 2023-07-05 RX ADMIN — ISOSORBIDE DINITRATE 5 MG: 10 TABLET ORAL at 09:00

## 2023-07-05 RX ADMIN — VENLAFAXINE HYDROCHLORIDE 37.5 MG: 37.5 CAPSULE, EXTENDED RELEASE ORAL at 09:00

## 2023-07-05 RX ADMIN — ASPIRIN 81 MG: 81 TABLET, COATED ORAL at 08:58

## 2023-07-05 RX ADMIN — AMLODIPINE BESYLATE 10 MG: 10 TABLET ORAL at 08:59

## 2023-07-05 RX ADMIN — PANTOPRAZOLE SODIUM 40 MG: 40 TABLET, DELAYED RELEASE ORAL at 06:25

## 2023-07-05 RX ADMIN — POTASSIUM CHLORIDE 10 MEQ: 750 TABLET, FILM COATED, EXTENDED RELEASE ORAL at 08:59

## 2023-07-05 RX ADMIN — ISOSORBIDE DINITRATE 5 MG: 10 TABLET ORAL at 17:09

## 2023-07-05 RX ADMIN — TRAZODONE HYDROCHLORIDE 50 MG: 50 TABLET ORAL at 21:20

## 2023-07-05 RX ADMIN — ACETAMINOPHEN 650 MG: 325 TABLET ORAL at 12:39

## 2023-07-05 RX ADMIN — ISOSORBIDE DINITRATE 5 MG: 10 TABLET ORAL at 13:01

## 2023-07-05 ASSESSMENT — PAIN DESCRIPTION - DESCRIPTORS
DESCRIPTORS: ACHING
DESCRIPTORS: ACHING
DESCRIPTORS: OTHER (COMMENT)

## 2023-07-05 ASSESSMENT — PAIN DESCRIPTION - ORIENTATION
ORIENTATION: RIGHT;LEFT
ORIENTATION: RIGHT;LEFT

## 2023-07-05 ASSESSMENT — PAIN DESCRIPTION - LOCATION
LOCATION: HEAD
LOCATION: HEAD
LOCATION: LEG
LOCATION: LEG

## 2023-07-05 ASSESSMENT — PAIN SCALES - GENERAL
PAINLEVEL_OUTOF10: 7
PAINLEVEL_OUTOF10: 3
PAINLEVEL_OUTOF10: 0
PAINLEVEL_OUTOF10: 3

## 2023-07-05 NOTE — BH NOTE
Affect constricted, mood depressed/anxious. Patient alert and oriented x 4. Denies pain. No SI/HI/AVH. No delusional or paranoid statements made. Patient cooperative and pleasant. Medication compliant. Appetite good eats 100% of all meals plus snacks. Patient in no apparent distress. Vitals signs within normal limits. PRN Medication Documentation    Specific patient behavior that led to need for PRN medication: Patient requested PRN Tylenol for headache. Staff interventions attempted prior to PRN being given: Assessment done. PRN medication given: Tylenol 650 mg po at 1239. Patient response/effectiveness of PRN medication: Positive effects. PRN Medication Documentation    Specific patient behavior that led to need for PRN medication: Patient requested PRN Atarax for anxiety. Staff interventions attempted prior to PRN being given: Assessment done. PRN medication given: Atarax 50 mg po at 1240. Patient response/effectiveness of PRN medication: Positive effects.

## 2023-07-05 NOTE — H&P
[facility] HISTORY AND PHYSICAL    Name:  Sabrina Telles  MR#:  [id number]  :  1965  ACCOUNT #:  [de-identified]  ADMIT DATE:  2023      [facility]    HISTORY OF PRESENT ILLNESS:  The patient is a 61-year-old  male who has a lengthy past psychiatric history of episodes of major depression, as well as some occasionally but milder polysubstance use, particularly alcohol and cocaine. He was admitted voluntarily from the [facility] after presenting there stating he had been feeling increasingly suicidal for the past month. He had no specific plan, but he stated that he quite \"might hurt myself\" if he went home. In the course of his interview, it appeared as though he almost had no memory for any details, although it was not thought that he was necessarily trying to be overly evasive. He states that he has been off medication for a number of months and realized that his mood was progressively getting worse, and he had actually made an appointment to see a psychiatrist back in May, but when he went to that appointment, they had canceled it on him and would not reschedule. He states he has continued to go without any treatment, and his mood has progressively gotten worse. His neurovegetative functioning has deteriorated notably. He states he is not sleeping well with significant middle insomnia, only getting 2-3 hours per night, most of the time. His energy level has been very low, but he has also not been eating and believes he has lost about 60 pounds in the past 6 months, unintentionally. He has been very dysphoric and very anhedonic, also has been increasingly withdrawn and isolative. He feels helpless and hopeless much of the time and has been having increased suicidal thoughts.   It should be noted that he has taken numerous overdoses in the past and also the fact that he walked out in front of traffic at

## 2023-07-05 NOTE — GROUP NOTE
Group Therapy Note    Pt came into the group room but quickly fell asleep and was not able to participate today.

## 2023-07-05 NOTE — BH NOTE
Behavioral Health Interdisciplinary Rounds     Patient Name: Razia Linder  Age: 59 y.o. Room/Bed:  230/01  Primary Diagnosis: Severe recurrent major depression without psychotic features ([address] )   Admission Status: Voluntary     Readmission within 30 days: No  Power of  in place: No  Patient requires a blocked bed: No14}          Reason for blocked bed:     Sleep hours: 3       Participation in Care/Groups:  No  Medication Compliant?: Yes  PRNS (last 24 hours): Antianxiety, Sleep Aid, and Pain    Restraints (last 24 hours):  No  Substance Abuse:  Yes    24 hour chart check complete: Yes    Patient goal(s) for today: to improve mood, get started on medications  Treatment team focus/goals:  Will have no self-injury during hospital stay  Progress note: Medications will be started and adjusted, patient will be referred in his area for follow up    LOS:  1  Expected LOS: 5-7    Financial concerns/prescription coverage:  No  Family contact: no      Family requesting physician contact today:  No  Discharge plan: return home  Access to weapons: No       Outpatient provider(s): none will be referred to [facility]  Patient's preferred phone number for follow up call: [phone]     Participating treatment team members: Taylor Claire, Dr. Lo WILSON), Yi Gonzales

## 2023-07-05 NOTE — BH NOTE
Patient was admitted to the unit voluntarily for depressed mood and suicidal ideations. Patient reports he has had several attempts in the past. Patient did not feel safe to return home. His past Psychiatrist retired and he had an appointment with another provider and the office cancelled and did not reschedule. Patient has a place to return to. He states he is normally compliant with his medications. Patient is  and does not have any children. He is able to care for self. He plans to return home and will be scheduled with outpatient follow up.

## 2023-07-05 NOTE — BH NOTE
Mood is calm. He express feeling depressed. Denies SI/HI, reports he hears voices on and off, but denied actively hearing voices at time asked. He denies visual hallucinations. He denies pain, reports to writer that he has difficulty sleeping. He did consume snack, and was compliant with staff interventions this shift. No unsafe behaviors.      PRN Medication Documentation    Specific patient behavior that led to need for PRN medication: c/o \"feeling edgy, like my nerves is going, maybe getting a little agitated\"  Staff interventions attempted prior to PRN being given: emotional support, and offered prescribed medication  PRN medication given: Haldol 5 mg by mouth @2049  Patient response/effectiveness of PRN medication: effective    PRN Medication Documentation    Specific patient behavior that led to need for PRN medication: c/o trouble sleeping  Staff interventions attempted prior to PRN being given: provided emotional support, calm environment, offered medication for sleep  PRN medication given: Trazodone 50 mg by [email or web address]  Patient response/effectiveness of PRN medication: effective    PRN Medication Documentation    Specific patient behavior that led to need for PRN medication: c/o increased anxiety  Staff interventions attempted prior to PRN being given: emotional support  PRN medication given: Atarax 50 mg by mouth @2054  Patient response/effectiveness of PRN medication: effective , sleeping, no distress noted     PRN Medication Documentation    Specific patient behavior that led to need for PRN medication: c/o pain bilateral legs  Staff interventions attempted prior to PRN being given: request pain medication  PRN medication given: Tylenol 650 mg by mouth @0027  Patient response/effectiveness of PRN medication: effective      PRN Medication Documentation    Specific patient behavior that led to need for PRN medication: c/o pain, spasms bilateral legs   Staff interventions attempted prior to PRN being given:

## 2023-07-06 PROCEDURE — 6370000000 HC RX 637 (ALT 250 FOR IP): Performed by: PSYCHIATRY & NEUROLOGY

## 2023-07-06 PROCEDURE — 1140000000 HC RM PRIVATE PSYCH

## 2023-07-06 PROCEDURE — 6370000000 HC RX 637 (ALT 250 FOR IP): Performed by: INTERNAL MEDICINE

## 2023-07-06 PROCEDURE — 94640 AIRWAY INHALATION TREATMENT: CPT

## 2023-07-06 PROCEDURE — 6360000002 HC RX W HCPCS: Performed by: INTERNAL MEDICINE

## 2023-07-06 PROCEDURE — 99232 SBSQ HOSP IP/OBS MODERATE 35: CPT | Performed by: PSYCHIATRY & NEUROLOGY

## 2023-07-06 RX ADMIN — ALBUTEROL SULFATE 2.5 MG: 2.5 SOLUTION RESPIRATORY (INHALATION) at 21:40

## 2023-07-06 RX ADMIN — MONTELUKAST 10 MG: 10 TABLET, FILM COATED ORAL at 08:43

## 2023-07-06 RX ADMIN — PANTOPRAZOLE SODIUM 40 MG: 40 TABLET, DELAYED RELEASE ORAL at 06:10

## 2023-07-06 RX ADMIN — LISINOPRIL 20 MG: 10 TABLET ORAL at 08:45

## 2023-07-06 RX ADMIN — ASPIRIN 81 MG: 81 TABLET, COATED ORAL at 08:44

## 2023-07-06 RX ADMIN — ISOSORBIDE DINITRATE 5 MG: 10 TABLET ORAL at 08:44

## 2023-07-06 RX ADMIN — ATORVASTATIN CALCIUM 40 MG: 40 TABLET, FILM COATED ORAL at 20:09

## 2023-07-06 RX ADMIN — VENLAFAXINE HYDROCHLORIDE 75 MG: 75 CAPSULE, EXTENDED RELEASE ORAL at 08:44

## 2023-07-06 RX ADMIN — AMLODIPINE BESYLATE 10 MG: 10 TABLET ORAL at 08:45

## 2023-07-06 RX ADMIN — ALBUTEROL SULFATE 2.5 MG: 2.5 SOLUTION RESPIRATORY (INHALATION) at 01:40

## 2023-07-06 RX ADMIN — POTASSIUM CHLORIDE 10 MEQ: 750 TABLET, FILM COATED, EXTENDED RELEASE ORAL at 20:09

## 2023-07-06 RX ADMIN — POTASSIUM CHLORIDE 10 MEQ: 750 TABLET, FILM COATED, EXTENDED RELEASE ORAL at 08:44

## 2023-07-06 RX ADMIN — ISOSORBIDE DINITRATE 5 MG: 10 TABLET ORAL at 17:57

## 2023-07-06 RX ADMIN — SPIRONOLACTONE 25 MG: 25 TABLET ORAL at 08:45

## 2023-07-06 RX ADMIN — HYDROXYZINE HYDROCHLORIDE 50 MG: 25 TABLET ORAL at 01:30

## 2023-07-06 RX ADMIN — ISOSORBIDE DINITRATE 5 MG: 10 TABLET ORAL at 13:21

## 2023-07-06 ASSESSMENT — PAIN SCALES - GENERAL
PAINLEVEL_OUTOF10: 0
PAINLEVEL_OUTOF10: 0

## 2023-07-06 NOTE — GROUP NOTE
Group Therapy Note    Pt did not attend group today as he was talking with his family.   I will continue to encourage attendance in the future

## 2023-07-06 NOTE — BH NOTE
Affect flat/constricted, mood depressed/anxious. Patient alert and oriented x 4. Denies pain. No SI/HI/AVH. No delusional or paranoid statements made. Patient cooperative and pleasant. Medication compliant and no PRN's given or requested. Patient appetite good eats 100% of all meals plus snacks. Patient attended and engaged in groups with prompting. Patient in no apparent distress. Vitals signs within normal limits.

## 2023-07-06 NOTE — BH NOTE
PSYCHOSOCIAL ASSESSMENT  :Patient identifying info:   Dorita Cohen is a 59 y.o., male admitted 7/4/2023  2:56 PM     Presenting problem and precipitating factors: Pt was admitted voluntarily from the [facility] after presenting there stating he had been feeling increasingly suicidal for the past month. He  has a lengthy past psychiatric history of episodes of major depression. Pt has been without his medication for the last couple of months. His was to see a psychiatrist in May (as his provider retired)but was not able to and his mood has gotten progressively worse. Mental status assessment: Pt has lost approximately 60 pounds in the last 6 months as he has not been eating or sleeping very well . Pt has been increasingly withdrawn and isolative. He has felt hopeless and has increased suicidal thoughts. Pt has numerous hospitalizations including [facility] [address] [place] [address]     Strengths/Recreation/Coping Skills:Pt feels he is able to care for himself    Collateral information: Pt    Current psychiatric /substance abuse providers and contact info: No current treatment    Previous psychiatric/substance abuse providers and response to treatment: Dr Aquilino Velazquez, now retired    Family history of mental illness or substance abuse: denies    Substance abuse history:  alcohol/ cocaine  Social History     Tobacco Use    Smoking status: Former    Smokeless tobacco: Never   Substance Use Topics    Alcohol use: Yes     Alcohol/week: 2.0 standard drinks       History of biomedical complications associated with substance abuse: denies    Patient's current acceptance of treatment or motivation for change: Pt is motivated to get his medication stabilized and connect with outpatient treatment    Family constellation:  ,no children,     Is significant other involved?  His girlfriend lives in [place]    Describe support system: Limited    Describe living arrangements and home

## 2023-07-06 NOTE — BH NOTE
This writer spoke with patient again this morning since he was very drowsy when we spoke yesterday. He was very flat with little to say. Reassured him that this writer will link him with outpatient services to continue to treatment in his area. Patient was agreeable. Patient appeared to be sad, his thoughts were organized. He has been cooperative. He denied any feelings to harm himself and denied any AVH. He is withdrawn. He does plan to return home and outpatient treatment will be set up.

## 2023-07-06 NOTE — BH NOTE
Affect constricted, mood depressed. Alert and oriented x 3. Cooperative and guarded. Attended group. Denies SI/HI/AVH. Medication compliant. Ate 100% of snack. Medicated with prn Tylenol for headache rated 3/10 and Trazodone for sleep. Patient aroused around 0130 c/o shortness of breath. 97%RA, resp-20, lungs clear. PRN nebulizer treatment given by RT, also given Atarax at that time for anxiety. Rested in bed with eyes closed for 6 hours 45 minutes.        PRN Medication Documentation    Specific patient behavior that led to the need for PRN medication: c/o headache  Staff interventions attempted prior to PRN being given: patient requested  PRN medication given: Tylenol 650 mg by mouth at 1950  Patient responsiveness/effectiveness of PRN medication: effective    PRN Medication Documentation    Specific patient behavior that led to the need for PRN medication: restless  Staff interventions attempted prior to PRN being given: patient requested  PRN medication given: Trazodone 50 mg by mouth at 2120  Patient responsiveness/effectiveness of PRN medication: rested    PRN Medication Documentation    Specific patient behavior that led to the need for PRN medication: anxiety r/t SOB  Staff interventions attempted prior to PRN being given: one on on, informing  PRN medication given: Atarax 50 mg by mouth at 0130  Patient responsiveness/effectiveness of PRN medication: helped

## 2023-07-06 NOTE — PLAN OF CARE
Problem: Self Harm/Suicidality  Goal: Will have no self-injury during hospital stay  Description: INTERVENTIONS:  1. Ensure constant observer at bedside with Q15M safety checks  2. Maintain a safe environment  3. Secure patient belongings  4. Ensure family/visitors adhere to safety recommendations  5. Ensure safety tray has been added to patient's diet order  6. Every shift and PRN: Re-assess suicidal risk via Frequent Screener    Outcome: Progressing     Problem: Behavior  Goal: Pt/Family maintain appropriate behavior and adhere to behavioral management agreement, if implemented  Description: INTERVENTIONS:  1. Assess patient/family's coping skills and  non-compliant behavior (including use of illegal substances)  2. Notify security of behavior or suspected illegal substances which indicate the need for search of the family and/or belongings  3. Encourage verbalization of thoughts and concerns in a socially appropriate manner  4. Utilize positive, consistent limit setting strategies supporting safety of patient, staff and others  5. Encourage participation in the decision making process about the behavioral management agreement  6. If a visitor's behavior poses a threat to safety call refer to organization policy.   7. Initiate consult with , Psychosocial CNS, Spiritual Care as appropriate  7/6/2023 1444 by Cricket Hunter RN  Outcome: Progressing  7/6/2023 1444 by Cricket Hunter RN  Outcome: Progressing     Problem: Pain  Goal: Verbalizes/displays adequate comfort level or baseline comfort level  Outcome: Progressing     Problem: Respiratory - Adult  Goal: Achieves optimal ventilation and oxygenation  7/6/2023 0146 by Bernette Goltz, RT  Outcome: Progressing

## 2023-07-07 LAB
ALBUMIN SERPL-MCNC: 3.3 G/DL (ref 3.5–5)
ALBUMIN/GLOB SERPL: 1.1 (ref 1.1–2.2)
ALP SERPL-CCNC: 88 U/L (ref 45–117)
ALT SERPL-CCNC: 33 U/L (ref 12–78)
ANION GAP SERPL CALC-SCNC: 3 MMOL/L (ref 5–15)
AST SERPL-CCNC: 24 U/L (ref 15–37)
BASOPHILS # BLD: 0 K/UL (ref 0–0.1)
BASOPHILS NFR BLD: 1 % (ref 0–1)
BILIRUB SERPL-MCNC: 0.4 MG/DL (ref 0.2–1)
BUN SERPL-MCNC: 15 MG/DL (ref 6–20)
BUN/CREAT SERPL: 13 (ref 12–20)
CALCIUM SERPL-MCNC: 9 MG/DL (ref 8.5–10.1)
CHLORIDE SERPL-SCNC: 105 MMOL/L (ref 97–108)
CO2 SERPL-SCNC: 34 MMOL/L (ref 21–32)
CREAT SERPL-MCNC: 1.13 MG/DL (ref 0.7–1.3)
DIFFERENTIAL METHOD BLD: ABNORMAL
EOSINOPHIL # BLD: 0.1 K/UL (ref 0–0.4)
EOSINOPHIL NFR BLD: 4 % (ref 0–7)
ERYTHROCYTE [DISTWIDTH] IN BLOOD BY AUTOMATED COUNT: 13.3 % (ref 11.5–14.5)
GLOBULIN SER CALC-MCNC: 3.1 G/DL (ref 2–4)
GLUCOSE SERPL-MCNC: 125 MG/DL (ref 65–100)
HCT VFR BLD AUTO: 37.4 % (ref 36.6–50.3)
HGB BLD-MCNC: 12.6 G/DL (ref 12.1–17)
IMM GRANULOCYTES # BLD AUTO: 0 K/UL (ref 0–0.04)
IMM GRANULOCYTES NFR BLD AUTO: 1 % (ref 0–0.5)
LYMPHOCYTES # BLD: 1.2 K/UL (ref 0.8–3.5)
LYMPHOCYTES NFR BLD: 40 % (ref 12–49)
MCH RBC QN AUTO: 32.7 PG (ref 26–34)
MCHC RBC AUTO-ENTMCNC: 33.7 G/DL (ref 30–36.5)
MCV RBC AUTO: 97.1 FL (ref 80–99)
MONOCYTES # BLD: 0.3 K/UL (ref 0–1)
MONOCYTES NFR BLD: 9 % (ref 5–13)
NEUTS SEG # BLD: 1.4 K/UL (ref 1.8–8)
NEUTS SEG NFR BLD: 45 % (ref 32–75)
NRBC # BLD: 0 K/UL (ref 0–0.01)
NRBC BLD-RTO: 0 PER 100 WBC
PLATELET # BLD AUTO: 157 K/UL (ref 150–400)
PMV BLD AUTO: 9.2 FL (ref 8.9–12.9)
POTASSIUM SERPL-SCNC: 4.6 MMOL/L (ref 3.5–5.1)
PROT SERPL-MCNC: 6.4 G/DL (ref 6.4–8.2)
RBC # BLD AUTO: 3.85 M/UL (ref 4.1–5.7)
SODIUM SERPL-SCNC: 142 MMOL/L (ref 136–145)
WBC # BLD AUTO: 3 K/UL (ref 4.1–11.1)

## 2023-07-07 PROCEDURE — 80053 COMPREHEN METABOLIC PANEL: CPT

## 2023-07-07 PROCEDURE — G0103 PSA SCREENING: HCPCS

## 2023-07-07 PROCEDURE — 1140000000 HC RM PRIVATE PSYCH

## 2023-07-07 PROCEDURE — 99232 SBSQ HOSP IP/OBS MODERATE 35: CPT | Performed by: PSYCHIATRY & NEUROLOGY

## 2023-07-07 PROCEDURE — 6370000000 HC RX 637 (ALT 250 FOR IP): Performed by: PSYCHIATRY & NEUROLOGY

## 2023-07-07 PROCEDURE — 85025 COMPLETE CBC W/AUTO DIFF WBC: CPT

## 2023-07-07 PROCEDURE — 36415 COLL VENOUS BLD VENIPUNCTURE: CPT

## 2023-07-07 PROCEDURE — 6370000000 HC RX 637 (ALT 250 FOR IP): Performed by: INTERNAL MEDICINE

## 2023-07-07 RX ORDER — VENLAFAXINE HYDROCHLORIDE 150 MG/1
150 CAPSULE, EXTENDED RELEASE ORAL
Status: DISCONTINUED | OUTPATIENT
Start: 2023-07-08 | End: 2023-07-14 | Stop reason: HOSPADM

## 2023-07-07 RX ORDER — VENLAFAXINE HYDROCHLORIDE 75 MG/1
75 CAPSULE, EXTENDED RELEASE ORAL
Status: COMPLETED | OUTPATIENT
Start: 2023-07-07 | End: 2023-07-07

## 2023-07-07 RX ORDER — TAMSULOSIN HYDROCHLORIDE 0.4 MG/1
0.4 CAPSULE ORAL DAILY
Status: DISCONTINUED | OUTPATIENT
Start: 2023-07-07 | End: 2023-07-14 | Stop reason: HOSPADM

## 2023-07-07 RX ADMIN — CYCLOBENZAPRINE 10 MG: 10 TABLET, FILM COATED ORAL at 03:37

## 2023-07-07 RX ADMIN — ACETAMINOPHEN 650 MG: 325 TABLET ORAL at 03:37

## 2023-07-07 RX ADMIN — PANTOPRAZOLE SODIUM 40 MG: 40 TABLET, DELAYED RELEASE ORAL at 06:36

## 2023-07-07 RX ADMIN — ISOSORBIDE DINITRATE 5 MG: 10 TABLET ORAL at 17:53

## 2023-07-07 RX ADMIN — CYCLOBENZAPRINE 10 MG: 10 TABLET, FILM COATED ORAL at 16:31

## 2023-07-07 RX ADMIN — ATORVASTATIN CALCIUM 40 MG: 40 TABLET, FILM COATED ORAL at 20:36

## 2023-07-07 RX ADMIN — VENLAFAXINE HYDROCHLORIDE 75 MG: 75 CAPSULE, EXTENDED RELEASE ORAL at 10:01

## 2023-07-07 RX ADMIN — ISOSORBIDE DINITRATE 5 MG: 10 TABLET ORAL at 12:54

## 2023-07-07 RX ADMIN — POTASSIUM CHLORIDE 10 MEQ: 750 TABLET, FILM COATED, EXTENDED RELEASE ORAL at 20:36

## 2023-07-07 RX ADMIN — TAMSULOSIN HYDROCHLORIDE 0.4 MG: 0.4 CAPSULE ORAL at 10:01

## 2023-07-07 ASSESSMENT — PAIN DESCRIPTION - DESCRIPTORS
DESCRIPTORS: CRAMPING
DESCRIPTORS: CRAMPING

## 2023-07-07 ASSESSMENT — PAIN DESCRIPTION - LOCATION
LOCATION: LEG

## 2023-07-07 ASSESSMENT — PAIN SCALES - GENERAL
PAINLEVEL_OUTOF10: 4
PAINLEVEL_OUTOF10: 9
PAINLEVEL_OUTOF10: 0

## 2023-07-07 ASSESSMENT — PAIN - FUNCTIONAL ASSESSMENT
PAIN_FUNCTIONAL_ASSESSMENT: ACTIVITIES ARE NOT PREVENTED
PAIN_FUNCTIONAL_ASSESSMENT: ACTIVITIES ARE NOT PREVENTED

## 2023-07-07 ASSESSMENT — PAIN DESCRIPTION - ORIENTATION
ORIENTATION: LEFT;RIGHT
ORIENTATION: LEFT;RIGHT

## 2023-07-07 NOTE — BH NOTE
Behavioral Health Interdisciplinary Rounds     Patient Name: Aj Gill  Age: 59 y.o. Room/Bed:  230/01  Primary Diagnosis: Severe recurrent major depression without psychotic features ([address])   Admission Status: Voluntary     Readmission within 30 days: No  Power of  in place: No  Patient requires a blocked bed: No14}          Reason for blocked bed:     Sleep hours: 5       Participation in Care/Groups:  Yes  Medication Compliant?: Yes  PRNS (last 24 hours):  Antianxiety and Pain    Restraints (last 24 hours):  No  Substance Abuse:  Yes    24 hour chart check complete: Yes    Patient goal(s) for today: to feel better  Treatment team focus/goals: Discharge to home or other facility with appropriate resources  Progress note: labs ordered antidepressant increased, has not noticed much improvement    LOS:  3  Expected LOS: 5-7    Financial concerns/prescription coverage:  No  Family contact: no      Family requesting physician contact today:  No  Discharge plan: home  Access to weapons: No       Outpatient provider(s): will be referred to [facility]  Patient's preferred phone number for follow up call: [phone]    Participating treatment team members: Aj Gill, Xiomara Carlos, Dr. Celestino Fernandez (assigned SW), Jania Marr

## 2023-07-07 NOTE — PROGRESS NOTES
INTERVAL Hx:  Records and clinical information were reviewed. States he's still depressed, and still having crying spells at times. Hasn't noticed much improvement, although he has felt safe being here. He's also been having urinary frequency and urgency, likely due to BPH (up 5 x/night). Will check a PSA and start Flomax at 0.4 mg daily. No SE's with the meds--increasing the Effexor XR today. Still has some SOB and received nebulizers with good results. Slept 5 hours last night.      MEDS:  Current Facility-Administered Medications   Medication Dose Route Frequency    [START ON 7/8/2023] venlafaxine (EFFEXOR XR) extended release capsule 150 mg  150 mg Oral Daily with breakfast    venlafaxine (EFFEXOR XR) extended release capsule 75 mg  75 mg Oral NOW    tamsulosin (FLOMAX) capsule 0.4 mg  0.4 mg Oral Daily    acetaminophen (TYLENOL) tablet 650 mg  650 mg Oral Q4H PRN    polyethylene glycol (GLYCOLAX) packet 17 g  17 g Oral Daily PRN    aluminum & magnesium hydroxide-simethicone (MAALOX) 200-200-20 MG/5ML suspension 30 mL  30 mL Oral Q6H PRN    hydrOXYzine HCl (ATARAX) tablet 50 mg  50 mg Oral TID PRN    traZODone (DESYREL) tablet 50 mg  50 mg Oral Nightly PRN    amLODIPine (NORVASC) tablet 10 mg  10 mg Oral Daily    atorvastatin (LIPITOR) tablet 40 mg  40 mg Oral Nightly    lisinopril (PRINIVIL;ZESTRIL) tablet 20 mg  20 mg Oral Daily    montelukast (SINGULAIR) tablet 10 mg  10 mg Oral Daily    cyclobenzaprine (FLEXERIL) tablet 10 mg  10 mg Oral TID PRN    isosorbide dinitrate (ISORDIL) tablet 5 mg  5 mg Oral TID    pantoprazole (PROTONIX) tablet 40 mg  40 mg Oral QAM AC    spironolactone (ALDACTONE) tablet 25 mg  25 mg Oral Daily    albuterol (PROVENTIL) (2.5 MG/3ML) 0.083% nebulizer solution 2.5 mg  2.5 mg Nebulization Q6H PRN    aspirin EC tablet 81 mg  81 mg Oral Daily    potassium chloride (KLOR-CON) extended release tablet 10 mEq  10 mEq Oral BID       VITALS: Patient Vitals for the past 12 hrs:   Temp Pulse

## 2023-07-07 NOTE — BH NOTE
Epic was down and meds from [id number] were charted on paper. Affect blunted. Mood anxious/depressed. Alert and oriented x 4. [place]. Ate all of meals. Played solitaire with good concentration. Denies SI/HI/AVH/pain. Nicotine patch helping \"not to smoke. \" Wearing paper scrubs. Cooperative and med compliant. Did not attend group activity.     PRN Medication Documentation    Specific patient behavior that led to need for PRN medication: muscle pain in legs 4/10  Staff interventions attempted prior to PRN being given: assess  PRN medication given: Flexeril 10mg po given at 1631  Patient response/effectiveness of PRN medication: decreased pain in legs 0/10 by 1710

## 2023-07-07 NOTE — BH NOTE
Affect flat, mood cooperative and friendly. Patient a & o x 4. Denies pain. No SI, HI, AVH. Patient tolerated medications well. Patient was open when describing issues with family. Educated patient on talking to others to help cope. Stated talking to others was difficult. Currently sleeping during the time of this note.      Slept 5h      PRN Medication Documentation    Specific patient behavior that led to need for PRN medication: C/O SOB  Staff interventions attempted prior to PRN being given: Informed respiratory of patients desire for albuterol   PRN medication given: albuterol   Patient response/effectiveness of PRN medication: Medication was effective denies sob currently sleeping     PRN Medication Documentation    Specific patient behavior that led to need for PRN medication: C/O leg pain  Staff interventions attempted prior to PRN being given: Assessment   PRN medication given: Acetaminophen 50mg/flexeril 10mg  Patient response/effectiveness of PRN medication: Medication effective;currently in bed

## 2023-07-07 NOTE — GROUP NOTE
Group Therapy Note  Pt did not participate in group today.   I will continue to encourage him to attend in the future

## 2023-07-08 LAB — PSA SERPL-MCNC: 0.3 NG/ML (ref 0.01–4)

## 2023-07-08 PROCEDURE — 6370000000 HC RX 637 (ALT 250 FOR IP): Performed by: PSYCHIATRY & NEUROLOGY

## 2023-07-08 PROCEDURE — 6370000000 HC RX 637 (ALT 250 FOR IP): Performed by: INTERNAL MEDICINE

## 2023-07-08 PROCEDURE — 6360000002 HC RX W HCPCS: Performed by: INTERNAL MEDICINE

## 2023-07-08 PROCEDURE — 1240000000 HC EMOTIONAL WELLNESS R&B

## 2023-07-08 RX ORDER — TRAZODONE HYDROCHLORIDE 100 MG/1
100 TABLET ORAL NIGHTLY PRN
Status: DISCONTINUED | OUTPATIENT
Start: 2023-07-08 | End: 2023-07-11

## 2023-07-08 RX ADMIN — TAMSULOSIN HYDROCHLORIDE 0.4 MG: 0.4 CAPSULE ORAL at 09:48

## 2023-07-08 RX ADMIN — SPIRONOLACTONE 25 MG: 25 TABLET ORAL at 09:48

## 2023-07-08 RX ADMIN — CYCLOBENZAPRINE 10 MG: 10 TABLET, FILM COATED ORAL at 06:59

## 2023-07-08 RX ADMIN — ISOSORBIDE DINITRATE 5 MG: 10 TABLET ORAL at 17:24

## 2023-07-08 RX ADMIN — MONTELUKAST 10 MG: 10 TABLET, FILM COATED ORAL at 09:39

## 2023-07-08 RX ADMIN — ISOSORBIDE DINITRATE 5 MG: 10 TABLET ORAL at 09:39

## 2023-07-08 RX ADMIN — ASPIRIN 81 MG: 81 TABLET, COATED ORAL at 09:49

## 2023-07-08 RX ADMIN — POTASSIUM CHLORIDE 10 MEQ: 750 TABLET, FILM COATED, EXTENDED RELEASE ORAL at 09:48

## 2023-07-08 RX ADMIN — LISINOPRIL 20 MG: 10 TABLET ORAL at 07:15

## 2023-07-08 RX ADMIN — POTASSIUM CHLORIDE 10 MEQ: 750 TABLET, FILM COATED, EXTENDED RELEASE ORAL at 20:11

## 2023-07-08 RX ADMIN — ISOSORBIDE DINITRATE 5 MG: 10 TABLET ORAL at 11:41

## 2023-07-08 RX ADMIN — PANTOPRAZOLE SODIUM 40 MG: 40 TABLET, DELAYED RELEASE ORAL at 06:04

## 2023-07-08 RX ADMIN — ALBUTEROL SULFATE 2.5 MG: 2.5 SOLUTION RESPIRATORY (INHALATION) at 00:07

## 2023-07-08 RX ADMIN — ATORVASTATIN CALCIUM 40 MG: 40 TABLET, FILM COATED ORAL at 20:11

## 2023-07-08 RX ADMIN — VENLAFAXINE HYDROCHLORIDE 150 MG: 150 CAPSULE, EXTENDED RELEASE ORAL at 09:38

## 2023-07-08 RX ADMIN — AMLODIPINE BESYLATE 10 MG: 10 TABLET ORAL at 09:42

## 2023-07-08 ASSESSMENT — PAIN SCALES - GENERAL
PAINLEVEL_OUTOF10: 0
PAINLEVEL_OUTOF10: 0
PAINLEVEL_OUTOF10: 8
PAINLEVEL_OUTOF10: 0

## 2023-07-08 ASSESSMENT — PAIN DESCRIPTION - LOCATION: LOCATION: LEG

## 2023-07-08 NOTE — BH NOTE
Appearance: wearing scrubs; fair grooming  Behavior: calm and cooperative; no agitation  Motor: slightly slowed  Mood/Affect: still dysphoric and flat  Thought Process: coherent; organized  Thought Content: no delusions; no SI/HI  Perceptions: no hallucinations  Insight/Judgment: fair     ASSESSMENT:   1.  Major Depression, recurrent, severe (F33.2)  2. Cocaine use (F14.10)     PLAN:  1. Continue the Effexor XR to 150 mg daily. 2.  Check a PSA, CMP (low K+), and CBC today. 3.  Increase trazodone to 100mg QHS PRN  4.  Start Flomax at 0.4 mg daily. 4.  ELOS: 5-10 days, given severity of his depression. F/U with [facility] in [place].

## 2023-07-08 NOTE — BH NOTE
Affect normal, mood pleasant. Patient a & o x 4. Denies pain. No SI, HI, AVH. Patient tolerated medications well, cooperative. Ate snacks with patients and watched movies. Enjoyed playing bingo with staff and patients. Currently sleeping during the time of this note.        PRN Medication Documentation    Specific patient behavior that led to need for PRN medication: C/O SOB  Staff interventions attempted prior to PRN being given: Informed respiratory  PRN medication given: [email or web address]  Patient response/effectiveness of PRN medication: Medication effective       Slept 5.30h

## 2023-07-08 NOTE — PLAN OF CARE
Problem: Discharge Planning  Goal: Discharge to home or other facility with appropriate resources  Outcome: Progressing     Problem: ABCDS Injury Assessment  Goal: Absence of physical injury  Outcome: Progressing     Problem: Self Harm/Suicidality  Goal: Will have no self-injury during hospital stay  Description: INTERVENTIONS:  1. Ensure constant observer at bedside with Q15M safety checks  2. Maintain a safe environment  3. Secure patient belongings  4. Ensure family/visitors adhere to safety recommendations  5. Ensure safety tray has been added to patient's diet order  6. Every shift and PRN: Re-assess suicidal risk via Frequent Screener    7/7/2023 2306 by Rome Hillman RN  Outcome: Progressing     Problem: Behavior  Goal: Pt/Family maintain appropriate behavior and adhere to behavioral management agreement, if implemented  Description: INTERVENTIONS:  1. Assess patient/family's coping skills and  non-compliant behavior (including use of illegal substances)  2. Notify security of behavior or suspected illegal substances which indicate the need for search of the family and/or belongings  3. Encourage verbalization of thoughts and concerns in a socially appropriate manner  4. Utilize positive, consistent limit setting strategies supporting safety of patient, staff and others  5. Encourage participation in the decision making process about the behavioral management agreement  6. If a visitor's behavior poses a threat to safety call refer to organization policy.   7. Initiate consult with , Psychosocial CNS, Spiritual Care as appropriate  7/7/2023 2306 by Rome Hillman RN  Outcome: Progressing

## 2023-07-08 NOTE — BH NOTE
PRN Medication Documentation    Specific patient behavior that led to need for PRN medication: Patient request  Staff interventions attempted prior to PRN being given: Assessment    PRN medication given: [email or web address]  Patient response/effectiveness of PRN medication: [email or web address]

## 2023-07-08 NOTE — PLAN OF CARE
Problem: Self Harm/Suicidality  Goal: Will have no self-injury during hospital stay  Description: INTERVENTIONS:  1. Ensure constant observer at bedside with Q15M safety checks  2. Maintain a safe environment  3. Secure patient belongings  4. Ensure family/visitors adhere to safety recommendations  5. Ensure safety tray has been added to patient's diet order  6. Every shift and PRN: Re-assess suicidal risk via Frequent Screener    Outcome: Progressing     Problem: Behavior  Goal: Pt/Family maintain appropriate behavior and adhere to behavioral management agreement, if implemented  Description: INTERVENTIONS:  1. Assess patient/family's coping skills and  non-compliant behavior (including use of illegal substances)  2. Notify security of behavior or suspected illegal substances which indicate the need for search of the family and/or belongings  3. Encourage verbalization of thoughts and concerns in a socially appropriate manner  4. Utilize positive, consistent limit setting strategies supporting safety of patient, staff and others  5. Encourage participation in the decision making process about the behavioral management agreement  6. If a visitor's behavior poses a threat to safety call refer to organization policy.   7. Initiate consult with , Psychosocial CNS, Spiritual Care as appropriate  Outcome: Progressing     Problem: Chronic Conditions and Co-morbidities  Goal: Patient's chronic conditions and co-morbidity symptoms are monitored and maintained or improved  Outcome: Progressing  Flowsheets (Taken 7/8/2023 0800)  Care Plan - Patient's Chronic Conditions and Co-Morbidity Symptoms are Monitored and Maintained or Improved: Monitor and assess patient's chronic conditions and comorbid symptoms for stability, deterioration, or improvement     Problem: Pain  Goal: Verbalizes/displays adequate comfort level or baseline comfort level  Outcome: Progressing  Flowsheets  Taken 7/8/2023

## 2023-07-08 NOTE — BH NOTE
Affect blunted. Mood anxious/depressed. Alert and oriented x 4. [place]. Ate all of meals. Played solitaire with good concentration. P;ayed games with peers, laughing more today and talking more. Ate all of meals. Denies SI/HI/AVH/pain. Nicotine patch helping \"not to smoke. \" Wearing paper scrubs. Cooperative and med compliant.

## 2023-07-09 PROCEDURE — 6370000000 HC RX 637 (ALT 250 FOR IP): Performed by: PSYCHIATRY & NEUROLOGY

## 2023-07-09 PROCEDURE — 1240000000 HC EMOTIONAL WELLNESS R&B

## 2023-07-09 PROCEDURE — 6370000000 HC RX 637 (ALT 250 FOR IP): Performed by: INTERNAL MEDICINE

## 2023-07-09 PROCEDURE — 6370000000 HC RX 637 (ALT 250 FOR IP): Performed by: NURSE PRACTITIONER

## 2023-07-09 RX ADMIN — MONTELUKAST 10 MG: 10 TABLET, FILM COATED ORAL at 08:33

## 2023-07-09 RX ADMIN — HYDROXYZINE HYDROCHLORIDE 50 MG: 25 TABLET ORAL at 22:46

## 2023-07-09 RX ADMIN — ATORVASTATIN CALCIUM 40 MG: 40 TABLET, FILM COATED ORAL at 22:46

## 2023-07-09 RX ADMIN — AMLODIPINE BESYLATE 10 MG: 10 TABLET ORAL at 08:30

## 2023-07-09 RX ADMIN — SPIRONOLACTONE 25 MG: 25 TABLET ORAL at 08:35

## 2023-07-09 RX ADMIN — VENLAFAXINE HYDROCHLORIDE 150 MG: 150 CAPSULE, EXTENDED RELEASE ORAL at 08:36

## 2023-07-09 RX ADMIN — TAMSULOSIN HYDROCHLORIDE 0.4 MG: 0.4 CAPSULE ORAL at 08:35

## 2023-07-09 RX ADMIN — POTASSIUM CHLORIDE 10 MEQ: 750 TABLET, FILM COATED, EXTENDED RELEASE ORAL at 08:34

## 2023-07-09 RX ADMIN — TRAZODONE HYDROCHLORIDE 100 MG: 100 TABLET ORAL at 00:57

## 2023-07-09 RX ADMIN — LISINOPRIL 20 MG: 10 TABLET ORAL at 08:29

## 2023-07-09 RX ADMIN — PANTOPRAZOLE SODIUM 40 MG: 40 TABLET, DELAYED RELEASE ORAL at 06:13

## 2023-07-09 RX ADMIN — ISOSORBIDE DINITRATE 5 MG: 10 TABLET ORAL at 12:57

## 2023-07-09 RX ADMIN — ASPIRIN 81 MG: 81 TABLET, COATED ORAL at 08:31

## 2023-07-09 RX ADMIN — ISOSORBIDE DINITRATE 5 MG: 10 TABLET ORAL at 08:31

## 2023-07-09 RX ADMIN — ISOSORBIDE DINITRATE 5 MG: 10 TABLET ORAL at 17:36

## 2023-07-09 RX ADMIN — TRAZODONE HYDROCHLORIDE 100 MG: 100 TABLET ORAL at 22:46

## 2023-07-09 ASSESSMENT — PAIN SCALES - GENERAL
PAINLEVEL_OUTOF10: 0
PAINLEVEL_OUTOF10: 0

## 2023-07-09 NOTE — BH NOTE
Affect blunted. Mood anxious/depressed. Alert and oriented x 4. [place]. Ate all of meals. Played solitaire with good concentration. Played games with peers, laughing more today and talking more. Ate all of meals. Tidy. Helps others. Worked on [employer]. Denies SI/HI/AVH/pain. Wearing paper scrubs. Cooperative and med compliant.

## 2023-07-09 NOTE — PLAN OF CARE
Problem: Discharge Planning  Goal: Discharge to home or other facility with appropriate resources  Outcome: Progressing     Problem: ABCDS Injury Assessment  Goal: Absence of physical injury  Outcome: Progressing     Problem: Self Harm/Suicidality  Goal: Will have no self-injury during hospital stay  Description: INTERVENTIONS:  1. Ensure constant observer at bedside with Q15M safety checks  2. Maintain a safe environment  3. Secure patient belongings  4. Ensure family/visitors adhere to safety recommendations  5. Ensure safety tray has been added to patient's diet order  6. Every shift and PRN: Re-assess suicidal risk via Frequent Screener    Outcome: Progressing     Problem: Behavior  Goal: Pt/Family maintain appropriate behavior and adhere to behavioral management agreement, if implemented  Description: INTERVENTIONS:  1. Assess patient/family's coping skills and  non-compliant behavior (including use of illegal substances)  2. Notify security of behavior or suspected illegal substances which indicate the need for search of the family and/or belongings  3. Encourage verbalization of thoughts and concerns in a socially appropriate manner  4. Utilize positive, consistent limit setting strategies supporting safety of patient, staff and others  5. Encourage participation in the decision making process about the behavioral management agreement  6. If a visitor's behavior poses a threat to safety call refer to organization policy.   7. Initiate consult with , Psychosocial CNS, Spiritual Care as appropriate  Outcome: Progressing     Problem: Chronic Conditions and Co-morbidities  Goal: Patient's chronic conditions and co-morbidity symptoms are monitored and maintained or improved  Outcome: Progressing  Flowsheets (Taken 7/9/2023 0802)  Care Plan - Patient's Chronic Conditions and Co-Morbidity Symptoms are Monitored and Maintained or Improved: Monitor and assess patient's chronic conditions and

## 2023-07-09 NOTE — BH NOTE
Affect normal, mood appropriate. Patient a & o x 4. Denies pain. No SI, HI, AVH. Patient tolerated medications well, cooperative. Enjoyed activities in the dayroom. Watch a few movies. Currently sleeping during the time of this note.      PRN Medication Documentation    Specific patient behavior that led to need for PRN medication: Patient request  Staff interventions attempted prior to PRN being given: Assessment  PRN medication given: trazodone  Patient response/effectiveness of PRN medication: Tolerated medication well     Slept 5.30h

## 2023-07-09 NOTE — PLAN OF CARE
Problem: Discharge Planning  Goal: Discharge to home or other facility with appropriate resources  Outcome: Progressing     Problem: ABCDS Injury Assessment  Goal: Absence of physical injury  Outcome: Progressing     Problem: Self Harm/Suicidality  Goal: Will have no self-injury during hospital stay  Description: INTERVENTIONS:  1. Ensure constant observer at bedside with Q15M safety checks  2. Maintain a safe environment  3. Secure patient belongings  4. Ensure family/visitors adhere to safety recommendations  5. Ensure safety tray has been added to patient's diet order  6.   Every shift and PRN: Re-assess suicidal risk via Frequent Screener    7/8/2023 2307 by Mallika Hastings RN  Outcome: Progressing

## 2023-07-10 PROCEDURE — 99232 SBSQ HOSP IP/OBS MODERATE 35: CPT | Performed by: PSYCHIATRY & NEUROLOGY

## 2023-07-10 PROCEDURE — 6370000000 HC RX 637 (ALT 250 FOR IP): Performed by: NURSE PRACTITIONER

## 2023-07-10 PROCEDURE — 6370000000 HC RX 637 (ALT 250 FOR IP): Performed by: PSYCHIATRY & NEUROLOGY

## 2023-07-10 PROCEDURE — 6370000000 HC RX 637 (ALT 250 FOR IP): Performed by: INTERNAL MEDICINE

## 2023-07-10 PROCEDURE — 1240000000 HC EMOTIONAL WELLNESS R&B

## 2023-07-10 RX ADMIN — SPIRONOLACTONE 25 MG: 25 TABLET ORAL at 08:47

## 2023-07-10 RX ADMIN — VENLAFAXINE HYDROCHLORIDE 150 MG: 150 CAPSULE, EXTENDED RELEASE ORAL at 08:46

## 2023-07-10 RX ADMIN — ISOSORBIDE DINITRATE 5 MG: 10 TABLET ORAL at 13:29

## 2023-07-10 RX ADMIN — TAMSULOSIN HYDROCHLORIDE 0.4 MG: 0.4 CAPSULE ORAL at 08:46

## 2023-07-10 RX ADMIN — AMLODIPINE BESYLATE 10 MG: 10 TABLET ORAL at 08:48

## 2023-07-10 RX ADMIN — PANTOPRAZOLE SODIUM 40 MG: 40 TABLET, DELAYED RELEASE ORAL at 06:12

## 2023-07-10 RX ADMIN — ISOSORBIDE DINITRATE 5 MG: 10 TABLET ORAL at 08:48

## 2023-07-10 RX ADMIN — MONTELUKAST 10 MG: 10 TABLET, FILM COATED ORAL at 08:47

## 2023-07-10 RX ADMIN — ATORVASTATIN CALCIUM 40 MG: 40 TABLET, FILM COATED ORAL at 23:08

## 2023-07-10 RX ADMIN — ISOSORBIDE DINITRATE 5 MG: 10 TABLET ORAL at 17:28

## 2023-07-10 RX ADMIN — LISINOPRIL 20 MG: 10 TABLET ORAL at 08:48

## 2023-07-10 RX ADMIN — ASPIRIN 81 MG: 81 TABLET, COATED ORAL at 08:46

## 2023-07-10 RX ADMIN — TRAZODONE HYDROCHLORIDE 100 MG: 100 TABLET ORAL at 23:09

## 2023-07-10 ASSESSMENT — PAIN SCALES - GENERAL: PAINLEVEL_OUTOF10: 0

## 2023-07-10 NOTE — PLAN OF CARE
Problem: Discharge Planning  Goal: Discharge to home or other facility with appropriate resources  7/9/2023 2254 by Buell Gottron, RN  Outcome: Progressing     Problem: ABCDS Injury Assessment  Goal: Absence of physical injury  7/9/2023 2254 by Buell Gottron, RN  Outcome: Progressing     Problem: Self Harm/Suicidality  Goal: Will have no self-injury during hospital stay  Description: INTERVENTIONS:  1. Ensure constant observer at bedside with Q15M safety checks  2. Maintain a safe environment  3. Secure patient belongings  4. Ensure family/visitors adhere to safety recommendations  5. Ensure safety tray has been added to patient's diet order  6. Every shift and PRN: Re-assess suicidal risk via Frequent Screener    7/9/2023 2254 by Buell Gottron, RN  Outcome: Progressing     Problem: Behavior  Goal: Pt/Family maintain appropriate behavior and adhere to behavioral management agreement, if implemented  Description: INTERVENTIONS:  1. Assess patient/family's coping skills and  non-compliant behavior (including use of illegal substances)  2. Notify security of behavior or suspected illegal substances which indicate the need for search of the family and/or belongings  3. Encourage verbalization of thoughts and concerns in a socially appropriate manner  4. Utilize positive, consistent limit setting strategies supporting safety of patient, staff and others  5. Encourage participation in the decision making process about the behavioral management agreement  6. If a visitor's behavior poses a threat to safety call refer to organization policy.   7. Initiate consult with , Psychosocial CNS, Spiritual Care as appropriate  7/9/2023 2254 by Buell Gottron, RN  Outcome: Progressing

## 2023-07-10 NOTE — PLAN OF CARE
Problem: Behavior  Goal: Pt/Family maintain appropriate behavior and adhere to behavioral management agreement, if implemented  Description: INTERVENTIONS:  1. Assess patient/family's coping skills and  non-compliant behavior (including use of illegal substances)  2. Notify security of behavior or suspected illegal substances which indicate the need for search of the family and/or belongings  3. Encourage verbalization of thoughts and concerns in a socially appropriate manner  4. Utilize positive, consistent limit setting strategies supporting safety of patient, staff and others  5. Encourage participation in the decision making process about the behavioral management agreement  6. If a visitor's behavior poses a threat to safety call refer to organization policy.   7. Initiate consult with , Psychosocial CNS, Spiritual Care as appropriate  7/10/2023 1752 by Taylor Hickman RN  Outcome: Progressing  7/10/2023 1752 by Taylor Hickman RN  Outcome: Progressing     Problem: Respiratory - Adult  Goal: Achieves optimal ventilation and oxygenation  7/10/2023 1752 by Taylor Hickman RN  Outcome: Progressing  7/10/2023 1752 by Taylor Hickman RN  Outcome: Progressing

## 2023-07-10 NOTE — BH NOTE
Affect normal, mood appropriate. Patient a & o x 4. Denies pain. No SI, HI, AVH. Patient tolerated medications well, cooperative. Stated he was having a hard time sleeping but was heard snoring a few minutes later. Enjoys being in the dayroom playing cards and watching movies. Currently sleeping during the time of this note.      PRN Medication Documentation    Specific patient behavior that led to need for PRN medication: C/O Difficulty Sleeping  Staff interventions attempted prior to PRN being given: Assessment  PRN medication given: Vistaril/Trazodone  Patient response/effectiveness of PRN medication: Tolerated Well

## 2023-07-10 NOTE — GROUP NOTE
Group Therapy Note    Date: 7/10/2023    Group Start Time: 0900  Group End Time: 0950  Group Topic: Process Group - Inpatient    [facility]    Martha Ferrell, KELSEYW        Group Therapy Note      Focus of session was on our current ways of coping with emotions and problems and is they effective or not. We spoke about how we can tell what we are doing is effective and that is that our problems get resolved and/or our emotions lessen as a result. We spoke about ineffective coping skills such as suppression or avoidance, being passive and not doing anything about it, acting out or striking back, blaming others or circumstances. We spoke about what are effective coping skills and they include confronting the issues and face reality, seeking out help or information, setting our priorities, and establishing goals. We spoke about what are we currently doing that is working and what is not working. Patient's Goal:    Will have no self-injury during hospital stay             Notes:  Jose Cox participated in group and he spoke about how he knows that he is doing better now that he is back on his medications. He spoke about some issues he is having with his new apartment and what he is concerned about. He was open to feedback and support. Status After Intervention:  Unchanged    Participation Level:  Active Listener and Interactive    Participation Quality: Appropriate, Attentive, Sharing, and Supportive      Speech:  normal and pressured      Thought Process/Content: Logical      Affective Functioning: Congruent      Mood: angry, anxious, and depressed      Level of consciousness:  Alert, Oriented x4, and Attentive      Response to Learning: Able to verbalize current knowledge/experience, Able to retain information, Capable of insight, and Progressing to goal      Endings: None Reported    Modes of Intervention: Education, Support, Socialization, and Exploration      Discipline Responsible: /Counselor      Signature:   Jacqulin Galeazzi Souders, LCSW

## 2023-07-10 NOTE — BH NOTE
Behavioral Health Interdisciplinary Rounds     Patient Name: An Brown  Age: 59 y.o. Room/Bed:  232/02  Primary Diagnosis: Severe recurrent major depression without psychotic features ([address])   Admission Status: Voluntary     Readmission within 30 days: No  Power of  in place: No  Patient requires a blocked bed: No14}          Reason for blocked bed:     Sleep hours: 6.5       Participation in Care/Groups:  Yes  Medication Compliant?: Yes  PRNS (last 24 hours):  Antianxiety and Sleep Aid    Restraints (last 24 hours):  No  Substance Abuse:  Yes    24 hour chart check complete: Yes    Patient goal(s) for today: to prepare for discharge  Treatment team focus/goals: Discharge to home or other facility with appropriate resources  Progress note: continue current meds, patient reports feeling improvement in his mood    LOS:  6  Expected LOS: 1-3    Financial concerns/prescription coverage:  No  Family contact: no      Family requesting physician contact today:  No  Discharge plan: home  Access to weapons: No       Outpatient provider(s): will be referred for out patient care  Patient's preferred phone number for follow up call: [phone]    Participating treatment team members: Suzanna Hu, Dr. Scott Mahoney (assigned SW), Jan Vega

## 2023-07-10 NOTE — BH NOTE
Patient affect blunted and mood euthymic. Patient alert and oriented x 4. Denies pain. No SI/HI/AVH. No delusional or paranoid statements made. Patient cooperative and pleasant. Medication compliant and no PRN's requested or given. Patient hard of hearing but makes needs known to staff. Patient attended and engaged in groups with prompting. Patient in the dayroom most of the shift playing cards and interacting appropriately with peers. Patient in no apparent distress. Vitals signs within normal limits.

## 2023-07-10 NOTE — BH NOTE
Met with patient this day. Patient reports he is feeling better. He feels he is hopeful which is evident by no further cring spells and is brighter. He is glad to be on medications and is motivated to continue outpatient treatment in his area. This writer will [facility] and will have intake scheduled prior to discharge. He has been appropriate with peers and staff, compliant with treatment and medications. He is hopeful for discharge tomorrow. Patient will be returning back to his home.

## 2023-07-11 PROCEDURE — 6370000000 HC RX 637 (ALT 250 FOR IP): Performed by: PSYCHIATRY & NEUROLOGY

## 2023-07-11 PROCEDURE — 99231 SBSQ HOSP IP/OBS SF/LOW 25: CPT | Performed by: PSYCHIATRY & NEUROLOGY

## 2023-07-11 PROCEDURE — 6360000002 HC RX W HCPCS: Performed by: INTERNAL MEDICINE

## 2023-07-11 PROCEDURE — 94640 AIRWAY INHALATION TREATMENT: CPT

## 2023-07-11 PROCEDURE — 6370000000 HC RX 637 (ALT 250 FOR IP): Performed by: INTERNAL MEDICINE

## 2023-07-11 PROCEDURE — 1240000000 HC EMOTIONAL WELLNESS R&B

## 2023-07-11 RX ORDER — CETIRIZINE HYDROCHLORIDE 10 MG/1
10 TABLET ORAL DAILY
Status: DISCONTINUED | OUTPATIENT
Start: 2023-07-11 | End: 2023-07-14 | Stop reason: HOSPADM

## 2023-07-11 RX ADMIN — ISOSORBIDE DINITRATE 5 MG: 10 TABLET ORAL at 13:07

## 2023-07-11 RX ADMIN — ATORVASTATIN CALCIUM 40 MG: 40 TABLET, FILM COATED ORAL at 23:01

## 2023-07-11 RX ADMIN — ISOSORBIDE DINITRATE 5 MG: 10 TABLET ORAL at 17:36

## 2023-07-11 RX ADMIN — VENLAFAXINE HYDROCHLORIDE 150 MG: 150 CAPSULE, EXTENDED RELEASE ORAL at 08:51

## 2023-07-11 RX ADMIN — ASPIRIN 81 MG: 81 TABLET, COATED ORAL at 08:51

## 2023-07-11 RX ADMIN — MONTELUKAST 10 MG: 10 TABLET, FILM COATED ORAL at 08:51

## 2023-07-11 RX ADMIN — TRAZODONE HYDROCHLORIDE 150 MG: 100 TABLET ORAL at 23:01

## 2023-07-11 RX ADMIN — ALBUTEROL SULFATE 2.5 MG: 2.5 SOLUTION RESPIRATORY (INHALATION) at 16:18

## 2023-07-11 RX ADMIN — CETIRIZINE HYDROCHLORIDE 10 MG: 10 TABLET, FILM COATED ORAL at 10:32

## 2023-07-11 RX ADMIN — PANTOPRAZOLE SODIUM 40 MG: 40 TABLET, DELAYED RELEASE ORAL at 06:08

## 2023-07-11 RX ADMIN — ISOSORBIDE DINITRATE 5 MG: 10 TABLET ORAL at 08:51

## 2023-07-11 RX ADMIN — AMLODIPINE BESYLATE 10 MG: 10 TABLET ORAL at 08:51

## 2023-07-11 RX ADMIN — HYDROXYZINE HYDROCHLORIDE 50 MG: 25 TABLET ORAL at 00:56

## 2023-07-11 RX ADMIN — LISINOPRIL 20 MG: 10 TABLET ORAL at 08:51

## 2023-07-11 RX ADMIN — SPIRONOLACTONE 25 MG: 25 TABLET ORAL at 08:52

## 2023-07-11 RX ADMIN — TAMSULOSIN HYDROCHLORIDE 0.4 MG: 0.4 CAPSULE ORAL at 08:51

## 2023-07-11 ASSESSMENT — PAIN SCALES - GENERAL
PAINLEVEL_OUTOF10: 0
PAINLEVEL_OUTOF10: 0

## 2023-07-11 NOTE — BH NOTE
Affect less depressed, mood \"I do better after being in hospital\"  Alert and Oriented X 4. Cooperative. Attended and participated in group. Interacted with staff and peers. Makes needs known. Ate  snacks. Denied SI/HI/AVH and pain. Pt restless throughout the night. Hyper talkative at times with staff. Easily redirected. Pt c/o constipation and refused PRN glycolax packet stating powder form does not work. Writer encouraged pt to increase fluids and fiber rich diet. Writer will pass to day shift. .  Medication compliant. Stable with no s/s of distress. Laid in bed with eyes closed for 4 hours       PRN Medication Documentation    Specific patient behavior that led to need for PRN medication: insomnia   Staff interventions attempted prior to PRN being given: requested   PRN medication given: trazodone 100 mg po at 2309  Patient response/effectiveness of PRN medication: tolerated for several hours    PRN Medication Documentation    Specific patient behavior that led to need for PRN medication: anxious/restless   Staff interventions attempted prior to PRN being given: redirecting   PRN medication given: atarax 50 mg po at 0056  Patient response/effectiveness of PRN medication: tolerated for several hours.

## 2023-07-11 NOTE — BH NOTE
Patient reports feeling much better due to the support, structure and medications. He feels ready for discharge. His insurance does not cover transportation and he does not have anyone to provide transportation. He may have a possible ride on Thursday. This writer did secure a ride with [facility] for Friday which was the earliest day as a back up. He will be returning back to his home and will be referred to [facility] for follow up.

## 2023-07-11 NOTE — PROGRESS NOTES
INTERVAL Hx:  Records and clinical information were reviewed. Continues to report that he's feeling better and that he's more hopeful now, although he didn't sleep well last night. Says he usually gets better in the hospital due to the support and med adjustments. No longer feeling very depressed, and objectively he appears to be much brighter and more animated--more social and talkative now. No further crying spells now and no SI at all. Still having some urinary frequency but he's not having to strain to urinate now. No SE's with the meds at all. No further SOB either. Slept only 4 hours last night.      MEDS:  Current Facility-Administered Medications   Medication Dose Route Frequency    traZODone (DESYREL) tablet 100 mg  100 mg Oral Nightly PRN    venlafaxine (EFFEXOR XR) extended release capsule 150 mg  150 mg Oral Daily with breakfast    tamsulosin (FLOMAX) capsule 0.4 mg  0.4 mg Oral Daily    acetaminophen (TYLENOL) tablet 650 mg  650 mg Oral Q4H PRN    polyethylene glycol (GLYCOLAX) packet 17 g  17 g Oral Daily PRN    aluminum & magnesium hydroxide-simethicone (MAALOX) 200-200-20 MG/5ML suspension 30 mL  30 mL Oral Q6H PRN    hydrOXYzine HCl (ATARAX) tablet 50 mg  50 mg Oral TID PRN    amLODIPine (NORVASC) tablet 10 mg  10 mg Oral Daily    atorvastatin (LIPITOR) tablet 40 mg  40 mg Oral Nightly    lisinopril (PRINIVIL;ZESTRIL) tablet 20 mg  20 mg Oral Daily    montelukast (SINGULAIR) tablet 10 mg  10 mg Oral Daily    cyclobenzaprine (FLEXERIL) tablet 10 mg  10 mg Oral TID PRN    isosorbide dinitrate (ISORDIL) tablet 5 mg  5 mg Oral TID    pantoprazole (PROTONIX) tablet 40 mg  40 mg Oral QAM AC    spironolactone (ALDACTONE) tablet 25 mg  25 mg Oral Daily    albuterol (PROVENTIL) (2.5 MG/3ML) 0.083% nebulizer solution 2.5 mg  2.5 mg Nebulization Q6H PRN    aspirin EC tablet 81 mg  81 mg Oral Daily       VITALS: Patient Vitals for the past 12 hrs:   Temp Pulse Resp BP SpO2   07/11/23 0851 -- -- -- 136/76 --

## 2023-07-11 NOTE — GROUP NOTE
Group Therapy Note    Date: 2023    Group Start Time: 1030  Group End Time: 1100  Group Topic: Community Meeting    [address]        Group Therapy Note    Attendees: 1       Patient's Goal:  To keep his mood up    Notes:  ***    Status After Intervention:  {Status After Intervention:619997951}    Participation Level: {Participation Level:369436724}    Participation Quality: {[facility] PARTICIPATION QUALITY:261447602}      Speech:  {ED  CD_SPEECH:30609}      Thought Process/Content: {Thought Process/Content:393618937}      Affective Functioning: {Affective Functionin}      Mood: {Mood:256584844}      Level of consciousness:  {Level of consciousness:224092028}      Response to Learnin [facility] Responses to Learnin}      Endings: {[facility] Endings:54959}    Modes of Intervention: {[facility] Modes of Intervention:083588053}      Discipline Responsible: [facility]5 [facility] Multidisciplinary:[id number]}      Signature:  Anoop Butt

## 2023-07-11 NOTE — BH NOTE
Affect blunted, mood euthymic. Patient alert and oriented x 4. Denies pain. No SI/HI/AVH. No delusional or paranoid statements made. Patient cooperative and pleasant. Patient very talkative. Patient hard of hearing but makes needs known to staff. Medication compliant and no PRN's given. Patient appetite good eats 100% of all meals plus snacks. Patient attended and engaged in groups with prompting. Patient in the dayroom most of the shift playing card games and interacting appropriately with peers and staff. Patient in no apparent distress. Vitals signs within normal limits. No falls noted on this shift.

## 2023-07-11 NOTE — PLAN OF CARE
Problem: Self Harm/Suicidality  Goal: Will have no self-injury during hospital stay  Description: INTERVENTIONS:  1. Ensure constant observer at bedside with Q15M safety checks  2. Maintain a safe environment  3. Secure patient belongings  4. Ensure family/visitors adhere to safety recommendations  5. Ensure safety tray has been added to patient's diet order  6. Every shift and PRN: Re-assess suicidal risk via Frequent Screener    Outcome: Progressing     Problem: Behavior  Goal: Pt/Family maintain appropriate behavior and adhere to behavioral management agreement, if implemented  Description: INTERVENTIONS:  1. Assess patient/family's coping skills and  non-compliant behavior (including use of illegal substances)  2. Notify security of behavior or suspected illegal substances which indicate the need for search of the family and/or belongings  3. Encourage verbalization of thoughts and concerns in a socially appropriate manner  4. Utilize positive, consistent limit setting strategies supporting safety of patient, staff and others  5. Encourage participation in the decision making process about the behavioral management agreement  6. If a visitor's behavior poses a threat to safety call refer to organization policy.   7. Initiate consult with , Psychosocial CNS, Spiritual Care as appropriate  Outcome: Progressing

## 2023-07-12 PROCEDURE — 6370000000 HC RX 637 (ALT 250 FOR IP): Performed by: PSYCHIATRY & NEUROLOGY

## 2023-07-12 PROCEDURE — 99231 SBSQ HOSP IP/OBS SF/LOW 25: CPT | Performed by: PSYCHIATRY & NEUROLOGY

## 2023-07-12 PROCEDURE — 6370000000 HC RX 637 (ALT 250 FOR IP): Performed by: INTERNAL MEDICINE

## 2023-07-12 PROCEDURE — 1240000000 HC EMOTIONAL WELLNESS R&B

## 2023-07-12 RX ORDER — QUETIAPINE FUMARATE 25 MG/1
50 TABLET, FILM COATED ORAL NIGHTLY
Status: DISCONTINUED | OUTPATIENT
Start: 2023-07-12 | End: 2023-07-14 | Stop reason: HOSPADM

## 2023-07-12 RX ORDER — DIAPER,BRIEF,INFANT-TODD,DISP
EACH MISCELLANEOUS 2 TIMES DAILY
Status: DISCONTINUED | OUTPATIENT
Start: 2023-07-12 | End: 2023-07-14 | Stop reason: HOSPADM

## 2023-07-12 RX ADMIN — HYDROXYZINE HYDROCHLORIDE 50 MG: 25 TABLET ORAL at 22:41

## 2023-07-12 RX ADMIN — ISOSORBIDE DINITRATE 5 MG: 10 TABLET ORAL at 13:38

## 2023-07-12 RX ADMIN — TAMSULOSIN HYDROCHLORIDE 0.4 MG: 0.4 CAPSULE ORAL at 10:00

## 2023-07-12 RX ADMIN — SPIRONOLACTONE 25 MG: 25 TABLET ORAL at 10:01

## 2023-07-12 RX ADMIN — ASPIRIN 81 MG: 81 TABLET, COATED ORAL at 10:00

## 2023-07-12 RX ADMIN — ISOSORBIDE DINITRATE 5 MG: 10 TABLET ORAL at 08:01

## 2023-07-12 RX ADMIN — MONTELUKAST 10 MG: 10 TABLET, FILM COATED ORAL at 10:01

## 2023-07-12 RX ADMIN — PANTOPRAZOLE SODIUM 40 MG: 40 TABLET, DELAYED RELEASE ORAL at 06:14

## 2023-07-12 RX ADMIN — ISOSORBIDE DINITRATE 5 MG: 10 TABLET ORAL at 17:43

## 2023-07-12 RX ADMIN — HYDROCORTISONE: 0.01 CREAM TOPICAL at 11:30

## 2023-07-12 RX ADMIN — QUETIAPINE FUMARATE 50 MG: 25 TABLET ORAL at 22:40

## 2023-07-12 RX ADMIN — LISINOPRIL 20 MG: 10 TABLET ORAL at 10:03

## 2023-07-12 RX ADMIN — ATORVASTATIN CALCIUM 40 MG: 40 TABLET, FILM COATED ORAL at 22:41

## 2023-07-12 RX ADMIN — HYDROCORTISONE: 0.01 CREAM TOPICAL at 22:42

## 2023-07-12 RX ADMIN — CETIRIZINE HYDROCHLORIDE 10 MG: 10 TABLET, FILM COATED ORAL at 10:00

## 2023-07-12 RX ADMIN — HYDROXYZINE HYDROCHLORIDE 50 MG: 25 TABLET ORAL at 00:40

## 2023-07-12 RX ADMIN — VENLAFAXINE HYDROCHLORIDE 150 MG: 150 CAPSULE, EXTENDED RELEASE ORAL at 10:00

## 2023-07-12 RX ADMIN — AMLODIPINE BESYLATE 10 MG: 10 TABLET ORAL at 10:02

## 2023-07-12 ASSESSMENT — PAIN SCALES - GENERAL: PAINLEVEL_OUTOF10: 0

## 2023-07-12 NOTE — BH NOTE
Behavioral Health Interdisciplinary Rounds     Patient Name: Joan Mcrae  Age: 59 y.o. Room/Bed:  232/02  Primary Diagnosis: Severe recurrent major depression without psychotic features ([address] )   Admission Status: Voluntary     Readmission within 30 days: No  Power of  in place: No  Patient requires a blocked bed: No14}          Reason for blocked bed:     Sleep hours: 4.75       Participation in Care/Groups:  Yes  Medication Compliant?: Yes  PRNS (last 24 hours): Antianxiety    Restraints (last 24 hours):  No  Substance Abuse:  Yes    24 hour chart check complete: Yes    Patient goal(s) for today: to continue to feel good and get follow up secured  Treatment team focus/goals: Pt maintain appropriate behavior and adhere to behavioral management agreement, if implemented  Progress note: Continues to report that he feels \"pretty good\", and that his mood is much better His N/V functioning has been much better as well, except for his sleep---still didn't sleep well last night, and the Trazodone doesn't seem to help--will switch to Seroquel at 50 mg QHS. Much more social and engaged, and he's feeling more optimistic as well.     LOS:  8  Expected LOS: 2    Financial concerns/prescription coverage:  No  Family contact: no      Family requesting physician contact today:  No  Discharge plan: home  Access to weapons: No       Outpatient provider(s): wants to be referred to E[facility]  Patient's preferred phone number for follow up call: [phone]    Participating treatment team members: Ashwin Quispe, Dr. Rashid LOPEZ, Michael Becker

## 2023-07-12 NOTE — BH NOTE
Patient reports he is feeling much better. His only concern is his sleep, which will hopefully be corrected with a medication change. He has been out of the room, socializing appropriately with peers and staff. He is ready for discharge, however he has not been able to find transportation and Friday was the first available date that [facility] outpatient could transport patient home. This writer left a message with [facility] to set up follow up. Patient prefers to follow up with them and if they are unable to see patient he will be referred to  [facility].

## 2023-07-12 NOTE — BH NOTE
Patient affect blunted and mood euthymic. Patient alert and oriented x 4. Patient hyper-talkative. Denies pain. No SI/HI/AVH. No delusional or paranoid statements made. Patient cooperative and pleasant. No agitation noted. Medication compliant and no PRN's given. Patient  appetite good eats 100% of all meals plus snacks. Patient attended and engaged in groups with prompting. Patient in the dayroom the entire shift interacting appropriately with peers and staff. Patient in no apparent distress. Vitals signs within normal limits.

## 2023-07-12 NOTE — BH NOTE
Affect/mood slightly anxious. Pt restless when unable to contact female friend via phone. Alert and Oriented X 4. Cooperative. Attended and participated in group. Interacted with staff and peers. Makes needs known. Ate  snacks. Denied SI/HI/AVH and pain. Easily redirected. .  Medication compliant. Stable with no s/s of distress.    Laid in bed with eyes closed for 4 hours and 45 min    PRN Medication Documentation     Specific patient behavior that led to need for PRN medication: insomnia   Staff interventions attempted prior to PRN being given: requested   PRN medication given: trazodone 150 mg po at 2301  Patient response/effectiveness of PRN medication: tolerated for several hours     PRN Medication Documentation     Specific patient behavior that led to need for PRN medication: anxious/restless   Staff interventions attempted prior to PRN being given: redirecting   PRN medication given: atarax 50 mg po at 0040  Patient response/effectiveness of PRN medication: tolerated

## 2023-07-12 NOTE — GROUP NOTE
Group Therapy Note    Date: 7/12/2023    Group Start Time: 1030  Group End Time: 1100  Group Topic: Community Meeting    [address]        Group Therapy Note    Attendees: 1       Patient's Goal:  To be happy    Notes:  Patient appetite was good, no physical pain, no suicidal or self harm thoughts. Patient stated that he has depression level 2 and anxiety level 2. Patient was offered medication for his anxiety  Status After Intervention:  Improved    Participation Level:  Active Listener    Participation Quality: Appropriate      Speech:  normal      Thought Process/Content: Logical      Affective Functioning: Congruent      Mood: anxious      Level of consciousness:  Alert      Response to Learning: Able to verbalize current knowledge/experience      Endings: None Reported    Modes of Intervention: Education      Discipline Responsible: [address] [employer]      Signature:  Jacky Barker

## 2023-07-13 PROCEDURE — 6370000000 HC RX 637 (ALT 250 FOR IP): Performed by: PSYCHIATRY & NEUROLOGY

## 2023-07-13 PROCEDURE — 1240000000 HC EMOTIONAL WELLNESS R&B

## 2023-07-13 PROCEDURE — 99231 SBSQ HOSP IP/OBS SF/LOW 25: CPT | Performed by: PSYCHIATRY & NEUROLOGY

## 2023-07-13 PROCEDURE — 6370000000 HC RX 637 (ALT 250 FOR IP): Performed by: INTERNAL MEDICINE

## 2023-07-13 RX ADMIN — TAMSULOSIN HYDROCHLORIDE 0.4 MG: 0.4 CAPSULE ORAL at 09:06

## 2023-07-13 RX ADMIN — HYDROCORTISONE: 0.01 CREAM TOPICAL at 09:07

## 2023-07-13 RX ADMIN — ISOSORBIDE DINITRATE 5 MG: 10 TABLET ORAL at 18:01

## 2023-07-13 RX ADMIN — MONTELUKAST 10 MG: 10 TABLET, FILM COATED ORAL at 09:04

## 2023-07-13 RX ADMIN — LISINOPRIL 20 MG: 10 TABLET ORAL at 09:06

## 2023-07-13 RX ADMIN — CETIRIZINE HYDROCHLORIDE 10 MG: 10 TABLET, FILM COATED ORAL at 09:06

## 2023-07-13 RX ADMIN — ISOSORBIDE DINITRATE 5 MG: 10 TABLET ORAL at 13:23

## 2023-07-13 RX ADMIN — AMLODIPINE BESYLATE 10 MG: 10 TABLET ORAL at 09:06

## 2023-07-13 RX ADMIN — ATORVASTATIN CALCIUM 40 MG: 40 TABLET, FILM COATED ORAL at 22:55

## 2023-07-13 RX ADMIN — ASPIRIN 81 MG: 81 TABLET, COATED ORAL at 09:07

## 2023-07-13 RX ADMIN — QUETIAPINE FUMARATE 50 MG: 25 TABLET ORAL at 22:55

## 2023-07-13 RX ADMIN — VENLAFAXINE HYDROCHLORIDE 150 MG: 150 CAPSULE, EXTENDED RELEASE ORAL at 09:06

## 2023-07-13 RX ADMIN — ISOSORBIDE DINITRATE 5 MG: 10 TABLET ORAL at 09:04

## 2023-07-13 RX ADMIN — HYDROCORTISONE: 0.01 CREAM TOPICAL at 21:34

## 2023-07-13 RX ADMIN — SPIRONOLACTONE 25 MG: 25 TABLET ORAL at 09:04

## 2023-07-13 RX ADMIN — PANTOPRAZOLE SODIUM 40 MG: 40 TABLET, DELAYED RELEASE ORAL at 06:24

## 2023-07-13 ASSESSMENT — PAIN SCALES - GENERAL: PAINLEVEL_OUTOF10: 0

## 2023-07-13 NOTE — BH NOTE
Affect elated, mood appropriate. Patient a & o x 4. Denies pain. No SI, HI, AVH. Enjoys spending time in the dayroom and playing cards. Enjoys other activities as well. Stayed up later than usual but wasn't still in a pleasant mood. Patient tolerated medications well, cooperative. Currently sleeping during the time of this note.      PRN Medication Documentation    Specific patient behavior that led to need for PRN medication: Anxious  Staff interventions attempted prior to PRN being given: redirection  PRN medication given: Hydroxyzine  Patient response/effectiveness of PRN medication: Tolerated well

## 2023-07-13 NOTE — PLAN OF CARE
Problem: Discharge Planning  Goal: Discharge to home or other facility with appropriate resources  Outcome: Progressing     Problem: ABCDS Injury Assessment  Goal: Absence of physical injury  Outcome: Progressing     Problem: Self Harm/Suicidality  Goal: Will have no self-injury during hospital stay  Description: INTERVENTIONS:  1. Ensure constant observer at bedside with Q15M safety checks  2. Maintain a safe environment  3. Secure patient belongings  4. Ensure family/visitors adhere to safety recommendations  5. Ensure safety tray has been added to patient's diet order  6. Every shift and PRN: Re-assess suicidal risk via Frequent Screener    7/13/2023 0011 by Mitchell Lobo RN  Outcome: Progressing     Problem: Behavior  Goal: Pt/Family maintain appropriate behavior and adhere to behavioral management agreement, if implemented  Description: INTERVENTIONS:  1. Assess patient/family's coping skills and  non-compliant behavior (including use of illegal substances)  2. Notify security of behavior or suspected illegal substances which indicate the need for search of the family and/or belongings  3. Encourage verbalization of thoughts and concerns in a socially appropriate manner  4. Utilize positive, consistent limit setting strategies supporting safety of patient, staff and others  5. Encourage participation in the decision making process about the behavioral management agreement  6. If a visitor's behavior poses a threat to safety call refer to organization policy.   7. Initiate consult with , Psychosocial CNS, Spiritual Care as appropriate  7/13/2023 0011 by Mitchell Lobo RN  Outcome: Progressing

## 2023-07-13 NOTE — GROUP NOTE
Group Therapy Note    Date: 7/13/2023    Group Start Time: 0900  Group End Time: 1400  Group Topic: Process Group - Inpatient    [facility]    Mikayla Juarez LCSW        Group Therapy Note    Focus of session was on identifying the difference between mindless responses and mindful responses. We spoke about the impact that either has on our moods and our functioning. We spoke about the benefits of mindfulness which included being non judging, based on facts, can distance self from thoughts, being in an approach mode, can let go, and are more calm and effective in any given moment. Patient's Goal:     Pt/Family maintain appropriate behavior and adhere to behavioral management agreement, if implemented                 Notes:  Pt participated in the group activity and engaged with the other members today. Pt was alert and orientated. He shared his possible discharge plans and what could potentially happen with his apartment and his cousins. He acknowledged that he has gotten better since being in the hospital and wants to make sure he has a plan to get his medication and counseling assistance once he is discharged. He does not like when he becomes too assertive/aggressive as he does not want to hurt himself or someone else. Status After Intervention:  Improved    Participation Level:  Active Listener and Interactive    Participation Quality: Appropriate, Attentive, Sharing, and Supportive      Speech:  normal      Thought Process/Content: Logical      Affective Functioning: Congruent      Mood: brighter      Level of consciousness:  Alert and Attentive      Response to Learning: Able to verbalize current knowledge/experience, Able to verbalize/acknowledge new learning, Able to retain information, Capable of insight, Able to change behavior, and Progressing to goal      Endings: None Reported    Modes of Intervention:

## 2023-07-13 NOTE — BH NOTE
Patient will be discharged to home tomorrow and will be transported by [facility] outpatient. This writer assisted patient in setting up his follow up appointment with [facility] since they would not allow this writer to schedule appointment. Patient will be seen in the [facility] office on July 25 at 10:40. He has been out of his room all day. He is appropriate with peers and staff. He continues to report that he is doing well. No barriers to discharge at this time.

## 2023-07-13 NOTE — BH NOTE
Affect constricted, mood depressed/anxious. Alert and oriented X 4. Cooperative and pleasant. Attended and participated in group. Interacted with staff and peers. Makes needs known. Ate all meals and snacks. Denied SI/HI/AVH and pain. Medication compliant. Stable with no s/s of distress. Pt talking of discharge this Friday.

## 2023-07-14 VITALS
SYSTOLIC BLOOD PRESSURE: 140 MMHG | BODY MASS INDEX: 26.77 KG/M2 | OXYGEN SATURATION: 99 % | WEIGHT: 208.56 LBS | HEART RATE: 80 BPM | HEIGHT: 74 IN | TEMPERATURE: 98 F | RESPIRATION RATE: 17 BRPM | DIASTOLIC BLOOD PRESSURE: 94 MMHG

## 2023-07-14 PROBLEM — F33.1 MODERATE RECURRENT MAJOR DEPRESSION (HCC): Status: RESOLVED | Noted: 2023-07-14 | Resolved: 2023-07-14

## 2023-07-14 PROBLEM — F33.1 MODERATE RECURRENT MAJOR DEPRESSION (HCC): Status: ACTIVE | Noted: 2023-07-14

## 2023-07-14 PROCEDURE — 6370000000 HC RX 637 (ALT 250 FOR IP): Performed by: PSYCHIATRY & NEUROLOGY

## 2023-07-14 PROCEDURE — 6370000000 HC RX 637 (ALT 250 FOR IP): Performed by: INTERNAL MEDICINE

## 2023-07-14 PROCEDURE — 99239 HOSP IP/OBS DSCHRG MGMT >30: CPT | Performed by: PSYCHIATRY & NEUROLOGY

## 2023-07-14 RX ORDER — TAMSULOSIN HYDROCHLORIDE 0.4 MG/1
0.4 CAPSULE ORAL DAILY
Qty: 30 CAPSULE | Refills: 1 | Status: SHIPPED | OUTPATIENT
Start: 2023-07-14

## 2023-07-14 RX ORDER — ASPIRIN 81 MG/1
81 TABLET ORAL DAILY
Qty: 30 TABLET | Refills: 0 | Status: SHIPPED | COMMUNITY
Start: 2023-07-14

## 2023-07-14 RX ORDER — VENLAFAXINE HYDROCHLORIDE 150 MG/1
150 CAPSULE, EXTENDED RELEASE ORAL
Qty: 30 CAPSULE | Refills: 1 | Status: SHIPPED | OUTPATIENT
Start: 2023-07-14

## 2023-07-14 RX ORDER — QUETIAPINE FUMARATE 50 MG/1
50 TABLET, FILM COATED ORAL NIGHTLY
Qty: 30 TABLET | Refills: 1 | Status: SHIPPED | OUTPATIENT
Start: 2023-07-14

## 2023-07-14 RX ADMIN — ISOSORBIDE DINITRATE 5 MG: 10 TABLET ORAL at 08:32

## 2023-07-14 RX ADMIN — AMLODIPINE BESYLATE 10 MG: 10 TABLET ORAL at 08:33

## 2023-07-14 RX ADMIN — TAMSULOSIN HYDROCHLORIDE 0.4 MG: 0.4 CAPSULE ORAL at 08:33

## 2023-07-14 RX ADMIN — HYDROCORTISONE: 0.01 CREAM TOPICAL at 08:33

## 2023-07-14 RX ADMIN — ASPIRIN 81 MG: 81 TABLET, COATED ORAL at 08:31

## 2023-07-14 RX ADMIN — CETIRIZINE HYDROCHLORIDE 10 MG: 10 TABLET, FILM COATED ORAL at 08:32

## 2023-07-14 RX ADMIN — LISINOPRIL 20 MG: 10 TABLET ORAL at 08:30

## 2023-07-14 RX ADMIN — SPIRONOLACTONE 25 MG: 25 TABLET ORAL at 08:31

## 2023-07-14 RX ADMIN — HYDROXYZINE HYDROCHLORIDE 50 MG: 25 TABLET ORAL at 00:14

## 2023-07-14 RX ADMIN — PANTOPRAZOLE SODIUM 40 MG: 40 TABLET, DELAYED RELEASE ORAL at 06:23

## 2023-07-14 RX ADMIN — VENLAFAXINE HYDROCHLORIDE 150 MG: 150 CAPSULE, EXTENDED RELEASE ORAL at 08:34

## 2023-07-14 RX ADMIN — MONTELUKAST 10 MG: 10 TABLET, FILM COATED ORAL at 08:31

## 2023-07-14 ASSESSMENT — PAIN SCALES - GENERAL: PAINLEVEL_OUTOF10: 0

## 2023-07-14 NOTE — PLAN OF CARE
Problem: Discharge Planning  Goal: Discharge to home or other facility with appropriate resources  Outcome: Adequate for Discharge     Problem: ABCDS Injury Assessment  Goal: Absence of physical injury  Outcome: Adequate for Discharge     Problem: Self Harm/Suicidality  Goal: Will have no self-injury during hospital stay  Description: INTERVENTIONS:  1. Ensure constant observer at bedside with Q15M safety checks  2. Maintain a safe environment  3. Secure patient belongings  4. Ensure family/visitors adhere to safety recommendations  5. Ensure safety tray has been added to patient's diet order  6. Every shift and PRN: Re-assess suicidal risk via Frequent Screener    Outcome: Adequate for Discharge     Problem: Behavior  Goal: Pt/Family maintain appropriate behavior and adhere to behavioral management agreement, if implemented  Description: INTERVENTIONS:  1. Assess patient/family's coping skills and  non-compliant behavior (including use of illegal substances)  2. Notify security of behavior or suspected illegal substances which indicate the need for search of the family and/or belongings  3. Encourage verbalization of thoughts and concerns in a socially appropriate manner  4. Utilize positive, consistent limit setting strategies supporting safety of patient, staff and others  5. Encourage participation in the decision making process about the behavioral management agreement  6. If a visitor's behavior poses a threat to safety call refer to organization policy.   7. Initiate consult with , Psychosocial CNS, Spiritual Care as appropriate  Outcome: Adequate for Discharge     Problem: Chronic Conditions and Co-morbidities  Goal: Patient's chronic conditions and co-morbidity symptoms are monitored and maintained or improved  Outcome: Adequate for Discharge  Flowsheets (Taken 7/14/2023 0800)  Care Plan - Patient's Chronic Conditions and Co-Morbidity Symptoms are Monitored and Maintained or Improved:

## 2023-07-14 NOTE — DISCHARGE INSTRUCTIONS
BEHAVIORAL HEALTH NURSING DISCHARGE NOTE      The following personal items collected during your admission are returned to you:   Dental Appliance:    Vision:    Hearing Aid:    Jewelry:    Clothing:    Other Valuables:    Valuables sent to safe:        PATIENT INSTRUCTIONS:    What to do at Home:      Avoid making any critical decisions for at least 24-hours. Recommended diet: cardiac diet,     Recommended activity: activity as tolerated,         Follow-up with Marvin Milton on 7/25 at 10:40 am. in  . If you have problems relating to your recovery, call your physician. The discharge information has been reviewed with the patient. The patient verbalized understanding.

## 2023-07-14 NOTE — BH NOTE
Affect blunted mood euthymic  Ate 100% of meals. All belongings returned to patient. Verbalized understanding of DC instructions. Denies SI/HI/AVH/pain. Med compliant. DC with  outpatient  at 1373.

## 2023-07-14 NOTE — BH NOTE
Affect /mood euthymic. Pt stated feeling a little anxious in a positive for in regards to discharge. . Alert and Oriented X 4. Cooperative and pleasant. Attended and participated in group. Interacted with staff and peers. Makes needs known. Ate all  snacks. Denied SI/HI/AVH and pain. Medication compliant. Stable with no s/s of distress.    Laid in bed with eyes closed for 2 hours     PRN Medication Documentation    Specific patient behavior that led to need for PRN medication: restless/anxious   Staff interventions attempted prior to PRN being given:   PRN medication given: Atarax 50 mg po at 0014  Patient response/effectiveness of PRN medication: tolerated

## 2023-07-14 NOTE — DISCHARGE SUMMARY
[facility] DISCHARGE    Name:  Sam Yu  MR#:  [id number]  :  1965  ACCOUNT #:  [de-identified]  ADMIT DATE:  2023  DISCHARGE DATE:  2023    [facility] DISCHARGE SUMMARY    NOTE:  Greater than 35 minutes was spent in the preparation of this discharge. DISCHARGE DIAGNOSES:  AXIS I:  1. Major Depression, recurrent, severe (F33.2). 2.  Cocaine use, mild (F14.10). AXIS II:  Deferred. AXIS III:  Hypertension; hyperlipidemia; obstructive sleep apnea; coronary artery disease; aortic root repair; gastroesophageal reflux disease. AXIS IV:  Stressors are moderate (limited support and social isolation). AXIS V:  Global Assessment of Functioning at discharge is 75. DISCHARGE MEDICATIONS:  1. Effexor  mg daily (new) - - #30 pills with one refill. 2.  Seroquel 50 mg at bedtime (new) - - #30 pills with one refill. 3.  Flomax 0.4 mg daily (new) - - #30 pills with one refill. 4.  Lipitor 40 mg at bedtime. 5.  Lisinopril 20 mg daily. 6.  Norvasc 10 mg daily. 7.  Singulair 10 mg daily. 8.  Protonix 40 mg daily. 9.  Isordil 5 mg t.i.d.  10.  Aspirin 81 mg daily. 11.  Spironolactone 25 mg daily. DISPOSITION/FOLLOWUP PLANS:  The patient is being discharged in stable condition this morning on 2023. He will be returning home where he lives in [place]. He has an intake appointment set with the [address] [place] [address] in [facility] on  at 10:40 a.m. for continued psychiatric care. HOSPITAL COURSE:  As noted in the admission note, the patient is a 57-year-old  male who has a lengthy past psychiatric history of major depression as well as some episodic polysubstance abuse, particularly alcohol and cocaine. He was admitted voluntarily from the [facility] Emergency Room after presenting there with increased depression and suicidal thinking for the past month.   He had no specific plan, but he was fearful

## 2023-07-14 NOTE — BH NOTE
Behavioral Health Transition Record to Provider    Patient Name: Dorita Cohen  YOB: 1965  Medical Record Number: [id number]  Date of Admission: 7/4/2023  Date of Discharge: 7/14/2023    Attending Provider: Kwaku Kumar MD  Discharging Provider: Nimihsa Low MD  To contact this individual call [phone] and ask the  to page. If unavailable, ask to be transferred to [facility] Provider on call. [facility] Provider will be available on call 24/7 and during holidays. Primary Care Provider: Yaa Machuca MD    Allergies   Allergen Reactions    Codeine      Other reaction(s): Unknown (comments)    Penicillins      Other reaction(s): Unknown (comments)       Reason for Admission: The patient is a 68-year-old  male who has a lengthy past psychiatric history of episodes of major depression, as well as some occasionally but milder polysubstance use, particularly alcohol and cocaine. He was admitted voluntarily from the [facility] after presenting there stating he had been feeling increasingly suicidal for the past month. He had no specific plan, but he stated that he quite \"might hurt myself\" if he went home. In the course of his interview, it appeared as though he almost had no memory for any details, although it was not thought that he was necessarily trying to be overly evasive. He states that he has been off medication for a number of months and realized that his mood was progressively getting worse, and he had actually made an appointment to see a psychiatrist back in May, but when he went to that appointment, they had canceled it on him and would not reschedule. He states he has continued to go without any treatment, and his mood has progressively gotten worse.        Admission Diagnosis: Major depressive disorder, recurrent ([address]) [F33.9]    * No surgery found *    Results for orders placed or performed during the the following for smoking cessation with an appointment? Not applicable    Patient was offered medication to assist with smoking cessation at discharge? Not applicable  Was education for smoking cessation added to the discharge instructions? Not applicable    Alcohol/Substance Abuse Discharge Plan:   Does the patient have a history of substance/alcohol abuse and requires a referral for treatment? Refused  Patient referred to the following for substance/alcohol abuse treatment with an appointment? Refused  Patient was offered medication to assist with alcohol cessation at discharge? Not applicable  Was education for substance/alcohol abuse added to discharge instructions? Not applicable    Patient discharged to Home/Self Care.  Discharge information discussed with patient/caregiver

## 2023-09-06 ENCOUNTER — APPOINTMENT (OUTPATIENT)
Facility: HOSPITAL | Age: 58
End: 2023-09-06
Payer: MEDICARE

## 2023-09-06 ENCOUNTER — HOSPITAL ENCOUNTER (OUTPATIENT)
Facility: HOSPITAL | Age: 58
Setting detail: OBSERVATION
Discharge: HOME | End: 2023-09-07
Attending: EMERGENCY MEDICINE | Admitting: HOSPITALIST
Payer: MEDICARE

## 2023-09-06 DIAGNOSIS — M62.82 NON-TRAUMATIC RHABDOMYOLYSIS: Primary | ICD-10-CM

## 2023-09-06 DIAGNOSIS — N28.9 ACUTE RENAL INSUFFICIENCY: ICD-10-CM

## 2023-09-06 LAB
ALBUMIN SERPL-MCNC: 4.4 G/DL (ref 3.5–5)
ALBUMIN/GLOB SERPL: 1.3 (ref 1.1–2.2)
ALP SERPL-CCNC: 67 U/L (ref 45–117)
ALT SERPL-CCNC: 37 U/L (ref 12–78)
ANION GAP SERPL CALC-SCNC: 18 MMOL/L (ref 5–15)
APAP SERPL-MCNC: <2 UG/ML (ref 10–30)
AST SERPL W P-5'-P-CCNC: 39 U/L (ref 15–37)
BASOPHILS # BLD: 0 K/UL (ref 0–0.1)
BASOPHILS NFR BLD: 1 % (ref 0–1)
BILIRUB SERPL-MCNC: 1.3 MG/DL (ref 0.2–1)
BUN SERPL-MCNC: 20 MG/DL (ref 6–20)
BUN/CREAT SERPL: 10 (ref 12–20)
CA-I BLD-MCNC: 9.5 MG/DL (ref 8.5–10.1)
CHLORIDE SERPL-SCNC: 103 MMOL/L (ref 97–108)
CK SERPL-CCNC: 580 U/L (ref 39–308)
CO2 SERPL-SCNC: 21 MMOL/L (ref 21–32)
CREAT SERPL-MCNC: 2.06 MG/DL (ref 0.7–1.3)
DIFFERENTIAL METHOD BLD: NORMAL
EOSINOPHIL # BLD: 0.1 K/UL (ref 0–0.4)
EOSINOPHIL NFR BLD: 1 % (ref 0–7)
ERYTHROCYTE [DISTWIDTH] IN BLOOD BY AUTOMATED COUNT: 12.7 % (ref 11.5–14.5)
ETHANOL SERPL-MCNC: <10 MG/DL (ref 0–0.08)
FLUAV RNA SPEC QL NAA+PROBE: NOT DETECTED
FLUBV RNA SPEC QL NAA+PROBE: NOT DETECTED
GLOBULIN SER CALC-MCNC: 3.4 G/DL (ref 2–4)
GLUCOSE SERPL-MCNC: 112 MG/DL (ref 65–100)
HCT VFR BLD AUTO: 42.8 % (ref 36.6–50.3)
HGB BLD-MCNC: 15.1 G/DL (ref 12.1–17)
IMM GRANULOCYTES # BLD AUTO: 0 K/UL (ref 0–0.04)
IMM GRANULOCYTES NFR BLD AUTO: 0 % (ref 0–0.5)
INR PPP: 1.1 (ref 0.9–1.1)
LACTATE SERPL-SCNC: 1.3 MMOL/L (ref 0.4–2)
LACTATE SERPL-SCNC: 6.5 MMOL/L (ref 0.4–2)
LIPASE SERPL-CCNC: 58 U/L (ref 73–393)
LYMPHOCYTES # BLD: 1.4 K/UL (ref 0.8–3.5)
LYMPHOCYTES NFR BLD: 29 % (ref 12–49)
MAGNESIUM SERPL-MCNC: 1.9 MG/DL (ref 1.6–2.4)
MCH RBC QN AUTO: 33.3 PG (ref 26–34)
MCHC RBC AUTO-ENTMCNC: 35.3 G/DL (ref 30–36.5)
MCV RBC AUTO: 94.3 FL (ref 80–99)
MONOCYTES # BLD: 0.6 K/UL (ref 0–1)
MONOCYTES NFR BLD: 13 % (ref 5–13)
NEUTS SEG # BLD: 2.7 K/UL (ref 1.8–8)
NEUTS SEG NFR BLD: 56 % (ref 32–75)
NRBC # BLD: 0 K/UL (ref 0–0.01)
NRBC BLD-RTO: 0 PER 100 WBC
PLATELET # BLD AUTO: 210 K/UL (ref 150–400)
PMV BLD AUTO: 9.4 FL (ref 8.9–12.9)
POTASSIUM SERPL-SCNC: 4.1 MMOL/L (ref 3.5–5.1)
PROT SERPL-MCNC: 7.8 G/DL (ref 6.4–8.2)
PROTHROMBIN TIME: 13.9 SEC (ref 11.9–14.6)
RBC # BLD AUTO: 4.54 M/UL (ref 4.1–5.7)
SALICYLATES SERPL-MCNC: <1.7 MG/DL (ref 2.8–20)
SARS-COV-2 RNA RESP QL NAA+PROBE: NOT DETECTED
SODIUM SERPL-SCNC: 142 MMOL/L (ref 136–145)
TROPONIN I SERPL HS-MCNC: 12 NG/L (ref 0–76)
WBC # BLD AUTO: 4.8 K/UL (ref 4.1–11.1)

## 2023-09-06 PROCEDURE — 93005 ELECTROCARDIOGRAM TRACING: CPT | Performed by: EMERGENCY MEDICINE

## 2023-09-06 PROCEDURE — 84484 ASSAY OF TROPONIN QUANT: CPT

## 2023-09-06 PROCEDURE — 6360000002 HC RX W HCPCS: Performed by: EMERGENCY MEDICINE

## 2023-09-06 PROCEDURE — 83605 ASSAY OF LACTIC ACID: CPT

## 2023-09-06 PROCEDURE — 87636 SARSCOV2 & INF A&B AMP PRB: CPT

## 2023-09-06 PROCEDURE — 82077 ASSAY SPEC XCP UR&BREATH IA: CPT

## 2023-09-06 PROCEDURE — 85025 COMPLETE CBC W/AUTO DIFF WBC: CPT

## 2023-09-06 PROCEDURE — 99285 EMERGENCY DEPT VISIT HI MDM: CPT

## 2023-09-06 PROCEDURE — 96374 THER/PROPH/DIAG INJ IV PUSH: CPT

## 2023-09-06 PROCEDURE — 96361 HYDRATE IV INFUSION ADD-ON: CPT

## 2023-09-06 PROCEDURE — 2580000003 HC RX 258: Performed by: EMERGENCY MEDICINE

## 2023-09-06 PROCEDURE — 70450 CT HEAD/BRAIN W/O DYE: CPT

## 2023-09-06 PROCEDURE — G0378 HOSPITAL OBSERVATION PER HR: HCPCS

## 2023-09-06 PROCEDURE — 96375 TX/PRO/DX INJ NEW DRUG ADDON: CPT

## 2023-09-06 PROCEDURE — 80143 DRUG ASSAY ACETAMINOPHEN: CPT

## 2023-09-06 PROCEDURE — 83735 ASSAY OF MAGNESIUM: CPT

## 2023-09-06 PROCEDURE — 83690 ASSAY OF LIPASE: CPT

## 2023-09-06 PROCEDURE — 80179 DRUG ASSAY SALICYLATE: CPT

## 2023-09-06 PROCEDURE — 80053 COMPREHEN METABOLIC PANEL: CPT

## 2023-09-06 PROCEDURE — 82550 ASSAY OF CK (CPK): CPT

## 2023-09-06 PROCEDURE — 85610 PROTHROMBIN TIME: CPT

## 2023-09-06 PROCEDURE — 36415 COLL VENOUS BLD VENIPUNCTURE: CPT

## 2023-09-06 RX ORDER — AMLODIPINE BESYLATE 10 MG/1
10 TABLET ORAL DAILY
Status: DISCONTINUED | OUTPATIENT
Start: 2023-09-07 | End: 2023-09-07 | Stop reason: HOSPADM

## 2023-09-06 RX ORDER — SODIUM CHLORIDE 9 MG/ML
INJECTION, SOLUTION INTRAVENOUS CONTINUOUS
Status: DISCONTINUED | OUTPATIENT
Start: 2023-09-07 | End: 2023-09-07

## 2023-09-06 RX ORDER — 0.9 % SODIUM CHLORIDE 0.9 %
1000 INTRAVENOUS SOLUTION INTRAVENOUS ONCE
Status: COMPLETED | OUTPATIENT
Start: 2023-09-06 | End: 2023-09-07

## 2023-09-06 RX ORDER — ACETAMINOPHEN 325 MG/1
650 TABLET ORAL EVERY 6 HOURS PRN
Status: DISCONTINUED | OUTPATIENT
Start: 2023-09-06 | End: 2023-09-07 | Stop reason: HOSPADM

## 2023-09-06 RX ORDER — MONTELUKAST SODIUM 10 MG/1
10 TABLET ORAL DAILY
Status: DISCONTINUED | OUTPATIENT
Start: 2023-09-07 | End: 2023-09-07 | Stop reason: HOSPADM

## 2023-09-06 RX ORDER — QUETIAPINE FUMARATE 50 MG/1
50 TABLET, FILM COATED ORAL NIGHTLY
Status: DISCONTINUED | OUTPATIENT
Start: 2023-09-07 | End: 2023-09-07 | Stop reason: HOSPADM

## 2023-09-06 RX ORDER — POLYETHYLENE GLYCOL 3350 17 G/17G
17 POWDER, FOR SOLUTION ORAL DAILY PRN
Status: DISCONTINUED | OUTPATIENT
Start: 2023-09-06 | End: 2023-09-07 | Stop reason: HOSPADM

## 2023-09-06 RX ORDER — ASPIRIN 81 MG/1
81 TABLET ORAL DAILY
Status: DISCONTINUED | OUTPATIENT
Start: 2023-09-07 | End: 2023-09-07 | Stop reason: HOSPADM

## 2023-09-06 RX ORDER — ACETAMINOPHEN 650 MG/1
650 SUPPOSITORY RECTAL EVERY 6 HOURS PRN
Status: DISCONTINUED | OUTPATIENT
Start: 2023-09-06 | End: 2023-09-07 | Stop reason: HOSPADM

## 2023-09-06 RX ORDER — PANTOPRAZOLE SODIUM 40 MG/1
40 TABLET, DELAYED RELEASE ORAL DAILY
Status: DISCONTINUED | OUTPATIENT
Start: 2023-09-07 | End: 2023-09-07

## 2023-09-06 RX ORDER — SODIUM CHLORIDE 9 MG/ML
INJECTION, SOLUTION INTRAVENOUS PRN
Status: DISCONTINUED | OUTPATIENT
Start: 2023-09-06 | End: 2023-09-07 | Stop reason: HOSPADM

## 2023-09-06 RX ORDER — ONDANSETRON 4 MG/1
4 TABLET, ORALLY DISINTEGRATING ORAL EVERY 8 HOURS PRN
Status: DISCONTINUED | OUTPATIENT
Start: 2023-09-06 | End: 2023-09-07 | Stop reason: HOSPADM

## 2023-09-06 RX ORDER — SODIUM CHLORIDE 0.9 % (FLUSH) 0.9 %
5-40 SYRINGE (ML) INJECTION PRN
Status: DISCONTINUED | OUTPATIENT
Start: 2023-09-06 | End: 2023-09-07 | Stop reason: HOSPADM

## 2023-09-06 RX ORDER — HEPARIN SODIUM 5000 [USP'U]/ML
5000 INJECTION, SOLUTION INTRAVENOUS; SUBCUTANEOUS 2 TIMES DAILY
Status: DISCONTINUED | OUTPATIENT
Start: 2023-09-07 | End: 2023-09-07

## 2023-09-06 RX ORDER — ONDANSETRON 2 MG/ML
4 INJECTION INTRAMUSCULAR; INTRAVENOUS EVERY 6 HOURS PRN
Status: DISCONTINUED | OUTPATIENT
Start: 2023-09-06 | End: 2023-09-07 | Stop reason: HOSPADM

## 2023-09-06 RX ORDER — VENLAFAXINE HYDROCHLORIDE 150 MG/1
150 CAPSULE, EXTENDED RELEASE ORAL
Status: DISCONTINUED | OUTPATIENT
Start: 2023-09-07 | End: 2023-09-07 | Stop reason: HOSPADM

## 2023-09-06 RX ORDER — TAMSULOSIN HYDROCHLORIDE 0.4 MG/1
0.4 CAPSULE ORAL DAILY
Status: DISCONTINUED | OUTPATIENT
Start: 2023-09-07 | End: 2023-09-07 | Stop reason: HOSPADM

## 2023-09-06 RX ORDER — ENOXAPARIN SODIUM 100 MG/ML
40 INJECTION SUBCUTANEOUS DAILY
Status: DISCONTINUED | OUTPATIENT
Start: 2023-09-07 | End: 2023-09-06

## 2023-09-06 RX ORDER — SODIUM CHLORIDE 0.9 % (FLUSH) 0.9 %
5-40 SYRINGE (ML) INJECTION EVERY 12 HOURS SCHEDULED
Status: DISCONTINUED | OUTPATIENT
Start: 2023-09-07 | End: 2023-09-07 | Stop reason: HOSPADM

## 2023-09-06 RX ORDER — LORAZEPAM 2 MG/ML
2 INJECTION INTRAMUSCULAR ONCE
Status: COMPLETED | OUTPATIENT
Start: 2023-09-06 | End: 2023-09-06

## 2023-09-06 RX ORDER — ISOSORBIDE DINITRATE 10 MG/1
5 TABLET ORAL DAILY
Status: DISCONTINUED | OUTPATIENT
Start: 2023-09-07 | End: 2023-09-07 | Stop reason: HOSPADM

## 2023-09-06 RX ADMIN — SODIUM CHLORIDE 1000 ML: 9 INJECTION, SOLUTION INTRAVENOUS at 16:30

## 2023-09-06 RX ADMIN — LORAZEPAM 2 MG: 2 INJECTION INTRAMUSCULAR; INTRAVENOUS at 16:20

## 2023-09-06 ASSESSMENT — PAIN - FUNCTIONAL ASSESSMENT: PAIN_FUNCTIONAL_ASSESSMENT: NONE - DENIES PAIN

## 2023-09-06 ASSESSMENT — PAIN SCALES - GENERAL: PAINLEVEL_OUTOF10: 0

## 2023-09-06 NOTE — ED NOTES
Pt requesting 8 bottles of water. Informed patient he could get 1 bottle of water and notified of need for urine. Pt reports has hx of CVA in 1989 and has been having numbness off and on for the past year.       Laura Miranda RN  09/06/23 8828

## 2023-09-06 NOTE — ED TRIAGE NOTES
Brought in by [employer] x4 with bilateral wrist and ankle shackles under paperless ECO per [facility] Department after EMS was called for patient with stroke like complaints and combative and walking. Police reports on scene pt found walking down street combative and altered and refusing care and was in house that did not have AC and was outside temperature in 90's. On arrival to ER pt not interactive with staff, not making eye contact, continuously humming and not answering questions. Clothes saturated with sweat. Clothes removed and gown applied. IV started and blood drawn.

## 2023-09-06 NOTE — ED NOTES
Pt reports he was using crack cocaine and drinking ETOH last night and today in house with no AC with friends and had numbness to face that has been ongoing.       Julio C Huang RN  09/06/23 5074

## 2023-09-07 ENCOUNTER — APPOINTMENT (OUTPATIENT)
Facility: HOSPITAL | Age: 58
End: 2023-09-07
Payer: MEDICARE

## 2023-09-07 VITALS
RESPIRATION RATE: 18 BRPM | OXYGEN SATURATION: 100 % | DIASTOLIC BLOOD PRESSURE: 80 MMHG | WEIGHT: 200.06 LBS | BODY MASS INDEX: 25.68 KG/M2 | SYSTOLIC BLOOD PRESSURE: 115 MMHG | TEMPERATURE: 97.7 F | HEIGHT: 74 IN | HEART RATE: 82 BPM

## 2023-09-07 LAB
ANION GAP SERPL CALC-SCNC: 6 MMOL/L (ref 5–15)
BASOPHILS # BLD: 0 K/UL (ref 0–0.1)
BASOPHILS NFR BLD: 1 % (ref 0–1)
BUN SERPL-MCNC: 16 MG/DL (ref 6–20)
BUN/CREAT SERPL: 13 (ref 12–20)
CA-I BLD-MCNC: 8.1 MG/DL (ref 8.5–10.1)
CHLORIDE SERPL-SCNC: 106 MMOL/L (ref 97–108)
CK SERPL-CCNC: 541 U/L (ref 39–308)
CO2 SERPL-SCNC: 30 MMOL/L (ref 21–32)
CREAT SERPL-MCNC: 1.27 MG/DL (ref 0.7–1.3)
DIFFERENTIAL METHOD BLD: ABNORMAL
EOSINOPHIL # BLD: 0.1 K/UL (ref 0–0.4)
EOSINOPHIL NFR BLD: 2 % (ref 0–7)
ERYTHROCYTE [DISTWIDTH] IN BLOOD BY AUTOMATED COUNT: 12.7 % (ref 11.5–14.5)
GLUCOSE SERPL-MCNC: 90 MG/DL (ref 65–100)
HCT VFR BLD AUTO: 37.6 % (ref 36.6–50.3)
HGB BLD-MCNC: 13 G/DL (ref 12.1–17)
IMM GRANULOCYTES # BLD AUTO: 0 K/UL (ref 0–0.04)
IMM GRANULOCYTES NFR BLD AUTO: 0 % (ref 0–0.5)
LACTATE SERPL-SCNC: 0.8 MMOL/L (ref 0.4–2)
LYMPHOCYTES # BLD: 1.9 K/UL (ref 0.8–3.5)
LYMPHOCYTES NFR BLD: 48 % (ref 12–49)
MCH RBC QN AUTO: 33.1 PG (ref 26–34)
MCHC RBC AUTO-ENTMCNC: 34.6 G/DL (ref 30–36.5)
MCV RBC AUTO: 95.7 FL (ref 80–99)
MONOCYTES # BLD: 0.5 K/UL (ref 0–1)
MONOCYTES NFR BLD: 12 % (ref 5–13)
NEUTS SEG # BLD: 1.5 K/UL (ref 1.8–8)
NEUTS SEG NFR BLD: 37 % (ref 32–75)
NRBC # BLD: 0 K/UL (ref 0–0.01)
NRBC BLD-RTO: 0 PER 100 WBC
PLATELET # BLD AUTO: 151 K/UL (ref 150–400)
PMV BLD AUTO: 9.4 FL (ref 8.9–12.9)
POTASSIUM SERPL-SCNC: 3 MMOL/L (ref 3.5–5.1)
RBC # BLD AUTO: 3.93 M/UL (ref 4.1–5.7)
SODIUM SERPL-SCNC: 142 MMOL/L (ref 136–145)
TROPONIN I SERPL HS-MCNC: 14 NG/L (ref 0–76)
WBC # BLD AUTO: 4 K/UL (ref 4.1–11.1)

## 2023-09-07 PROCEDURE — 80048 BASIC METABOLIC PNL TOTAL CA: CPT

## 2023-09-07 PROCEDURE — 85025 COMPLETE CBC W/AUTO DIFF WBC: CPT

## 2023-09-07 PROCEDURE — G0378 HOSPITAL OBSERVATION PER HR: HCPCS

## 2023-09-07 PROCEDURE — 83605 ASSAY OF LACTIC ACID: CPT

## 2023-09-07 PROCEDURE — 36415 COLL VENOUS BLD VENIPUNCTURE: CPT

## 2023-09-07 PROCEDURE — 6370000000 HC RX 637 (ALT 250 FOR IP): Performed by: INTERNAL MEDICINE

## 2023-09-07 PROCEDURE — 2500000003 HC RX 250 WO HCPCS: Performed by: HOSPITALIST

## 2023-09-07 PROCEDURE — 96365 THER/PROPH/DIAG IV INF INIT: CPT

## 2023-09-07 PROCEDURE — 71045 X-RAY EXAM CHEST 1 VIEW: CPT

## 2023-09-07 PROCEDURE — 84484 ASSAY OF TROPONIN QUANT: CPT

## 2023-09-07 PROCEDURE — 6370000000 HC RX 637 (ALT 250 FOR IP): Performed by: HOSPITALIST

## 2023-09-07 PROCEDURE — 2580000003 HC RX 258: Performed by: INTERNAL MEDICINE

## 2023-09-07 PROCEDURE — 96366 THER/PROPH/DIAG IV INF ADDON: CPT

## 2023-09-07 PROCEDURE — 82550 ASSAY OF CK (CPK): CPT

## 2023-09-07 PROCEDURE — 96372 THER/PROPH/DIAG INJ SC/IM: CPT

## 2023-09-07 PROCEDURE — 6360000002 HC RX W HCPCS: Performed by: INTERNAL MEDICINE

## 2023-09-07 RX ORDER — POTASSIUM CHLORIDE 20 MEQ/1
20 TABLET, EXTENDED RELEASE ORAL 2 TIMES DAILY WITH MEALS
Status: DISCONTINUED | OUTPATIENT
Start: 2023-09-07 | End: 2023-09-07 | Stop reason: HOSPADM

## 2023-09-07 RX ORDER — HYDROXYZINE HYDROCHLORIDE 25 MG/1
25 TABLET, FILM COATED ORAL EVERY 8 HOURS PRN
Qty: 30 TABLET | Refills: 0 | Status: SHIPPED | OUTPATIENT
Start: 2023-09-07 | End: 2023-09-17

## 2023-09-07 RX ORDER — DEXTROSE MONOHYDRATE, SODIUM CHLORIDE, AND POTASSIUM CHLORIDE 50; 1.49; 9 G/1000ML; G/1000ML; G/1000ML
INJECTION, SOLUTION INTRAVENOUS CONTINUOUS
Status: DISCONTINUED | OUTPATIENT
Start: 2023-09-07 | End: 2023-09-07

## 2023-09-07 RX ORDER — DIPHENHYDRAMINE HCL 25 MG
25 TABLET ORAL ONCE
Status: COMPLETED | OUTPATIENT
Start: 2023-09-07 | End: 2023-09-07

## 2023-09-07 RX ORDER — HYDROXYZINE HYDROCHLORIDE 25 MG/1
25 TABLET, FILM COATED ORAL ONCE
Status: COMPLETED | OUTPATIENT
Start: 2023-09-07 | End: 2023-09-07

## 2023-09-07 RX ADMIN — MONTELUKAST 10 MG: 10 TABLET, FILM COATED ORAL at 09:39

## 2023-09-07 RX ADMIN — HYDROXYZINE HYDROCHLORIDE 25 MG: 25 TABLET, FILM COATED ORAL at 11:07

## 2023-09-07 RX ADMIN — DIPHENHYDRAMINE HYDROCHLORIDE 25 MG: 25 TABLET ORAL at 11:49

## 2023-09-07 RX ADMIN — POTASSIUM CHLORIDE 20 MEQ: 1500 TABLET, EXTENDED RELEASE ORAL at 09:41

## 2023-09-07 RX ADMIN — TAMSULOSIN HYDROCHLORIDE 0.4 MG: 0.4 CAPSULE ORAL at 09:39

## 2023-09-07 RX ADMIN — VENLAFAXINE HYDROCHLORIDE 150 MG: 150 CAPSULE, EXTENDED RELEASE ORAL at 09:39

## 2023-09-07 RX ADMIN — QUETIAPINE FUMARATE 50 MG: 50 TABLET ORAL at 00:29

## 2023-09-07 RX ADMIN — SODIUM CHLORIDE: 9 INJECTION, SOLUTION INTRAVENOUS at 00:28

## 2023-09-07 RX ADMIN — PANTOPRAZOLE SODIUM 40 MG: 40 TABLET, DELAYED RELEASE ORAL at 09:39

## 2023-09-07 RX ADMIN — HEPARIN SODIUM 5000 UNITS: 5000 INJECTION INTRAVENOUS; SUBCUTANEOUS at 00:29

## 2023-09-07 RX ADMIN — POTASSIUM CHLORIDE, DEXTROSE MONOHYDRATE AND SODIUM CHLORIDE: 150; 5; 900 INJECTION, SOLUTION INTRAVENOUS at 09:13

## 2023-09-07 RX ADMIN — ISOSORBIDE DINITRATE 5 MG: 10 TABLET ORAL at 09:39

## 2023-09-07 RX ADMIN — HEPARIN SODIUM 5000 UNITS: 5000 INJECTION INTRAVENOUS; SUBCUTANEOUS at 09:39

## 2023-09-07 RX ADMIN — ASPIRIN 81 MG: 81 TABLET, COATED ORAL at 09:39

## 2023-09-07 RX ADMIN — AMLODIPINE BESYLATE 10 MG: 10 TABLET ORAL at 09:39

## 2023-09-07 ASSESSMENT — PAIN SCALES - GENERAL: PAINLEVEL_OUTOF10: 0

## 2023-09-07 NOTE — PROGRESS NOTES
To room 213 via wheelchair from ED accompanied by ED nurse. No c/o pain. States confused. Cooperative and calm.

## 2023-09-07 NOTE — PROGRESS NOTES
IV removed. Pt want to sign AMA. Dr. Álvarez Shown aware. Continues to itch, meds given per order. Pt sitting up in bed eating lunch at present. Dr. Álvarez Shown been in to examine.

## 2023-09-07 NOTE — PROGRESS NOTES
Accepted meds. Ate well. IVF infusing. Pt states spiders in his mouth. Dr. Figueroa Ratel aware. No resp distress.

## 2023-09-07 NOTE — ED NOTES
Patient transferred to [facility], Room 213 via wheelchair. Pt accompanied by RN. All of pt's belongings w/ patient including, clothing, shoes, wallet, and cell phone. Pt in no acute distress at time of transfer.       Yoseph Terry RN  09/06/23 7439

## 2023-09-07 NOTE — ED NOTES
TRANSFER - OUT REPORT:    Verbal report given to Mio Myers LPN on Barry Reilly  being transferred to [facility], Room 213 for routine progression of patient care       Report consisted of patient's Situation, Background, Assessment and   Recommendations(SBAR). Information from the following report(s) Nurse Handoff Report, ED SBAR, and Cardiac Rhythm SR  was reviewed with the receiving nurse. Lines:   Peripheral IV 09/06/23 Right Antecubital (Active)   Site Assessment Clean, dry & intact 09/06/23 1830   Phlebitis Assessment No symptoms 09/06/23 1830   Infiltration Assessment 0 09/06/23 1830   Dressing Status New dressing applied 09/06/23 1830   Dressing Type Transparent 09/06/23 1830   Dressing Intervention New 09/06/23 1830        Opportunity for questions and clarification was provided.       Patient transported with:  Registered Nurse and Rowdy Dawson RN  09/06/23 1572

## 2023-09-08 LAB
EKG ATRIAL RATE: 90 BPM
EKG DIAGNOSIS: NORMAL
EKG P AXIS: 44 DEGREES
EKG P-R INTERVAL: 158 MS
EKG Q-T INTERVAL: 378 MS
EKG QRS DURATION: 103 MS
EKG QTC CALCULATION (BAZETT): 463 MS
EKG R AXIS: 37 DEGREES
EKG T AXIS: 82 DEGREES
EKG VENTRICULAR RATE: 90 BPM

## 2023-12-23 ENCOUNTER — APPOINTMENT (OUTPATIENT)
Facility: HOSPITAL | Age: 58
DRG: 394 | End: 2023-12-23
Payer: MEDICARE

## 2023-12-23 ENCOUNTER — HOSPITAL ENCOUNTER (INPATIENT)
Facility: HOSPITAL | Age: 58
LOS: 2 days | Discharge: HOME | DRG: 394 | End: 2023-12-25
Attending: STUDENT IN AN ORGANIZED HEALTH CARE EDUCATION/TRAINING PROGRAM | Admitting: INTERNAL MEDICINE
Payer: MEDICARE

## 2023-12-23 DIAGNOSIS — K43.9 VENTRAL HERNIA WITHOUT OBSTRUCTION OR GANGRENE: Primary | ICD-10-CM

## 2023-12-23 DIAGNOSIS — R10.9 ABDOMINAL PAIN, UNSPECIFIED ABDOMINAL LOCATION: ICD-10-CM

## 2023-12-23 LAB
AMPHET UR QL SCN: NEGATIVE
BARBITURATES UR QL SCN: NEGATIVE
BENZODIAZ UR QL: NEGATIVE
CANNABINOIDS UR QL SCN: NEGATIVE
COCAINE UR QL SCN: POSITIVE
Lab: ABNORMAL
METHADONE UR QL: NEGATIVE
OPIATES UR QL: POSITIVE
PCP UR QL: NEGATIVE

## 2023-12-23 PROCEDURE — 99285 EMERGENCY DEPT VISIT HI MDM: CPT

## 2023-12-23 PROCEDURE — 6360000004 HC RX CONTRAST MEDICATION: Performed by: SURGERY

## 2023-12-23 PROCEDURE — 99222 1ST HOSP IP/OBS MODERATE 55: CPT | Performed by: SURGERY

## 2023-12-23 PROCEDURE — 6360000002 HC RX W HCPCS: Performed by: STUDENT IN AN ORGANIZED HEALTH CARE EDUCATION/TRAINING PROGRAM

## 2023-12-23 PROCEDURE — 2580000003 HC RX 258: Performed by: INTERNAL MEDICINE

## 2023-12-23 PROCEDURE — 6370000000 HC RX 637 (ALT 250 FOR IP): Performed by: INTERNAL MEDICINE

## 2023-12-23 PROCEDURE — 74176 CT ABD & PELVIS W/O CONTRAST: CPT

## 2023-12-23 PROCEDURE — 1100000000 HC RM PRIVATE

## 2023-12-23 PROCEDURE — 6360000002 HC RX W HCPCS: Performed by: SURGERY

## 2023-12-23 PROCEDURE — 96374 THER/PROPH/DIAG INJ IV PUSH: CPT

## 2023-12-23 PROCEDURE — 6360000002 HC RX W HCPCS: Performed by: INTERNAL MEDICINE

## 2023-12-23 PROCEDURE — 94761 N-INVAS EAR/PLS OXIMETRY MLT: CPT

## 2023-12-23 PROCEDURE — 2500000003 HC RX 250 WO HCPCS: Performed by: SURGERY

## 2023-12-23 PROCEDURE — 80307 DRUG TEST PRSMV CHEM ANLYZR: CPT

## 2023-12-23 PROCEDURE — 96375 TX/PRO/DX INJ NEW DRUG ADDON: CPT

## 2023-12-23 RX ORDER — VENLAFAXINE HYDROCHLORIDE 75 MG/1
150 CAPSULE, EXTENDED RELEASE ORAL
Status: DISCONTINUED | OUTPATIENT
Start: 2023-12-23 | End: 2023-12-26 | Stop reason: HOSPADM

## 2023-12-23 RX ORDER — ONDANSETRON 4 MG/1
4 TABLET, ORALLY DISINTEGRATING ORAL EVERY 8 HOURS PRN
Status: DISCONTINUED | OUTPATIENT
Start: 2023-12-23 | End: 2023-12-26 | Stop reason: HOSPADM

## 2023-12-23 RX ORDER — TAMSULOSIN HYDROCHLORIDE 0.4 MG/1
0.4 CAPSULE ORAL NIGHTLY
Status: DISCONTINUED | OUTPATIENT
Start: 2023-12-23 | End: 2023-12-26 | Stop reason: HOSPADM

## 2023-12-23 RX ORDER — SODIUM CHLORIDE 9 MG/ML
INJECTION, SOLUTION INTRAVENOUS PRN
Status: DISCONTINUED | OUTPATIENT
Start: 2023-12-23 | End: 2023-12-26 | Stop reason: HOSPADM

## 2023-12-23 RX ORDER — MORPHINE SULFATE 4 MG/ML
4 INJECTION INTRAVENOUS ONCE
Status: DISCONTINUED | OUTPATIENT
Start: 2023-12-23 | End: 2023-12-23

## 2023-12-23 RX ORDER — MONTELUKAST SODIUM 10 MG/1
10 TABLET ORAL NIGHTLY
Status: DISCONTINUED | OUTPATIENT
Start: 2023-12-23 | End: 2023-12-26 | Stop reason: HOSPADM

## 2023-12-23 RX ORDER — SODIUM CHLORIDE 9 MG/ML
INJECTION, SOLUTION INTRAVENOUS CONTINUOUS
Status: DISCONTINUED | OUTPATIENT
Start: 2023-12-23 | End: 2023-12-25

## 2023-12-23 RX ORDER — FENTANYL CITRATE 50 UG/ML
100 INJECTION, SOLUTION INTRAMUSCULAR; INTRAVENOUS
Status: COMPLETED | OUTPATIENT
Start: 2023-12-23 | End: 2023-12-23

## 2023-12-23 RX ORDER — ENOXAPARIN SODIUM 100 MG/ML
30 INJECTION SUBCUTANEOUS 2 TIMES DAILY
Status: DISCONTINUED | OUTPATIENT
Start: 2023-12-23 | End: 2023-12-26 | Stop reason: HOSPADM

## 2023-12-23 RX ORDER — ONDANSETRON 2 MG/ML
4 INJECTION INTRAMUSCULAR; INTRAVENOUS EVERY 6 HOURS PRN
Status: DISCONTINUED | OUTPATIENT
Start: 2023-12-23 | End: 2023-12-26 | Stop reason: HOSPADM

## 2023-12-23 RX ORDER — ACETAMINOPHEN 325 MG/1
650 TABLET ORAL EVERY 6 HOURS PRN
Status: DISCONTINUED | OUTPATIENT
Start: 2023-12-23 | End: 2023-12-26 | Stop reason: HOSPADM

## 2023-12-23 RX ORDER — QUETIAPINE FUMARATE 25 MG/1
50 TABLET, FILM COATED ORAL NIGHTLY
Status: DISCONTINUED | OUTPATIENT
Start: 2023-12-23 | End: 2023-12-26 | Stop reason: HOSPADM

## 2023-12-23 RX ORDER — PANTOPRAZOLE SODIUM 40 MG/1
40 TABLET, DELAYED RELEASE ORAL
Status: DISCONTINUED | OUTPATIENT
Start: 2023-12-24 | End: 2023-12-26 | Stop reason: HOSPADM

## 2023-12-23 RX ORDER — HYDROMORPHONE HYDROCHLORIDE 1 MG/ML
1 INJECTION, SOLUTION INTRAMUSCULAR; INTRAVENOUS; SUBCUTANEOUS EVERY 4 HOURS PRN
Status: DISCONTINUED | OUTPATIENT
Start: 2023-12-23 | End: 2023-12-25

## 2023-12-23 RX ORDER — ACETAMINOPHEN 650 MG/1
650 SUPPOSITORY RECTAL EVERY 6 HOURS PRN
Status: DISCONTINUED | OUTPATIENT
Start: 2023-12-23 | End: 2023-12-26 | Stop reason: HOSPADM

## 2023-12-23 RX ORDER — SODIUM CHLORIDE 0.9 % (FLUSH) 0.9 %
5-40 SYRINGE (ML) INJECTION PRN
Status: DISCONTINUED | OUTPATIENT
Start: 2023-12-23 | End: 2023-12-26 | Stop reason: HOSPADM

## 2023-12-23 RX ORDER — SODIUM CHLORIDE 0.9 % (FLUSH) 0.9 %
5-40 SYRINGE (ML) INJECTION EVERY 12 HOURS SCHEDULED
Status: DISCONTINUED | OUTPATIENT
Start: 2023-12-23 | End: 2023-12-26 | Stop reason: HOSPADM

## 2023-12-23 RX ADMIN — DIATRIZOATE MEGLUMINE AND DIATRIZOATE SODIUM 30 ML: 660; 100 LIQUID ORAL; RECTAL at 08:09

## 2023-12-23 RX ADMIN — ENOXAPARIN SODIUM 30 MG: 100 INJECTION SUBCUTANEOUS at 21:49

## 2023-12-23 RX ADMIN — QUETIAPINE FUMARATE 50 MG: 25 TABLET ORAL at 21:49

## 2023-12-23 RX ADMIN — TAMSULOSIN HYDROCHLORIDE 0.4 MG: 0.4 CAPSULE ORAL at 21:49

## 2023-12-23 RX ADMIN — MONTELUKAST 10 MG: 10 TABLET, FILM COATED ORAL at 23:16

## 2023-12-23 RX ADMIN — SODIUM CHLORIDE: 9 INJECTION, SOLUTION INTRAVENOUS at 14:42

## 2023-12-23 RX ADMIN — HYDROMORPHONE HYDROCHLORIDE 1 MG: 1 INJECTION, SOLUTION INTRAMUSCULAR; INTRAVENOUS; SUBCUTANEOUS at 08:18

## 2023-12-23 RX ADMIN — FENTANYL CITRATE 100 MCG: 50 INJECTION, SOLUTION INTRAMUSCULAR; INTRAVENOUS at 06:14

## 2023-12-23 RX ADMIN — HYDROMORPHONE HYDROCHLORIDE 1 MG: 1 INJECTION, SOLUTION INTRAMUSCULAR; INTRAVENOUS; SUBCUTANEOUS at 23:27

## 2023-12-23 NOTE — CONSULTS
Surgery Consult    Patient: Quincy Parker MRN: [id number]  SSN: [id number]    YOB: 1965  Age: 59 y.o. Sex: male      Subjective:      Quincy Parker is a 59 y.o. male who is being seen for abdominal pain. He presented to the ER with acute worsening of abdominal pain. He states he has also been nauseous over the past few days but has not vomited. He has been burping more often though. He has a history of exploratory laparotomy with colostomy for perforated diverticulitis in 2012 with subsequent exploratory laparotomy with colostomy reversal.  He has developed a ventral hernia at the superior portion of his previous incision which now contains a portion of his transverse colon. He has been evaluated at [address] [place] [address] for hernia repair in the past with his last visit being in January of this year. He had been scheduled for surgery prior to that but at 1 point did not get cardiac clearance and ended up having open heart surgery and another time his surgery was canceled because he was positive for cocaine. The patient states that his hernia is always helped but he typically has regular bowel movements. He states that he always has pain but it became worse over the past few days. CT of the abdomen and pelvis was performed in the emergency department which demonstrated possible small bowel obstruction with a large portion of the transverse colon herniated into the ventral hernia with solid stool within this possibly causing a colonic obstruction. An abdominal binder was placed on the patient and a repeat CT scan was performed with oral contrast.  This revealed no evidence of small bowel obstruction and the previous stool within the herniated transverse colon had passed. There was still evidence of the hernia but no obstruction. The patient continued to have abdominal pain and so was admitted for observation and pain control.   General surgery has been consulted for evaluation and management

## 2023-12-23 NOTE — ED NOTES
This nurse taking over care of patient at this time. Pt resting in position of comfort, awaiting further testing at this time.

## 2023-12-23 NOTE — ED NOTES
Patient hernia came out again. Hernia placed back and abdominal binder applied as ordered. Patient will have a abdominal CT with contrast. Half of contrast given now and patient will drink the other half in 30  minutes per hospital protocol. CT scheduled for 11:40 am. Patient in excruciating pain. Dilaudid ordered. WCTM.

## 2023-12-23 NOTE — H&P
History & Physical    Primary Care Provider: Shasha Radford MD  Source of Information: Patient/family     Chief complaint:   Chief Complaint   Patient presents with    Hernia        History of Presenting Illness:   Kobe Smart is a 59 y.o. man with hypertension, hyperlipidemia, history of seizures, anxiety depression with a history of psychosis, GERD who presented to outside facility with severe abdominal pain overlying his known ventral hernia. Imaging at that facility suggested an early small bowel obstruction so he was transferred here for surgical consultation. Repeat CT here did not show bowel obstruction but he has had severe ongoing tenderness of the hernia. He was evaluated by general surgery in the ED who recommended medical admission. He continues to have pain over his hernia. No chest pain or shortness of breath. No cough. No fever. He says he has been moving his bowels okay no diarrhea or blood in his stools. No dysuria but he says he always struggles with urination. In the ED he was afebrile hemodynamically stable and not hypoxic. Normal lactate  Case discussed with ED physician. Patient will be admitted to the hospitalist service for monitoring of abdominal exam and treatment of his pain. Review of Systems:  Review of Systems   Constitutional:  Positive for appetite change. Negative for activity change, chills and fever. HENT:  Negative for sore throat and trouble swallowing. Eyes:  Negative for photophobia and visual disturbance. Respiratory:  Negative for cough and shortness of breath. Cardiovascular:  Negative for chest pain and leg swelling. Gastrointestinal:  Positive for abdominal pain and nausea. Negative for blood in stool, diarrhea and vomiting. Endocrine: Negative for polyuria. Genitourinary:  Positive for difficulty urinating. Negative for dysuria and flank pain. Musculoskeletal:  Positive for back pain. Negative for neck pain.    Skin:

## 2023-12-23 NOTE — ED PROVIDER NOTES
[address] SURGICAL  EMERGENCY DEPARTMENT HISTORY AND PHYSICAL EXAM      Date: 12/23/2023  Patient Name: Joan Mcrae  MRN: [id number]  [address] [facility] [place] 1965  Date of evaluation: 12/23/2023  Provider: Jose Gandhi MD   Note Started: 5:50 AM EST 12/23/23    HISTORY OF PRESENT ILLNESS     Chief Complaint   Patient presents with    Hernia       History Provided By: Patient    HPI: Joan Mcrae is a 59 y.o. male with PMH of anxiety, GERD, HLD, HTN, DELTA, seizure disorder, and stroke who comes to the ED transfer outside facility due to irreducible ventral hernia. Patient went to the facility for chief complaint of abdominal pain. On my assessment, patient is complaint abdominal pain diminished abdomen. He report has been burping last night. His workup at that facility showed that he has ventral hernia with concerns for colonic obstruction and thus patient was sent to us. PAST MEDICAL HISTORY   Past Medical History:  Past Medical History:   Diagnosis Date    Anxiety     Arthritis     GERD (gastroesophageal reflux disease)     Hypercholesterolemia     Hypertension     Mild depressed bipolar 1 disorder ([facility]) 10/13/2020    Myocardial infarction [facility])     Obstructive sleep apnea 10/13/2020    Psychotic disorder ([facility])     Seizures (HCC)     Stroke [facility])        Past Surgical History:  Past Surgical History:   Procedure Laterality Date    OH UNLISTED PROCEDURE ABDOMEN PERITONEUM & OMENTUM         Family History:  Family History   Problem Relation Age of Onset    Depression Mother     No Known Problems Father        Social History:  Social History     Tobacco Use    Smoking status: Former    Smokeless tobacco: Never   Substance Use Topics    Alcohol use: Yes     Alcohol/week: 2.0 standard drinks of alcohol    Drug use: Yes     Types: Cocaine       Allergies:   Allergies   Allergen Reactions    Codeine      Other reaction(s): Unknown (comments)    Penicillins      Other reaction(s): Unknown (comments)       PCP:

## 2023-12-24 LAB
ALBUMIN SERPL-MCNC: 3.6 G/DL (ref 3.5–5)
ALBUMIN/GLOB SERPL: 1.2 (ref 1.1–2.2)
ALP SERPL-CCNC: 60 U/L (ref 45–117)
ALT SERPL-CCNC: 33 U/L (ref 12–78)
ANION GAP SERPL CALC-SCNC: 4 MMOL/L (ref 5–15)
AST SERPL W P-5'-P-CCNC: 33 U/L (ref 15–37)
BASOPHILS # BLD: 0 K/UL (ref 0–0.1)
BASOPHILS NFR BLD: 1 % (ref 0–1)
BILIRUB SERPL-MCNC: 0.6 MG/DL (ref 0.2–1)
BUN SERPL-MCNC: 14 MG/DL (ref 6–20)
BUN/CREAT SERPL: 13 (ref 12–20)
CA-I BLD-MCNC: 8.2 MG/DL (ref 8.5–10.1)
CHLORIDE SERPL-SCNC: 104 MMOL/L (ref 97–108)
CO2 SERPL-SCNC: 29 MMOL/L (ref 21–32)
CREAT SERPL-MCNC: 1.07 MG/DL (ref 0.7–1.3)
DIFFERENTIAL METHOD BLD: ABNORMAL
EOSINOPHIL # BLD: 0.1 K/UL (ref 0–0.4)
EOSINOPHIL NFR BLD: 2 % (ref 0–7)
ERYTHROCYTE [DISTWIDTH] IN BLOOD BY AUTOMATED COUNT: 12.6 % (ref 11.5–14.5)
GLOBULIN SER CALC-MCNC: 2.9 G/DL (ref 2–4)
GLUCOSE SERPL-MCNC: 79 MG/DL (ref 65–100)
HCT VFR BLD AUTO: 40.7 % (ref 36.6–50.3)
HGB BLD-MCNC: 13.7 G/DL (ref 12.1–17)
IMM GRANULOCYTES # BLD AUTO: 0 K/UL (ref 0–0.04)
IMM GRANULOCYTES NFR BLD AUTO: 0 % (ref 0–0.5)
LACTATE SERPL-SCNC: 1.2 MMOL/L (ref 0.4–2)
LYMPHOCYTES # BLD: 1.3 K/UL (ref 0.8–3.5)
LYMPHOCYTES NFR BLD: 40 % (ref 12–49)
MCH RBC QN AUTO: 32.8 PG (ref 26–34)
MCHC RBC AUTO-ENTMCNC: 33.7 G/DL (ref 30–36.5)
MCV RBC AUTO: 97.4 FL (ref 80–99)
MONOCYTES # BLD: 0.3 K/UL (ref 0–1)
MONOCYTES NFR BLD: 8 % (ref 5–13)
NEUTS SEG # BLD: 1.6 K/UL (ref 1.8–8)
NEUTS SEG NFR BLD: 49 % (ref 32–75)
NRBC # BLD: 0 K/UL (ref 0–0.01)
NRBC BLD-RTO: 0 PER 100 WBC
PLATELET # BLD AUTO: 138 K/UL (ref 150–400)
PMV BLD AUTO: 9.8 FL (ref 8.9–12.9)
POTASSIUM SERPL-SCNC: 4.1 MMOL/L (ref 3.5–5.1)
PROT SERPL-MCNC: 6.5 G/DL (ref 6.4–8.2)
RBC # BLD AUTO: 4.18 M/UL (ref 4.1–5.7)
SODIUM SERPL-SCNC: 137 MMOL/L (ref 136–145)
WBC # BLD AUTO: 3.2 K/UL (ref 4.1–11.1)

## 2023-12-24 PROCEDURE — 83605 ASSAY OF LACTIC ACID: CPT

## 2023-12-24 PROCEDURE — 99232 SBSQ HOSP IP/OBS MODERATE 35: CPT | Performed by: SURGERY

## 2023-12-24 PROCEDURE — 6360000002 HC RX W HCPCS: Performed by: INTERNAL MEDICINE

## 2023-12-24 PROCEDURE — 2580000003 HC RX 258: Performed by: INTERNAL MEDICINE

## 2023-12-24 PROCEDURE — 85025 COMPLETE CBC W/AUTO DIFF WBC: CPT

## 2023-12-24 PROCEDURE — 6360000002 HC RX W HCPCS: Performed by: SURGERY

## 2023-12-24 PROCEDURE — 6370000000 HC RX 637 (ALT 250 FOR IP): Performed by: INTERNAL MEDICINE

## 2023-12-24 PROCEDURE — 36415 COLL VENOUS BLD VENIPUNCTURE: CPT

## 2023-12-24 PROCEDURE — 6370000000 HC RX 637 (ALT 250 FOR IP): Performed by: SURGERY

## 2023-12-24 PROCEDURE — 1100000000 HC RM PRIVATE

## 2023-12-24 PROCEDURE — 80053 COMPREHEN METABOLIC PANEL: CPT

## 2023-12-24 RX ORDER — CLONIDINE HYDROCHLORIDE 0.1 MG/1
0.1 TABLET ORAL EVERY 6 HOURS PRN
Status: DISCONTINUED | OUTPATIENT
Start: 2023-12-24 | End: 2023-12-26 | Stop reason: HOSPADM

## 2023-12-24 RX ORDER — DICYCLOMINE HYDROCHLORIDE 10 MG/1
20 CAPSULE ORAL EVERY 6 HOURS PRN
Status: DISCONTINUED | OUTPATIENT
Start: 2023-12-24 | End: 2023-12-26 | Stop reason: HOSPADM

## 2023-12-24 RX ORDER — LANOLIN ALCOHOL/MO/W.PET/CERES
3 CREAM (GRAM) TOPICAL NIGHTLY
Status: DISCONTINUED | OUTPATIENT
Start: 2023-12-24 | End: 2023-12-26 | Stop reason: HOSPADM

## 2023-12-24 RX ORDER — BUPRENORPHINE 2 MG/1
4 TABLET SUBLINGUAL PRN
Status: DISCONTINUED | OUTPATIENT
Start: 2023-12-24 | End: 2023-12-26 | Stop reason: HOSPADM

## 2023-12-24 RX ORDER — PROMETHAZINE HYDROCHLORIDE 25 MG/1
25 TABLET ORAL EVERY 6 HOURS PRN
Status: DISCONTINUED | OUTPATIENT
Start: 2023-12-24 | End: 2023-12-26 | Stop reason: HOSPADM

## 2023-12-24 RX ORDER — POLYETHYLENE GLYCOL 3350 17 G/17G
17 POWDER, FOR SOLUTION ORAL DAILY
Status: DISCONTINUED | OUTPATIENT
Start: 2023-12-24 | End: 2023-12-26 | Stop reason: HOSPADM

## 2023-12-24 RX ADMIN — PANTOPRAZOLE SODIUM 40 MG: 40 TABLET, DELAYED RELEASE ORAL at 06:35

## 2023-12-24 RX ADMIN — VENLAFAXINE HYDROCHLORIDE 150 MG: 75 CAPSULE, EXTENDED RELEASE ORAL at 10:02

## 2023-12-24 RX ADMIN — ENOXAPARIN SODIUM 30 MG: 100 INJECTION SUBCUTANEOUS at 21:11

## 2023-12-24 RX ADMIN — ENOXAPARIN SODIUM 30 MG: 100 INJECTION SUBCUTANEOUS at 10:02

## 2023-12-24 RX ADMIN — HYDROMORPHONE HYDROCHLORIDE 1 MG: 1 INJECTION, SOLUTION INTRAMUSCULAR; INTRAVENOUS; SUBCUTANEOUS at 05:00

## 2023-12-24 RX ADMIN — Medication 3 MG: at 21:10

## 2023-12-24 RX ADMIN — ACETAMINOPHEN 650 MG: 325 TABLET ORAL at 21:11

## 2023-12-24 RX ADMIN — SODIUM CHLORIDE, PRESERVATIVE FREE 10 ML: 5 INJECTION INTRAVENOUS at 21:11

## 2023-12-24 RX ADMIN — TAMSULOSIN HYDROCHLORIDE 0.4 MG: 0.4 CAPSULE ORAL at 21:11

## 2023-12-24 RX ADMIN — QUETIAPINE FUMARATE 50 MG: 25 TABLET ORAL at 21:11

## 2023-12-24 RX ADMIN — POLYETHYLENE GLYCOL 3350 17 G: 17 POWDER, FOR SOLUTION ORAL at 12:54

## 2023-12-24 RX ADMIN — SODIUM CHLORIDE: 9 INJECTION, SOLUTION INTRAVENOUS at 04:59

## 2023-12-24 RX ADMIN — MONTELUKAST 10 MG: 10 TABLET, FILM COATED ORAL at 21:11

## 2023-12-24 NOTE — PROGRESS NOTES
Hospitalist Progress Note               Daily Progress Note: 12/24/2023      Chief complaint:   Chief Complaint   Patient presents with    Hernia        Subjective:     Patient is seen today for follow-up of abdominal pain and large ventral hernia. When I saw patient early this morning he was quite restless moaning in pain with diaphoresis and salivating. He reported severe diffuse abdominal pain and cramping. We have checked a urine drug screen last night that was positive for cocaine. Patient admitted to cocaine use prior to his presentation. We discussed the possibility that a lot of his symptoms may be related more to cocaine use as well as cocaine withdrawal.  He was also noted to be positive for opiates but this may reflect opiates received in the emergency room rather than use as he denies any fentanyl or heroin use.         Medications reviewed  Current Facility-Administered Medications   Medication Dose Route Frequency    buprenorphine (SUBUTEX) SL tablet 4 mg  4 mg SubLINGual PRN    dicyclomine (BENTYL) capsule 20 mg  20 mg Oral Q6H PRN    promethazine (PHENERGAN) tablet 25 mg  25 mg Oral Q6H PRN    cloNIDine (CATAPRES) tablet 0.1 mg  0.1 mg Oral Q6H PRN    melatonin tablet 3 mg  3 mg Oral Nightly    polyethylene glycol (GLYCOLAX) packet 17 g  17 g Oral Daily    HYDROmorphone HCl PF (DILAUDID) injection 1 mg  1 mg IntraVENous Q4H PRN    diatrizoate meglumine-sodium (GASTROGRAFIN) 66-10 % solution 30 mL  30 mL Oral ONCE PRN    sodium chloride flush 0.9 % injection 5-40 mL  5-40 mL IntraVENous 2 times per day    sodium chloride flush 0.9 % injection 5-40 mL  5-40 mL IntraVENous PRN    0.9 % sodium chloride infusion   IntraVENous PRN    enoxaparin Sodium (LOVENOX) injection 30 mg  30 mg SubCUTAneous BID    ondansetron (ZOFRAN-ODT) disintegrating tablet 4 mg  4 mg Oral Q8H PRN    Or    ondansetron (ZOFRAN) injection 4 mg  4 mg IntraVENous Q6H PRN    acetaminophen (TYLENOL) tablet 650 mg  650 mg Oral

## 2023-12-24 NOTE — PROGRESS NOTES
4 Eyes Skin Assessment     NAME:  Seymour Hernandez  YOB: 1965  MEDICAL RECORD NUMBER:  [id number]    The patient is being assessed for  Admission    I agree that at least one RN has performed a thorough Head to Toe Skin Assessment on the patient. ALL assessment sites listed below have been assessed. Areas assessed by both nurses:    Head, Face, Ears, Shoulders, Back, Chest, Arms, Elbows, Hands, Sacrum. Buttock, Coccyx, Ischium, Legs. Feet and Heels, and Under Medical Devices         Does the Patient have a Wound?  No noted wound(s)       Bear Prevention initiated by RN: Yes  Wound Care Orders initiated by RN: Yes    Pressure Injury (Stage 3,4, Unstageable, DTI, NWPT, and Complex wounds) if present, place Wound referral order by RN under : No    New Ostomies, if present place, Ostomy referral order under : No     Nurse 1 eSignature: Electronically signed by Beryl Cardenas RN on 12/24/23 at 7:07 AM EST    **SHARE this note so that the co-signing nurse can place an eSignature**    Nurse 2 eSignature: Electronically signed by Marisa Salmeron RN on 12/24/23 at 7:09 AM EST

## 2023-12-24 NOTE — ED NOTES
Pt provided clear liquid diet per order. Pt sitting up eating at this time. Pt denies any needs at this time and call bell in reach.

## 2023-12-24 NOTE — PROGRESS NOTES
4 Eyes Skin Assessment     NAME:  Katarzyna Norton  YOB: 1965  MEDICAL RECORD NUMBER:  [id number]    The patient is being assessed for  Admission    I agree that at least one RN has performed a thorough Head to Toe Skin Assessment on the patient. ALL assessment sites listed below have been assessed. Areas assessed by both nurses:    Head, Face, Ears, Shoulders, Back, Chest, Arms, Elbows, Hands, Sacrum. Buttock, Coccyx, Ischium, Legs. Feet and Heels, and Under Medical Devices         Does the Patient have a Wound?  No noted wound(s)       Bear Prevention initiated by RN: Yes  Wound Care Orders initiated by RN: Yes    Pressure Injury (Stage 3,4, Unstageable, DTI, NWPT, and Complex wounds) if present, place Wound referral order by RN under : No    New Ostomies, if present place, Ostomy referral order under : No     Nurse 1 eSignature: Electronically signed by Clarence Arias RN on 12/24/23 at 6:48 AM EST    **SHARE this note so that the co-signing nurse can place an eSignature**    Nurse 2 eSignature: Electronically signed by Clarence Arias RN on 12/24/23 at 6:49 AM EST

## 2023-12-24 NOTE — ED NOTES
ED TO INPATIENT SBAR HANDOFF    Patient Name: Juan J Berman   Preferred Name: Jairo Leach  : 1965  59 y.o. Family/Caregiver Present: no   Code Status Order: Full Code  PO Status: Clear liquid diet  Telemetry Order: No  C-SSRS: Risk of Suicide: No Risk  Sitter no   Restraints:     Sepsis Risk Score Sepsis Risk Score: 0.59    Situation  Chief Complaint   Patient presents with    Hernia     Brief Description of Patient's Condition: Pt transferred from hospital in [facility] for surgical consult for ventral hernia. Mental Status: oriented, alert, coherent, logical, thought processes intact, and able to concentrate and follow conversation  Arrived from:Home  Imaging:   CT ABDOMEN PELVIS WO CONTRAST Additional Contrast? Oral   Final Result      1. No bowel obstruction or ileus. 2. Persistent ventral hernia containing unobstructed loops of transverse colon. Over the interval, the herniated loops have cleared of stool. There is no   inflammation or fluid collection. Abnormal labs: Abnormal Labs Reviewed - No abnormal labs to display    Background  Allergies:    Allergies   Allergen Reactions    Codeine      Other reaction(s): Unknown (comments)    Penicillins      Other reaction(s): Unknown (comments)     History:   Past Medical History:   Diagnosis Date    Anxiety     Arthritis     GERD (gastroesophageal reflux disease)     Hypercholesterolemia     Hypertension     Mild depressed bipolar 1 disorder ([address]) 10/13/2020    Myocardial infarction [facility])     Obstructive sleep apnea 10/13/2020    Psychotic disorder (HCC)     Seizures (HCC)     Stroke [facility])        Assessment  Vitals: MEWS Score: 1  Level of Consciousness: Alert (0)   Vitals:    23 1500 23 1600 23 1700 23 1800   BP: 106/76 111/85 122/79 112/81   Pulse: 68 70 54 76   Resp: 11 17 10 12   Temp:       TempSrc:       SpO2: 98% 96% 94% 95%   Weight:       Height:         Deterioration Index (DI): Deterioration Index:

## 2023-12-24 NOTE — PROGRESS NOTES
Received patient from ER to room 515 alert and oriented. Patient educated on call bell system and phone. Assessment completed, vitals signs completed, patient positioned for comfort, no distress noted at the time.

## 2023-12-25 VITALS
HEIGHT: 74 IN | SYSTOLIC BLOOD PRESSURE: 130 MMHG | RESPIRATION RATE: 18 BRPM | BODY MASS INDEX: 29.52 KG/M2 | WEIGHT: 230 LBS | OXYGEN SATURATION: 100 % | TEMPERATURE: 97.5 F | DIASTOLIC BLOOD PRESSURE: 92 MMHG | HEART RATE: 75 BPM

## 2023-12-25 PROBLEM — F14.10 COCAINE ABUSE (HCC): Status: ACTIVE | Noted: 2023-12-25

## 2023-12-25 PROCEDURE — 6360000002 HC RX W HCPCS: Performed by: SURGERY

## 2023-12-25 PROCEDURE — 6370000000 HC RX 637 (ALT 250 FOR IP): Performed by: INTERNAL MEDICINE

## 2023-12-25 PROCEDURE — 6360000002 HC RX W HCPCS: Performed by: INTERNAL MEDICINE

## 2023-12-25 PROCEDURE — 2580000003 HC RX 258: Performed by: INTERNAL MEDICINE

## 2023-12-25 PROCEDURE — 6370000000 HC RX 637 (ALT 250 FOR IP): Performed by: SURGERY

## 2023-12-25 RX ORDER — BUTALBITAL, ACETAMINOPHEN AND CAFFEINE 50; 325; 40 MG/1; MG/1; MG/1
2 TABLET ORAL ONCE
Status: COMPLETED | OUTPATIENT
Start: 2023-12-25 | End: 2023-12-25

## 2023-12-25 RX ORDER — ISOSORBIDE DINITRATE 5 MG/1
5 TABLET ORAL 3 TIMES DAILY
Qty: 30 TABLET | Refills: 0 | Status: SHIPPED
Start: 2023-12-25

## 2023-12-25 RX ORDER — POLYETHYLENE GLYCOL 3350 17 G/17G
17 POWDER, FOR SOLUTION ORAL DAILY
Qty: 30 PACKET | Refills: 0 | Status: SHIPPED | OUTPATIENT
Start: 2023-12-26 | End: 2024-01-25

## 2023-12-25 RX ADMIN — VENLAFAXINE HYDROCHLORIDE 150 MG: 75 CAPSULE, EXTENDED RELEASE ORAL at 08:27

## 2023-12-25 RX ADMIN — HYDROMORPHONE HYDROCHLORIDE 1 MG: 1 INJECTION, SOLUTION INTRAMUSCULAR; INTRAVENOUS; SUBCUTANEOUS at 04:28

## 2023-12-25 RX ADMIN — POLYETHYLENE GLYCOL 3350 17 G: 17 POWDER, FOR SOLUTION ORAL at 08:27

## 2023-12-25 RX ADMIN — SODIUM CHLORIDE, PRESERVATIVE FREE 10 ML: 5 INJECTION INTRAVENOUS at 08:29

## 2023-12-25 RX ADMIN — HYDROMORPHONE HYDROCHLORIDE 1 MG: 1 INJECTION, SOLUTION INTRAMUSCULAR; INTRAVENOUS; SUBCUTANEOUS at 08:27

## 2023-12-25 RX ADMIN — BUTALBITAL, ACETAMINOPHEN, AND CAFFEINE 2 TABLET: 325; 50; 40 TABLET ORAL at 16:47

## 2023-12-25 RX ADMIN — PANTOPRAZOLE SODIUM 40 MG: 40 TABLET, DELAYED RELEASE ORAL at 06:37

## 2023-12-25 RX ADMIN — ENOXAPARIN SODIUM 30 MG: 100 INJECTION SUBCUTANEOUS at 08:27

## 2023-12-25 NOTE — CARE COORDINATION
DC order noted. Chart reviewed. Met with the pt at bedside. IMM complete.   No other intervention required for this Home Self Care discharge
Patient with Decision Making Capacity    Healthcare Decision Maker:    Primary Decision Maker: Steven Chen - Ancelmo Relative - [phone]  Click here to complete Healthcare Decision Makers including selection of the Healthcare Decision Maker Relationship (ie \"Primary\"). Today we documented Decision Maker(s) consistent with Legal Next of Kin hierarchy.     Content/Action Overview:  Has NO ACP documents/care preferences - requested patient complete ACP documents  Reviewed DNR/DNI and patient elects Full Code (Attempt Resuscitation)             FAVIO Atkins

## 2023-12-25 NOTE — DISCHARGE INSTRUCTIONS
No cocaine  Take miralax daily to keep your bowels regular  Followup with your PCP within 1 week for blood pressure check and to review and adjust your medications

## 2023-12-25 NOTE — PLAN OF CARE
Problem: Discharge Planning  Goal: Discharge to home or other facility with appropriate resources  12/25/2023 0451 by Duglas Washington LPN  Outcome: Progressing  Flowsheets (Taken 12/24/2023 2000)  Discharge to home or other facility with appropriate resources: Identify barriers to discharge with patient and caregiver     Problem: Pain  Goal: Verbalizes/displays adequate comfort level or baseline comfort level  12/25/2023 1135 by Archer Gowers, RN  Outcome: Progressing  12/25/2023 0451 by Duglas Washington LPN  Outcome: Progressing

## 2023-12-25 NOTE — DISCHARGE SUMMARY
Physician Discharge Summary     Patient ID:    Charlene Calabrese  [id number]  38 y.o.  1965    Admit date: 12/23/2023    Discharge date : 12/25/2023      Final Diagnoses:   Large ventral hernia  Cocaine use disorder  Hypertension  Hyperlipidemia  Bipolar disorder with anxiety and depression      Hospital Course:   Charlene Calabrese is a 59 y.o. man with hypertension, hyperlipidemia, history of seizures, anxiety depression with a history of psychosis, GERD who presented to outside facility with severe abdominal pain overlying his known ventral hernia. Imaging at that facility suggested an early small bowel obstruction so he was transferred here for surgical consultation. Repeat CT here did not show bowel obstruction but he has had severe ongoing tenderness of the hernia. He was evaluated by general surgery in the ED who recommended medical admission. In the ED he was afebrile hemodynamically stable and not hypoxic. Normal lactate      Abdominal pain due to large ventral hernia-he likely has some discomfort due to his large ventral hernia but no evidence of obstruction. Appreciate general surgery input. He is tolerating a regular diet without difficulty. he can follow-up in surgery clinic as desired for discussion of surgical repair. He is aware that he will need to be free of cocaine use for surgical intervention     Cocaine use with withdrawal-I suspect he was having symptoms related to both cocaine use initially and then cocaine withdrawal.  He had significant improvement in his symptoms with small amount of Dilaudid. I placed him on the opiate withdrawal protocol for now and added symptomatic treatment. He is currently tolerating p.o. well and he has no abdominal pain. No tremors nausea or any other symptoms. We will plan for discharge home t We talked about how cocaine use can cause vasospasm leading to things such as stroke heart attack as well as abdominal pain.   Cessation

## 2023-12-25 NOTE — DISCHARGE INSTR - DIET

## 2023-12-25 NOTE — PLAN OF CARE
Problem: Discharge Planning  Goal: Discharge to home or other facility with appropriate resources  Outcome: Progressing  Flowsheets (Taken 12/24/2023 2000)  Discharge to home or other facility with appropriate resources: Identify barriers to discharge with patient and caregiver     Problem: Pain  Goal: Verbalizes/displays adequate comfort level or baseline comfort level  12/25/2023 0451 by Ramses Hart LPN  Outcome: Progressing  12/24/2023 1901 by Gabbi De La Fuente RN  Outcome: Progressing

## 2023-12-26 NOTE — PROGRESS NOTES
Patient discharged from this facility with no LDAs in place. Patient did not receive discharge paperwork stated he had to get to his ride cause he was going to [place]. Patient transported by staff via wheelchair to cab. Hospital voucher given to  by staff.

## 2024-02-20 ENCOUNTER — HOSPITAL ENCOUNTER (EMERGENCY)
Facility: HOSPITAL | Age: 59
Discharge: HOME | End: 2024-02-20
Attending: EMERGENCY MEDICINE
Payer: MEDICARE

## 2024-02-20 VITALS
RESPIRATION RATE: 20 BRPM | HEIGHT: 74 IN | SYSTOLIC BLOOD PRESSURE: 151 MMHG | TEMPERATURE: 97.7 F | OXYGEN SATURATION: 100 % | DIASTOLIC BLOOD PRESSURE: 114 MMHG | WEIGHT: 188.3 LBS | HEART RATE: 96 BPM | BODY MASS INDEX: 24.17 KG/M2

## 2024-02-20 DIAGNOSIS — Z59.00 HOMELESSNESS UNSPECIFIED: Primary | ICD-10-CM

## 2024-02-20 LAB
ALBUMIN SERPL-MCNC: 3.8 G/DL (ref 3.5–5)
ALBUMIN/GLOB SERPL: 1 (ref 1.1–2.2)
ALP SERPL-CCNC: 75 U/L (ref 45–117)
ALT SERPL-CCNC: 49 U/L (ref 12–78)
ANION GAP SERPL CALC-SCNC: 6 MMOL/L (ref 5–15)
AST SERPL W P-5'-P-CCNC: 40 U/L (ref 15–37)
BASOPHILS # BLD: 0 K/UL (ref 0–0.1)
BASOPHILS NFR BLD: 1 % (ref 0–1)
BILIRUB SERPL-MCNC: 0.8 MG/DL (ref 0.2–1)
BUN SERPL-MCNC: 19 MG/DL (ref 6–20)
BUN/CREAT SERPL: 15 (ref 12–20)
CA-I BLD-MCNC: 8.6 MG/DL (ref 8.5–10.1)
CHLORIDE SERPL-SCNC: 100 MMOL/L (ref 97–108)
CO2 SERPL-SCNC: 31 MMOL/L (ref 21–32)
CREAT SERPL-MCNC: 1.25 MG/DL (ref 0.7–1.3)
DIFFERENTIAL METHOD BLD: ABNORMAL
EKG ATRIAL RATE: 79 BPM
EKG DIAGNOSIS: NORMAL
EKG P AXIS: 73 DEGREES
EKG P-R INTERVAL: 153 MS
EKG Q-T INTERVAL: 392 MS
EKG QRS DURATION: 96 MS
EKG QTC CALCULATION (BAZETT): 450 MS
EKG R AXIS: 82 DEGREES
EKG T AXIS: 83 DEGREES
EKG VENTRICULAR RATE: 79 BPM
EOSINOPHIL # BLD: 0.1 K/UL (ref 0–0.4)
EOSINOPHIL NFR BLD: 3 % (ref 0–7)
ERYTHROCYTE [DISTWIDTH] IN BLOOD BY AUTOMATED COUNT: 13 % (ref 11.5–14.5)
ETHANOL SERPL-MCNC: <10 MG/DL (ref 0–0.08)
FLUAV RNA SPEC QL NAA+PROBE: NOT DETECTED
FLUBV RNA SPEC QL NAA+PROBE: NOT DETECTED
GLOBULIN SER CALC-MCNC: 3.8 G/DL (ref 2–4)
GLUCOSE SERPL-MCNC: 124 MG/DL (ref 65–100)
HCT VFR BLD AUTO: 42.1 % (ref 36.6–50.3)
HGB BLD-MCNC: 14.7 G/DL (ref 12.1–17)
IMM GRANULOCYTES # BLD AUTO: 0 K/UL (ref 0–0.04)
IMM GRANULOCYTES NFR BLD AUTO: 1 % (ref 0–0.5)
LYMPHOCYTES # BLD: 1.5 K/UL (ref 0.8–3.5)
LYMPHOCYTES NFR BLD: 43 % (ref 12–49)
MCH RBC QN AUTO: 33.1 PG (ref 26–34)
MCHC RBC AUTO-ENTMCNC: 34.9 G/DL (ref 30–36.5)
MCV RBC AUTO: 94.8 FL (ref 80–99)
MONOCYTES # BLD: 0.4 K/UL (ref 0–1)
MONOCYTES NFR BLD: 13 % (ref 5–13)
NEUTS SEG # BLD: 1.3 K/UL (ref 1.8–8)
NEUTS SEG NFR BLD: 39 % (ref 32–75)
NRBC # BLD: 0 K/UL (ref 0–0.01)
NRBC BLD-RTO: 0 PER 100 WBC
PLATELET # BLD AUTO: 157 K/UL (ref 150–400)
PMV BLD AUTO: 9.3 FL (ref 8.9–12.9)
POTASSIUM SERPL-SCNC: 4.1 MMOL/L (ref 3.5–5.1)
PROT SERPL-MCNC: 7.6 G/DL (ref 6.4–8.2)
RBC # BLD AUTO: 4.44 M/UL (ref 4.1–5.7)
SARS-COV-2 RNA RESP QL NAA+PROBE: NOT DETECTED
SODIUM SERPL-SCNC: 137 MMOL/L (ref 136–145)
WBC # BLD AUTO: 3.4 K/UL (ref 4.1–11.1)

## 2024-02-20 PROCEDURE — 80053 COMPREHEN METABOLIC PANEL: CPT

## 2024-02-20 PROCEDURE — 82077 ASSAY SPEC XCP UR&BREATH IA: CPT

## 2024-02-20 PROCEDURE — 36415 COLL VENOUS BLD VENIPUNCTURE: CPT

## 2024-02-20 PROCEDURE — 87636 SARSCOV2 & INF A&B AMP PRB: CPT

## 2024-02-20 PROCEDURE — 85025 COMPLETE CBC W/AUTO DIFF WBC: CPT

## 2024-02-20 PROCEDURE — 99284 EMERGENCY DEPT VISIT MOD MDM: CPT

## 2024-02-20 PROCEDURE — 93005 ELECTROCARDIOGRAM TRACING: CPT | Performed by: EMERGENCY MEDICINE

## 2024-02-20 RX ORDER — NALOXONE HYDROCHLORIDE 1 MG/ML
1 INJECTION INTRAMUSCULAR; INTRAVENOUS; SUBCUTANEOUS
Status: DISCONTINUED | OUTPATIENT
Start: 2024-02-20 | End: 2024-02-20 | Stop reason: HOSPADM

## 2024-02-20 SDOH — ECONOMIC STABILITY - HOUSING INSECURITY: HOMELESSNESS UNSPECIFIED: Z59.00

## 2024-02-20 NOTE — ED NOTES
Patient requested to be discharged home. Patient states he is not SI/HI. Patient A&O x4. MD made aware and decided to discharge patient to home. Patient belongings returned to patient. Patient ambulated off unit without difficulty. Patient left before receiving discharge paperwork.

## 2024-02-20 NOTE — BSMART NOTE
This writer was contacted by Rosemarie BUSTAMANTE and informed the pt was ready to be assessed    Prior to evaluation, this writer was contacted again and informed that the pt was alert and denying SI/HI. The MD was making the decision to discharge the pt.

## 2024-02-20 NOTE — ED NOTES
[facility] called back states that due to not being able to finish assessment they did not have a disposition at this time to call back when patient wakes up so they could finish his assessment.

## 2024-02-20 NOTE — ED NOTES
[facility] EMERGENCY DEPT  EMERGENCY DEPARTMENT ENCOUNTER      Pt Name: David Alfaro  MRN: [id number]  Birthdate 1965  Date of evaluation: 2/20/2024  Provider: Alberto Gaspar MD  1:11 AM    CHIEF COMPLAINT       Chief Complaint   Patient presents with    Mental Health Problem         HISTORY OF PRESENT ILLNESS    David Alfaro is a 58 y.o. male with history of hypertension, depression, substance use disorder, who presents to the emergency department with complaint of being locked out of his house and nowhere to sleep.  He told triage nurse that he would kill himself if we did not admit him.  He was not cooperative with further history.      Nursing Notes were reviewed.    REVIEW OF SYSTEMS       Review of Systems   Unable to perform ROS: Other (Uncooperative)       Except as noted above the remainder of the review of systems was reviewed and negative.       PAST MEDICAL HISTORY     Past Medical History:   Diagnosis Date    Anxiety     Arthritis     GERD (gastroesophageal reflux disease)     Hypercholesterolemia     Hypertension     Mild depressed bipolar 1 disorder (HCC) 10/13/2020    Myocardial infarction (HCC)     Obstructive sleep apnea 10/13/2020    Psychotic disorder (HCC)     Seizures (HCC)     Stroke (HCC)          SURGICAL HISTORY       Past Surgical History:   Procedure Laterality Date    [place] UNLISTED PROCEDURE ABDOMEN PERITONEUM & OMENTUM           CURRENT MEDICATIONS       Previous Medications    ALBUTEROL SULFATE HFA (PROVENTIL;VENTOLIN;PROAIR) 108 (90 BASE) MCG/ACT INHALER    ceived the following from [facility]: Outside name: albuterol (PROVENTIL HFA, VENTOLIN HFA, PROAIR HFA) 90 mcg/actuation inhaler    ASPIRIN 81 MG EC TABLET    Take 1 tablet by mouth daily    ATORVASTATIN (LIPITOR) 40 MG TABLET        ISOSORBIDE DINITRATE (ISORDIL) 5 MG TABLET    Take 1 tablet by mouth 3 times daily    MONTELUKAST (SINGULAIR) 10 MG TABLET    Take 1 tablet by mouth daily    QUETIAPINE (SEROQUEL)

## 2024-02-20 NOTE — BSMART NOTE
Writer spoke with patient's nurse, Rosemarie, in regards to patient's current presentation and completing assessment. Advised Rosemarie to call back for assessment to be completed once patient is awake.

## 2024-02-20 NOTE — BSMART NOTE
This writer attempted to assess pt via telehealth platform at [facility]. Pt presented as drowsy and irritable. Pt had difficulty focusing and answering questions when prompted. Pt became agitated and left the room stating \" I just want to go home\".     This writer called and spoke with Lety BUSTAMANTE, and informed her of pt leaving. Lety said they believe the pt may be under the influence and they may need to administer Narcan.

## 2024-02-20 NOTE — ED TRIAGE NOTES
Pt states, \"I am tired of people fucking with me.  I am pissed off to the limit tonight.  If y'all turn me away, I will kill myself.  I don't care if I have to take a bunch of pills or jump in the river.  I'm not stable no more, I don't have no remorse.  I need somebody to take a bullet and put me out my misery.\"  Pt does not have a specific plan.  Pt said that depression meds are not working, he has been taking 7 pills at a time.  Pt states he has always had problems and depression.

## 2024-02-20 NOTE — ED NOTES
Patient stormed out of room with PIV still in place and down hallway stumbling yelling that he was going to leave. Hospital security and nurses attempted to encourage patient back to room. Patient refused to go to room, PIV removed, then became combative and nurses and security assisted patient to the floor.     During incident 911 was called.     [facility] called back and states that patient became agitated and she was unable to finish assessment. States they would call back.

## 2024-02-20 NOTE — ED NOTES
Patient alert and oriented and able to ambulate on his own. Patient is now denying suicidal ideation. MD made aware and states that patient is voluntary and can leave if he chooses.

## 2024-03-15 ENCOUNTER — APPOINTMENT (OUTPATIENT)
Facility: HOSPITAL | Age: 59
DRG: 682 | End: 2024-03-15
Payer: MEDICARE

## 2024-03-15 ENCOUNTER — HOSPITAL ENCOUNTER (INPATIENT)
Facility: HOSPITAL | Age: 59
LOS: 3 days | Discharge: HOME HEALTH | DRG: 682 | End: 2024-03-19
Attending: EMERGENCY MEDICINE | Admitting: HOSPITALIST
Payer: MEDICARE

## 2024-03-15 DIAGNOSIS — R06.02 SHORTNESS OF BREATH: ICD-10-CM

## 2024-03-15 DIAGNOSIS — N17.9 AKI (ACUTE KIDNEY INJURY) (HCC): Primary | ICD-10-CM

## 2024-03-15 PROBLEM — R53.1 WEAKNESS: Status: ACTIVE | Noted: 2024-03-15

## 2024-03-15 PROBLEM — R41.0 CONFUSION: Status: ACTIVE | Noted: 2024-03-15

## 2024-03-15 PROBLEM — J44.9 COPD (CHRONIC OBSTRUCTIVE PULMONARY DISEASE) (HCC): Status: ACTIVE | Noted: 2024-03-15

## 2024-03-15 PROBLEM — R09.02 HYPOXIA: Status: ACTIVE | Noted: 2024-03-15

## 2024-03-15 PROBLEM — F19.10 DRUG ABUSE (HCC): Status: ACTIVE | Noted: 2024-03-15

## 2024-03-15 PROBLEM — I25.10 CAD (CORONARY ARTERY DISEASE): Status: ACTIVE | Noted: 2024-03-15

## 2024-03-15 PROBLEM — N40.1 BENIGN PROSTATIC HYPERPLASIA WITH LOWER URINARY TRACT SYMPTOMS: Status: ACTIVE | Noted: 2024-03-15

## 2024-03-15 PROBLEM — J18.9 PNEUMONIA OF LEFT UPPER LOBE DUE TO INFECTIOUS ORGANISM: Status: ACTIVE | Noted: 2024-03-15

## 2024-03-15 PROBLEM — R79.89 ELEVATED LFTS: Status: ACTIVE | Noted: 2024-03-15

## 2024-03-15 PROBLEM — I50.22 CHRONIC SYSTOLIC HEART FAILURE (HCC): Status: ACTIVE | Noted: 2024-03-15

## 2024-03-15 LAB
ALBUMIN SERPL-MCNC: 4.1 G/DL (ref 3.5–5)
ALBUMIN/GLOB SERPL: 1 (ref 1.1–2.2)
ALP SERPL-CCNC: 84 U/L (ref 45–117)
ALT SERPL-CCNC: 180 U/L (ref 12–78)
AMPHET UR QL SCN: NEGATIVE
ANION GAP SERPL CALC-SCNC: 14 MMOL/L (ref 5–15)
APPEARANCE UR: CLEAR
AST SERPL W P-5'-P-CCNC: 190 U/L (ref 15–37)
BACTERIA URNS QL MICRO: NEGATIVE /HPF
BARBITURATES UR QL SCN: NEGATIVE
BASOPHILS # BLD: 0 K/UL (ref 0–0.1)
BASOPHILS NFR BLD: 1 % (ref 0–1)
BENZODIAZ UR QL: NEGATIVE
BILIRUB SERPL-MCNC: 1.3 MG/DL (ref 0.2–1)
BILIRUB UR QL: NEGATIVE
BNP SERPL-MCNC: 813 PG/ML
BUN SERPL-MCNC: 46 MG/DL (ref 6–20)
BUN/CREAT SERPL: 19 (ref 12–20)
CA-I BLD-MCNC: 9.8 MG/DL (ref 8.5–10.1)
CANNABINOIDS UR QL SCN: NEGATIVE
CHLORIDE SERPL-SCNC: 98 MMOL/L (ref 97–108)
CK SERPL-CCNC: 1876 U/L (ref 39–308)
CK SERPL-CCNC: 3837 U/L (ref 39–308)
CO2 SERPL-SCNC: 23 MMOL/L (ref 21–32)
COCAINE UR QL SCN: POSITIVE
COLOR UR: ABNORMAL
CREAT SERPL-MCNC: 2.48 MG/DL (ref 0.7–1.3)
DIFFERENTIAL METHOD BLD: ABNORMAL
EKG ATRIAL RATE: 97 BPM
EKG DIAGNOSIS: NORMAL
EKG P AXIS: 53 DEGREES
EKG P-R INTERVAL: 141 MS
EKG Q-T INTERVAL: 318 MS
EKG QRS DURATION: 83 MS
EKG QTC CALCULATION (BAZETT): 404 MS
EKG R AXIS: 17 DEGREES
EKG T AXIS: 92 DEGREES
EKG VENTRICULAR RATE: 97 BPM
EOSINOPHIL # BLD: 0 K/UL (ref 0–0.4)
EOSINOPHIL NFR BLD: 0 % (ref 0–7)
ERYTHROCYTE [DISTWIDTH] IN BLOOD BY AUTOMATED COUNT: 13.2 % (ref 11.5–14.5)
ETHANOL SERPL-MCNC: <10 MG/DL (ref 0–0.08)
GLOBULIN SER CALC-MCNC: 4.3 G/DL (ref 2–4)
GLUCOSE BLD STRIP.AUTO-MCNC: 98 MG/DL (ref 65–100)
GLUCOSE SERPL-MCNC: 105 MG/DL (ref 65–100)
GLUCOSE UR STRIP.AUTO-MCNC: NEGATIVE MG/DL
HCT VFR BLD AUTO: 45.2 % (ref 36.6–50.3)
HGB BLD-MCNC: 16.3 G/DL (ref 12.1–17)
HGB UR QL STRIP: NEGATIVE
IMM GRANULOCYTES # BLD AUTO: 0 K/UL (ref 0–0.04)
IMM GRANULOCYTES NFR BLD AUTO: 1 % (ref 0–0.5)
KETONES UR QL STRIP.AUTO: 15 MG/DL
LACTATE SERPL-SCNC: 1.9 MMOL/L (ref 0.4–2)
LEUKOCYTE ESTERASE UR QL STRIP.AUTO: NEGATIVE
LYMPHOCYTES # BLD: 1.1 K/UL (ref 0.8–3.5)
LYMPHOCYTES NFR BLD: 19 % (ref 12–49)
Lab: ABNORMAL
MAGNESIUM SERPL-MCNC: 1.8 MG/DL (ref 1.6–2.4)
MCH RBC QN AUTO: 33.7 PG (ref 26–34)
MCHC RBC AUTO-ENTMCNC: 36.1 G/DL (ref 30–36.5)
MCV RBC AUTO: 93.4 FL (ref 80–99)
METHADONE UR QL: NEGATIVE
MONOCYTES # BLD: 0.6 K/UL (ref 0–1)
MONOCYTES NFR BLD: 9 % (ref 5–13)
NEUTS SEG # BLD: 4.3 K/UL (ref 1.8–8)
NEUTS SEG NFR BLD: 70 % (ref 32–75)
NITRITE UR QL STRIP.AUTO: NEGATIVE
NRBC # BLD: 0 K/UL (ref 0–0.01)
NRBC BLD-RTO: 0 PER 100 WBC
OPIATES UR QL: NEGATIVE
PCP UR QL: NEGATIVE
PERFORMED BY:: NORMAL
PH UR STRIP: 7 (ref 5–8)
PLATELET # BLD AUTO: 245 K/UL (ref 150–400)
PMV BLD AUTO: 9 FL (ref 8.9–12.9)
POTASSIUM SERPL-SCNC: 4 MMOL/L (ref 3.5–5.1)
PROCALCITONIN SERPL-MCNC: 0.09 NG/ML
PROT SERPL-MCNC: 8.4 G/DL (ref 6.4–8.2)
PROT UR STRIP-MCNC: NEGATIVE MG/DL
RBC # BLD AUTO: 4.84 M/UL (ref 4.1–5.7)
RBC #/AREA URNS HPF: ABNORMAL /HPF (ref 0–3)
SODIUM SERPL-SCNC: 135 MMOL/L (ref 136–145)
SP GR UR REFRACTOMETRY: 1.01 (ref 1–1.03)
TROPONIN I SERPL HS-MCNC: 18 NG/L (ref 0–76)
URINE CULTURE IF INDICATED: ABNORMAL
UROBILINOGEN UR QL STRIP.AUTO: 0.2 EU/DL (ref 0.2–1)
WBC # BLD AUTO: 6.1 K/UL (ref 4.1–11.1)
WBC URNS QL MICRO: ABNORMAL /HPF (ref 0–5)

## 2024-03-15 PROCEDURE — 2580000003 HC RX 258: Performed by: INTERNAL MEDICINE

## 2024-03-15 PROCEDURE — 93005 ELECTROCARDIOGRAM TRACING: CPT | Performed by: EMERGENCY MEDICINE

## 2024-03-15 PROCEDURE — 71275 CT ANGIOGRAPHY CHEST: CPT

## 2024-03-15 PROCEDURE — 83605 ASSAY OF LACTIC ACID: CPT

## 2024-03-15 PROCEDURE — 2580000003 HC RX 258: Performed by: HOSPITALIST

## 2024-03-15 PROCEDURE — 96365 THER/PROPH/DIAG IV INF INIT: CPT

## 2024-03-15 PROCEDURE — 81001 URINALYSIS AUTO W/SCOPE: CPT

## 2024-03-15 PROCEDURE — 83880 ASSAY OF NATRIURETIC PEPTIDE: CPT

## 2024-03-15 PROCEDURE — 80053 COMPREHEN METABOLIC PANEL: CPT

## 2024-03-15 PROCEDURE — 96366 THER/PROPH/DIAG IV INF ADDON: CPT

## 2024-03-15 PROCEDURE — 6360000002 HC RX W HCPCS: Performed by: EMERGENCY MEDICINE

## 2024-03-15 PROCEDURE — G0378 HOSPITAL OBSERVATION PER HR: HCPCS

## 2024-03-15 PROCEDURE — 87040 BLOOD CULTURE FOR BACTERIA: CPT

## 2024-03-15 PROCEDURE — 82077 ASSAY SPEC XCP UR&BREATH IA: CPT

## 2024-03-15 PROCEDURE — 2500000003 HC RX 250 WO HCPCS: Performed by: INTERNAL MEDICINE

## 2024-03-15 PROCEDURE — 6360000002 HC RX W HCPCS: Performed by: INTERNAL MEDICINE

## 2024-03-15 PROCEDURE — 96361 HYDRATE IV INFUSION ADD-ON: CPT

## 2024-03-15 PROCEDURE — 6370000000 HC RX 637 (ALT 250 FOR IP): Performed by: HOSPITALIST

## 2024-03-15 PROCEDURE — 99285 EMERGENCY DEPT VISIT HI MDM: CPT

## 2024-03-15 PROCEDURE — 82962 GLUCOSE BLOOD TEST: CPT

## 2024-03-15 PROCEDURE — 82550 ASSAY OF CK (CPK): CPT

## 2024-03-15 PROCEDURE — A4216 STERILE WATER/SALINE, 10 ML: HCPCS | Performed by: HOSPITALIST

## 2024-03-15 PROCEDURE — 80307 DRUG TEST PRSMV CHEM ANLYZR: CPT

## 2024-03-15 PROCEDURE — 6360000002 HC RX W HCPCS: Performed by: HOSPITALIST

## 2024-03-15 PROCEDURE — 6370000000 HC RX 637 (ALT 250 FOR IP): Performed by: INTERNAL MEDICINE

## 2024-03-15 PROCEDURE — 96367 TX/PROPH/DG ADDL SEQ IV INF: CPT

## 2024-03-15 PROCEDURE — 85025 COMPLETE CBC W/AUTO DIFF WBC: CPT

## 2024-03-15 PROCEDURE — 2580000003 HC RX 258: Performed by: EMERGENCY MEDICINE

## 2024-03-15 PROCEDURE — 74177 CT ABD & PELVIS W/CONTRAST: CPT

## 2024-03-15 PROCEDURE — 80061 LIPID PANEL: CPT

## 2024-03-15 PROCEDURE — 6360000004 HC RX CONTRAST MEDICATION: Performed by: EMERGENCY MEDICINE

## 2024-03-15 PROCEDURE — 83735 ASSAY OF MAGNESIUM: CPT

## 2024-03-15 PROCEDURE — 84484 ASSAY OF TROPONIN QUANT: CPT

## 2024-03-15 PROCEDURE — 71045 X-RAY EXAM CHEST 1 VIEW: CPT

## 2024-03-15 PROCEDURE — C9113 INJ PANTOPRAZOLE SODIUM, VIA: HCPCS | Performed by: HOSPITALIST

## 2024-03-15 PROCEDURE — 84145 PROCALCITONIN (PCT): CPT

## 2024-03-15 RX ORDER — POLYETHYLENE GLYCOL 3350 17 G/17G
17 POWDER, FOR SOLUTION ORAL DAILY PRN
Status: DISCONTINUED | OUTPATIENT
Start: 2024-03-15 | End: 2024-03-19 | Stop reason: HOSPADM

## 2024-03-15 RX ORDER — AMLODIPINE BESYLATE 10 MG/1
10 TABLET ORAL DAILY
Status: DISCONTINUED | OUTPATIENT
Start: 2024-03-16 | End: 2024-03-15

## 2024-03-15 RX ORDER — VANCOMYCIN 2 G/400ML
2000 INJECTION, SOLUTION INTRAVENOUS ONCE
Status: COMPLETED | OUTPATIENT
Start: 2024-03-15 | End: 2024-03-15

## 2024-03-15 RX ORDER — CYCLOBENZAPRINE HCL 10 MG
10 TABLET ORAL 2 TIMES DAILY PRN
Status: DISCONTINUED | OUTPATIENT
Start: 2024-03-15 | End: 2024-03-19 | Stop reason: HOSPADM

## 2024-03-15 RX ORDER — MONTELUKAST SODIUM 10 MG/1
10 TABLET ORAL NIGHTLY
Status: DISCONTINUED | OUTPATIENT
Start: 2024-03-15 | End: 2024-03-15

## 2024-03-15 RX ORDER — SODIUM CHLORIDE 9 MG/ML
INJECTION, SOLUTION INTRAVENOUS PRN
Status: DISCONTINUED | OUTPATIENT
Start: 2024-03-15 | End: 2024-03-19 | Stop reason: HOSPADM

## 2024-03-15 RX ORDER — AMLODIPINE BESYLATE 2.5 MG/1
2.5 TABLET ORAL DAILY
Status: DISCONTINUED | OUTPATIENT
Start: 2024-03-16 | End: 2024-03-18

## 2024-03-15 RX ORDER — DOXYCYCLINE 100 MG/1
100 CAPSULE ORAL EVERY 12 HOURS SCHEDULED
Status: DISCONTINUED | OUTPATIENT
Start: 2024-03-15 | End: 2024-03-15

## 2024-03-15 RX ORDER — TAMSULOSIN HYDROCHLORIDE 0.4 MG/1
0.4 CAPSULE ORAL DAILY
Status: DISCONTINUED | OUTPATIENT
Start: 2024-03-16 | End: 2024-03-19 | Stop reason: HOSPADM

## 2024-03-15 RX ORDER — ACETAMINOPHEN 325 MG/1
650 TABLET ORAL EVERY 6 HOURS PRN
Status: DISCONTINUED | OUTPATIENT
Start: 2024-03-15 | End: 2024-03-19 | Stop reason: HOSPADM

## 2024-03-15 RX ORDER — ASPIRIN 81 MG/1
81 TABLET ORAL DAILY
Status: DISCONTINUED | OUTPATIENT
Start: 2024-03-16 | End: 2024-03-19 | Stop reason: HOSPADM

## 2024-03-15 RX ORDER — ONDANSETRON 2 MG/ML
4 INJECTION INTRAMUSCULAR; INTRAVENOUS EVERY 6 HOURS PRN
Status: DISCONTINUED | OUTPATIENT
Start: 2024-03-15 | End: 2024-03-19 | Stop reason: HOSPADM

## 2024-03-15 RX ORDER — MAGNESIUM SULFATE IN WATER 40 MG/ML
2000 INJECTION, SOLUTION INTRAVENOUS PRN
Status: DISCONTINUED | OUTPATIENT
Start: 2024-03-15 | End: 2024-03-19 | Stop reason: HOSPADM

## 2024-03-15 RX ORDER — SODIUM CHLORIDE 0.9 % (FLUSH) 0.9 %
5-40 SYRINGE (ML) INJECTION EVERY 12 HOURS SCHEDULED
Status: DISCONTINUED | OUTPATIENT
Start: 2024-03-15 | End: 2024-03-19 | Stop reason: HOSPADM

## 2024-03-15 RX ORDER — POTASSIUM CHLORIDE 20 MEQ/1
40 TABLET, EXTENDED RELEASE ORAL PRN
Status: DISCONTINUED | OUTPATIENT
Start: 2024-03-15 | End: 2024-03-19 | Stop reason: HOSPADM

## 2024-03-15 RX ORDER — HEPARIN SODIUM 5000 [USP'U]/ML
5000 INJECTION, SOLUTION INTRAVENOUS; SUBCUTANEOUS EVERY 8 HOURS SCHEDULED
Status: DISCONTINUED | OUTPATIENT
Start: 2024-03-15 | End: 2024-03-18

## 2024-03-15 RX ORDER — POLYETHYLENE GLYCOL 3350 17 G/17G
17 POWDER, FOR SOLUTION ORAL 2 TIMES DAILY
Status: DISCONTINUED | OUTPATIENT
Start: 2024-03-15 | End: 2024-03-19 | Stop reason: HOSPADM

## 2024-03-15 RX ORDER — 0.9 % SODIUM CHLORIDE 0.9 %
1000 INTRAVENOUS SOLUTION INTRAVENOUS ONCE
Status: COMPLETED | OUTPATIENT
Start: 2024-03-15 | End: 2024-03-15

## 2024-03-15 RX ORDER — IPRATROPIUM BROMIDE AND ALBUTEROL SULFATE 2.5; .5 MG/3ML; MG/3ML
1 SOLUTION RESPIRATORY (INHALATION)
Status: DISCONTINUED | OUTPATIENT
Start: 2024-03-16 | End: 2024-03-16

## 2024-03-15 RX ORDER — ACETAMINOPHEN 650 MG/1
650 SUPPOSITORY RECTAL EVERY 6 HOURS PRN
Status: DISCONTINUED | OUTPATIENT
Start: 2024-03-15 | End: 2024-03-19 | Stop reason: HOSPADM

## 2024-03-15 RX ORDER — POTASSIUM CHLORIDE 7.45 MG/ML
10 INJECTION INTRAVENOUS PRN
Status: DISCONTINUED | OUTPATIENT
Start: 2024-03-15 | End: 2024-03-19 | Stop reason: HOSPADM

## 2024-03-15 RX ORDER — MONTELUKAST SODIUM 10 MG/1
10 TABLET ORAL DAILY
Status: DISCONTINUED | OUTPATIENT
Start: 2024-03-16 | End: 2024-03-19 | Stop reason: HOSPADM

## 2024-03-15 RX ORDER — ENOXAPARIN SODIUM 100 MG/ML
40 INJECTION SUBCUTANEOUS DAILY
Status: DISCONTINUED | OUTPATIENT
Start: 2024-03-15 | End: 2024-03-15

## 2024-03-15 RX ORDER — ISOSORBIDE DINITRATE 10 MG/1
5 TABLET ORAL 3 TIMES DAILY
Status: DISCONTINUED | OUTPATIENT
Start: 2024-03-15 | End: 2024-03-15

## 2024-03-15 RX ORDER — 0.9 % SODIUM CHLORIDE 0.9 %
30 INTRAVENOUS SOLUTION INTRAVENOUS ONCE
Status: COMPLETED | OUTPATIENT
Start: 2024-03-15 | End: 2024-03-15

## 2024-03-15 RX ORDER — DOCUSATE SODIUM 100 MG/1
100 CAPSULE, LIQUID FILLED ORAL 2 TIMES DAILY
Status: DISCONTINUED | OUTPATIENT
Start: 2024-03-15 | End: 2024-03-19 | Stop reason: HOSPADM

## 2024-03-15 RX ORDER — PANTOPRAZOLE SODIUM 40 MG/1
40 TABLET, DELAYED RELEASE ORAL
Status: DISCONTINUED | OUTPATIENT
Start: 2024-03-16 | End: 2024-03-15

## 2024-03-15 RX ORDER — SODIUM CHLORIDE 0.9 % (FLUSH) 0.9 %
5-40 SYRINGE (ML) INJECTION PRN
Status: DISCONTINUED | OUTPATIENT
Start: 2024-03-15 | End: 2024-03-19 | Stop reason: HOSPADM

## 2024-03-15 RX ORDER — METRONIDAZOLE 500 MG/100ML
500 INJECTION, SOLUTION INTRAVENOUS
Status: COMPLETED | OUTPATIENT
Start: 2024-03-15 | End: 2024-03-15

## 2024-03-15 RX ORDER — RISPERIDONE 1 MG/1
2 TABLET ORAL 2 TIMES DAILY
Status: DISCONTINUED | OUTPATIENT
Start: 2024-03-15 | End: 2024-03-19 | Stop reason: HOSPADM

## 2024-03-15 RX ORDER — DEXTROSE AND SODIUM CHLORIDE 5; .9 G/100ML; G/100ML
INJECTION, SOLUTION INTRAVENOUS CONTINUOUS
Status: DISCONTINUED | OUTPATIENT
Start: 2024-03-15 | End: 2024-03-17

## 2024-03-15 RX ORDER — ONDANSETRON 4 MG/1
4 TABLET, ORALLY DISINTEGRATING ORAL EVERY 8 HOURS PRN
Status: DISCONTINUED | OUTPATIENT
Start: 2024-03-15 | End: 2024-03-19 | Stop reason: HOSPADM

## 2024-03-15 RX ADMIN — WATER 1000 MG: 1 INJECTION INTRAMUSCULAR; INTRAVENOUS; SUBCUTANEOUS at 22:37

## 2024-03-15 RX ADMIN — RISPERIDONE 2 MG: 1 TABLET, FILM COATED ORAL at 22:37

## 2024-03-15 RX ADMIN — DOXYCYCLINE 100 MG: 100 INJECTION, POWDER, LYOPHILIZED, FOR SOLUTION INTRAVENOUS at 22:37

## 2024-03-15 RX ADMIN — DOCUSATE SODIUM 100 MG: 100 CAPSULE, LIQUID FILLED ORAL at 22:37

## 2024-03-15 RX ADMIN — METOPROLOL TARTRATE 25 MG: 25 TABLET, FILM COATED ORAL at 22:37

## 2024-03-15 RX ADMIN — ISOSORBIDE DINITRATE 5 MG: 10 TABLET ORAL at 18:37

## 2024-03-15 RX ADMIN — POLYETHYLENE GLYCOL 3350 17 G: 17 POWDER, FOR SOLUTION ORAL at 22:38

## 2024-03-15 RX ADMIN — VANCOMYCIN 2000 MG: 2 INJECTION, SOLUTION INTRAVENOUS at 13:03

## 2024-03-15 RX ADMIN — CEFEPIME 2000 MG: 2 INJECTION, POWDER, FOR SOLUTION INTRAVENOUS at 11:52

## 2024-03-15 RX ADMIN — HEPARIN SODIUM 5000 UNITS: 5000 INJECTION INTRAVENOUS; SUBCUTANEOUS at 22:57

## 2024-03-15 RX ADMIN — DEXTROSE AND SODIUM CHLORIDE: 5; 900 INJECTION, SOLUTION INTRAVENOUS at 18:37

## 2024-03-15 RX ADMIN — IOPAMIDOL 100 ML: 755 INJECTION, SOLUTION INTRAVENOUS at 13:48

## 2024-03-15 RX ADMIN — HYDROMORPHONE HYDROCHLORIDE 0.5 MG: 1 INJECTION, SOLUTION INTRAMUSCULAR; INTRAVENOUS; SUBCUTANEOUS at 19:43

## 2024-03-15 RX ADMIN — PANTOPRAZOLE SODIUM 40 MG: 40 INJECTION, POWDER, FOR SOLUTION INTRAVENOUS at 18:37

## 2024-03-15 RX ADMIN — SODIUM CHLORIDE 1000 ML: 9 INJECTION, SOLUTION INTRAVENOUS at 11:12

## 2024-03-15 RX ADMIN — SODIUM CHLORIDE 2409 ML: 9 INJECTION, SOLUTION INTRAVENOUS at 11:59

## 2024-03-15 RX ADMIN — HYDROMORPHONE HYDROCHLORIDE 0.5 MG: 1 INJECTION, SOLUTION INTRAMUSCULAR; INTRAVENOUS; SUBCUTANEOUS at 23:47

## 2024-03-15 RX ADMIN — SODIUM CHLORIDE, PRESERVATIVE FREE 10 ML: 5 INJECTION INTRAVENOUS at 22:38

## 2024-03-15 RX ADMIN — METRONIDAZOLE 500 MG: 500 INJECTION, SOLUTION INTRAVENOUS at 11:52

## 2024-03-15 ASSESSMENT — PAIN DESCRIPTION - ORIENTATION
ORIENTATION: LEFT
ORIENTATION: MID

## 2024-03-15 ASSESSMENT — PAIN DESCRIPTION - DESCRIPTORS
DESCRIPTORS: DISCOMFORT
DESCRIPTORS: DISCOMFORT

## 2024-03-15 ASSESSMENT — PAIN SCALES - GENERAL
PAINLEVEL_OUTOF10: 8
PAINLEVEL_OUTOF10: 10
PAINLEVEL_OUTOF10: 9
PAINLEVEL_OUTOF10: 8

## 2024-03-15 ASSESSMENT — PAIN DESCRIPTION - LOCATION
LOCATION: ABDOMEN

## 2024-03-15 ASSESSMENT — PAIN SCALES - WONG BAKER
WONGBAKER_NUMERICALRESPONSE: NO HURT
WONGBAKER_NUMERICALRESPONSE: NO HURT

## 2024-03-15 NOTE — ED NOTES
Septic workup started at this time. Pt POC glucose readings in 90s x2 times with readings 400s and 500s per EMS. MD aware.

## 2024-03-15 NOTE — ED PROVIDER NOTES
EMERGENCY DEPARTMENT HISTORY AND PHYSICAL EXAM      Date: 3/15/2024  Patient Name: David Alfaro      History of Presenting Illness     Chief Complaint   Patient presents with    Hyperglycemia    Hernia       History Provided By: EMS    HPI: David Alfaro, 58 y.o. male with a past medical history significant for CVA, bipolar presents to the ED with cc of hyperglycemia, SOB. Bystanders called for pt's SOB. Initially refused transport.  w/EMS, was saturating 98% but upon entering ambulance desatted to 70s so placed on 4LNC. Pt in significant respiratory distress unable to get history at this time.    There are no other complaints, changes, or physical findings at this time.    PCP: Cristobal Fraga Sr., MD    Current Facility-Administered Medications   Medication Dose Route Frequency Provider Last Rate Last Admin    amLODIPine (NORVASC) tablet 5 mg  5 mg Oral Daily Davi Ruth MD        carvedilol (COREG) tablet 3.125 mg  3.125 mg Oral BID WC Davi Ruth MD   3.125 mg at 03/19/24 0748    venlafaxine (EFFEXOR XR) extended release capsule 150 mg  150 mg Oral Daily Davi Ruth MD   150 mg at 03/18/24 0815    venlafaxine (EFFEXOR XR) extended release capsule 75 mg  75 mg Oral Daily Davi Ruth MD   75 mg at 03/18/24 0814    senna (SENOKOT) tablet 8.6 mg  1 tablet Oral Nightly Davi Ruth MD   8.6 mg at 03/18/24 2132    lactulose (CHRONULAC) 10 GM/15ML solution 20 g  20 g Oral BID Davi Ruth MD   20 g at 03/18/24 2132    simethicone (MYLICON) chewable tablet 80 mg  80 mg Oral Q6H PRN Davi Ruth MD        pantoprazole (PROTONIX) tablet 40 mg  40 mg Oral QAM AC Davi Ruth MD   40 mg at 03/19/24 0748    ipratropium 0.5 mg-albuterol 2.5 mg (DUONEB) nebulizer solution 1 Dose  1 Dose Inhalation Q4H PRN Davi Ruth MD   1 Dose at 03/17/24 0729    magnesium oxide (MAG-OX) tablet 400 mg  400 mg Oral Daily Davi Ruth MD   400 mg at

## 2024-03-16 LAB
ALBUMIN SERPL-MCNC: 2.9 G/DL (ref 3.5–5)
ALBUMIN/GLOB SERPL: 0.9 (ref 1.1–2.2)
ALP SERPL-CCNC: 55 U/L (ref 45–117)
ALT SERPL-CCNC: 107 U/L (ref 12–78)
ANION GAP SERPL CALC-SCNC: 10 MMOL/L (ref 5–15)
AST SERPL W P-5'-P-CCNC: 84 U/L (ref 15–37)
BASOPHILS # BLD: 0 K/UL (ref 0–0.1)
BASOPHILS NFR BLD: 1 % (ref 0–1)
BILIRUB SERPL-MCNC: 0.7 MG/DL (ref 0.2–1)
BUN SERPL-MCNC: 22 MG/DL (ref 6–20)
BUN/CREAT SERPL: 17 (ref 12–20)
CA-I BLD-MCNC: 8.3 MG/DL (ref 8.5–10.1)
CHLORIDE SERPL-SCNC: 103 MMOL/L (ref 97–108)
CHOLEST SERPL-MCNC: 112 MG/DL
CK SERPL-CCNC: 1336 U/L (ref 39–308)
CK SERPL-CCNC: 1509 U/L (ref 39–308)
CO2 SERPL-SCNC: 26 MMOL/L (ref 21–32)
CREAT SERPL-MCNC: 1.31 MG/DL (ref 0.7–1.3)
DIFFERENTIAL METHOD BLD: ABNORMAL
EOSINOPHIL # BLD: 0.1 K/UL (ref 0–0.4)
EOSINOPHIL NFR BLD: 2 % (ref 0–7)
ERYTHROCYTE [DISTWIDTH] IN BLOOD BY AUTOMATED COUNT: 13.4 % (ref 11.5–14.5)
GLOBULIN SER CALC-MCNC: 3.4 G/DL (ref 2–4)
GLUCOSE SERPL-MCNC: 136 MG/DL (ref 65–100)
HCT VFR BLD AUTO: 38.8 % (ref 36.6–50.3)
HDLC SERPL-MCNC: 59 MG/DL
HDLC SERPL: 1.9 (ref 0–5)
HGB BLD-MCNC: 14 G/DL (ref 12.1–17)
IMM GRANULOCYTES # BLD AUTO: 0 K/UL (ref 0–0.04)
IMM GRANULOCYTES NFR BLD AUTO: 2 % (ref 0–0.5)
LDLC SERPL CALC-MCNC: 41.6 MG/DL (ref 0–100)
LIPID PANEL: NORMAL
LYMPHOCYTES # BLD: 0.8 K/UL (ref 0.8–3.5)
LYMPHOCYTES NFR BLD: 31 % (ref 12–49)
MCH RBC QN AUTO: 33.8 PG (ref 26–34)
MCHC RBC AUTO-ENTMCNC: 34.8 G/DL (ref 30–36.5)
MCV RBC AUTO: 97.2 FL (ref 80–99)
MONOCYTES # BLD: 0.6 K/UL (ref 0–1)
MONOCYTES NFR BLD: 24 % (ref 5–13)
NEUTS SEG # BLD: 1.1 K/UL (ref 1.8–8)
NEUTS SEG NFR BLD: 40 % (ref 32–75)
NRBC # BLD: 0 K/UL (ref 0–0.01)
NRBC BLD-RTO: 0 PER 100 WBC
PLATELET # BLD AUTO: 155 K/UL (ref 150–400)
PMV BLD AUTO: 9.4 FL (ref 8.9–12.9)
POTASSIUM SERPL-SCNC: 3.8 MMOL/L (ref 3.5–5.1)
PROT SERPL-MCNC: 6.3 G/DL (ref 6.4–8.2)
RBC # BLD AUTO: 3.99 M/UL (ref 4.1–5.7)
SODIUM SERPL-SCNC: 139 MMOL/L (ref 136–145)
TRIGL SERPL-MCNC: 57 MG/DL
VLDLC SERPL CALC-MCNC: 11.4 MG/DL
WBC # BLD AUTO: 2.6 K/UL (ref 4.1–11.1)

## 2024-03-16 PROCEDURE — 6370000000 HC RX 637 (ALT 250 FOR IP): Performed by: HOSPITALIST

## 2024-03-16 PROCEDURE — 6370000000 HC RX 637 (ALT 250 FOR IP): Performed by: INTERNAL MEDICINE

## 2024-03-16 PROCEDURE — 51798 US URINE CAPACITY MEASURE: CPT

## 2024-03-16 PROCEDURE — 85025 COMPLETE CBC W/AUTO DIFF WBC: CPT

## 2024-03-16 PROCEDURE — 2500000003 HC RX 250 WO HCPCS: Performed by: INTERNAL MEDICINE

## 2024-03-16 PROCEDURE — 2580000003 HC RX 258: Performed by: INTERNAL MEDICINE

## 2024-03-16 PROCEDURE — 82550 ASSAY OF CK (CPK): CPT

## 2024-03-16 PROCEDURE — 94640 AIRWAY INHALATION TREATMENT: CPT

## 2024-03-16 PROCEDURE — 6360000002 HC RX W HCPCS: Performed by: INTERNAL MEDICINE

## 2024-03-16 PROCEDURE — C9113 INJ PANTOPRAZOLE SODIUM, VIA: HCPCS | Performed by: HOSPITALIST

## 2024-03-16 PROCEDURE — 80053 COMPREHEN METABOLIC PANEL: CPT

## 2024-03-16 PROCEDURE — 1100000000 HC RM PRIVATE

## 2024-03-16 PROCEDURE — 36415 COLL VENOUS BLD VENIPUNCTURE: CPT

## 2024-03-16 PROCEDURE — G0378 HOSPITAL OBSERVATION PER HR: HCPCS

## 2024-03-16 PROCEDURE — A4216 STERILE WATER/SALINE, 10 ML: HCPCS | Performed by: HOSPITALIST

## 2024-03-16 PROCEDURE — 2580000003 HC RX 258: Performed by: HOSPITALIST

## 2024-03-16 PROCEDURE — 6360000002 HC RX W HCPCS: Performed by: HOSPITALIST

## 2024-03-16 RX ORDER — SENNOSIDES A AND B 8.6 MG/1
1 TABLET, FILM COATED ORAL NIGHTLY
Status: DISCONTINUED | OUTPATIENT
Start: 2024-03-16 | End: 2024-03-19 | Stop reason: HOSPADM

## 2024-03-16 RX ORDER — CARVEDILOL 3.12 MG/1
6.25 TABLET ORAL 2 TIMES DAILY WITH MEALS
Status: DISCONTINUED | OUTPATIENT
Start: 2024-03-16 | End: 2024-03-17

## 2024-03-16 RX ORDER — PANTOPRAZOLE SODIUM 40 MG/1
40 TABLET, DELAYED RELEASE ORAL
Status: DISCONTINUED | OUTPATIENT
Start: 2024-03-17 | End: 2024-03-19 | Stop reason: HOSPADM

## 2024-03-16 RX ORDER — VENLAFAXINE HYDROCHLORIDE 150 MG/1
150 CAPSULE, EXTENDED RELEASE ORAL DAILY
COMMUNITY

## 2024-03-16 RX ORDER — MAGNESIUM HYDROXIDE/ALUMINUM HYDROXICE/SIMETHICONE 120; 1200; 1200 MG/30ML; MG/30ML; MG/30ML
30 SUSPENSION ORAL
Status: DISPENSED | OUTPATIENT
Start: 2024-03-16 | End: 2024-03-17

## 2024-03-16 RX ORDER — VENLAFAXINE HYDROCHLORIDE 75 MG/1
75 CAPSULE, EXTENDED RELEASE ORAL DAILY
COMMUNITY

## 2024-03-16 RX ORDER — CYCLOBENZAPRINE HCL 10 MG
10 TABLET ORAL 2 TIMES DAILY
COMMUNITY

## 2024-03-16 RX ORDER — SIMETHICONE 80 MG
80 TABLET,CHEWABLE ORAL EVERY 6 HOURS PRN
Status: DISCONTINUED | OUTPATIENT
Start: 2024-03-16 | End: 2024-03-19 | Stop reason: HOSPADM

## 2024-03-16 RX ORDER — VENLAFAXINE HYDROCHLORIDE 75 MG/1
75 CAPSULE, EXTENDED RELEASE ORAL DAILY
Status: DISCONTINUED | OUTPATIENT
Start: 2024-03-16 | End: 2024-03-19 | Stop reason: HOSPADM

## 2024-03-16 RX ORDER — VENLAFAXINE HYDROCHLORIDE 150 MG/1
150 CAPSULE, EXTENDED RELEASE ORAL DAILY
Status: DISCONTINUED | OUTPATIENT
Start: 2024-03-16 | End: 2024-03-19 | Stop reason: HOSPADM

## 2024-03-16 RX ORDER — IPRATROPIUM BROMIDE AND ALBUTEROL SULFATE 2.5; .5 MG/3ML; MG/3ML
1 SOLUTION RESPIRATORY (INHALATION) EVERY 4 HOURS PRN
Status: DISCONTINUED | OUTPATIENT
Start: 2024-03-16 | End: 2024-03-19 | Stop reason: HOSPADM

## 2024-03-16 RX ORDER — LANOLIN ALCOHOL/MO/W.PET/CERES
400 CREAM (GRAM) TOPICAL DAILY
Status: DISCONTINUED | OUTPATIENT
Start: 2024-03-16 | End: 2024-03-19 | Stop reason: HOSPADM

## 2024-03-16 RX ORDER — LACTULOSE 10 G/15ML
20 SOLUTION ORAL 2 TIMES DAILY
Status: DISCONTINUED | OUTPATIENT
Start: 2024-03-16 | End: 2024-03-19 | Stop reason: HOSPADM

## 2024-03-16 RX ADMIN — TAMSULOSIN HYDROCHLORIDE 0.4 MG: 0.4 CAPSULE ORAL at 10:22

## 2024-03-16 RX ADMIN — WATER 1000 MG: 1 INJECTION INTRAMUSCULAR; INTRAVENOUS; SUBCUTANEOUS at 21:04

## 2024-03-16 RX ADMIN — ALUMINUM HYDROXIDE, MAGNESIUM HYDROXIDE, AND SIMETHICONE 30 ML: 1200; 120; 1200 SUSPENSION ORAL at 17:13

## 2024-03-16 RX ADMIN — DOCUSATE SODIUM 100 MG: 100 CAPSULE, LIQUID FILLED ORAL at 10:24

## 2024-03-16 RX ADMIN — HEPARIN SODIUM 5000 UNITS: 5000 INJECTION INTRAVENOUS; SUBCUTANEOUS at 21:04

## 2024-03-16 RX ADMIN — DOXYCYCLINE 100 MG: 100 INJECTION, POWDER, LYOPHILIZED, FOR SOLUTION INTRAVENOUS at 10:20

## 2024-03-16 RX ADMIN — SODIUM CHLORIDE, PRESERVATIVE FREE 10 ML: 5 INJECTION INTRAVENOUS at 08:07

## 2024-03-16 RX ADMIN — ACETAMINOPHEN 650 MG: 325 TABLET ORAL at 10:23

## 2024-03-16 RX ADMIN — DOCUSATE SODIUM 100 MG: 100 CAPSULE, LIQUID FILLED ORAL at 21:04

## 2024-03-16 RX ADMIN — DEXTROSE AND SODIUM CHLORIDE: 5; 900 INJECTION, SOLUTION INTRAVENOUS at 05:41

## 2024-03-16 RX ADMIN — Medication 400 MG: at 11:40

## 2024-03-16 RX ADMIN — AMLODIPINE BESYLATE 2.5 MG: 2.5 TABLET ORAL at 10:22

## 2024-03-16 RX ADMIN — RISPERIDONE 2 MG: 1 TABLET, FILM COATED ORAL at 21:04

## 2024-03-16 RX ADMIN — DOXYCYCLINE 100 MG: 100 INJECTION, POWDER, LYOPHILIZED, FOR SOLUTION INTRAVENOUS at 21:47

## 2024-03-16 RX ADMIN — ASPIRIN 81 MG: 81 TABLET, COATED ORAL at 10:22

## 2024-03-16 RX ADMIN — HEPARIN SODIUM 5000 UNITS: 5000 INJECTION INTRAVENOUS; SUBCUTANEOUS at 08:06

## 2024-03-16 RX ADMIN — METOPROLOL TARTRATE 25 MG: 25 TABLET, FILM COATED ORAL at 10:23

## 2024-03-16 RX ADMIN — LACTULOSE 20 G: 20 SOLUTION ORAL at 11:39

## 2024-03-16 RX ADMIN — SENNOSIDES 8.6 MG: 8.6 TABLET, FILM COATED ORAL at 21:04

## 2024-03-16 RX ADMIN — CARVEDILOL 6.25 MG: 3.12 TABLET, FILM COATED ORAL at 17:13

## 2024-03-16 RX ADMIN — VENLAFAXINE HYDROCHLORIDE 150 MG: 150 CAPSULE, EXTENDED RELEASE ORAL at 10:22

## 2024-03-16 RX ADMIN — IPRATROPIUM BROMIDE AND ALBUTEROL SULFATE 1 DOSE: .5; 3 SOLUTION RESPIRATORY (INHALATION) at 07:23

## 2024-03-16 RX ADMIN — POLYETHYLENE GLYCOL 3350 17 G: 17 POWDER, FOR SOLUTION ORAL at 10:20

## 2024-03-16 RX ADMIN — IPRATROPIUM BROMIDE AND ALBUTEROL SULFATE 1 DOSE: 2.5; .5 SOLUTION RESPIRATORY (INHALATION) at 22:07

## 2024-03-16 RX ADMIN — POLYETHYLENE GLYCOL 3350 17 G: 17 POWDER, FOR SOLUTION ORAL at 21:04

## 2024-03-16 RX ADMIN — HEPARIN SODIUM 5000 UNITS: 5000 INJECTION INTRAVENOUS; SUBCUTANEOUS at 14:28

## 2024-03-16 RX ADMIN — PANTOPRAZOLE SODIUM 40 MG: 40 INJECTION, POWDER, FOR SOLUTION INTRAVENOUS at 08:06

## 2024-03-16 RX ADMIN — LACTULOSE 20 G: 20 SOLUTION ORAL at 21:05

## 2024-03-16 RX ADMIN — ALUMINUM HYDROXIDE, MAGNESIUM HYDROXIDE, AND SIMETHICONE 30 ML: 1200; 120; 1200 SUSPENSION ORAL at 11:39

## 2024-03-16 RX ADMIN — MONTELUKAST 10 MG: 10 TABLET, FILM COATED ORAL at 10:22

## 2024-03-16 RX ADMIN — VENLAFAXINE HYDROCHLORIDE 75 MG: 75 CAPSULE, EXTENDED RELEASE ORAL at 10:22

## 2024-03-16 RX ADMIN — RISPERIDONE 2 MG: 1 TABLET, FILM COATED ORAL at 10:23

## 2024-03-16 RX ADMIN — SODIUM CHLORIDE, PRESERVATIVE FREE 10 ML: 5 INJECTION INTRAVENOUS at 21:05

## 2024-03-16 ASSESSMENT — PAIN DESCRIPTION - ORIENTATION
ORIENTATION: LOWER
ORIENTATION: MID;LOWER

## 2024-03-16 ASSESSMENT — PAIN SCALES - GENERAL
PAINLEVEL_OUTOF10: 0
PAINLEVEL_OUTOF10: 4
PAINLEVEL_OUTOF10: 0
PAINLEVEL_OUTOF10: 0
PAINLEVEL_OUTOF10: 4
PAINLEVEL_OUTOF10: 0

## 2024-03-16 ASSESSMENT — PAIN DESCRIPTION - DESCRIPTORS
DESCRIPTORS: DISCOMFORT;ACHING
DESCRIPTORS: ACHING;BURNING;DISCOMFORT

## 2024-03-16 ASSESSMENT — PAIN DESCRIPTION - FREQUENCY: FREQUENCY: INTERMITTENT

## 2024-03-16 ASSESSMENT — PAIN DESCRIPTION - LOCATION
LOCATION: ABDOMEN
LOCATION: ABDOMEN

## 2024-03-16 ASSESSMENT — PAIN DESCRIPTION - PAIN TYPE: TYPE: ACUTE PAIN

## 2024-03-16 ASSESSMENT — PAIN DESCRIPTION - ONSET: ONSET: GRADUAL

## 2024-03-16 NOTE — ASSESSMENT & PLAN NOTE
Patient has mild elevated liver function tests with  and .  Medical records reviewed and do not show previous elevated liver function tests.    CT of the abdomen does not show biliary duct dilatation.  This is likely due to muscle injury from rhabdomyolysis, but can be due to acute infectious illness or medication effect.    -Will provide supportive care.  -Obtain abdominal ultrasound.  -Repeat test in am.

## 2024-03-16 NOTE — ASSESSMENT & PLAN NOTE
Blood pressure is mildly elevated.  In light of history of underlying coronary artery disease, beta-blockers will be utilized.

## 2024-03-16 NOTE — ASSESSMENT & PLAN NOTE
Patient reports progressive weakness.  This is likely due to acute infectious process and dehydration.      -Treat underlying problem  - PT/OT

## 2024-03-16 NOTE — ASSESSMENT & PLAN NOTE
Patient reports history of myocardial infarction and bypass surgery.  Patient is a poor historian and does not describe concerning chest pain.  EKG does not show acute ischemic changes.    Patient is noncompliant with medications.  Medical records reviewed and noted heart cath from 2017 showing no obstruction in the coronary arteries.  Will continue with medical management  - Aspirin  - Beta blockers.  - Statin

## 2024-03-16 NOTE — ASSESSMENT & PLAN NOTE
Echo from 2020 shows ejection fraction of 35%, echo from 2021 shows ejection fraction 55%.    Patient will benefit from treatment with ACE inhibitors and blockers.  Patient agrees cannot be used due to acute kidney injury.    Patient at this time is volume depleted but will watch closely as patient is at risk for volume overload.

## 2024-03-16 NOTE — ASSESSMENT & PLAN NOTE
It was reported by EMS that patient oxygen saturation initially was 98% and then 70% and required 4l nasal cannula support.  At this time patient saturation is 94% on room air.    Hypoxia has resolved, patient be closely monitored for recurrence.

## 2024-03-16 NOTE — ASSESSMENT & PLAN NOTE
Patient told me that he is no longer using cocaine.  However urine drug screen was positive for cocaine.   will be consulted for patient for rehab.

## 2024-03-16 NOTE — H&P
V2.0  History and Physical      Name:  David Alfaro /Age/Sex: 1965  (58 y.o. male)   MRN & CSN:  [id number] & [id number] Encounter Date/Time: 3.15.2024 / 8:30pm   Location:   PCP: Cristobal Fraga Sr., MD       This is a telemedicine visit conducted using bidirectional audio and video modality.  Physician is located at home office in [place].  Patient is located in hospital at [facility].    Hospital Day: 1    Assessment and Plan:   David Alfaro is a 58 y.o. male with a pmh of CAD, CHF, HTN who presents with VINCENT (acute kidney injury) ([facility])    Hospital Problems             Last Modified POA    * (Principal) VINCENT (acute kidney injury) ([facility]) 3/15/2024 Yes    Rhabdomyolysis 3/15/2024 Yes    Abdominal pain 3/15/2024 Yes    Pneumonia of left upper lobe due to infectious organism 3/15/2024 Yes    Elevated LFTs 3/15/2024 Yes    Weakness 3/15/2024 Yes    Confusion 3/15/2024 Yes    COPD (chronic obstructive pulmonary disease) ([facility]) 3/15/2024 Yes    Hypoxia 3/15/2024 Yes    Chronic systolic heart failure ([facility]) 3/15/2024 Yes    Drug abuse ([facility]) 3/15/2024 Yes    HTN (hypertension) (Chronic) 3/15/2024 Yes    Ventral hernia without obstruction or gangrene 3/15/2024 Yes    CAD (coronary artery disease) 3/15/2024 Yes    Benign prostatic hyperplasia with lower urinary tract symptoms 3/15/2024 Yes       Plan:  VINCENT (acute kidney injury) ([facility])  Patient is a poor historian, appears to have decreased oral intake and dehydration.  He reports noncompliance with home medications which includes spironolactone.  He was also supposed to stop lisinopril.  In addition, he reports difficulty urinating.    Therefore, acute kidney injury is likely due to dehydration in addition to medication effect.  We will check post void residual to exclude an obstructive component as well.  We will hold lisinopril and spironolactone.    Abdominal pain  Patient reports chronic ongoing abdominal pain due to large

## 2024-03-16 NOTE — ASSESSMENT & PLAN NOTE
Patient is a poor historian, appears to have decreased oral intake and dehydration.  He reports noncompliance with home medications which includes spironolactone.  He was also supposed to stop lisinopril.  In addition, he reports difficulty urinating.    Therefore, acute kidney injury is likely due to dehydration in addition to medication effect.  We will check post void residual to exclude an obstructive component as well.  We will hold lisinopril and spironolactone.

## 2024-03-16 NOTE — ASSESSMENT & PLAN NOTE
Patient was reportedly confused on presentation to the emergency department.  At the time of my interview patient still does not have recollection of the events leading him to being in the ED.  At this time he is alert, he knows he is in the hospital continuously.  He is oriented to person.    This is likely due to dehydration and acute infectious process.

## 2024-03-16 NOTE — ASSESSMENT & PLAN NOTE
Patient reports symptoms of cough and shortness of breath.  Patient was initially hypoxic and required support, this has since resolved.  CT of the chest shows left upper lobe foci.    -Will treat with intravenous ceftriaxone and doxycycline

## 2024-03-16 NOTE — ASSESSMENT & PLAN NOTE
Patient reports history of COPD but is out of inhalers.    -Will provide scheduled bronchodilators.

## 2024-03-16 NOTE — ASSESSMENT & PLAN NOTE
Patient reports symptoms of difficulty urinating and dysuria.  Urinalysis was unremarkable.  Continue Flomax.    Check postvoid visual.

## 2024-03-16 NOTE — ASSESSMENT & PLAN NOTE
This is a chronic ongoing issue for the patient.  No surgical intervention is warranted at this time.

## 2024-03-16 NOTE — ASSESSMENT & PLAN NOTE
Patient reports chronic ongoing abdominal pain due to large ventral hernia.  He reports history of vomiting 4 days ago along with chronic constipation.  He had been recently in December evaluated by general surgery and no intervention was warranted.  CT of the abdomen does not show evidence of obstruction.  - Will continue with supportive care for now.  - Full liquid diet.  - Laxatives.

## 2024-03-16 NOTE — ASSESSMENT & PLAN NOTE
Patient reports falls but cannot recall call his last one.  CK is elevated at 3837.    Patient may have rhabdo and will provide IV hydration and close monitoring.

## 2024-03-17 LAB
ALBUMIN SERPL-MCNC: 2.7 G/DL (ref 3.5–5)
ALBUMIN/GLOB SERPL: 0.8 (ref 1.1–2.2)
ALP SERPL-CCNC: 57 U/L (ref 45–117)
ALT SERPL-CCNC: 82 U/L (ref 12–78)
ANION GAP SERPL CALC-SCNC: 7 MMOL/L (ref 5–15)
AST SERPL W P-5'-P-CCNC: 51 U/L (ref 15–37)
BASOPHILS # BLD: 0 K/UL (ref 0–0.1)
BASOPHILS NFR BLD: 1 % (ref 0–1)
BILIRUB SERPL-MCNC: 0.5 MG/DL (ref 0.2–1)
BUN SERPL-MCNC: 22 MG/DL (ref 6–20)
BUN/CREAT SERPL: 20 (ref 12–20)
CA-I BLD-MCNC: 8.4 MG/DL (ref 8.5–10.1)
CHLORIDE SERPL-SCNC: 105 MMOL/L (ref 97–108)
CK SERPL-CCNC: 645 U/L (ref 39–308)
CO2 SERPL-SCNC: 28 MMOL/L (ref 21–32)
CREAT SERPL-MCNC: 1.12 MG/DL (ref 0.7–1.3)
DIFFERENTIAL METHOD BLD: ABNORMAL
EOSINOPHIL # BLD: 0.2 K/UL (ref 0–0.4)
EOSINOPHIL NFR BLD: 7 % (ref 0–7)
ERYTHROCYTE [DISTWIDTH] IN BLOOD BY AUTOMATED COUNT: 13.5 % (ref 11.5–14.5)
GLOBULIN SER CALC-MCNC: 3.2 G/DL (ref 2–4)
GLUCOSE SERPL-MCNC: 155 MG/DL (ref 65–100)
HCT VFR BLD AUTO: 38.5 % (ref 36.6–50.3)
HGB BLD-MCNC: 13.2 G/DL (ref 12.1–17)
IMM GRANULOCYTES # BLD AUTO: 0.1 K/UL (ref 0–0.04)
IMM GRANULOCYTES NFR BLD AUTO: 2 % (ref 0–0.5)
LYMPHOCYTES # BLD: 1.2 K/UL (ref 0.8–3.5)
LYMPHOCYTES NFR BLD: 49 % (ref 12–49)
MCH RBC QN AUTO: 33.9 PG (ref 26–34)
MCHC RBC AUTO-ENTMCNC: 34.3 G/DL (ref 30–36.5)
MCV RBC AUTO: 99 FL (ref 80–99)
MONOCYTES # BLD: 0.4 K/UL (ref 0–1)
MONOCYTES NFR BLD: 16 % (ref 5–13)
NEUTS SEG # BLD: 0.6 K/UL (ref 1.8–8)
NEUTS SEG NFR BLD: 25 % (ref 32–75)
NRBC # BLD: 0 K/UL (ref 0–0.01)
NRBC BLD-RTO: 0 PER 100 WBC
PLATELET # BLD AUTO: 133 K/UL (ref 150–400)
PMV BLD AUTO: 9 FL (ref 8.9–12.9)
POTASSIUM SERPL-SCNC: 3.8 MMOL/L (ref 3.5–5.1)
PROT SERPL-MCNC: 5.9 G/DL (ref 6.4–8.2)
RBC # BLD AUTO: 3.89 M/UL (ref 4.1–5.7)
RBC MORPH BLD: ABNORMAL
SODIUM SERPL-SCNC: 140 MMOL/L (ref 136–145)
WBC # BLD AUTO: 2.5 K/UL (ref 4.1–11.1)

## 2024-03-17 PROCEDURE — 6360000002 HC RX W HCPCS: Performed by: INTERNAL MEDICINE

## 2024-03-17 PROCEDURE — 2580000003 HC RX 258: Performed by: INTERNAL MEDICINE

## 2024-03-17 PROCEDURE — 2500000003 HC RX 250 WO HCPCS: Performed by: INTERNAL MEDICINE

## 2024-03-17 PROCEDURE — 36415 COLL VENOUS BLD VENIPUNCTURE: CPT

## 2024-03-17 PROCEDURE — 2580000003 HC RX 258: Performed by: HOSPITALIST

## 2024-03-17 PROCEDURE — 6370000000 HC RX 637 (ALT 250 FOR IP): Performed by: INTERNAL MEDICINE

## 2024-03-17 PROCEDURE — 94640 AIRWAY INHALATION TREATMENT: CPT

## 2024-03-17 PROCEDURE — 1100000000 HC RM PRIVATE

## 2024-03-17 PROCEDURE — 82550 ASSAY OF CK (CPK): CPT

## 2024-03-17 PROCEDURE — 80053 COMPREHEN METABOLIC PANEL: CPT

## 2024-03-17 PROCEDURE — 6370000000 HC RX 637 (ALT 250 FOR IP): Performed by: HOSPITALIST

## 2024-03-17 PROCEDURE — 85025 COMPLETE CBC W/AUTO DIFF WBC: CPT

## 2024-03-17 RX ORDER — CARVEDILOL 3.12 MG/1
3.12 TABLET ORAL 2 TIMES DAILY WITH MEALS
Status: DISCONTINUED | OUTPATIENT
Start: 2024-03-17 | End: 2024-03-19 | Stop reason: HOSPADM

## 2024-03-17 RX ADMIN — POLYETHYLENE GLYCOL 3350 17 G: 17 POWDER, FOR SOLUTION ORAL at 21:26

## 2024-03-17 RX ADMIN — RISPERIDONE 2 MG: 1 TABLET, FILM COATED ORAL at 21:26

## 2024-03-17 RX ADMIN — PANTOPRAZOLE SODIUM 40 MG: 40 TABLET, DELAYED RELEASE ORAL at 10:25

## 2024-03-17 RX ADMIN — DOXYCYCLINE 100 MG: 100 INJECTION, POWDER, LYOPHILIZED, FOR SOLUTION INTRAVENOUS at 21:27

## 2024-03-17 RX ADMIN — TAMSULOSIN HYDROCHLORIDE 0.4 MG: 0.4 CAPSULE ORAL at 10:25

## 2024-03-17 RX ADMIN — SODIUM CHLORIDE, PRESERVATIVE FREE 10 ML: 5 INJECTION INTRAVENOUS at 21:27

## 2024-03-17 RX ADMIN — LACTULOSE 20 G: 20 SOLUTION ORAL at 10:25

## 2024-03-17 RX ADMIN — ASPIRIN 81 MG: 81 TABLET, COATED ORAL at 10:25

## 2024-03-17 RX ADMIN — HEPARIN SODIUM 5000 UNITS: 5000 INJECTION INTRAVENOUS; SUBCUTANEOUS at 21:27

## 2024-03-17 RX ADMIN — IPRATROPIUM BROMIDE AND ALBUTEROL SULFATE 1 DOSE: 2.5; .5 SOLUTION RESPIRATORY (INHALATION) at 07:29

## 2024-03-17 RX ADMIN — RISPERIDONE 2 MG: 1 TABLET, FILM COATED ORAL at 10:25

## 2024-03-17 RX ADMIN — DEXTROSE AND SODIUM CHLORIDE: 5; 900 INJECTION, SOLUTION INTRAVENOUS at 04:04

## 2024-03-17 RX ADMIN — HEPARIN SODIUM 5000 UNITS: 5000 INJECTION INTRAVENOUS; SUBCUTANEOUS at 05:51

## 2024-03-17 RX ADMIN — DOCUSATE SODIUM 100 MG: 100 CAPSULE, LIQUID FILLED ORAL at 10:25

## 2024-03-17 RX ADMIN — WATER 1000 MG: 1 INJECTION INTRAMUSCULAR; INTRAVENOUS; SUBCUTANEOUS at 21:27

## 2024-03-17 RX ADMIN — Medication 400 MG: at 10:25

## 2024-03-17 RX ADMIN — LACTULOSE 20 G: 20 SOLUTION ORAL at 21:26

## 2024-03-17 RX ADMIN — AMLODIPINE BESYLATE 2.5 MG: 2.5 TABLET ORAL at 10:25

## 2024-03-17 RX ADMIN — SENNOSIDES 8.6 MG: 8.6 TABLET, FILM COATED ORAL at 21:26

## 2024-03-17 RX ADMIN — POLYETHYLENE GLYCOL 3350 17 G: 17 POWDER, FOR SOLUTION ORAL at 10:33

## 2024-03-17 RX ADMIN — VENLAFAXINE HYDROCHLORIDE 75 MG: 75 CAPSULE, EXTENDED RELEASE ORAL at 10:26

## 2024-03-17 RX ADMIN — DOCUSATE SODIUM 100 MG: 100 CAPSULE, LIQUID FILLED ORAL at 21:26

## 2024-03-17 RX ADMIN — MONTELUKAST 10 MG: 10 TABLET, FILM COATED ORAL at 10:27

## 2024-03-17 RX ADMIN — VENLAFAXINE HYDROCHLORIDE 150 MG: 150 CAPSULE, EXTENDED RELEASE ORAL at 10:26

## 2024-03-17 RX ADMIN — DOXYCYCLINE 100 MG: 100 INJECTION, POWDER, LYOPHILIZED, FOR SOLUTION INTRAVENOUS at 10:25

## 2024-03-17 RX ADMIN — SODIUM CHLORIDE, PRESERVATIVE FREE 10 ML: 5 INJECTION INTRAVENOUS at 10:27

## 2024-03-17 RX ADMIN — HEPARIN SODIUM 5000 UNITS: 5000 INJECTION INTRAVENOUS; SUBCUTANEOUS at 13:46

## 2024-03-17 RX ADMIN — CARVEDILOL 3.12 MG: 3.12 TABLET, FILM COATED ORAL at 17:08

## 2024-03-17 RX ADMIN — ALUMINUM HYDROXIDE, MAGNESIUM HYDROXIDE, AND SIMETHICONE 30 ML: 1200; 120; 1200 SUSPENSION ORAL at 10:25

## 2024-03-17 ASSESSMENT — PAIN SCALES - GENERAL
PAINLEVEL_OUTOF10: 0

## 2024-03-18 ENCOUNTER — APPOINTMENT (OUTPATIENT)
Facility: HOSPITAL | Age: 59
DRG: 682 | End: 2024-03-18
Payer: MEDICARE

## 2024-03-18 LAB
ALBUMIN SERPL-MCNC: 2.4 G/DL (ref 3.5–5)
ALBUMIN/GLOB SERPL: 0.8 (ref 1.1–2.2)
ALP SERPL-CCNC: 49 U/L (ref 45–117)
ALT SERPL-CCNC: 69 U/L (ref 12–78)
ANION GAP SERPL CALC-SCNC: 5 MMOL/L (ref 5–15)
AST SERPL W P-5'-P-CCNC: 39 U/L (ref 15–37)
BASOPHILS # BLD: 0 K/UL (ref 0–0.1)
BASOPHILS NFR BLD: 1 % (ref 0–1)
BILIRUB SERPL-MCNC: 0.5 MG/DL (ref 0.2–1)
BUN SERPL-MCNC: 22 MG/DL (ref 6–20)
BUN/CREAT SERPL: 23 (ref 12–20)
CA-I BLD-MCNC: 8 MG/DL (ref 8.5–10.1)
CHLORIDE SERPL-SCNC: 105 MMOL/L (ref 97–108)
CK SERPL-CCNC: 394 U/L (ref 39–308)
CO2 SERPL-SCNC: 30 MMOL/L (ref 21–32)
CREAT SERPL-MCNC: 0.96 MG/DL (ref 0.7–1.3)
DIFFERENTIAL METHOD BLD: ABNORMAL
EOSINOPHIL # BLD: 0.1 K/UL (ref 0–0.4)
EOSINOPHIL NFR BLD: 5 % (ref 0–7)
ERYTHROCYTE [DISTWIDTH] IN BLOOD BY AUTOMATED COUNT: 13.5 % (ref 11.5–14.5)
GLOBULIN SER CALC-MCNC: 2.9 G/DL (ref 2–4)
GLUCOSE SERPL-MCNC: 89 MG/DL (ref 65–100)
HCT VFR BLD AUTO: 33.3 % (ref 36.6–50.3)
HGB BLD-MCNC: 11.5 G/DL (ref 12.1–17)
IMM GRANULOCYTES # BLD AUTO: 0 K/UL (ref 0–0.04)
IMM GRANULOCYTES NFR BLD AUTO: 1 % (ref 0–0.5)
LYMPHOCYTES # BLD: 1.6 K/UL (ref 0.8–3.5)
LYMPHOCYTES NFR BLD: 55 % (ref 12–49)
MCH RBC QN AUTO: 33.9 PG (ref 26–34)
MCHC RBC AUTO-ENTMCNC: 34.5 G/DL (ref 30–36.5)
MCV RBC AUTO: 98.2 FL (ref 80–99)
MONOCYTES # BLD: 0.4 K/UL (ref 0–1)
MONOCYTES NFR BLD: 13 % (ref 5–13)
NEUTS SEG # BLD: 0.7 K/UL (ref 1.8–8)
NEUTS SEG NFR BLD: 25 % (ref 32–75)
NRBC # BLD: 0 K/UL (ref 0–0.01)
NRBC BLD-RTO: 0 PER 100 WBC
PLATELET # BLD AUTO: 124 K/UL (ref 150–400)
PMV BLD AUTO: 9.4 FL (ref 8.9–12.9)
POTASSIUM SERPL-SCNC: 3.7 MMOL/L (ref 3.5–5.1)
PROT SERPL-MCNC: 5.3 G/DL (ref 6.4–8.2)
RBC # BLD AUTO: 3.39 M/UL (ref 4.1–5.7)
RBC MORPH BLD: ABNORMAL
SODIUM SERPL-SCNC: 140 MMOL/L (ref 136–145)
WBC # BLD AUTO: 2.8 K/UL (ref 4.1–11.1)

## 2024-03-18 PROCEDURE — 82550 ASSAY OF CK (CPK): CPT

## 2024-03-18 PROCEDURE — 6360000002 HC RX W HCPCS: Performed by: INTERNAL MEDICINE

## 2024-03-18 PROCEDURE — 6370000000 HC RX 637 (ALT 250 FOR IP): Performed by: HOSPITALIST

## 2024-03-18 PROCEDURE — 36415 COLL VENOUS BLD VENIPUNCTURE: CPT

## 2024-03-18 PROCEDURE — 85025 COMPLETE CBC W/AUTO DIFF WBC: CPT

## 2024-03-18 PROCEDURE — 1100000000 HC RM PRIVATE

## 2024-03-18 PROCEDURE — 76705 ECHO EXAM OF ABDOMEN: CPT

## 2024-03-18 PROCEDURE — 6370000000 HC RX 637 (ALT 250 FOR IP): Performed by: INTERNAL MEDICINE

## 2024-03-18 PROCEDURE — 97161 PT EVAL LOW COMPLEX 20 MIN: CPT

## 2024-03-18 PROCEDURE — 2580000003 HC RX 258: Performed by: HOSPITALIST

## 2024-03-18 PROCEDURE — 80053 COMPREHEN METABOLIC PANEL: CPT

## 2024-03-18 RX ORDER — AMLODIPINE BESYLATE 5 MG/1
5 TABLET ORAL DAILY
Status: DISCONTINUED | OUTPATIENT
Start: 2024-03-19 | End: 2024-03-19 | Stop reason: HOSPADM

## 2024-03-18 RX ADMIN — SODIUM CHLORIDE, PRESERVATIVE FREE 10 ML: 5 INJECTION INTRAVENOUS at 08:15

## 2024-03-18 RX ADMIN — VENLAFAXINE HYDROCHLORIDE 75 MG: 75 CAPSULE, EXTENDED RELEASE ORAL at 08:14

## 2024-03-18 RX ADMIN — MONTELUKAST 10 MG: 10 TABLET, FILM COATED ORAL at 08:14

## 2024-03-18 RX ADMIN — CARVEDILOL 3.12 MG: 3.12 TABLET, FILM COATED ORAL at 17:41

## 2024-03-18 RX ADMIN — HEPARIN SODIUM 5000 UNITS: 5000 INJECTION INTRAVENOUS; SUBCUTANEOUS at 05:53

## 2024-03-18 RX ADMIN — Medication 400 MG: at 08:13

## 2024-03-18 RX ADMIN — DOCUSATE SODIUM 100 MG: 100 CAPSULE, LIQUID FILLED ORAL at 21:32

## 2024-03-18 RX ADMIN — RISPERIDONE 2 MG: 1 TABLET, FILM COATED ORAL at 08:14

## 2024-03-18 RX ADMIN — DOCUSATE SODIUM 100 MG: 100 CAPSULE, LIQUID FILLED ORAL at 08:13

## 2024-03-18 RX ADMIN — PANTOPRAZOLE SODIUM 40 MG: 40 TABLET, DELAYED RELEASE ORAL at 08:14

## 2024-03-18 RX ADMIN — LACTULOSE 20 G: 20 SOLUTION ORAL at 08:14

## 2024-03-18 RX ADMIN — RISPERIDONE 2 MG: 1 TABLET, FILM COATED ORAL at 21:32

## 2024-03-18 RX ADMIN — POLYETHYLENE GLYCOL 3350 17 G: 17 POWDER, FOR SOLUTION ORAL at 21:33

## 2024-03-18 RX ADMIN — TAMSULOSIN HYDROCHLORIDE 0.4 MG: 0.4 CAPSULE ORAL at 08:13

## 2024-03-18 RX ADMIN — LACTULOSE 20 G: 20 SOLUTION ORAL at 21:32

## 2024-03-18 RX ADMIN — SENNOSIDES 8.6 MG: 8.6 TABLET, FILM COATED ORAL at 21:32

## 2024-03-18 RX ADMIN — VENLAFAXINE HYDROCHLORIDE 150 MG: 150 CAPSULE, EXTENDED RELEASE ORAL at 08:15

## 2024-03-18 RX ADMIN — ASPIRIN 81 MG: 81 TABLET, COATED ORAL at 08:14

## 2024-03-18 RX ADMIN — AMLODIPINE BESYLATE 2.5 MG: 2.5 TABLET ORAL at 08:14

## 2024-03-18 RX ADMIN — CARVEDILOL 3.12 MG: 3.12 TABLET, FILM COATED ORAL at 08:13

## 2024-03-18 RX ADMIN — POLYETHYLENE GLYCOL 3350 17 G: 17 POWDER, FOR SOLUTION ORAL at 08:14

## 2024-03-18 ASSESSMENT — ENCOUNTER SYMPTOMS
GASTROINTESTINAL NEGATIVE: 1
ALLERGIC/IMMUNOLOGIC NEGATIVE: 1
EYES NEGATIVE: 1
RESPIRATORY NEGATIVE: 1

## 2024-03-18 ASSESSMENT — PAIN SCALES - GENERAL: PAINLEVEL_OUTOF10: 0

## 2024-03-18 NOTE — CARE COORDINATION
[facility] called, stated the patient had called them requesting services. Asked if he still had [employer]. I stated we had something different but would check. I emailed registration to verify insurance so I can inform the patient what he has currently since his insurance changing is a concern of his.  Pending response.

## 2024-03-19 VITALS
OXYGEN SATURATION: 100 % | WEIGHT: 192.7 LBS | SYSTOLIC BLOOD PRESSURE: 120 MMHG | RESPIRATION RATE: 17 BRPM | BODY MASS INDEX: 24.73 KG/M2 | DIASTOLIC BLOOD PRESSURE: 88 MMHG | TEMPERATURE: 97.9 F | HEIGHT: 74 IN | HEART RATE: 78 BPM

## 2024-03-19 LAB
ALBUMIN SERPL-MCNC: 2.7 G/DL (ref 3.5–5)
ALBUMIN/GLOB SERPL: 0.9 (ref 1.1–2.2)
ALP SERPL-CCNC: 57 U/L (ref 45–117)
ALT SERPL-CCNC: 70 U/L (ref 12–78)
ANION GAP SERPL CALC-SCNC: 7 MMOL/L (ref 5–15)
AST SERPL W P-5'-P-CCNC: 35 U/L (ref 15–37)
BASOPHILS # BLD: 0 K/UL (ref 0–0.1)
BASOPHILS NFR BLD: 1 % (ref 0–1)
BILIRUB SERPL-MCNC: 0.4 MG/DL (ref 0.2–1)
BUN SERPL-MCNC: 27 MG/DL (ref 6–20)
BUN/CREAT SERPL: 22 (ref 12–20)
CA-I BLD-MCNC: 8.3 MG/DL (ref 8.5–10.1)
CHLORIDE SERPL-SCNC: 103 MMOL/L (ref 97–108)
CK SERPL-CCNC: 258 U/L (ref 39–308)
CO2 SERPL-SCNC: 30 MMOL/L (ref 21–32)
CREAT SERPL-MCNC: 1.24 MG/DL (ref 0.7–1.3)
DIFFERENTIAL METHOD BLD: ABNORMAL
EOSINOPHIL # BLD: 0.1 K/UL (ref 0–0.4)
EOSINOPHIL NFR BLD: 3 % (ref 0–7)
ERYTHROCYTE [DISTWIDTH] IN BLOOD BY AUTOMATED COUNT: 13.6 % (ref 11.5–14.5)
GLOBULIN SER CALC-MCNC: 2.9 G/DL (ref 2–4)
GLUCOSE SERPL-MCNC: 95 MG/DL (ref 65–100)
HCT VFR BLD AUTO: 35.2 % (ref 36.6–50.3)
HGB BLD-MCNC: 12.2 G/DL (ref 12.1–17)
IMM GRANULOCYTES # BLD AUTO: 0 K/UL (ref 0–0.04)
IMM GRANULOCYTES NFR BLD AUTO: 1 % (ref 0–0.5)
LYMPHOCYTES # BLD: 1.5 K/UL (ref 0.8–3.5)
LYMPHOCYTES NFR BLD: 58 % (ref 12–49)
MCH RBC QN AUTO: 34.1 PG (ref 26–34)
MCHC RBC AUTO-ENTMCNC: 34.7 G/DL (ref 30–36.5)
MCV RBC AUTO: 98.3 FL (ref 80–99)
MONOCYTES # BLD: 0.3 K/UL (ref 0–1)
MONOCYTES NFR BLD: 12 % (ref 5–13)
NEUTS SEG # BLD: 0.6 K/UL (ref 1.8–8)
NEUTS SEG NFR BLD: 25 % (ref 32–75)
NRBC # BLD: 0 K/UL (ref 0–0.01)
NRBC BLD-RTO: 0 PER 100 WBC
PLATELET # BLD AUTO: 150 K/UL (ref 150–400)
PMV BLD AUTO: 9.3 FL (ref 8.9–12.9)
POTASSIUM SERPL-SCNC: 4.2 MMOL/L (ref 3.5–5.1)
PROT SERPL-MCNC: 5.6 G/DL (ref 6.4–8.2)
RBC # BLD AUTO: 3.58 M/UL (ref 4.1–5.7)
RBC MORPH BLD: ABNORMAL
SODIUM SERPL-SCNC: 140 MMOL/L (ref 136–145)
WBC # BLD AUTO: 2.5 K/UL (ref 4.1–11.1)

## 2024-03-19 PROCEDURE — 82550 ASSAY OF CK (CPK): CPT

## 2024-03-19 PROCEDURE — 36415 COLL VENOUS BLD VENIPUNCTURE: CPT

## 2024-03-19 PROCEDURE — 97116 GAIT TRAINING THERAPY: CPT

## 2024-03-19 PROCEDURE — 85025 COMPLETE CBC W/AUTO DIFF WBC: CPT

## 2024-03-19 PROCEDURE — 80053 COMPREHEN METABOLIC PANEL: CPT

## 2024-03-19 PROCEDURE — 6370000000 HC RX 637 (ALT 250 FOR IP): Performed by: INTERNAL MEDICINE

## 2024-03-19 PROCEDURE — 6370000000 HC RX 637 (ALT 250 FOR IP): Performed by: HOSPITALIST

## 2024-03-19 RX ORDER — PANTOPRAZOLE SODIUM 40 MG/1
40 TABLET, DELAYED RELEASE ORAL
Qty: 30 TABLET | Refills: 3 | Status: SHIPPED | OUTPATIENT
Start: 2024-03-20

## 2024-03-19 RX ORDER — FLUTICASONE PROPIONATE AND SALMETEROL 250; 50 UG/1; UG/1
1 POWDER RESPIRATORY (INHALATION) EVERY 12 HOURS
Qty: 60 EACH | Refills: 3 | Status: SHIPPED | OUTPATIENT
Start: 2024-03-19

## 2024-03-19 RX ORDER — LACTULOSE 10 G/15ML
20 SOLUTION ORAL 2 TIMES DAILY
Qty: 946 ML | Refills: 1 | Status: SHIPPED | OUTPATIENT
Start: 2024-03-19

## 2024-03-19 RX ORDER — PSEUDOEPHEDRINE HCL 30 MG
100 TABLET ORAL 2 TIMES DAILY
Qty: 60 CAPSULE | Refills: 2 | Status: SHIPPED | OUTPATIENT
Start: 2024-03-19

## 2024-03-19 RX ORDER — CARVEDILOL 3.12 MG/1
3.12 TABLET ORAL 2 TIMES DAILY WITH MEALS
Qty: 60 TABLET | Refills: 3 | Status: SHIPPED | OUTPATIENT
Start: 2024-03-19

## 2024-03-19 RX ORDER — DOXYCYCLINE HYCLATE 100 MG
100 TABLET ORAL 2 TIMES DAILY
Qty: 10 TABLET | Refills: 0 | Status: SHIPPED | OUTPATIENT
Start: 2024-03-19 | End: 2024-03-24

## 2024-03-19 RX ORDER — LANOLIN ALCOHOL/MO/W.PET/CERES
400 CREAM (GRAM) TOPICAL DAILY
Qty: 30 TABLET | Refills: 1 | Status: SHIPPED | OUTPATIENT
Start: 2024-03-20

## 2024-03-19 RX ORDER — POLYETHYLENE GLYCOL 3350 17 G/17G
17 POWDER, FOR SOLUTION ORAL DAILY PRN
Qty: 30 PACKET | Refills: 0 | Status: SHIPPED | OUTPATIENT
Start: 2024-03-19 | End: 2024-04-18

## 2024-03-19 RX ORDER — AMLODIPINE BESYLATE 5 MG/1
5 TABLET ORAL DAILY
Qty: 30 TABLET | Refills: 3 | Status: SHIPPED | OUTPATIENT
Start: 2024-03-20

## 2024-03-19 RX ORDER — RISPERIDONE 2 MG/1
2 TABLET ORAL 2 TIMES DAILY
Qty: 60 TABLET | Refills: 3 | Status: SHIPPED | OUTPATIENT
Start: 2024-03-19

## 2024-03-19 RX ADMIN — MONTELUKAST 10 MG: 10 TABLET, FILM COATED ORAL at 10:09

## 2024-03-19 RX ADMIN — LACTULOSE 20 G: 20 SOLUTION ORAL at 10:08

## 2024-03-19 RX ADMIN — POLYETHYLENE GLYCOL 3350 17 G: 17 POWDER, FOR SOLUTION ORAL at 10:08

## 2024-03-19 RX ADMIN — ASPIRIN 81 MG: 81 TABLET, COATED ORAL at 10:08

## 2024-03-19 RX ADMIN — RISPERIDONE 2 MG: 1 TABLET, FILM COATED ORAL at 10:10

## 2024-03-19 RX ADMIN — VENLAFAXINE HYDROCHLORIDE 75 MG: 75 CAPSULE, EXTENDED RELEASE ORAL at 10:08

## 2024-03-19 RX ADMIN — DOCUSATE SODIUM 100 MG: 100 CAPSULE, LIQUID FILLED ORAL at 10:08

## 2024-03-19 RX ADMIN — CARVEDILOL 3.12 MG: 3.12 TABLET, FILM COATED ORAL at 07:48

## 2024-03-19 RX ADMIN — AMLODIPINE BESYLATE 5 MG: 5 TABLET ORAL at 10:08

## 2024-03-19 RX ADMIN — PANTOPRAZOLE SODIUM 40 MG: 40 TABLET, DELAYED RELEASE ORAL at 07:48

## 2024-03-19 RX ADMIN — Medication 400 MG: at 10:08

## 2024-03-19 RX ADMIN — VENLAFAXINE HYDROCHLORIDE 150 MG: 150 CAPSULE, EXTENDED RELEASE ORAL at 10:09

## 2024-03-19 RX ADMIN — TAMSULOSIN HYDROCHLORIDE 0.4 MG: 0.4 CAPSULE ORAL at 10:08

## 2024-03-19 ASSESSMENT — PAIN SCALES - GENERAL
PAINLEVEL_OUTOF10: 0
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
## 2024-03-19 NOTE — DISCHARGE INSTRUCTIONS
NOTIFY YOUR PHYSICIAN FOR ANY OF THE FOLLOWING:   Fever over 101 degrees for 24 hours.   Chest pain, shortness of breath, fever, chills, nausea, vomiting, diarrhea, change in mentation, falling, weakness, bleeding. Severe pain or pain not relieved by medications, as well as any other signs or symptoms that you may have questions about    Monitor and keep bowel movements soft and have at least daily bowel movement  Holding your atorvastatin medication because of muscle pains and rhabdomyolysis that your were treated for

## 2024-03-19 NOTE — PLAN OF CARE
Problem: Pain  Goal: Verbalizes/displays adequate comfort level or baseline comfort level  Outcome: Progressing     
  Problem: Physical Therapy - Adult  Goal: By Discharge: Performs mobility at highest level of function for planned discharge setting.  See evaluation for individualized goals.  3/19/2024 1207 by Mariluz Sarmiento, PT  Outcome: Progressing     
  Problem: Physical Therapy - Adult  Goal: By Discharge: Performs mobility at highest level of function for planned discharge setting.  See evaluation for individualized goals.  3/19/2024 1207 by Mariluz Sarmiento, PT  Outcome: Progressing     
PHYSICAL THERAPY TREATMENT    Patient: David Alfaro (58 y.o. male)  Date: 3/19/2024  Diagnosis: VINCENT (acute kidney injury) (Formerly [facility]) [N17.9] VINCENT (acute kidney injury) (Formerly [facility])      Precautions: Fall Risk                    ASSESSMENT:    Patient continues to benefit from skilled PT services and is progressing towards goals. Pt able to ambulate today with increased gait distance, demonstrating progression.  Pt with less unsteadiness during turn while using RW today, and no reports of LE endurance deficits with increased gait distance.          PLAN:  Patient continues to benefit from skilled intervention to address the above impairments.  Continue treatment per established plan of care.    Recommendation for discharge: (in order for the patient to meet his/her long term goals): Therapy 2 days/week in the home    Other factors to consider for discharge: high risk for falls    IF patient discharges home will need the following DME: rollator       SUBJECTIVE:   Patient stated, \"My side hurts some.\"    OBJECTIVE DATA SUMMARY:   Critical Behavior:          Functional Mobility Training:  Bed Mobility:  Bed Mobility Training  Bed Mobility Training: No  Transfers:  Transfer Training  Transfer Training: Yes  Overall Level of Assistance: Contact-guard assistance  Interventions: Verbal cues;Manual cues  Sit to Stand: Contact-guard assistance  Stand to Sit: Contact-guard assistance  Balance:  Balance  Sitting: Intact  Standing: With support   Wheelchair Mobility:      Ambulation/Gait Training:     Gait  Distance (ft): 40 Feet  Assistive Device: Walker, rolling  Interventions: Verbal cues;Manual cues  Base of Support: Narrowed  Speed/Shavonne: Pace decreased (< 100 feet/min)  Step Length: Left shortened;Right shortened  Neuro Re-Education:                    Treatment Session:  Pt tolerated session well.  Pt found sitting upright in chair and cooperative throughout treatment session.  Pt ambulates with very narrowed, almost tandem 
With: Friend(s)  Home Layout: One level  Home Access: Stairs to enter with rails  Entrance Stairs - Number of Steps: 2  Home Equipment: Rollator  Has the patient had two or more falls in the past year or any fall with injury in the past year?: Yes    Cognitive/Behavioral Status:  Orientation  Overall Orientation Status: Within Normal Limits       Skin: not applicable    Edema: not applicable    Strength:         Tone & Sensation:           Coordination:       Range Of Motion:  AROM: Generally decreased, functional       Functional Mobility:  Bed Mobility:     Bed Mobility Training  Bed Mobility Training: Yes  Overall Level of Assistance: Independent  Rolling: Independent  Supine to Sit: Independent  Scooting: Independent  Transfers:     Transfer Training  Transfer Training: Yes  Overall Level of Assistance: Contact-guard assistance  Interventions: Verbal cues;Manual cues  Sit to Stand: Contact-guard assistance  Stand to Sit: Contact-guard assistance  Balance:               Balance  Sitting: Intact  Standing: With support  Wheelchair Mobility:        Ambulation/Gait Training:                       Gait  Gait Training: No  Overall Level of Assistance: Contact-guard assistance  Distance (ft): 20 Feet  Assistive Device: Walker, rolling  Interventions: Verbal cues;Manual cues  Base of Support: Narrowed  Speed/Shavonne: Pace decreased (< 100 feet/min)  Step Length: Left shortened;Right shortened                           Treatment Session:  Pt tolerated session well today.  Pt able to ambulate into hallway with use of RW.  After about 10 ft of ambulation, pt initiated 180 degree turn, in which he required assistance to prevent fall.  Pt has very narrowed base of support.  DPT notes B knee flexion and pt's reports of weakness during turn, however was able to make it back to bed without LE giving out on him.  After a seated rest, DPT observed pt's gait with no AD, seeing as this is how he reports to get around his home at

## 2024-03-19 NOTE — DISCHARGE SUMMARY
3.5 K/UL    Monocytes Absolute 0.3 0.0 - 1.0 K/UL    Eosinophils Absolute 0.1 0.0 - 0.4 K/UL    Basophils Absolute 0.0 0.0 - 0.1 K/UL    Absolute Immature Granulocyte 0.0 0.00 - 0.04 K/UL    Differential Type Smear Scanned      RBC Comment Normocytic, Normochromic     Comprehensive Metabolic Panel    Collection Time: 03/19/24  5:50 AM   Result Value Ref Range    Sodium 140 136 - 145 mmol/L    Potassium 4.2 3.5 - 5.1 mmol/L    Chloride 103 97 - 108 mmol/L    CO2 30 21 - 32 mmol/L    Anion Gap 7 5 - 15 mmol/L    Glucose 95 65 - 100 mg/dL    BUN 27 (H) 6 - 20 mg/dL    Creatinine 1.24 0.70 - 1.30 mg/dL    Bun/Cre Ratio 22 (H) 12 - 20      Est, Glom Filt Rate >60 >60 ml/min/1.73m2    Calcium 8.3 (L) 8.5 - 10.1 mg/dL    Total Bilirubin 0.4 0.2 - 1.0 mg/dL    AST 35 15 - 37 U/L    ALT 70 12 - 78 U/L    Alk Phosphatase 57 45 - 117 U/L    Total Protein 5.6 (L) 6.4 - 8.2 g/dL    Albumin 2.7 (L) 3.5 - 5.0 g/dL    Globulin 2.9 2.0 - 4.0 g/dL    Albumin/Globulin Ratio 0.9 (L) 1.1 - 2.2     CK    Collection Time: 03/19/24  5:50 AM   Result Value Ref Range    Total  39 - 308 U/L     .result      Imaging:  US ABDOMEN LIMITED    Result Date: 3/18/2024  No acute process shown.    CT ABDOMEN PELVIS W IV CONTRAST Additional Contrast? None    Result Date: 3/15/2024  1. No acute abdominal abnormality. 2. Moderate to large ventral hernia containing long segment of nondilated bowel. No acute inflammatory change. No evidence for obstruction. 3. Moderate lower lumbosacral spondylosis. 4. Stable 3 mm right lung nodule. Guidelines by the Fleischner society (Radiology 2017 special report) suggest that patients with low risk for lung cancer and solid nodule(s) less than or equal to 6 mm in diameter require no follow-up.  In patients with a higher risk, such as smokers, follow-up noncontrast chest CT should be considered at 12 months.  Patients with a known malignancy are at increased risk for metastasis and should receive a three month

## 2024-03-20 NOTE — CONSULTS
PSYCHIATRIC CONSULTATION                         IDENTIFICATION:    Patient Name  David Alfaro   Date of Birth 1965   [facility] [id number]   Medical Record Number  [id number]      Age  58 y.o.   PCP Cristobal Fraga Sr., MD   Admit date:  3/15/2024    Room Number  212/01  @ [facility]   Date of Service  3/19/2024            HISTORY         REASON FOR HOSPITALIZATION/CONSULTATION:  A psychiatric consultation was requested by No att. providers found to evaluate or provide advice/opinion related to evaluating depression    CC: \"Pain\"    HISTORY OF PRESENT ILLNESS:    The patient, David Alfaro, is a 58 y.o. Black /  male with a past psychiatric history significant for major depressive disorder and stimulant/cocaine use disorder admitted to [facility] for management of shortness of breath.  He stated that he is going through some stress recently.  He ran out of his medications and reported feeling more depressed after another.  He also reported having some stress related to medical problems.  Reported poor sleep sometimes.  Reported good appetite.  Reported low energy level sometimes.  Denied any anhedonia.  Denied any suicidal or homicidal ideations.  Denied any anxiety or panic attacks.  Denied any symptoms related to sherin or psychosis.  He reported occasional cocaine use and last used about a week ago.  Denied any other drug use.  Reported occasional alcohol use.  Denied any withdrawal symptoms.     ALLERGIES:  Allergies   Allergen Reactions    Codeine Swelling     Tongue swelling    Penicillins Swelling     Tongue Swelling           PAST MEDICAL HISTORY:  Past Medical History:   Diagnosis Date    Anxiety     Arthritis     GERD (gastroesophageal reflux disease)     Hypercholesterolemia     Hypertension     Mild depressed bipolar 1 disorder (HCC) 10/13/2020    Myocardial infarction (HCC)     Obstructive sleep apnea 10/13/2020

## 2024-03-21 LAB
BACTERIA SPEC CULT: NORMAL
BACTERIA SPEC CULT: NORMAL
Lab: NORMAL
Lab: NORMAL

## 2024-06-27 ENCOUNTER — HOSPITAL ENCOUNTER (OUTPATIENT)
Facility: HOSPITAL | Age: 59
Setting detail: OBSERVATION
Discharge: HOME | End: 2024-06-27
Attending: EMERGENCY MEDICINE | Admitting: HOSPITALIST
Payer: MEDICARE

## 2024-06-27 ENCOUNTER — APPOINTMENT (OUTPATIENT)
Facility: HOSPITAL | Age: 59
End: 2024-06-27
Attending: EMERGENCY MEDICINE
Payer: MEDICARE

## 2024-06-27 VITALS
TEMPERATURE: 98.6 F | SYSTOLIC BLOOD PRESSURE: 130 MMHG | WEIGHT: 176 LBS | DIASTOLIC BLOOD PRESSURE: 82 MMHG | HEIGHT: 74 IN | RESPIRATION RATE: 18 BRPM | BODY MASS INDEX: 22.59 KG/M2 | HEART RATE: 75 BPM | OXYGEN SATURATION: 98 %

## 2024-06-27 DIAGNOSIS — S99.191A CLOSED FRACTURE OF BASE OF FIFTH METATARSAL BONE OF RIGHT FOOT AT METAPHYSEAL-DIAPHYSEAL JUNCTION, INITIAL ENCOUNTER: Primary | ICD-10-CM

## 2024-06-27 PROCEDURE — 6370000000 HC RX 637 (ALT 250 FOR IP): Performed by: EMERGENCY MEDICINE

## 2024-06-27 PROCEDURE — G0378 HOSPITAL OBSERVATION PER HR: HCPCS

## 2024-06-27 PROCEDURE — 73630 X-RAY EXAM OF FOOT: CPT

## 2024-06-27 PROCEDURE — 99283 EMERGENCY DEPT VISIT LOW MDM: CPT

## 2024-06-27 PROCEDURE — 29515 APPLICATION SHORT LEG SPLINT: CPT

## 2024-06-27 RX ORDER — ACETAMINOPHEN 500 MG
500 TABLET ORAL 4 TIMES DAILY PRN
Qty: 120 TABLET | Refills: 0 | Status: SHIPPED | OUTPATIENT
Start: 2024-06-27

## 2024-06-27 RX ORDER — IBUPROFEN 400 MG/1
400 TABLET ORAL EVERY 6 HOURS PRN
Qty: 120 TABLET | Refills: 0 | Status: SHIPPED | OUTPATIENT
Start: 2024-06-27

## 2024-06-27 RX ORDER — OXYCODONE HYDROCHLORIDE AND ACETAMINOPHEN 5; 325 MG/1; MG/1
1 TABLET ORAL
Status: COMPLETED | OUTPATIENT
Start: 2024-06-27 | End: 2024-06-27

## 2024-06-27 RX ORDER — OXYCODONE HYDROCHLORIDE 5 MG/1
5 TABLET ORAL EVERY 6 HOURS PRN
Qty: 15 TABLET | Refills: 0 | Status: SHIPPED | OUTPATIENT
Start: 2024-06-27 | End: 2024-07-01

## 2024-06-27 RX ADMIN — OXYCODONE AND ACETAMINOPHEN 1 TABLET: 5; 325 TABLET ORAL at 10:45

## 2024-06-27 ASSESSMENT — PAIN SCALES - GENERAL: PAINLEVEL_OUTOF10: 8

## 2024-06-27 ASSESSMENT — PAIN - FUNCTIONAL ASSESSMENT: PAIN_FUNCTIONAL_ASSESSMENT: 0-10

## 2024-06-27 NOTE — ED PROVIDER NOTES
[facility] EMERGENCY DEPT  EMERGENCY DEPARTMENT HISTORY AND PHYSICAL EXAM      Date: 6/27/2024  Patient Name: David Alfaro  MRN: [id number]  Birthdate 1965  Date of evaluation: 6/27/2024  Provider: Chris Coleman MD   Note Started: 10:37 AM EDT 6/27/24    HISTORY OF PRESENT ILLNESS     Chief Complaint   Patient presents with    Foot Injury       History Provided By: Patient    HPI: David Alfaro is a 58 y.o. male states he fell on Saturday, twisted right foot, noted bruising and pain but worse today with sharp pain shooting down. [facility] believes he may have had a seizure/ which is not abnormal for him. [facility] has been evaluated for same and having ongoing testing for same.     PAST MEDICAL HISTORY   Past Medical History:  Past Medical History:   Diagnosis Date    Anxiety     Arthritis     GERD (gastroesophageal reflux disease)     Hypercholesterolemia     Hypertension     Mild depressed bipolar 1 disorder (HCC) 10/13/2020    Myocardial infarction (HCC)     Obstructive sleep apnea 10/13/2020    Psychotic disorder (HCC)     Seizures (HCC)     Stroke (HCC)        Past Surgical History:  Past Surgical History:   Procedure Laterality Date    IN UNLISTED PROCEDURE ABDOMEN PERITONEUM & OMENTUM         Family History:  Family History   Problem Relation Age of Onset    Depression Mother     No Known Problems Father        Social History:  Social History     Tobacco Use    Smoking status: Former    Smokeless tobacco: Never   Vaping Use    Vaping Use: Never used   Substance Use Topics    Alcohol use: Yes     Alcohol/week: 2.0 standard drinks of alcohol    Drug use: Not Currently     Types: Cocaine     Comment: Pt denies       Allergies:  Allergies   Allergen Reactions    Codeine Swelling     Tongue swelling    Penicillins Swelling     Tongue Swelling       PCP: Cristobal Fraga Sr., MD    Current Meds:   No current facility-administered medications for this encounter.     Current Outpatient Medications    Medication Sig Dispense Refill    acetaminophen (TYLENOL) 500 MG tablet Take 1 tablet by mouth 4 times daily as needed for Pain 120 tablet 0    ibuprofen (IBU) 400 MG tablet Take 1 tablet by mouth every 6 hours as needed for Pain 120 tablet 0    oxyCODONE (ROXICODONE) 5 MG immediate release tablet Take 1 tablet by mouth every 6 hours as needed for Pain for up to 4 days. Intended supply: 3 days. Take lowest dose possible to manage pain Max Daily Amount: 20 mg 15 tablet 0    risperiDONE (RISPERDAL) 2 MG tablet Take 1 tablet by mouth 2 times daily 60 tablet 3    carvedilol (COREG) 3.125 MG tablet Take 1 tablet by mouth 2 times daily (with meals) 60 tablet 3    docusate sodium (COLACE, DULCOLAX) 100 MG CAPS Take 100 mg by mouth 2 times daily 60 capsule 2    amLODIPine (NORVASC) 5 MG tablet Take 1 tablet by mouth daily 30 tablet 3    magnesium oxide (MAG-OX) 400 (240 Mg) MG tablet Take 1 tablet by mouth daily 30 tablet 1    pantoprazole (PROTONIX) 40 MG tablet Take 1 tablet by mouth every morning (before breakfast) 30 tablet 3    lactulose (CHRONULAC) 10 GM/15ML solution Take 30 mLs by mouth 2 times daily 946 mL 1    fluticasone-salmeterol (ADVAIR DISKUS) 250-50 MCG/ACT AEPB diskus inhaler Inhale 1 puff into the lungs in the morning and 1 puff in the evening. 60 each 3    venlafaxine (EFFEXOR XR) 150 MG extended release capsule Take 1 capsule by mouth daily      venlafaxine (EFFEXOR XR) 75 MG extended release capsule Take 1 capsule by mouth daily Patient is prescribed a total of 225mg PO QD verified by [facility] pharmacy.      cyclobenzaprine (FLEXERIL) 10 MG tablet Take 1 tablet by mouth in the morning and at bedtime      isosorbide dinitrate (ISORDIL) 5 MG tablet Take 1 tablet by mouth 3 times daily 30 tablet 0    aspirin 81 MG EC tablet Take 1 tablet by mouth daily 30 tablet 0    QUEtiapine (SEROQUEL) 50 MG tablet Take 1 tablet by mouth nightly 30 tablet 1    tamsulosin (FLOMAX) 0.4 MG capsule Take 1 capsule by mouth

## 2024-06-27 NOTE — ED NOTES
Pt given discharge and follow up instructions. Education on appt and medications given . No further questions at this time. Pt placed in wheelchair. Left with belongings. Pt awaiting ride at this time.

## 2024-06-27 NOTE — ED NOTES
Snell compression splint placed with extra padding and orthoglass. Pt tolerated well. splint secured with ace bandage. No changes to skin color, temp, condition. Distal pulses present. Capillary refill brisk. Pt reports some pain relief after splint placement. MD aware and at bedside examining splint.

## 2024-06-27 NOTE — DISCHARGE INSTRUCTIONS
You are not weight bearing . Do not put weight on your leg until cleared by the podiatrist/orthopedist.

## 2024-06-27 NOTE — ED TRIAGE NOTES
Pt reports he fell on Saturday and twisted his right foot. Pt noted to have mild difficulty with ambulation Pt denies any ankle pain. No obvious deformity noted, bruising noted to right lateral foot and toes.

## 2024-07-08 ENCOUNTER — HOSPITAL ENCOUNTER (OUTPATIENT)
Facility: HOSPITAL | Age: 59
Discharge: HOME | End: 2024-07-11
Attending: PODIATRIST
Payer: MEDICARE

## 2024-07-08 ENCOUNTER — HOSPITAL ENCOUNTER (OUTPATIENT)
Facility: HOSPITAL | Age: 59
Discharge: HOME | End: 2024-07-11
Payer: MEDICARE

## 2024-07-08 LAB
ANION GAP SERPL CALC-SCNC: 8 MMOL/L (ref 5–15)
BUN SERPL-MCNC: 29 MG/DL (ref 6–20)
BUN/CREAT SERPL: 19 (ref 12–20)
CA-I BLD-MCNC: 9.1 MG/DL (ref 8.5–10.1)
CHLORIDE SERPL-SCNC: 107 MMOL/L (ref 97–108)
CO2 SERPL-SCNC: 28 MMOL/L (ref 21–32)
CREAT SERPL-MCNC: 1.5 MG/DL (ref 0.7–1.3)
EKG ATRIAL RATE: 75 BPM
EKG DIAGNOSIS: NORMAL
EKG P AXIS: 75 DEGREES
EKG P-R INTERVAL: 155 MS
EKG Q-T INTERVAL: 393 MS
EKG QRS DURATION: 98 MS
EKG QTC CALCULATION (BAZETT): 439 MS
EKG R AXIS: 68 DEGREES
EKG T AXIS: 73 DEGREES
EKG VENTRICULAR RATE: 75 BPM
ERYTHROCYTE [DISTWIDTH] IN BLOOD BY AUTOMATED COUNT: 13.3 % (ref 11.5–14.5)
GLUCOSE SERPL-MCNC: 91 MG/DL (ref 65–100)
HCT VFR BLD AUTO: 37.6 % (ref 36.6–50.3)
HGB BLD-MCNC: 12.9 G/DL (ref 12.1–17)
MCH RBC QN AUTO: 33.8 PG (ref 26–34)
MCHC RBC AUTO-ENTMCNC: 34.3 G/DL (ref 30–36.5)
MCV RBC AUTO: 98.4 FL (ref 80–99)
NRBC # BLD: 0 K/UL (ref 0–0.01)
NRBC BLD-RTO: 0 PER 100 WBC
PLATELET # BLD AUTO: 157 K/UL (ref 150–400)
PMV BLD AUTO: 8.7 FL (ref 8.9–12.9)
POTASSIUM SERPL-SCNC: 4.3 MMOL/L (ref 3.5–5.1)
RBC # BLD AUTO: 3.82 M/UL (ref 4.1–5.7)
SODIUM SERPL-SCNC: 143 MMOL/L (ref 136–145)
WBC # BLD AUTO: 3.6 K/UL (ref 4.1–11.1)

## 2024-07-08 PROCEDURE — 36415 COLL VENOUS BLD VENIPUNCTURE: CPT

## 2024-07-08 PROCEDURE — 93005 ELECTROCARDIOGRAM TRACING: CPT | Performed by: PODIATRIST

## 2024-07-08 PROCEDURE — 71046 X-RAY EXAM CHEST 2 VIEWS: CPT

## 2024-07-08 PROCEDURE — 80048 BASIC METABOLIC PNL TOTAL CA: CPT

## 2024-07-08 PROCEDURE — 85027 COMPLETE CBC AUTOMATED: CPT

## 2024-07-08 RX ORDER — ARIPIPRAZOLE 5 MG/1
5 TABLET ORAL DAILY
COMMUNITY

## 2024-07-08 RX ORDER — LISINOPRIL 20 MG/1
20 TABLET ORAL DAILY
COMMUNITY

## 2024-07-08 RX ORDER — CELECOXIB 200 MG/1
200 CAPSULE ORAL 2 TIMES DAILY
COMMUNITY

## 2024-07-08 RX ORDER — MULTIVIT WITH MINERALS/LUTEIN
500 TABLET ORAL DAILY
COMMUNITY

## 2024-07-08 RX ORDER — SPIRONOLACTONE 25 MG/1
25 TABLET ORAL DAILY
COMMUNITY

## 2024-07-08 ASSESSMENT — PAIN DESCRIPTION - LOCATION: LOCATION: FOOT

## 2024-07-08 ASSESSMENT — PAIN DESCRIPTION - ONSET: ONSET: ON-GOING

## 2024-07-08 ASSESSMENT — PAIN DESCRIPTION - ORIENTATION: ORIENTATION: RIGHT

## 2024-07-08 ASSESSMENT — PAIN DESCRIPTION - FREQUENCY: FREQUENCY: CONTINUOUS

## 2024-07-08 ASSESSMENT — PAIN - FUNCTIONAL ASSESSMENT: PAIN_FUNCTIONAL_ASSESSMENT: ACTIVITIES ARE NOT PREVENTED

## 2024-07-08 ASSESSMENT — PAIN DESCRIPTION - DESCRIPTORS: DESCRIPTORS: ACHING

## 2024-07-08 ASSESSMENT — PAIN DESCRIPTION - PAIN TYPE: TYPE: ACUTE PAIN

## 2024-07-08 ASSESSMENT — PAIN SCALES - GENERAL: PAINLEVEL_OUTOF10: 8

## 2024-07-08 NOTE — DISCHARGE INSTRUCTIONS
BE AT THE HOSPITAL AT 0630, COMING THRU THE ER Department. May eat until 12 midnight tonight. Shower tonight and in the morning. Take blood pressure meds. Lisinopril,carvedilol, amlodipine.     Pain control prn with percocet. Finish course of clindamycin as prescribed. Do not make any financial decisions today. Do not drive for 2 weeks on right foot.   Keep dressings dry, clean, intact. Remain NWB to right foot. Ice and elevate right foot as directed. Finish course of clindamycin as prescribed. Take aspirin 325 mg daily for 21 days for DVT prophylaxis. Do not drive for 2 weeks with right foot.

## 2024-07-12 ENCOUNTER — HOSPITAL ENCOUNTER (EMERGENCY)
Facility: HOSPITAL | Age: 59
Discharge: HOME | End: 2024-07-12
Attending: EMERGENCY MEDICINE
Payer: MEDICARE

## 2024-07-12 VITALS
HEART RATE: 67 BPM | HEIGHT: 70 IN | WEIGHT: 175 LBS | RESPIRATION RATE: 16 BRPM | BODY MASS INDEX: 25.05 KG/M2 | TEMPERATURE: 98.3 F | OXYGEN SATURATION: 100 % | SYSTOLIC BLOOD PRESSURE: 143 MMHG | DIASTOLIC BLOOD PRESSURE: 96 MMHG

## 2024-07-12 DIAGNOSIS — S92.351A CLOSED DISPLACED FRACTURE OF FIFTH METATARSAL BONE OF RIGHT FOOT, INITIAL ENCOUNTER: ICD-10-CM

## 2024-07-12 DIAGNOSIS — M79.671 RIGHT FOOT PAIN: Primary | ICD-10-CM

## 2024-07-12 PROCEDURE — 6370000000 HC RX 637 (ALT 250 FOR IP): Performed by: EMERGENCY MEDICINE

## 2024-07-12 PROCEDURE — 99283 EMERGENCY DEPT VISIT LOW MDM: CPT

## 2024-07-12 RX ORDER — HYDROCODONE BITARTRATE AND ACETAMINOPHEN 5; 325 MG/1; MG/1
1 TABLET ORAL
Status: COMPLETED | OUTPATIENT
Start: 2024-07-12 | End: 2024-07-12

## 2024-07-12 RX ORDER — HYDROCODONE BITARTRATE AND ACETAMINOPHEN 5; 325 MG/1; MG/1
1 TABLET ORAL EVERY 6 HOURS PRN
Qty: 11 TABLET | Refills: 0 | Status: SHIPPED | OUTPATIENT
Start: 2024-07-12 | End: 2024-07-15

## 2024-07-12 RX ADMIN — HYDROCODONE BITARTRATE AND ACETAMINOPHEN 1 TABLET: 5; 325 TABLET ORAL at 18:33

## 2024-07-12 ASSESSMENT — PAIN DESCRIPTION - ORIENTATION: ORIENTATION: RIGHT

## 2024-07-12 ASSESSMENT — PAIN - FUNCTIONAL ASSESSMENT: PAIN_FUNCTIONAL_ASSESSMENT: 0-10

## 2024-07-12 ASSESSMENT — PAIN DESCRIPTION - LOCATION: LOCATION: FOOT

## 2024-07-12 ASSESSMENT — PAIN SCALES - GENERAL: PAINLEVEL_OUTOF10: 10

## 2024-07-12 NOTE — ED TRIAGE NOTES
Pt reported he broke something in his foot hat he was supposed to have surgery on this past Tuesday but it got postpone, pt reported he has not had any pain meds and OTC meds are not helping with the pain

## 2024-07-12 NOTE — DISCHARGE INSTRUCTIONS
You can take tylenol or ibuprofen for aches and pains for the next few days. You can alternate these every 3 hours so that you always have something on board. For instance, you can take tylenol at 9am, ibuprofen at noon, tylenol again at 3pm and ibuprofen again at 6pm. Take in plenty of water to stay hydrated and follow up with your primary care doctor in the next few days.     Save the narcotic pain medication for severe pain preventing sleep.    Return to the ER for any new or concerning symptoms.        Thank you for choosing our Emergency Department for your care.  It is our privilege to care for you in your time of need.  In the next several days, you may receive a survey via email or mailed to your home about your experience with our team.  We would greatly appreciate you taking a few minutes to complete the survey, as we use this information to learn what we have done well and what we could be doing better. Thank you for trusting us with your care!    Below you will find a list of your tests from today's visit.   Labs  No results found for this or any previous visit (from the past 12 hour(s)).    Radiologic Studies  No orders to display     ------------------------------------------------------------------------------------------------------------  The evaluation and treatment you received in the Emergency Department were for an urgent problem. It is important that you follow-up with a doctor, nurse practitioner, or physician assistant to:  (1) confirm your diagnosis,  (2) re-evaluation of changes in your illness and treatment, and (3) for ongoing care. Please take your discharge instructions with you when you go to your follow-up appointment.     If you have any problem arranging a follow-up appointment, contact us!  If your symptoms become worse or you do not improve as expected, please return to us. We are available 24 hours a day.     If a prescription has been provided, please fill it as soon as possible

## 2024-07-12 NOTE — ED PROVIDER NOTES
of external notes): Prior medical records and Nursing notes.    Vitals:    Vitals:    07/12/24 1804   BP: (!) 143/96   Pulse: 67   Resp: 16   Temp: 98.3 °F (36.8 °C)   SpO2: 100%   Weight: 79.4 kg (175 lb)   Height: 1.778 m (5' 10\")        ED COURSE       Patient was given the following medications:  Medications   HYDROcodone-acetaminophen (NORCO) 5-325 MG per tablet 1 tablet (has no administration in time range)       CONSULTS: See ED Course/MDM for further details.         PROCEDURES   Unless otherwise noted above, none      CRITICAL CARE TIME   N/a     ED IMPRESSION     1. Right foot pain    2. Closed displaced fracture of fifth metatarsal bone of right foot, initial encounter          DISPOSITION/PLAN   Discharge     PATIENT REFERRED TO:  Your orthopedic doctor  Go on Tuesday            DISCHARGE MEDICATIONS:     Medication List        START taking these medications      HYDROcodone-acetaminophen 5-325 MG per tablet  Commonly known as: Norco  Take 1 tablet by mouth every 6 hours as needed for Pain for up to 3 days. Intended supply: 3 days. Take lowest dose possible to manage pain Max Daily Amount: 4 tablets            ASK your doctor about these medications      acetaminophen 500 MG tablet  Commonly known as: TYLENOL  Take 1 tablet by mouth 4 times daily as needed for Pain     albuterol sulfate  (90 Base) MCG/ACT inhaler  Commonly known as: PROVENTIL;VENTOLIN;PROAIR     amLODIPine 5 MG tablet  Commonly known as: NORVASC  Take 1 tablet by mouth daily     ARIPiprazole 5 MG tablet  Commonly known as: ABILIFY     aspirin 81 MG EC tablet  Take 1 tablet by mouth daily     carvedilol 3.125 MG tablet  Commonly known as: COREG  Take 1 tablet by mouth 2 times daily (with meals)     celecoxib 200 MG capsule  Commonly known as: CELEBREX     cyclobenzaprine 10 MG tablet  Commonly known as: FLEXERIL     docusate 100 MG Caps  Commonly known as: COLACE, DULCOLAX  Take 100 mg by mouth 2 times daily

## 2024-07-16 ENCOUNTER — ANESTHESIA EVENT (OUTPATIENT)
Facility: HOSPITAL | Age: 59
End: 2024-07-16
Payer: MEDICARE

## 2024-07-16 ENCOUNTER — HOSPITAL ENCOUNTER (OUTPATIENT)
Facility: HOSPITAL | Age: 59
Setting detail: OUTPATIENT SURGERY
Discharge: HOME | End: 2024-07-16
Attending: PODIATRIST | Admitting: HOSPITALIST
Payer: MEDICARE

## 2024-07-16 ENCOUNTER — ANESTHESIA (OUTPATIENT)
Facility: HOSPITAL | Age: 59
End: 2024-07-16
Payer: MEDICARE

## 2024-07-16 VITALS
OXYGEN SATURATION: 100 % | TEMPERATURE: 97.3 F | WEIGHT: 174 LBS | HEART RATE: 64 BPM | RESPIRATION RATE: 20 BRPM | HEIGHT: 70 IN | DIASTOLIC BLOOD PRESSURE: 72 MMHG | SYSTOLIC BLOOD PRESSURE: 117 MMHG | BODY MASS INDEX: 24.91 KG/M2

## 2024-07-16 DIAGNOSIS — G89.18 POST-OP PAIN: Primary | ICD-10-CM

## 2024-07-16 PROCEDURE — 3600000014 HC SURGERY LEVEL 4 ADDTL 15MIN: Performed by: PODIATRIST

## 2024-07-16 PROCEDURE — 6360000002 HC RX W HCPCS: Performed by: NURSE ANESTHETIST, CERTIFIED REGISTERED

## 2024-07-16 PROCEDURE — C1713 ANCHOR/SCREW BN/BN,TIS/BN: HCPCS | Performed by: PODIATRIST

## 2024-07-16 PROCEDURE — 7100000011 HC PHASE II RECOVERY - ADDTL 15 MIN: Performed by: PODIATRIST

## 2024-07-16 PROCEDURE — 2580000003 HC RX 258: Performed by: PODIATRIST

## 2024-07-16 PROCEDURE — 7100000010 HC PHASE II RECOVERY - FIRST 15 MIN: Performed by: PODIATRIST

## 2024-07-16 PROCEDURE — 3600000004 HC SURGERY LEVEL 4 BASE: Performed by: PODIATRIST

## 2024-07-16 PROCEDURE — C1734 ORTH/DEVIC/DRUG BN/BN,TIS/BN: HCPCS | Performed by: PODIATRIST

## 2024-07-16 PROCEDURE — 2500000003 HC RX 250 WO HCPCS: Performed by: PODIATRIST

## 2024-07-16 PROCEDURE — 2720000010 HC SURG SUPPLY STERILE: Performed by: PODIATRIST

## 2024-07-16 PROCEDURE — 2500000003 HC RX 250 WO HCPCS: Performed by: NURSE ANESTHETIST, CERTIFIED REGISTERED

## 2024-07-16 PROCEDURE — 6370000000 HC RX 637 (ALT 250 FOR IP)

## 2024-07-16 PROCEDURE — 3700000001 HC ADD 15 MINUTES (ANESTHESIA): Performed by: PODIATRIST

## 2024-07-16 PROCEDURE — 6360000002 HC RX W HCPCS: Performed by: PODIATRIST

## 2024-07-16 PROCEDURE — 2709999900 HC NON-CHARGEABLE SUPPLY: Performed by: PODIATRIST

## 2024-07-16 PROCEDURE — 3700000000 HC ANESTHESIA ATTENDED CARE: Performed by: PODIATRIST

## 2024-07-16 DEVICE — SLIM STRAIGHT PLATE
Type: IMPLANTABLE DEVICE | Site: FOOT | Status: FUNCTIONAL
Brand: VARIAX

## 2024-07-16 DEVICE — LOCKING SCREW, FULLY THREADED,T8
Type: IMPLANTABLE DEVICE | Site: FOOT | Status: FUNCTIONAL
Brand: VARIAX

## 2024-07-16 DEVICE — INJECTABLE KIT
Type: IMPLANTABLE DEVICE | Site: FOOT | Status: FUNCTIONAL
Brand: AUGMENT® INJECTABLE

## 2024-07-16 RX ORDER — OXYCODONE HYDROCHLORIDE AND ACETAMINOPHEN 5; 325 MG/1; MG/1
1 TABLET ORAL EVERY 6 HOURS PRN
Qty: 21 TABLET | Refills: 0 | Status: SHIPPED | OUTPATIENT
Start: 2024-07-16 | End: 2024-07-23

## 2024-07-16 RX ORDER — SODIUM CHLORIDE 9 MG/ML
INJECTION, SOLUTION INTRAVENOUS CONTINUOUS
Status: DISCONTINUED | OUTPATIENT
Start: 2024-07-16 | End: 2024-07-16 | Stop reason: HOSPADM

## 2024-07-16 RX ORDER — CLINDAMYCIN HYDROCHLORIDE 300 MG/1
300 CAPSULE ORAL 3 TIMES DAILY
Qty: 21 CAPSULE | Refills: 0 | Status: SHIPPED | OUTPATIENT
Start: 2024-07-16 | End: 2024-07-23

## 2024-07-16 RX ORDER — PROPOFOL 10 MG/ML
INJECTION, EMULSION INTRAVENOUS PRN
Status: DISCONTINUED | OUTPATIENT
Start: 2024-07-16 | End: 2024-07-16 | Stop reason: SDUPTHER

## 2024-07-16 RX ORDER — OXYCODONE HYDROCHLORIDE AND ACETAMINOPHEN 5; 325 MG/1; MG/1
TABLET ORAL
Status: COMPLETED
Start: 2024-07-16 | End: 2024-07-16

## 2024-07-16 RX ORDER — EPHEDRINE SULFATE 50 MG/ML
INJECTION INTRAVENOUS PRN
Status: DISCONTINUED | OUTPATIENT
Start: 2024-07-16 | End: 2024-07-16 | Stop reason: SDUPTHER

## 2024-07-16 RX ORDER — MIDAZOLAM HYDROCHLORIDE 1 MG/ML
INJECTION INTRAMUSCULAR; INTRAVENOUS PRN
Status: DISCONTINUED | OUTPATIENT
Start: 2024-07-16 | End: 2024-07-16 | Stop reason: SDUPTHER

## 2024-07-16 RX ORDER — OXYCODONE HYDROCHLORIDE AND ACETAMINOPHEN 5; 325 MG/1; MG/1
2 TABLET ORAL ONCE
Status: COMPLETED | OUTPATIENT
Start: 2024-07-16 | End: 2024-07-16

## 2024-07-16 RX ORDER — LIDOCAINE HYDROCHLORIDE 10 MG/ML
INJECTION, SOLUTION INFILTRATION; PERINEURAL
Status: DISCONTINUED
Start: 2024-07-16 | End: 2024-07-16 | Stop reason: WASHOUT

## 2024-07-16 RX ORDER — LIDOCAINE HYDROCHLORIDE 10 MG/ML
INJECTION, SOLUTION INFILTRATION; PERINEURAL
Status: DISCONTINUED
Start: 2024-07-16 | End: 2024-07-16 | Stop reason: HOSPADM

## 2024-07-16 RX ORDER — KETAMINE HCL IN NACL, ISO-OSM 100MG/10ML
SYRINGE (ML) INJECTION PRN
Status: DISCONTINUED | OUTPATIENT
Start: 2024-07-16 | End: 2024-07-16 | Stop reason: SDUPTHER

## 2024-07-16 RX ORDER — LIDOCAINE HYDROCHLORIDE 20 MG/ML
INJECTION, SOLUTION EPIDURAL; INFILTRATION; INTRACAUDAL; PERINEURAL PRN
Status: DISCONTINUED | OUTPATIENT
Start: 2024-07-16 | End: 2024-07-16 | Stop reason: SDUPTHER

## 2024-07-16 RX ORDER — GLYCOPYRROLATE 0.2 MG/ML
INJECTION INTRAMUSCULAR; INTRAVENOUS PRN
Status: DISCONTINUED | OUTPATIENT
Start: 2024-07-16 | End: 2024-07-16 | Stop reason: SDUPTHER

## 2024-07-16 RX ORDER — CLINDAMYCIN PHOSPHATE 900 MG/50ML
900 INJECTION, SOLUTION INTRAVENOUS ONCE
Status: COMPLETED | OUTPATIENT
Start: 2024-07-16 | End: 2024-07-16

## 2024-07-16 RX ORDER — FENTANYL CITRATE 50 UG/ML
INJECTION, SOLUTION INTRAMUSCULAR; INTRAVENOUS PRN
Status: DISCONTINUED | OUTPATIENT
Start: 2024-07-16 | End: 2024-07-16 | Stop reason: SDUPTHER

## 2024-07-16 RX ORDER — CLINDAMYCIN PHOSPHATE 900 MG/50ML
INJECTION, SOLUTION INTRAVENOUS
Status: COMPLETED
Start: 2024-07-16 | End: 2024-07-16

## 2024-07-16 RX ORDER — LIDOCAINE HYDROCHLORIDE 10 MG/ML
INJECTION, SOLUTION INFILTRATION; PERINEURAL PRN
Status: DISCONTINUED | OUTPATIENT
Start: 2024-07-16 | End: 2024-07-16 | Stop reason: ALTCHOICE

## 2024-07-16 RX ADMIN — PROPOFOL 50 MG: 10 INJECTION, EMULSION INTRAVENOUS at 08:58

## 2024-07-16 RX ADMIN — GLYCOPYRROLATE 0.2 MG: 0.2 INJECTION INTRAMUSCULAR; INTRAVENOUS at 08:58

## 2024-07-16 RX ADMIN — PROPOFOL 50 MCG/KG/MIN: 10 INJECTION, EMULSION INTRAVENOUS at 09:05

## 2024-07-16 RX ADMIN — OXYCODONE AND ACETAMINOPHEN 2 TABLET: 5; 325 TABLET ORAL at 11:55

## 2024-07-16 RX ADMIN — Medication 30 MG: at 08:57

## 2024-07-16 RX ADMIN — FENTANYL CITRATE 100 MCG: 50 INJECTION, SOLUTION INTRAMUSCULAR; INTRAVENOUS at 09:15

## 2024-07-16 RX ADMIN — EPHEDRINE SULFATE 10 MG: 50 INJECTION INTRAVENOUS at 09:30

## 2024-07-16 RX ADMIN — EPHEDRINE SULFATE 10 MG: 50 INJECTION INTRAVENOUS at 09:37

## 2024-07-16 RX ADMIN — SODIUM CHLORIDE: 9 INJECTION, SOLUTION INTRAVENOUS at 08:43

## 2024-07-16 RX ADMIN — CLINDAMYCIN IN 5 PERCENT DEXTROSE 900 MG: 18 INJECTION, SOLUTION INTRAVENOUS at 08:49

## 2024-07-16 RX ADMIN — Medication 20 MG: at 09:05

## 2024-07-16 RX ADMIN — OXYCODONE HYDROCHLORIDE AND ACETAMINOPHEN 2 TABLET: 5; 325 TABLET ORAL at 11:55

## 2024-07-16 RX ADMIN — MIDAZOLAM 2 MG: 1 INJECTION INTRAMUSCULAR; INTRAVENOUS at 08:46

## 2024-07-16 RX ADMIN — LIDOCAINE HYDROCHLORIDE 60 MG: 20 INJECTION, SOLUTION EPIDURAL; INFILTRATION; INTRACAUDAL; PERINEURAL at 09:05

## 2024-07-16 ASSESSMENT — PAIN - FUNCTIONAL ASSESSMENT
PAIN_FUNCTIONAL_ASSESSMENT: 0-10
PAIN_FUNCTIONAL_ASSESSMENT: ACTIVITIES ARE NOT PREVENTED
PAIN_FUNCTIONAL_ASSESSMENT: 0-10

## 2024-07-16 ASSESSMENT — PAIN DESCRIPTION - DESCRIPTORS
DESCRIPTORS: SHARP

## 2024-07-16 ASSESSMENT — PAIN DESCRIPTION - LOCATION: LOCATION: FOOT

## 2024-07-16 ASSESSMENT — PAIN SCALES - GENERAL: PAINLEVEL_OUTOF10: 10

## 2024-07-16 ASSESSMENT — PAIN DESCRIPTION - ORIENTATION: ORIENTATION: RIGHT

## 2024-07-16 NOTE — ANESTHESIA POSTPROCEDURE EVALUATION
Department of Anesthesiology  Postprocedure Note    Patient: David Alfaro  MRN: [id number]  YOB: 1965  Date of evaluation: 7/16/2024    Procedure Summary       Date: 07/16/24 Room / Location: [facility] MAIN OR 01 / [facility] MAIN OR    Anesthesia Start: 0850 Anesthesia Stop: 1102    Procedure: RIGHT FOOT FIFTH METATARSAL FRACTURE OPEN REDUCTION WITH INTERNAL FIXATION (Right: Foot) Diagnosis:       Closed displaced fracture of fifth metatarsal bone of right foot, initial encounter      (Closed displaced fracture of fifth metatarsal bone of right foot, initial encounter [S92.351A])    Surgeons: Markell Howard DPM Responsible Provider:     Anesthesia Type: MAC ASA Status: 3            Anesthesia Type: MAC    Chente Phase I: Chente Score: 10    Chente Phase II:      Anesthesia Post Evaluation    Patient location during evaluation: bedside  Patient participation: complete - patient participated  Level of consciousness: sleepy but conscious  Pain score: 0  Airway patency: patent  Nausea & Vomiting: no vomiting and no nausea  Cardiovascular status: blood pressure returned to baseline  Respiratory status: room air  Hydration status: stable  Multimodal analgesia pain management approach    No notable events documented.

## 2024-07-16 NOTE — H&P
Update History & Physical    The patient's History and Physical of ER on 7/12/24 was reviewed with the patient and I examined the patient. There was no change. The surgical site was confirmed by the patient and me.       Plan: The risks, benefits, expected outcome, and alternative to the recommended procedure have been discussed with the patient. Patient understands and wants to proceed with the procedure.     Electronically signed by Markell Howard DPM on 7/16/2024 at 8:18 AM

## 2024-07-16 NOTE — PROGRESS NOTES
Dr. Howard in explained to patient to elevate foot and walk on heel with no weight on entire foot and to expect pain and to start pain medicine at home and to proceed with discharge

## 2024-07-16 NOTE — ANESTHESIA PRE PROCEDURE
Department of Anesthesiology  Preprocedure Note       Name:  David Alfaro   Age:  58 y.o.  :  1965                                          MRN:  [id number]         Date:  2024      Surgeon: Surgeon(s):  Markell Howard DPM    Procedure: Procedure(s):  RIGHT FOOT FIFTH METATARSAL FRACTURE OPEN REDUCTION WITH INTERNAL FIXATION    Medications prior to admission:   Prior to Admission medications    Medication Sig Start Date End Date Taking? Authorizing Provider   celecoxib (CELEBREX) 200 MG capsule Take 1 capsule by mouth 2 times daily   Yes Rina Hill MD   ARIPiprazole (ABILIFY) 5 MG tablet Take 1 tablet by mouth daily   Yes Rina Hill MD   lisinopril (PRINIVIL;ZESTRIL) 20 MG tablet Take 1 tablet by mouth daily   Yes ProviderRina MD   spironolactone (ALDACTONE) 25 MG tablet Take 1 tablet by mouth daily   Yes ProviderRina MD   Ascorbic Acid (VITAMIN C) 250 MG tablet Take 2 tablets by mouth daily   Yes ProviderRina MD   Multiple Vitamin (MULTIVITAMIN ADULT PO) Take 1 tablet by mouth once   Yes ProviderRina MD   acetaminophen (TYLENOL) 500 MG tablet Take 1 tablet by mouth 4 times daily as needed for Pain 24   Chris Coleman MD   ibuprofen (IBU) 400 MG tablet Take 1 tablet by mouth every 6 hours as needed for Pain 24   Chris Coleman MD   risperiDONE (RISPERDAL) 2 MG tablet Take 1 tablet by mouth 2 times daily 3/19/24   Davi Ruth MD   carvedilol (COREG) 3.125 MG tablet Take 1 tablet by mouth 2 times daily (with meals) 3/19/24   Davi Ruth MD   docusate sodium (COLACE, DULCOLAX) 100 MG CAPS Take 100 mg by mouth 2 times daily 3/19/24   Davi Ruth MD   amLODIPine (NORVASC) 5 MG tablet Take 1 tablet by mouth daily 3/20/24   Davi Ruth MD   magnesium oxide (MAG-OX) 400 (240 Mg) MG tablet Take 1 tablet by mouth daily 3/20/24   Davi Ruth MD   pantoprazole (PROTONIX) 40 MG tablet

## 2024-07-18 NOTE — OP NOTE
complications of procedure discussed in detail with patient. No guarantee made to outcome of procedure. Patient voiced verbal understanding and signed consent.     Detailed Description of Procedure: After IV was established by the Department of Anesthesia, the patient was taken to the operating room via cart and placed on the operating table in the supine position. A safety strap was placed across her waist for her protection. Copious amounts of Webril were applied about the right ankle and a pneumatic ankle tourniquet was applied. After adequate IV sedation was administered by the Department of Anesthesia, a total of 10 cc of 0.5% Marcaine plain was used to perform an infiltrative type block to the right fifth metatarsal area of the right foot. Next, the foot was prepped and draped in the usual aseptic fashion. An Esmarch bandage was used to exsanguinate the foot and the pneumatic ankle tourniquet was elevated to 250 mmHg. The foot was lowered in the operative field and a sterile stocking was reflected. Attention was directed to the right fifth metatarsal base. The fluoroscopic unit was used to visualize the fractured fifth metatarsal. A displaced fifth metatarsal fracture of the right fifth metatarsal base was visualized. The fracture was linear in nature with distal fragment shifted medially. A skin scrub was used to carefully sean out all the landmarks including the peroneus longus and brevis tendons in the fifth metatarsal and the sural nerve. A linear incision was created with a #10 blade. A #15 blade was used to deepen the incision through the subcutaneous tissue. All small veins traversing the subcutaneous tissue were ligated with electrocautery. Next, using combination of sharp and blunt dissection, the deep fascia was reached. Next a linear capsuloperiosteal incision was made down to the bone using a #15 blade. Next, using a periosteal elevator and a #15 blade, the capsuloperiosteal tissues were stripped

## 2024-07-19 ENCOUNTER — HOSPITAL ENCOUNTER (OUTPATIENT)
Facility: HOSPITAL | Age: 59
Discharge: HOME | End: 2024-07-22
Attending: PODIATRIST
Payer: MEDICARE

## 2024-07-19 PROCEDURE — 76000 FLUOROSCOPY <1 HR PHYS/QHP: CPT

## 2024-08-30 ENCOUNTER — APPOINTMENT (OUTPATIENT)
Facility: HOSPITAL | Age: 59
DRG: 871 | End: 2024-08-30
Payer: MEDICARE

## 2024-08-30 ENCOUNTER — HOSPITAL ENCOUNTER (EMERGENCY)
Facility: HOSPITAL | Age: 59
Discharge: HOME | DRG: 871 | End: 2024-08-30
Attending: EMERGENCY MEDICINE
Payer: MEDICARE

## 2024-08-30 VITALS
OXYGEN SATURATION: 94 % | HEART RATE: 88 BPM | SYSTOLIC BLOOD PRESSURE: 127 MMHG | DIASTOLIC BLOOD PRESSURE: 91 MMHG | RESPIRATION RATE: 20 BRPM | TEMPERATURE: 98.5 F

## 2024-08-30 DIAGNOSIS — E87.70 HYPERVOLEMIA, UNSPECIFIED HYPERVOLEMIA TYPE: Primary | ICD-10-CM

## 2024-08-30 LAB
ANION GAP SERPL CALC-SCNC: 12 MMOL/L (ref 5–15)
BNP SERPL-MCNC: 970 PG/ML
BUN SERPL-MCNC: 18 MG/DL (ref 6–20)
BUN/CREAT SERPL: 14 (ref 12–20)
CA-I BLD-MCNC: 9.1 MG/DL (ref 8.5–10.1)
CHLORIDE SERPL-SCNC: 104 MMOL/L (ref 97–108)
CO2 SERPL-SCNC: 26 MMOL/L (ref 21–32)
CREAT SERPL-MCNC: 1.29 MG/DL (ref 0.7–1.3)
GLUCOSE SERPL-MCNC: 123 MG/DL (ref 65–100)
POTASSIUM SERPL-SCNC: 4.3 MMOL/L (ref 3.5–5.1)
SODIUM SERPL-SCNC: 142 MMOL/L (ref 136–145)
TROPONIN I SERPL HS-MCNC: 9 NG/L (ref 0–76)

## 2024-08-30 PROCEDURE — 96374 THER/PROPH/DIAG INJ IV PUSH: CPT

## 2024-08-30 PROCEDURE — 71045 X-RAY EXAM CHEST 1 VIEW: CPT

## 2024-08-30 PROCEDURE — 80048 BASIC METABOLIC PNL TOTAL CA: CPT

## 2024-08-30 PROCEDURE — 36415 COLL VENOUS BLD VENIPUNCTURE: CPT

## 2024-08-30 PROCEDURE — 99284 EMERGENCY DEPT VISIT MOD MDM: CPT

## 2024-08-30 PROCEDURE — 84484 ASSAY OF TROPONIN QUANT: CPT

## 2024-08-30 PROCEDURE — 6360000002 HC RX W HCPCS: Performed by: EMERGENCY MEDICINE

## 2024-08-30 PROCEDURE — 83880 ASSAY OF NATRIURETIC PEPTIDE: CPT

## 2024-08-30 RX ORDER — SPIRONOLACTONE 25 MG/1
25 TABLET ORAL 2 TIMES DAILY
Qty: 3 TABLET | Refills: 1 | Status: ON HOLD | OUTPATIENT
Start: 2024-08-30

## 2024-08-30 RX ORDER — FUROSEMIDE 10 MG/ML
20 INJECTION INTRAMUSCULAR; INTRAVENOUS
Status: COMPLETED | OUTPATIENT
Start: 2024-08-30 | End: 2024-08-30

## 2024-08-30 RX ADMIN — FUROSEMIDE 20 MG: 10 INJECTION, SOLUTION INTRAMUSCULAR; INTRAVENOUS at 10:52

## 2024-08-30 ASSESSMENT — PAIN SCALES - GENERAL: PAINLEVEL_OUTOF10: 0

## 2024-08-30 ASSESSMENT — PAIN - FUNCTIONAL ASSESSMENT: PAIN_FUNCTIONAL_ASSESSMENT: 0-10

## 2024-08-30 ASSESSMENT — LIFESTYLE VARIABLES
HOW OFTEN DO YOU HAVE A DRINK CONTAINING ALCOHOL: NEVER
HOW MANY STANDARD DRINKS CONTAINING ALCOHOL DO YOU HAVE ON A TYPICAL DAY: PATIENT DOES NOT DRINK

## 2024-08-30 NOTE — ED TRIAGE NOTES
Patient arrives to ED with complaint of fatigue and cough x3 days. Reports coughing up yellow phlegm. Denies pain at this time.

## 2024-08-30 NOTE — ED PROVIDER NOTES
BUN/Creatinine Ratio 14 12 - 20      Est, Glom Filt Rate 63 >60 ml/min/1.73m2    Calcium 9.1 8.5 - 10.1 mg/dL       EKG:.As interpreted by me, normal sinus rhythm with a rate of 77.  No STEMI    Radiologic Studies:  Non-plain film images such as CT, Ultrasound and MRI are read by the radiologist. Plain radiographic images are visualized and preliminarily interpreted by the ED Provider with the following findings: Xray Interpreted by me.  Shows mild hypervolemia    Interpretation per the Radiologist below, if available at the time of this note:  XR CHEST PORTABLE   Final Result   Bibasilar patchy airspace disease.         Electronically signed by Chevy Poe MD           ED COURSE and DIFFERENTIAL DIAGNOSIS/MDM   10:52 AM Differential and Considerations: Appears to be viral syndrome versus possible congestive heart failure.  Will obtain chest x-ray    Records Reviewed (source and summary of external notes): Prior medical records and Nursing notes.    Vitals:    Vitals:    08/30/24 0940   BP: (!) 150/98   Pulse: 88   Resp: 20   Temp: 98.5 °F (36.9 °C)   SpO2: 93%        ED COURSE  ED Course as of 08/30/24 1052   Fri Aug 30, 2024   1049 Patient appears to have bilateral airspace disease which is most consistent with congestive heart failure.  Patient is on spironolactone we will double for the next 3 days and then have him follow-up with PCP as an outpatient.  No elevation in troponin no elevation of white blood cell count no increased work of breathing no accessory muscle use no tachypnea no tachycardia and no hypoxia. [CS]   1051 Patient has mildly elevated BNP.  After further questioning patient is not on hydrochlorothiazide but is on spironolactone. [CS]      ED Course User Index  [CS] Valdez Martines MD       SEPSIS Reassessment: Sepsis reassessment not applicable    Clinical Management Tools:      Patient was given the following medications:  Medications   furosemide (LASIX) injection 20 mg (has no  tablet  Commonly known as: NORVASC  Take 1 tablet by mouth daily     ARIPiprazole 5 MG tablet  Commonly known as: ABILIFY     aspirin 81 MG EC tablet  Take 1 tablet by mouth daily     carvedilol 3.125 MG tablet  Commonly known as: COREG  Take 1 tablet by mouth 2 times daily (with meals)     celecoxib 200 MG capsule  Commonly known as: CELEBREX     cyclobenzaprine 10 MG tablet  Commonly known as: FLEXERIL     docusate 100 MG Caps  Commonly known as: COLACE, DULCOLAX  Take 100 mg by mouth 2 times daily     fluticasone-salmeterol 250-50 MCG/ACT Aepb diskus inhaler  Commonly known as: Advair Diskus  Inhale 1 puff into the lungs in the morning and 1 puff in the evening.     ibuprofen 400 MG tablet  Commonly known as: IBU  Take 1 tablet by mouth every 6 hours as needed for Pain     isosorbide dinitrate 5 MG tablet  Commonly known as: ISORDIL  Take 1 tablet by mouth 3 times daily     lactulose 10 GM/15ML solution  Commonly known as: CHRONULAC  Take 30 mLs by mouth 2 times daily     lisinopril 20 MG tablet  Commonly known as: PRINIVIL;ZESTRIL     magnesium oxide 400 (240 Mg) MG tablet  Commonly known as: MAG-OX  Take 1 tablet by mouth daily     montelukast 10 MG tablet  Commonly known as: SINGULAIR     MULTIVITAMIN ADULT PO     pantoprazole 40 MG tablet  Commonly known as: PROTONIX  Take 1 tablet by mouth every morning (before breakfast)     QUEtiapine 50 MG tablet  Commonly known as: SEROQUEL  Take 1 tablet by mouth nightly     risperiDONE 2 MG tablet  Commonly known as: RISPERDAL  Take 1 tablet by mouth 2 times daily     tamsulosin 0.4 MG capsule  Commonly known as: FLOMAX  Take 1 capsule by mouth daily     * venlafaxine 150 MG extended release capsule  Commonly known as: EFFEXOR XR     * venlafaxine 75 MG extended release capsule  Commonly known as: EFFEXOR XR     vitamin C 250 MG tablet           * This list has 2 medication(s) that are the same as other medications prescribed for you. Read the directions carefully, and

## 2024-08-30 NOTE — ED NOTES
Patient stable at time of discharge. Education and discharge paperwork is reviewed with patient who verbalizes understanding. Patient moved to ED lobby via wheelchair and assisted with calling the [employer] company.

## 2024-08-31 ENCOUNTER — APPOINTMENT (OUTPATIENT)
Facility: HOSPITAL | Age: 59
DRG: 871 | End: 2024-08-31
Payer: MEDICARE

## 2024-08-31 ENCOUNTER — HOSPITAL ENCOUNTER (INPATIENT)
Facility: HOSPITAL | Age: 59
LOS: 3 days | Discharge: HOME | DRG: 871 | End: 2024-09-04
Attending: EMERGENCY MEDICINE | Admitting: FAMILY MEDICINE
Payer: MEDICARE

## 2024-08-31 DIAGNOSIS — J18.9 PNEUMONIA OF BOTH LUNGS DUE TO INFECTIOUS ORGANISM, UNSPECIFIED PART OF LUNG: Primary | ICD-10-CM

## 2024-08-31 LAB
ALBUMIN SERPL-MCNC: 2.6 G/DL (ref 3.5–5)
ALBUMIN/GLOB SERPL: 0.5 (ref 1.1–2.2)
ALP SERPL-CCNC: 60 U/L (ref 45–117)
ALT SERPL-CCNC: 102 U/L (ref 12–78)
ANION GAP SERPL CALC-SCNC: 11 MMOL/L (ref 5–15)
AST SERPL W P-5'-P-CCNC: 89 U/L (ref 15–37)
BASOPHILS # BLD: 0 K/UL (ref 0–0.1)
BASOPHILS NFR BLD: 0 % (ref 0–1)
BILIRUB SERPL-MCNC: 0.5 MG/DL (ref 0.2–1)
BUN SERPL-MCNC: 26 MG/DL (ref 6–20)
BUN/CREAT SERPL: 15 (ref 12–20)
CA-I BLD-MCNC: 9.2 MG/DL (ref 8.5–10.1)
CHLORIDE SERPL-SCNC: 102 MMOL/L (ref 97–108)
CO2 SERPL-SCNC: 26 MMOL/L (ref 21–32)
CREAT SERPL-MCNC: 1.76 MG/DL (ref 0.7–1.3)
DIFFERENTIAL METHOD BLD: ABNORMAL
EOSINOPHIL # BLD: 0 K/UL (ref 0–0.4)
EOSINOPHIL NFR BLD: 0 % (ref 0–7)
ERYTHROCYTE [DISTWIDTH] IN BLOOD BY AUTOMATED COUNT: 13 % (ref 11.5–14.5)
FLUAV RNA SPEC QL NAA+PROBE: NOT DETECTED
FLUBV RNA SPEC QL NAA+PROBE: NOT DETECTED
GLOBULIN SER CALC-MCNC: 4.9 G/DL (ref 2–4)
GLUCOSE SERPL-MCNC: 180 MG/DL (ref 65–100)
HCT VFR BLD AUTO: 35.6 % (ref 36.6–50.3)
HGB BLD-MCNC: 12 G/DL (ref 12.1–17)
IMM GRANULOCYTES # BLD AUTO: 0.1 K/UL (ref 0–0.04)
IMM GRANULOCYTES NFR BLD AUTO: 3 % (ref 0–0.5)
LACTATE SERPL-SCNC: 2.8 MMOL/L (ref 0.4–2)
LYMPHOCYTES # BLD: 0.8 K/UL (ref 0.8–3.5)
LYMPHOCYTES NFR BLD: 14 % (ref 12–49)
MCH RBC QN AUTO: 34.5 PG (ref 26–34)
MCHC RBC AUTO-ENTMCNC: 33.7 G/DL (ref 30–36.5)
MCV RBC AUTO: 102.3 FL (ref 80–99)
MONOCYTES # BLD: 0.8 K/UL (ref 0–1)
MONOCYTES NFR BLD: 13 % (ref 5–13)
NEUTS SEG # BLD: 4.4 K/UL (ref 1.8–8)
NEUTS SEG NFR BLD: 70 % (ref 32–75)
NRBC # BLD: 0.02 K/UL (ref 0–0.01)
NRBC BLD-RTO: 0.3 PER 100 WBC
PLATELET # BLD AUTO: 357 K/UL (ref 150–400)
PMV BLD AUTO: 9.7 FL (ref 8.9–12.9)
POTASSIUM SERPL-SCNC: 4.2 MMOL/L (ref 3.5–5.1)
PROT SERPL-MCNC: 7.5 G/DL (ref 6.4–8.2)
RBC # BLD AUTO: 3.48 M/UL (ref 4.1–5.7)
RBC MORPH BLD: ABNORMAL
SARS-COV-2 RNA RESP QL NAA+PROBE: NOT DETECTED
SODIUM SERPL-SCNC: 139 MMOL/L (ref 136–145)
TROPONIN I SERPL HS-MCNC: 15 NG/L (ref 0–76)
WBC # BLD AUTO: 6.3 K/UL (ref 4.1–11.1)

## 2024-08-31 PROCEDURE — 80053 COMPREHEN METABOLIC PANEL: CPT

## 2024-08-31 PROCEDURE — 6370000000 HC RX 637 (ALT 250 FOR IP): Performed by: EMERGENCY MEDICINE

## 2024-08-31 PROCEDURE — 82550 ASSAY OF CK (CPK): CPT

## 2024-08-31 PROCEDURE — 99285 EMERGENCY DEPT VISIT HI MDM: CPT

## 2024-08-31 PROCEDURE — 96375 TX/PRO/DX INJ NEW DRUG ADDON: CPT

## 2024-08-31 PROCEDURE — 96366 THER/PROPH/DIAG IV INF ADDON: CPT

## 2024-08-31 PROCEDURE — 6360000002 HC RX W HCPCS: Performed by: EMERGENCY MEDICINE

## 2024-08-31 PROCEDURE — 71045 X-RAY EXAM CHEST 1 VIEW: CPT

## 2024-08-31 PROCEDURE — 87040 BLOOD CULTURE FOR BACTERIA: CPT

## 2024-08-31 PROCEDURE — 83605 ASSAY OF LACTIC ACID: CPT

## 2024-08-31 PROCEDURE — 87636 SARSCOV2 & INF A&B AMP PRB: CPT

## 2024-08-31 PROCEDURE — 36415 COLL VENOUS BLD VENIPUNCTURE: CPT

## 2024-08-31 PROCEDURE — 84484 ASSAY OF TROPONIN QUANT: CPT

## 2024-08-31 PROCEDURE — 84145 PROCALCITONIN (PCT): CPT

## 2024-08-31 PROCEDURE — 2580000003 HC RX 258: Performed by: EMERGENCY MEDICINE

## 2024-08-31 PROCEDURE — 93005 ELECTROCARDIOGRAM TRACING: CPT | Performed by: EMERGENCY MEDICINE

## 2024-08-31 PROCEDURE — 96365 THER/PROPH/DIAG IV INF INIT: CPT

## 2024-08-31 PROCEDURE — 87154 CUL TYP ID BLD PTHGN 6+ TRGT: CPT

## 2024-08-31 PROCEDURE — 85025 COMPLETE CBC W/AUTO DIFF WBC: CPT

## 2024-08-31 RX ORDER — LEVOFLOXACIN 5 MG/ML
750 INJECTION, SOLUTION INTRAVENOUS
Status: COMPLETED | OUTPATIENT
Start: 2024-08-31 | End: 2024-09-01

## 2024-08-31 RX ORDER — 0.9 % SODIUM CHLORIDE 0.9 %
30 INTRAVENOUS SOLUTION INTRAVENOUS ONCE
Status: COMPLETED | OUTPATIENT
Start: 2024-08-31 | End: 2024-09-01

## 2024-08-31 RX ORDER — IBUPROFEN 400 MG/1
400 TABLET, FILM COATED ORAL
Status: COMPLETED | OUTPATIENT
Start: 2024-08-31 | End: 2024-09-01

## 2024-08-31 RX ORDER — ACETAMINOPHEN 500 MG
1000 TABLET ORAL
Status: COMPLETED | OUTPATIENT
Start: 2024-08-31 | End: 2024-08-31

## 2024-08-31 RX ADMIN — WATER 1000 MG: 1 INJECTION INTRAMUSCULAR; INTRAVENOUS; SUBCUTANEOUS at 22:41

## 2024-08-31 RX ADMIN — ACETAMINOPHEN 1000 MG: 500 TABLET ORAL at 22:35

## 2024-08-31 RX ADMIN — SODIUM CHLORIDE 2259 ML: 9 INJECTION, SOLUTION INTRAVENOUS at 22:39

## 2024-08-31 RX ADMIN — LEVOFLOXACIN 750 MG: 5 INJECTION, SOLUTION INTRAVENOUS at 22:48

## 2024-08-31 ASSESSMENT — PAIN DESCRIPTION - LOCATION: LOCATION: CHEST

## 2024-08-31 ASSESSMENT — PAIN - FUNCTIONAL ASSESSMENT: PAIN_FUNCTIONAL_ASSESSMENT: 0-10

## 2024-08-31 ASSESSMENT — PAIN DESCRIPTION - PAIN TYPE: TYPE: ACUTE PAIN

## 2024-09-01 ENCOUNTER — APPOINTMENT (OUTPATIENT)
Facility: HOSPITAL | Age: 59
DRG: 871 | End: 2024-09-01
Payer: MEDICARE

## 2024-09-01 PROBLEM — I69.30 HISTORY OF CEREBROVASCULAR ACCIDENT (CVA) WITH RESIDUAL DEFICIT: Status: ACTIVE | Noted: 2024-09-01

## 2024-09-01 PROBLEM — A41.9 SEPSIS (HCC): Status: ACTIVE | Noted: 2024-09-01

## 2024-09-01 PROBLEM — R26.2 AMBULATORY DYSFUNCTION: Status: ACTIVE | Noted: 2024-09-01

## 2024-09-01 PROBLEM — G93.40 ACUTE ENCEPHALOPATHY: Status: ACTIVE | Noted: 2024-09-01

## 2024-09-01 LAB
AMPHET UR QL SCN: NEGATIVE
APPEARANCE UR: CLEAR
BACTERIA URNS QL MICRO: ABNORMAL /HPF
BARBITURATES UR QL SCN: NEGATIVE
BENZODIAZ UR QL: NEGATIVE
BILIRUB UR QL: NEGATIVE
CANNABINOIDS UR QL SCN: NEGATIVE
CK SERPL-CCNC: 207 U/L (ref 39–308)
COCAINE UR QL SCN: POSITIVE
COLOR UR: ABNORMAL
GLUCOSE UR STRIP.AUTO-MCNC: NEGATIVE MG/DL
HGB UR QL STRIP: NEGATIVE
KETONES UR QL STRIP.AUTO: NEGATIVE MG/DL
LACTATE SERPL-SCNC: 1.3 MMOL/L (ref 0.4–2)
LEUKOCYTE ESTERASE UR QL STRIP.AUTO: NEGATIVE
Lab: ABNORMAL
MDMA, URINE: NEGATIVE
METHADONE UR QL: NEGATIVE
NITRITE UR QL STRIP.AUTO: NEGATIVE
OPIATES UR QL: POSITIVE
PCP UR QL: NEGATIVE
PH UR STRIP: 5.5 (ref 5–8)
PROCALCITONIN SERPL-MCNC: 1.26 NG/ML
PROCALCITONIN SERPL-MCNC: 1.37 NG/ML
PROT UR STRIP-MCNC: 30 MG/DL
RBC #/AREA URNS HPF: ABNORMAL /HPF (ref 0–3)
SP GR UR REFRACTOMETRY: >1.03 (ref 1–1.03)
URINE CULTURE IF INDICATED: ABNORMAL
UROBILINOGEN UR QL STRIP.AUTO: 0.2 EU/DL (ref 0.2–1)
WBC URNS QL MICRO: ABNORMAL /HPF (ref 0–5)

## 2024-09-01 PROCEDURE — 1100000000 HC RM PRIVATE

## 2024-09-01 PROCEDURE — 83605 ASSAY OF LACTIC ACID: CPT

## 2024-09-01 PROCEDURE — 94669 MECHANICAL CHEST WALL OSCILL: CPT

## 2024-09-01 PROCEDURE — 6370000000 HC RX 637 (ALT 250 FOR IP): Performed by: FAMILY MEDICINE

## 2024-09-01 PROCEDURE — 70450 CT HEAD/BRAIN W/O DYE: CPT

## 2024-09-01 PROCEDURE — 2580000003 HC RX 258: Performed by: FAMILY MEDICINE

## 2024-09-01 PROCEDURE — 71250 CT THORAX DX C-: CPT

## 2024-09-01 PROCEDURE — 80307 DRUG TEST PRSMV CHEM ANLYZR: CPT

## 2024-09-01 PROCEDURE — 2580000003 HC RX 258: Performed by: EMERGENCY MEDICINE

## 2024-09-01 PROCEDURE — 87086 URINE CULTURE/COLONY COUNT: CPT

## 2024-09-01 PROCEDURE — 6370000000 HC RX 637 (ALT 250 FOR IP): Performed by: EMERGENCY MEDICINE

## 2024-09-01 PROCEDURE — 6360000002 HC RX W HCPCS: Performed by: FAMILY MEDICINE

## 2024-09-01 PROCEDURE — 94640 AIRWAY INHALATION TREATMENT: CPT

## 2024-09-01 PROCEDURE — 81001 URINALYSIS AUTO W/SCOPE: CPT

## 2024-09-01 RX ORDER — QUETIAPINE FUMARATE 50 MG/1
50 TABLET, FILM COATED ORAL NIGHTLY
Status: DISCONTINUED | OUTPATIENT
Start: 2024-09-01 | End: 2024-09-04 | Stop reason: HOSPADM

## 2024-09-01 RX ORDER — ENOXAPARIN SODIUM 100 MG/ML
40 INJECTION SUBCUTANEOUS DAILY
Status: DISCONTINUED | OUTPATIENT
Start: 2024-09-01 | End: 2024-09-04 | Stop reason: HOSPADM

## 2024-09-01 RX ORDER — RISPERIDONE 1 MG/1
2 TABLET ORAL 2 TIMES DAILY
Status: DISCONTINUED | OUTPATIENT
Start: 2024-09-01 | End: 2024-09-04 | Stop reason: HOSPADM

## 2024-09-01 RX ORDER — VANCOMYCIN 2 G/400ML
2000 INJECTION, SOLUTION INTRAVENOUS ONCE
Status: COMPLETED | OUTPATIENT
Start: 2024-09-01 | End: 2024-09-01

## 2024-09-01 RX ORDER — VENLAFAXINE HYDROCHLORIDE 75 MG/1
75 CAPSULE, EXTENDED RELEASE ORAL DAILY
Status: DISCONTINUED | OUTPATIENT
Start: 2024-09-01 | End: 2024-09-04 | Stop reason: HOSPADM

## 2024-09-01 RX ORDER — POLYETHYLENE GLYCOL 3350 17 G/17G
17 POWDER, FOR SOLUTION ORAL DAILY PRN
Status: DISCONTINUED | OUTPATIENT
Start: 2024-09-01 | End: 2024-09-04 | Stop reason: HOSPADM

## 2024-09-01 RX ORDER — VANCOMYCIN 2 G/400ML
25 INJECTION, SOLUTION INTRAVENOUS
Status: DISCONTINUED | OUTPATIENT
Start: 2024-09-01 | End: 2024-09-01

## 2024-09-01 RX ORDER — SPIRONOLACTONE 25 MG/1
25 TABLET ORAL DAILY
Status: DISCONTINUED | OUTPATIENT
Start: 2024-09-01 | End: 2024-09-01

## 2024-09-01 RX ORDER — ASCORBIC ACID 500 MG
500 TABLET ORAL DAILY
Status: DISCONTINUED | OUTPATIENT
Start: 2024-09-01 | End: 2024-09-04 | Stop reason: HOSPADM

## 2024-09-01 RX ORDER — ACETAMINOPHEN 500 MG
500 TABLET ORAL 4 TIMES DAILY PRN
Status: DISCONTINUED | OUTPATIENT
Start: 2024-09-01 | End: 2024-09-04 | Stop reason: HOSPADM

## 2024-09-01 RX ORDER — LACTULOSE 10 G/15ML
20 SOLUTION ORAL 2 TIMES DAILY
Status: DISCONTINUED | OUTPATIENT
Start: 2024-09-01 | End: 2024-09-02

## 2024-09-01 RX ORDER — BUDESONIDE AND FORMOTEROL FUMARATE DIHYDRATE 80; 4.5 UG/1; UG/1
2 AEROSOL RESPIRATORY (INHALATION)
Status: DISCONTINUED | OUTPATIENT
Start: 2024-09-01 | End: 2024-09-04 | Stop reason: HOSPADM

## 2024-09-01 RX ORDER — SODIUM CHLORIDE 9 MG/ML
INJECTION, SOLUTION INTRAVENOUS PRN
Status: DISCONTINUED | OUTPATIENT
Start: 2024-09-01 | End: 2024-09-04 | Stop reason: HOSPADM

## 2024-09-01 RX ORDER — ASPIRIN 81 MG/1
81 TABLET ORAL DAILY
Status: DISCONTINUED | OUTPATIENT
Start: 2024-09-01 | End: 2024-09-04 | Stop reason: HOSPADM

## 2024-09-01 RX ORDER — DOCUSATE SODIUM 100 MG/1
100 CAPSULE, LIQUID FILLED ORAL 2 TIMES DAILY
Status: DISCONTINUED | OUTPATIENT
Start: 2024-09-01 | End: 2024-09-04 | Stop reason: HOSPADM

## 2024-09-01 RX ORDER — SODIUM CHLORIDE 0.9 % (FLUSH) 0.9 %
5-40 SYRINGE (ML) INJECTION EVERY 12 HOURS SCHEDULED
Status: DISCONTINUED | OUTPATIENT
Start: 2024-09-01 | End: 2024-09-04 | Stop reason: HOSPADM

## 2024-09-01 RX ORDER — LANOLIN ALCOHOL/MO/W.PET/CERES
400 CREAM (GRAM) TOPICAL DAILY
Status: DISCONTINUED | OUTPATIENT
Start: 2024-09-01 | End: 2024-09-04 | Stop reason: HOSPADM

## 2024-09-01 RX ORDER — TAMSULOSIN HYDROCHLORIDE 0.4 MG/1
0.4 CAPSULE ORAL DAILY
Status: DISCONTINUED | OUTPATIENT
Start: 2024-09-01 | End: 2024-09-04 | Stop reason: HOSPADM

## 2024-09-01 RX ORDER — LEVOFLOXACIN 500 MG/1
500 TABLET, FILM COATED ORAL DAILY
Status: DISCONTINUED | OUTPATIENT
Start: 2024-09-02 | End: 2024-09-04 | Stop reason: HOSPADM

## 2024-09-01 RX ORDER — SODIUM CHLORIDE 9 MG/ML
INJECTION, SOLUTION INTRAVENOUS CONTINUOUS
Status: DISCONTINUED | OUTPATIENT
Start: 2024-09-01 | End: 2024-09-01

## 2024-09-01 RX ORDER — GUAIFENESIN 600 MG/1
600 TABLET, EXTENDED RELEASE ORAL 2 TIMES DAILY
Status: DISCONTINUED | OUTPATIENT
Start: 2024-09-01 | End: 2024-09-04 | Stop reason: HOSPADM

## 2024-09-01 RX ORDER — SODIUM CHLORIDE, SODIUM LACTATE, POTASSIUM CHLORIDE, CALCIUM CHLORIDE 600; 310; 30; 20 MG/100ML; MG/100ML; MG/100ML; MG/100ML
INJECTION, SOLUTION INTRAVENOUS CONTINUOUS
Status: DISCONTINUED | OUTPATIENT
Start: 2024-09-01 | End: 2024-09-03

## 2024-09-01 RX ORDER — PANTOPRAZOLE SODIUM 40 MG/1
40 TABLET, DELAYED RELEASE ORAL
Status: DISCONTINUED | OUTPATIENT
Start: 2024-09-02 | End: 2024-09-02

## 2024-09-01 RX ORDER — CARVEDILOL 3.12 MG/1
3.12 TABLET ORAL 2 TIMES DAILY WITH MEALS
Status: DISCONTINUED | OUTPATIENT
Start: 2024-09-01 | End: 2024-09-04 | Stop reason: HOSPADM

## 2024-09-01 RX ORDER — ALBUTEROL SULFATE 90 UG/1
1 AEROSOL, METERED RESPIRATORY (INHALATION) EVERY 4 HOURS PRN
Status: DISCONTINUED | OUTPATIENT
Start: 2024-09-01 | End: 2024-09-04 | Stop reason: HOSPADM

## 2024-09-01 RX ORDER — ARIPIPRAZOLE 5 MG/1
5 TABLET ORAL DAILY
Status: DISCONTINUED | OUTPATIENT
Start: 2024-09-01 | End: 2024-09-04 | Stop reason: HOSPADM

## 2024-09-01 RX ORDER — CYCLOBENZAPRINE HCL 10 MG
10 TABLET ORAL 2 TIMES DAILY
Status: DISCONTINUED | OUTPATIENT
Start: 2024-09-01 | End: 2024-09-04 | Stop reason: HOSPADM

## 2024-09-01 RX ORDER — ISOSORBIDE DINITRATE 10 MG/1
5 TABLET ORAL 3 TIMES DAILY
Status: DISCONTINUED | OUTPATIENT
Start: 2024-09-01 | End: 2024-09-04 | Stop reason: HOSPADM

## 2024-09-01 RX ORDER — SODIUM CHLORIDE 0.9 % (FLUSH) 0.9 %
5-40 SYRINGE (ML) INJECTION PRN
Status: DISCONTINUED | OUTPATIENT
Start: 2024-09-01 | End: 2024-09-04 | Stop reason: HOSPADM

## 2024-09-01 RX ORDER — VENLAFAXINE HYDROCHLORIDE 150 MG/1
150 CAPSULE, EXTENDED RELEASE ORAL DAILY
Status: DISCONTINUED | OUTPATIENT
Start: 2024-09-01 | End: 2024-09-04 | Stop reason: HOSPADM

## 2024-09-01 RX ADMIN — RISPERIDONE 2 MG: 1 TABLET, FILM COATED ORAL at 20:51

## 2024-09-01 RX ADMIN — VENLAFAXINE HYDROCHLORIDE 150 MG: 150 CAPSULE, EXTENDED RELEASE ORAL at 13:21

## 2024-09-01 RX ADMIN — CYCLOBENZAPRINE 10 MG: 10 TABLET, FILM COATED ORAL at 13:19

## 2024-09-01 RX ADMIN — GUAIFENESIN 600 MG: 600 TABLET, EXTENDED RELEASE ORAL at 13:20

## 2024-09-01 RX ADMIN — GUAIFENESIN 600 MG: 600 TABLET, EXTENDED RELEASE ORAL at 20:49

## 2024-09-01 RX ADMIN — OXYCODONE HYDROCHLORIDE AND ACETAMINOPHEN 500 MG: 500 TABLET ORAL at 13:21

## 2024-09-01 RX ADMIN — ASPIRIN 81 MG: 81 TABLET, COATED ORAL at 13:21

## 2024-09-01 RX ADMIN — QUETIAPINE FUMARATE 50 MG: 50 TABLET ORAL at 20:49

## 2024-09-01 RX ADMIN — Medication 400 MG: at 13:20

## 2024-09-01 RX ADMIN — SODIUM CHLORIDE, POTASSIUM CHLORIDE, SODIUM LACTATE AND CALCIUM CHLORIDE: 600; 310; 30; 20 INJECTION, SOLUTION INTRAVENOUS at 14:21

## 2024-09-01 RX ADMIN — LACTULOSE 20 G: 20 SOLUTION ORAL at 20:49

## 2024-09-01 RX ADMIN — SODIUM CHLORIDE, PRESERVATIVE FREE 10 ML: 5 INJECTION INTRAVENOUS at 20:52

## 2024-09-01 RX ADMIN — ARIPIPRAZOLE 5 MG: 5 TABLET ORAL at 13:20

## 2024-09-01 RX ADMIN — SODIUM CHLORIDE: 9 INJECTION, SOLUTION INTRAVENOUS at 06:44

## 2024-09-01 RX ADMIN — Medication 2 PUFF: at 20:07

## 2024-09-01 RX ADMIN — VENLAFAXINE HYDROCHLORIDE 75 MG: 75 CAPSULE, EXTENDED RELEASE ORAL at 13:20

## 2024-09-01 RX ADMIN — ACETAMINOPHEN 500 MG: 500 TABLET ORAL at 21:24

## 2024-09-01 RX ADMIN — SODIUM CHLORIDE, PRESERVATIVE FREE 10 ML: 5 INJECTION INTRAVENOUS at 11:42

## 2024-09-01 RX ADMIN — ISOSORBIDE DINITRATE 5 MG: 10 TABLET ORAL at 20:50

## 2024-09-01 RX ADMIN — VANCOMYCIN 2000 MG: 2 INJECTION, SOLUTION INTRAVENOUS at 11:37

## 2024-09-01 RX ADMIN — DOCUSATE SODIUM 100 MG: 100 CAPSULE, LIQUID FILLED ORAL at 20:49

## 2024-09-01 RX ADMIN — POLYETHYLENE GLYCOL 3350 17 G: 17 POWDER, FOR SOLUTION ORAL at 11:29

## 2024-09-01 RX ADMIN — DOCUSATE SODIUM 100 MG: 100 CAPSULE, LIQUID FILLED ORAL at 13:20

## 2024-09-01 RX ADMIN — ENOXAPARIN SODIUM 40 MG: 100 INJECTION SUBCUTANEOUS at 11:28

## 2024-09-01 RX ADMIN — RISPERIDONE 2 MG: 1 TABLET, FILM COATED ORAL at 13:19

## 2024-09-01 RX ADMIN — ISOSORBIDE DINITRATE 5 MG: 10 TABLET ORAL at 14:20

## 2024-09-01 RX ADMIN — IBUPROFEN 400 MG: 400 TABLET, FILM COATED ORAL at 00:07

## 2024-09-01 RX ADMIN — Medication 2 PUFF: at 12:35

## 2024-09-01 RX ADMIN — TAMSULOSIN HYDROCHLORIDE 0.4 MG: 0.4 CAPSULE ORAL at 13:20

## 2024-09-01 RX ADMIN — CYCLOBENZAPRINE 10 MG: 10 TABLET, FILM COATED ORAL at 20:51

## 2024-09-01 ASSESSMENT — PAIN SCALES - GENERAL
PAINLEVEL_OUTOF10: 0
PAINLEVEL_OUTOF10: 3
PAINLEVEL_OUTOF10: 0
PAINLEVEL_OUTOF10: 0

## 2024-09-01 ASSESSMENT — PAIN SCALES - WONG BAKER: WONGBAKER_NUMERICALRESPONSE: NO HURT

## 2024-09-01 ASSESSMENT — PAIN DESCRIPTION - LOCATION: LOCATION: FOOT

## 2024-09-01 ASSESSMENT — PAIN DESCRIPTION - ORIENTATION: ORIENTATION: LEFT;RIGHT

## 2024-09-01 ASSESSMENT — PAIN DESCRIPTION - DESCRIPTORS: DESCRIPTORS: ACHING

## 2024-09-01 NOTE — ED TRIAGE NOTES
Brought in by neighbor. Seen in ED last Friday reports for 'blockage in chest'. Returned today for fever, cough, weakness, and chest pain. Reports painful urination and frequency. Patient poor historian.   Family reports patient is normally A&Ox4 but has been confused the past couple of days with today being the worst.

## 2024-09-01 NOTE — H&P
UTI  Would treat for both sources  Blood cultures collected  Add on urine culture  Lactic acid normalized  Given IV fluid boluses on admission for sepsis    2.  Multifocal bilateral infiltrates on imaging  Likely a pneumonia, sounds as if patient had a viral URI several weeks ago and possibly has a superimposed bacterial pneumonia now  Also with cocaine smoking may have a toxic or inflammatory lung process  No wheezing  Broad-spectrum antibiotics to include MRSA and Pseudomonas coverage  MRSA nasal swab screening  Negative for COVID and flu, although if patient had COVID a few weeks ago for instance these infiltrates on imaging could be remnants from a viral pneumonia  Patient did have rhonchi throughout on lung exam more suggestive of true bacterial pneumonia  Incentive spirometer and Acapella, Mucinex    3. UTI   Positive bacteria on UA, with patient reporting symptoms  Add on urine culture  Broad-spectrum antibiotics as above will cover this  Bladder scan, rule out retention    3.  CKD with slight creatinine elevation from baseline  Technically does not meet criteria for VINCENT  Creatinine 1.76 on admission, baseline appears to be around 1.3  Bladder scans, treating UTI  IV fluids    4.  Seizure disorder?   Listed on medical history   Reviewed prior clinical records, no confirmation of seizures on EEG  Not on any home medications for this   Cardiac monitor   Fall precautions     5.  Chronic combined heart failure  Last known LVEF 35% in 2020  Needs updated echo, but not urgent unless he would develop cardiac symptoms acutely here  Can get an echo on Tuesday if patient is still in hospital  Hold home beta-blocker for now with soft BP, and cocaine abuse - would resume as soon as BP improving, possibly tonight or tomorrow   Monitor clinical volume status for any diuretic needs, patient appears dry at this time    6.  CAD  Nonobstructive on cardiac cath within the past few years  Chest pain is clearly musculoskeletal

## 2024-09-01 NOTE — ED NOTES
Episode of urinary incontinence; soiled clothing removed, skin cleansed and patient placed in clean gown.

## 2024-09-01 NOTE — ED PROVIDER NOTES
[facility] EMERGENCY DEPT  EMERGENCY DEPARTMENT HISTORY AND PHYSICAL EXAM      Date: 8/31/2024  Patient Name: David Alfaro  MRN: [id number]  Birthdate 1965  Date of evaluation: 8/31/2024  Provider: Chris Coleman MD   Note Started: 10:31 PM EDT 8/31/24    HISTORY OF PRESENT ILLNESS     Chief Complaint   Patient presents with    Fever    Fatigue       History Provided By: Patient    HPI: David Alfaro is a 58 y.o. male presented with fever, sweating, confusion, dysuria, and polyuria. Complains of pain when urinating.  Was seen yesterday with cough and intermittent shortness of breath thought to have pulmonary edema and discharged on Lasix.  Patient brought in today by his neighbor who found patient altered and confused.  On arrival patient tachycardic to the 120s increased respiratory rate.  Febrile.  Code sepsis called.      PAST MEDICAL HISTORY   Past Medical History:  Past Medical History:   Diagnosis Date    Anxiety     Arthritis     GERD (gastroesophageal reflux disease)     Hypercholesterolemia     Hypertension     Mild depressed bipolar 1 disorder (HCC) 10/13/2020    Myocardial infarction ([facility])     Obstructive sleep apnea 10/13/2020    Psychotic disorder ([facility])     Seizures (HCC)     Stroke ([facility])        Past Surgical History:  Past Surgical History:   Procedure Laterality Date    ANKLE FRACTURE SURGERY Right 7/16/2024    RIGHT FOOT FIFTH METATARSAL FRACTURE OPEN REDUCTION WITH INTERNAL FIXATION performed by Markell Howard DPM at [facility] MAIN OR    CARDIAC SURGERY      VA UNLISTED PROCEDURE ABDOMEN PERITONEUM & OMENTUM         Family History:  Family History   Problem Relation Age of Onset    Depression Mother     No Known Problems Father     Hypertension Sister     Diabetes Type 1  Brother     Hypertension Brother     Heart Failure Brother        Social History:  Social History     Tobacco Use    Smoking status: Former    Smokeless tobacco: Never   Vaping Use    Vaping status: Never Used   Substance

## 2024-09-02 LAB
ACCESSION NUMBER, LLC1M: ABNORMAL
ACINETOBACTER CALCOAC BAUMANNII COMPLEX BY PCR: NOT DETECTED
ALBUMIN SERPL-MCNC: 1.7 G/DL (ref 3.5–5)
ALBUMIN/GLOB SERPL: 0.5 (ref 1.1–2.2)
ALP SERPL-CCNC: 47 U/L (ref 45–117)
ALT SERPL-CCNC: 58 U/L (ref 12–78)
ANION GAP SERPL CALC-SCNC: 8 MMOL/L (ref 5–15)
AST SERPL W P-5'-P-CCNC: 36 U/L (ref 15–37)
BACTERIA SPEC CULT: NORMAL
BACTERIA SPEC CULT: NORMAL
BACTEROIDES FRAGILIS BY PCR: NOT DETECTED
BASOPHILS # BLD: 0 K/UL (ref 0–0.1)
BASOPHILS NFR BLD: 1 % (ref 0–1)
BILIRUB SERPL-MCNC: 0.3 MG/DL (ref 0.2–1)
BIOFIRE TEST COMMENT: ABNORMAL
BUN SERPL-MCNC: 18 MG/DL (ref 6–20)
BUN/CREAT SERPL: 21 (ref 12–20)
CA-I BLD-MCNC: 8.2 MG/DL (ref 8.5–10.1)
CANDIDA ALBICANS BY PCR: NOT DETECTED
CANDIDA AURIS BY PCR: NOT DETECTED
CANDIDA GLABRATA: NOT DETECTED
CANDIDA KRUSEI BY PCR: NOT DETECTED
CANDIDA PARAPSILOSIS BY PCR: NOT DETECTED
CANDIDA TROPICALIS BY PCR: NOT DETECTED
CHLORIDE SERPL-SCNC: 108 MMOL/L (ref 97–108)
CO2 SERPL-SCNC: 25 MMOL/L (ref 21–32)
CREAT SERPL-MCNC: 0.86 MG/DL (ref 0.7–1.3)
CRYPTOCOCCUS NEOFORMANS/GATTII BY PCR: NOT DETECTED
DIFFERENTIAL METHOD BLD: ABNORMAL
ENTEROBACTER CLOACAE COMPLEX BY PCR: NOT DETECTED
ENTEROBACTERALES BY PCR: NOT DETECTED
ENTEROCOCCUS FAECALIS BY PCR: NOT DETECTED
ENTEROCOCCUS FAECIUM BY PCR: NOT DETECTED
EOSINOPHIL # BLD: 0 K/UL (ref 0–0.4)
EOSINOPHIL NFR BLD: 1 % (ref 0–7)
ERYTHROCYTE [DISTWIDTH] IN BLOOD BY AUTOMATED COUNT: 13.1 % (ref 11.5–14.5)
ESCHERICHIA COLI: NOT DETECTED
GLOBULIN SER CALC-MCNC: 3.7 G/DL (ref 2–4)
GLUCOSE BLD STRIP.AUTO-MCNC: 88 MG/DL (ref 65–100)
GLUCOSE SERPL-MCNC: 85 MG/DL (ref 65–100)
HAEMOPHILUS INFLUENZAE BY PCR: NOT DETECTED
HCT VFR BLD AUTO: 26.6 % (ref 36.6–50.3)
HGB BLD-MCNC: 9.1 G/DL (ref 12.1–17)
IMM GRANULOCYTES # BLD AUTO: 0.1 K/UL (ref 0–0.04)
IMM GRANULOCYTES NFR BLD AUTO: 1 % (ref 0–0.5)
KLEBSIELLA AEROGENES BY PCR: NOT DETECTED
KLEBSIELLA OXYTOCA BY PCR: NOT DETECTED
KLEBSIELLA PNEUMONIAE GROUP BY PCR: NOT DETECTED
LISTERIA MONOCYTOGENES BY PCR: NOT DETECTED
LYMPHOCYTES # BLD: 1.6 K/UL (ref 0.8–3.5)
LYMPHOCYTES NFR BLD: 36 % (ref 12–49)
Lab: NORMAL
MCH RBC QN AUTO: 34.3 PG (ref 26–34)
MCHC RBC AUTO-ENTMCNC: 34.2 G/DL (ref 30–36.5)
MCV RBC AUTO: 100.4 FL (ref 80–99)
MONOCYTES # BLD: 0.4 K/UL (ref 0–1)
MONOCYTES NFR BLD: 9 % (ref 5–13)
NEISSERIA MENINGITIDIS BY PCR: NOT DETECTED
NEUTS SEG # BLD: 2.3 K/UL (ref 1.8–8)
NEUTS SEG NFR BLD: 52 % (ref 32–75)
NRBC # BLD: 0 K/UL (ref 0–0.01)
NRBC BLD-RTO: 0 PER 100 WBC
PERFORMED BY:: NORMAL
PLATELET # BLD AUTO: 297 K/UL (ref 150–400)
PMV BLD AUTO: 9.5 FL (ref 8.9–12.9)
POTASSIUM SERPL-SCNC: 3.8 MMOL/L (ref 3.5–5.1)
PROT SERPL-MCNC: 5.4 G/DL (ref 6.4–8.2)
PROTEUS BY PCR: NOT DETECTED
PSEUDOMONAS AERUGINOSA, PSAEP: NOT DETECTED
RBC # BLD AUTO: 2.65 M/UL (ref 4.1–5.7)
RESISTANT GENE TARGETS: ABNORMAL
SALMONELLA SPECIES BY PCR: NOT DETECTED
SERRATIA MARCESCENS BY PCR: NOT DETECTED
SODIUM SERPL-SCNC: 141 MMOL/L (ref 136–145)
STAPHYLOCOCCUS AUREUS: NOT DETECTED
STAPHYLOCOCCUS EPIDERMIDIS BY PCR: NOT DETECTED
STAPHYLOCOCCUS LUGDUNENSIS BY PCR: NOT DETECTED
STAPHYLOCOCCUS: DETECTED
STENOTROPHOMONAS MALTOPHILIA BY PCR: NOT DETECTED
STREPTOCOCCUS AGALACTIAE (GROUP B): NOT DETECTED
STREPTOCOCCUS PNEUMONIAE , SPNP: NOT DETECTED
STREPTOCOCCUS PYOGENES (GROUP A), SPYOP: NOT DETECTED
STREPTOCOCCUS: NOT DETECTED
WBC # BLD AUTO: 4.4 K/UL (ref 4.1–11.1)

## 2024-09-02 PROCEDURE — 6370000000 HC RX 637 (ALT 250 FOR IP): Performed by: FAMILY MEDICINE

## 2024-09-02 PROCEDURE — 80053 COMPREHEN METABOLIC PANEL: CPT

## 2024-09-02 PROCEDURE — 36415 COLL VENOUS BLD VENIPUNCTURE: CPT

## 2024-09-02 PROCEDURE — 6360000002 HC RX W HCPCS: Performed by: FAMILY MEDICINE

## 2024-09-02 PROCEDURE — 94640 AIRWAY INHALATION TREATMENT: CPT

## 2024-09-02 PROCEDURE — 82962 GLUCOSE BLOOD TEST: CPT

## 2024-09-02 PROCEDURE — 85025 COMPLETE CBC W/AUTO DIFF WBC: CPT

## 2024-09-02 PROCEDURE — 2580000003 HC RX 258: Performed by: FAMILY MEDICINE

## 2024-09-02 PROCEDURE — 1100000000 HC RM PRIVATE

## 2024-09-02 PROCEDURE — 94669 MECHANICAL CHEST WALL OSCILL: CPT

## 2024-09-02 RX ORDER — LIDOCAINE 4 G/G
1 PATCH TOPICAL DAILY
Status: DISCONTINUED | OUTPATIENT
Start: 2024-09-02 | End: 2024-09-04 | Stop reason: HOSPADM

## 2024-09-02 RX ORDER — KETOROLAC TROMETHAMINE 15 MG/ML
15 INJECTION, SOLUTION INTRAMUSCULAR; INTRAVENOUS EVERY 6 HOURS PRN
Status: DISCONTINUED | OUTPATIENT
Start: 2024-09-02 | End: 2024-09-04 | Stop reason: HOSPADM

## 2024-09-02 RX ORDER — LACTULOSE 10 G/15ML
20 SOLUTION ORAL 2 TIMES DAILY PRN
Status: DISCONTINUED | OUTPATIENT
Start: 2024-09-02 | End: 2024-09-04 | Stop reason: HOSPADM

## 2024-09-02 RX ORDER — LACTOBACILLUS RHAMNOSUS GG 10B CELL
1 CAPSULE ORAL 2 TIMES DAILY WITH MEALS
Status: DISCONTINUED | OUTPATIENT
Start: 2024-09-02 | End: 2024-09-04 | Stop reason: HOSPADM

## 2024-09-02 RX ADMIN — Medication 2 PUFF: at 19:12

## 2024-09-02 RX ADMIN — ENOXAPARIN SODIUM 40 MG: 100 INJECTION SUBCUTANEOUS at 09:35

## 2024-09-02 RX ADMIN — ISOSORBIDE DINITRATE 5 MG: 10 TABLET ORAL at 13:42

## 2024-09-02 RX ADMIN — GUAIFENESIN 600 MG: 600 TABLET, EXTENDED RELEASE ORAL at 21:25

## 2024-09-02 RX ADMIN — ISOSORBIDE DINITRATE 5 MG: 10 TABLET ORAL at 21:25

## 2024-09-02 RX ADMIN — RISPERIDONE 2 MG: 1 TABLET, FILM COATED ORAL at 09:36

## 2024-09-02 RX ADMIN — Medication 1 LOZENGE: at 13:08

## 2024-09-02 RX ADMIN — SODIUM CHLORIDE, PRESERVATIVE FREE 10 ML: 5 INJECTION INTRAVENOUS at 09:58

## 2024-09-02 RX ADMIN — RISPERIDONE 2 MG: 1 TABLET, FILM COATED ORAL at 21:26

## 2024-09-02 RX ADMIN — SODIUM CHLORIDE, POTASSIUM CHLORIDE, SODIUM LACTATE AND CALCIUM CHLORIDE: 600; 310; 30; 20 INJECTION, SOLUTION INTRAVENOUS at 03:57

## 2024-09-02 RX ADMIN — ASPIRIN 81 MG: 81 TABLET, COATED ORAL at 09:38

## 2024-09-02 RX ADMIN — GUAIFENESIN 600 MG: 600 TABLET, EXTENDED RELEASE ORAL at 09:38

## 2024-09-02 RX ADMIN — PANTOPRAZOLE SODIUM 40 MG: 40 INJECTION, POWDER, FOR SOLUTION INTRAVENOUS at 09:42

## 2024-09-02 RX ADMIN — ISOSORBIDE DINITRATE 5 MG: 10 TABLET ORAL at 09:39

## 2024-09-02 RX ADMIN — CYCLOBENZAPRINE 10 MG: 10 TABLET, FILM COATED ORAL at 21:25

## 2024-09-02 RX ADMIN — SODIUM CHLORIDE, POTASSIUM CHLORIDE, SODIUM LACTATE AND CALCIUM CHLORIDE: 600; 310; 30; 20 INJECTION, SOLUTION INTRAVENOUS at 20:05

## 2024-09-02 RX ADMIN — Medication 1 CAPSULE: at 09:36

## 2024-09-02 RX ADMIN — PANTOPRAZOLE SODIUM 40 MG: 40 INJECTION, POWDER, FOR SOLUTION INTRAVENOUS at 21:26

## 2024-09-02 RX ADMIN — OXYCODONE HYDROCHLORIDE AND ACETAMINOPHEN 500 MG: 500 TABLET ORAL at 09:41

## 2024-09-02 RX ADMIN — DOCUSATE SODIUM 100 MG: 100 CAPSULE, LIQUID FILLED ORAL at 09:39

## 2024-09-02 RX ADMIN — SODIUM CHLORIDE, PRESERVATIVE FREE 10 ML: 5 INJECTION INTRAVENOUS at 09:48

## 2024-09-02 RX ADMIN — VANCOMYCIN HYDROCHLORIDE 1000 MG: 1 INJECTION, POWDER, LYOPHILIZED, FOR SOLUTION INTRAVENOUS at 09:51

## 2024-09-02 RX ADMIN — VENLAFAXINE HYDROCHLORIDE 75 MG: 75 CAPSULE, EXTENDED RELEASE ORAL at 09:38

## 2024-09-02 RX ADMIN — QUETIAPINE FUMARATE 50 MG: 50 TABLET ORAL at 21:26

## 2024-09-02 RX ADMIN — ACETAMINOPHEN 500 MG: 500 TABLET ORAL at 07:23

## 2024-09-02 RX ADMIN — LEVOFLOXACIN 500 MG: 500 TABLET, FILM COATED ORAL at 09:40

## 2024-09-02 RX ADMIN — PANTOPRAZOLE SODIUM 40 MG: 40 TABLET, DELAYED RELEASE ORAL at 07:24

## 2024-09-02 RX ADMIN — VENLAFAXINE HYDROCHLORIDE 150 MG: 150 CAPSULE, EXTENDED RELEASE ORAL at 09:37

## 2024-09-02 RX ADMIN — KETOROLAC TROMETHAMINE 15 MG: 15 INJECTION, SOLUTION INTRAMUSCULAR; INTRAVENOUS at 09:58

## 2024-09-02 RX ADMIN — TAMSULOSIN HYDROCHLORIDE 0.4 MG: 0.4 CAPSULE ORAL at 09:39

## 2024-09-02 RX ADMIN — Medication 400 MG: at 09:39

## 2024-09-02 RX ADMIN — Medication 2 PUFF: at 07:08

## 2024-09-02 RX ADMIN — SODIUM CHLORIDE, PRESERVATIVE FREE 10 ML: 5 INJECTION INTRAVENOUS at 21:26

## 2024-09-02 RX ADMIN — CYCLOBENZAPRINE 10 MG: 10 TABLET, FILM COATED ORAL at 09:37

## 2024-09-02 RX ADMIN — DOCUSATE SODIUM 100 MG: 100 CAPSULE, LIQUID FILLED ORAL at 21:25

## 2024-09-02 RX ADMIN — Medication 1 CAPSULE: at 16:20

## 2024-09-02 RX ADMIN — ARIPIPRAZOLE 5 MG: 5 TABLET ORAL at 09:39

## 2024-09-02 ASSESSMENT — PAIN SCALES - GENERAL
PAINLEVEL_OUTOF10: 0
PAINLEVEL_OUTOF10: 8
PAINLEVEL_OUTOF10: 0
PAINLEVEL_OUTOF10: 5
PAINLEVEL_OUTOF10: 0
PAINLEVEL_OUTOF10: 0
PAINLEVEL_OUTOF10: 8
PAINLEVEL_OUTOF10: 8

## 2024-09-02 ASSESSMENT — PAIN DESCRIPTION - ORIENTATION
ORIENTATION: MID
ORIENTATION: MID

## 2024-09-02 ASSESSMENT — PAIN DESCRIPTION - LOCATION
LOCATION: CHEST
LOCATION: CHEST

## 2024-09-02 ASSESSMENT — PAIN DESCRIPTION - DESCRIPTORS
DESCRIPTORS: ACHING
DESCRIPTORS: ACHING

## 2024-09-02 NOTE — CONSULTS
CrCl increased to 103 ml/min  Will increase Vancomycin to 1 gram q12h starting tonight  Random level ordered for 0500 09/03/24

## 2024-09-02 NOTE — CARE COORDINATION
ERNIE contacted pt using the phone number listed on his facesheet ([phone]) to discuss Important Message from Medicare (IM) Letter. Pt's family member, Emmy Nation, answered the phone. ERNIE discussed IM Letter with Emmy, who is planning to come to the hospital later in the day to visit pt. ERNIE emailed a copy of the IM Letter to pt's nurse and requested that a copy be provided to pt and a signed copy be placed on pt's chart.    Emmy told ERNIE that pt's current Medicare plan has not been helpful in getting pt the services that he needs. ERNIE told Emmy that the Medicare open enrollment period is from October 15th-December 7th of this year if pt is interested in changing his Medicare plan.     Aneta Lara LMSW,   [phone]

## 2024-09-03 ENCOUNTER — HOSPITAL ENCOUNTER (INPATIENT)
Facility: HOSPITAL | Age: 59
Discharge: HOME | DRG: 871 | End: 2024-09-05
Attending: HOSPITALIST
Payer: MEDICARE

## 2024-09-03 LAB
ALBUMIN SERPL-MCNC: 1.8 G/DL (ref 3.5–5)
ALBUMIN/GLOB SERPL: 0.5 (ref 1.1–2.2)
ALP SERPL-CCNC: 46 U/L (ref 45–117)
ALT SERPL-CCNC: 42 U/L (ref 12–78)
ANION GAP SERPL CALC-SCNC: 8 MMOL/L (ref 5–15)
AST SERPL W P-5'-P-CCNC: 22 U/L (ref 15–37)
BACTERIA SPEC CULT: ABNORMAL
BACTERIA SPEC CULT: NORMAL
BILIRUB SERPL-MCNC: 0.2 MG/DL (ref 0.2–1)
BNP SERPL-MCNC: 416 PG/ML
BUN SERPL-MCNC: 15 MG/DL (ref 6–20)
BUN/CREAT SERPL: 15 (ref 12–20)
CA-I BLD-MCNC: 8.4 MG/DL (ref 8.5–10.1)
CHLORIDE SERPL-SCNC: 105 MMOL/L (ref 97–108)
CO2 SERPL-SCNC: 26 MMOL/L (ref 21–32)
CREAT SERPL-MCNC: 1.03 MG/DL (ref 0.7–1.3)
DATE LAST DOSE: NORMAL
DOSE AMOUNT: NORMAL UNITS
ECHO AO ROOT DIAM: 3.5 CM
ECHO AO ROOT INDEX: 1.71 CM/M2
ECHO AO SINUS VALSALVA DIAM: 4.1 CM
ECHO AO SINUS VALSALVA INDEX: 2 CM/M2
ECHO AV AREA PEAK VELOCITY: 2.5 CM2
ECHO AV AREA VTI: 2.9 CM2
ECHO AV AREA/BSA PEAK VELOCITY: 1.2 CM2/M2
ECHO AV AREA/BSA VTI: 1.4 CM2/M2
ECHO AV MEAN GRADIENT: 3 MMHG
ECHO AV MEAN VELOCITY: 0.8 M/S
ECHO AV PEAK GRADIENT: 5 MMHG
ECHO AV PEAK VELOCITY: 1.1 M/S
ECHO AV VELOCITY RATIO: 0.73
ECHO AV VTI: 17.6 CM
ECHO BSA: 2.04 M2
ECHO EST RA PRESSURE: 3 MMHG
ECHO LA DIAMETER INDEX: 2.15 CM/M2
ECHO LA DIAMETER: 4.4 CM
ECHO LA TO AORTIC ROOT RATIO: 1.26
ECHO LA VOL A-L A2C: 30 ML (ref 18–58)
ECHO LA VOL A-L A4C: 46 ML (ref 18–58)
ECHO LA VOL BP: 36 ML (ref 18–58)
ECHO LA VOL MOD A2C: 29 ML (ref 18–58)
ECHO LA VOL MOD A4C: 41 ML (ref 18–58)
ECHO LA VOL/BSA BIPLANE: 18 ML/M2 (ref 16–34)
ECHO LA VOLUME AREA LENGTH: 39 ML
ECHO LA VOLUME INDEX A-L A2C: 15 ML/M2 (ref 16–34)
ECHO LA VOLUME INDEX A-L A4C: 22 ML/M2 (ref 16–34)
ECHO LA VOLUME INDEX AREA LENGTH: 19 ML/M2 (ref 16–34)
ECHO LA VOLUME INDEX MOD A2C: 14 ML/M2 (ref 16–34)
ECHO LA VOLUME INDEX MOD A4C: 20 ML/M2 (ref 16–34)
ECHO LV E' LATERAL VELOCITY: 11 CM/S
ECHO LV E' SEPTAL VELOCITY: 8 CM/S
ECHO LV EDV A2C: 56 ML
ECHO LV EDV A4C: 96 ML
ECHO LV EDV BP: 73 ML (ref 67–155)
ECHO LV EDV INDEX A4C: 47 ML/M2
ECHO LV EDV INDEX BP: 36 ML/M2
ECHO LV EDV NDEX A2C: 27 ML/M2
ECHO LV EJECTION FRACTION A2C: 60 %
ECHO LV EJECTION FRACTION A4C: 49 %
ECHO LV EJECTION FRACTION A4C: 50 %
ECHO LV EJECTION FRACTION BIPLANE: 53 % (ref 55–100)
ECHO LV ESV A2C: 22 ML
ECHO LV ESV A4C: 49 ML
ECHO LV ESV BP: 34 ML (ref 22–58)
ECHO LV ESV INDEX A2C: 11 ML/M2
ECHO LV ESV INDEX A4C: 24 ML/M2
ECHO LV ESV INDEX BP: 17 ML/M2
ECHO LV FRACTIONAL SHORTENING: 26 % (ref 28–44)
ECHO LV GLOBAL LONGITUDINAL STRAIN (GLS): -13.1 %
ECHO LV INTERNAL DIMENSION DIASTOLE INDEX: 2.78 CM/M2
ECHO LV INTERNAL DIMENSION DIASTOLIC: 5.7 CM (ref 4.2–5.9)
ECHO LV INTERNAL DIMENSION SYSTOLIC INDEX: 2.05 CM/M2
ECHO LV INTERNAL DIMENSION SYSTOLIC: 4.2 CM
ECHO LV IVSD: 1.1 CM (ref 0.6–1)
ECHO LV MASS 2D: 226.4 G (ref 88–224)
ECHO LV MASS INDEX 2D: 110.4 G/M2 (ref 49–115)
ECHO LV POSTERIOR WALL DIASTOLIC: 0.9 CM (ref 0.6–1)
ECHO LV RELATIVE WALL THICKNESS RATIO: 0.32
ECHO LVOT AREA: 3.1 CM2
ECHO LVOT AV VTI INDEX: 0.91
ECHO LVOT DIAM: 2 CM
ECHO LVOT MEAN GRADIENT: 1 MMHG
ECHO LVOT PEAK GRADIENT: 3 MMHG
ECHO LVOT PEAK VELOCITY: 0.8 M/S
ECHO LVOT STROKE VOLUME INDEX: 24.5 ML/M2
ECHO LVOT SV: 50.2 ML
ECHO LVOT VTI: 16 CM
ECHO MV A VELOCITY: 0.56 M/S
ECHO MV E DECELERATION TIME (DT): 197.2 MS
ECHO MV E VELOCITY: 0.43 M/S
ECHO MV E/A RATIO: 0.77
ECHO MV E/E' LATERAL: 3.91
ECHO MV E/E' RATIO (AVERAGED): 4.64
ECHO MV E/E' SEPTAL: 5.38
ECHO RA AREA 4C: 19.3 CM2
ECHO RIGHT VENTRICULAR SYSTOLIC PRESSURE (RVSP): 23 MMHG
ECHO RV FREE WALL PEAK S': 13 CM/S
ECHO RV INTERNAL DIMENSION: 3.8 CM
ECHO RV TAPSE: 1.2 CM (ref 1.7–?)
ECHO TV REGURGITANT MAX VELOCITY: 2.21 M/S
ECHO TV REGURGITANT PEAK GRADIENT: 20 MMHG
EKG ATRIAL RATE: 120 BPM
EKG DIAGNOSIS: NORMAL
EKG P AXIS: 47 DEGREES
EKG P-R INTERVAL: 147 MS
EKG Q-T INTERVAL: 286 MS
EKG QRS DURATION: 87 MS
EKG QTC CALCULATION (BAZETT): 406 MS
EKG R AXIS: 17 DEGREES
EKG T AXIS: 56 DEGREES
EKG VENTRICULAR RATE: 121 BPM
ERYTHROCYTE [DISTWIDTH] IN BLOOD BY AUTOMATED COUNT: 13.1 % (ref 11.5–14.5)
GLOBULIN SER CALC-MCNC: 3.6 G/DL (ref 2–4)
GLUCOSE SERPL-MCNC: 118 MG/DL (ref 65–100)
HCT VFR BLD AUTO: 26.7 % (ref 36.6–50.3)
HGB BLD-MCNC: 9.3 G/DL (ref 12.1–17)
Lab: ABNORMAL
Lab: NORMAL
MCH RBC QN AUTO: 34.4 PG (ref 26–34)
MCHC RBC AUTO-ENTMCNC: 34.8 G/DL (ref 30–36.5)
MCV RBC AUTO: 98.9 FL (ref 80–99)
NRBC # BLD: 0 K/UL (ref 0–0.01)
NRBC BLD-RTO: 0 PER 100 WBC
PLATELET # BLD AUTO: 320 K/UL (ref 150–400)
PMV BLD AUTO: 9.4 FL (ref 8.9–12.9)
POTASSIUM SERPL-SCNC: 4 MMOL/L (ref 3.5–5.1)
PROCALCITONIN SERPL-MCNC: 0.56 NG/ML
PROT SERPL-MCNC: 5.4 G/DL (ref 6.4–8.2)
RBC # BLD AUTO: 2.7 M/UL (ref 4.1–5.7)
SODIUM SERPL-SCNC: 139 MMOL/L (ref 136–145)
VANCOMYCIN SERPL-MCNC: 5.6 UG/ML
WBC # BLD AUTO: 5.2 K/UL (ref 4.1–11.1)

## 2024-09-03 PROCEDURE — 80053 COMPREHEN METABOLIC PANEL: CPT

## 2024-09-03 PROCEDURE — 80202 ASSAY OF VANCOMYCIN: CPT

## 2024-09-03 PROCEDURE — 83880 ASSAY OF NATRIURETIC PEPTIDE: CPT

## 2024-09-03 PROCEDURE — 6370000000 HC RX 637 (ALT 250 FOR IP): Performed by: FAMILY MEDICINE

## 2024-09-03 PROCEDURE — 97165 OT EVAL LOW COMPLEX 30 MIN: CPT

## 2024-09-03 PROCEDURE — 93306 TTE W/DOPPLER COMPLETE: CPT

## 2024-09-03 PROCEDURE — 6360000002 HC RX W HCPCS: Performed by: FAMILY MEDICINE

## 2024-09-03 PROCEDURE — 85027 COMPLETE CBC AUTOMATED: CPT

## 2024-09-03 PROCEDURE — 1100000000 HC RM PRIVATE

## 2024-09-03 PROCEDURE — 2580000003 HC RX 258: Performed by: FAMILY MEDICINE

## 2024-09-03 PROCEDURE — 84145 PROCALCITONIN (PCT): CPT

## 2024-09-03 PROCEDURE — 36415 COLL VENOUS BLD VENIPUNCTURE: CPT

## 2024-09-03 PROCEDURE — 94640 AIRWAY INHALATION TREATMENT: CPT

## 2024-09-03 PROCEDURE — 6370000000 HC RX 637 (ALT 250 FOR IP): Performed by: HOSPITALIST

## 2024-09-03 PROCEDURE — 97161 PT EVAL LOW COMPLEX 20 MIN: CPT

## 2024-09-03 RX ORDER — BENZONATATE 100 MG/1
100 CAPSULE ORAL 3 TIMES DAILY
Status: DISCONTINUED | OUTPATIENT
Start: 2024-09-03 | End: 2024-09-04 | Stop reason: HOSPADM

## 2024-09-03 RX ADMIN — CYCLOBENZAPRINE 10 MG: 10 TABLET, FILM COATED ORAL at 21:26

## 2024-09-03 RX ADMIN — BENZONATATE 100 MG: 100 CAPSULE ORAL at 21:26

## 2024-09-03 RX ADMIN — PANTOPRAZOLE SODIUM 40 MG: 40 INJECTION, POWDER, FOR SOLUTION INTRAVENOUS at 21:26

## 2024-09-03 RX ADMIN — CYCLOBENZAPRINE 10 MG: 10 TABLET, FILM COATED ORAL at 08:51

## 2024-09-03 RX ADMIN — ISOSORBIDE DINITRATE 5 MG: 10 TABLET ORAL at 14:16

## 2024-09-03 RX ADMIN — CARVEDILOL 3.12 MG: 3.12 TABLET, FILM COATED ORAL at 16:10

## 2024-09-03 RX ADMIN — Medication 400 MG: at 08:51

## 2024-09-03 RX ADMIN — Medication 2 PUFF: at 08:31

## 2024-09-03 RX ADMIN — RISPERIDONE 2 MG: 1 TABLET, FILM COATED ORAL at 21:26

## 2024-09-03 RX ADMIN — LEVOFLOXACIN 500 MG: 500 TABLET, FILM COATED ORAL at 08:52

## 2024-09-03 RX ADMIN — ISOSORBIDE DINITRATE 5 MG: 10 TABLET ORAL at 21:26

## 2024-09-03 RX ADMIN — ASPIRIN 81 MG: 81 TABLET, COATED ORAL at 08:51

## 2024-09-03 RX ADMIN — VENLAFAXINE HYDROCHLORIDE 75 MG: 75 CAPSULE, EXTENDED RELEASE ORAL at 08:51

## 2024-09-03 RX ADMIN — OXYCODONE HYDROCHLORIDE AND ACETAMINOPHEN 500 MG: 500 TABLET ORAL at 08:50

## 2024-09-03 RX ADMIN — DOCUSATE SODIUM 100 MG: 100 CAPSULE, LIQUID FILLED ORAL at 21:26

## 2024-09-03 RX ADMIN — Medication 1 LOZENGE: at 21:26

## 2024-09-03 RX ADMIN — ARIPIPRAZOLE 5 MG: 5 TABLET ORAL at 08:52

## 2024-09-03 RX ADMIN — SODIUM CHLORIDE, PRESERVATIVE FREE 10 ML: 5 INJECTION INTRAVENOUS at 21:28

## 2024-09-03 RX ADMIN — TAMSULOSIN HYDROCHLORIDE 0.4 MG: 0.4 CAPSULE ORAL at 08:51

## 2024-09-03 RX ADMIN — Medication 1 CAPSULE: at 08:51

## 2024-09-03 RX ADMIN — ISOSORBIDE DINITRATE 5 MG: 10 TABLET ORAL at 08:51

## 2024-09-03 RX ADMIN — GUAIFENESIN 600 MG: 600 TABLET, EXTENDED RELEASE ORAL at 08:51

## 2024-09-03 RX ADMIN — DOCUSATE SODIUM 100 MG: 100 CAPSULE, LIQUID FILLED ORAL at 08:51

## 2024-09-03 RX ADMIN — BENZONATATE 100 MG: 100 CAPSULE ORAL at 14:16

## 2024-09-03 RX ADMIN — ENOXAPARIN SODIUM 40 MG: 100 INJECTION SUBCUTANEOUS at 08:50

## 2024-09-03 RX ADMIN — Medication 1 LOZENGE: at 11:10

## 2024-09-03 RX ADMIN — GUAIFENESIN 600 MG: 600 TABLET, EXTENDED RELEASE ORAL at 21:26

## 2024-09-03 RX ADMIN — Medication 2 PUFF: at 19:39

## 2024-09-03 RX ADMIN — PANTOPRAZOLE SODIUM 40 MG: 40 INJECTION, POWDER, FOR SOLUTION INTRAVENOUS at 08:52

## 2024-09-03 RX ADMIN — Medication 1 CAPSULE: at 16:10

## 2024-09-03 RX ADMIN — RISPERIDONE 2 MG: 1 TABLET, FILM COATED ORAL at 08:52

## 2024-09-03 RX ADMIN — QUETIAPINE FUMARATE 50 MG: 50 TABLET ORAL at 21:26

## 2024-09-03 RX ADMIN — Medication 1 LOZENGE: at 16:10

## 2024-09-03 RX ADMIN — KETOROLAC TROMETHAMINE 15 MG: 15 INJECTION, SOLUTION INTRAMUSCULAR; INTRAVENOUS at 07:49

## 2024-09-03 RX ADMIN — VENLAFAXINE HYDROCHLORIDE 150 MG: 150 CAPSULE, EXTENDED RELEASE ORAL at 08:52

## 2024-09-03 RX ADMIN — BENZONATATE 100 MG: 100 CAPSULE ORAL at 10:00

## 2024-09-03 RX ADMIN — SODIUM CHLORIDE, PRESERVATIVE FREE 10 ML: 5 INJECTION INTRAVENOUS at 07:50

## 2024-09-03 ASSESSMENT — PAIN SCALES - GENERAL
PAINLEVEL_OUTOF10: 0
PAINLEVEL_OUTOF10: 0
PAINLEVEL_OUTOF10: 7
PAINLEVEL_OUTOF10: 7
PAINLEVEL_OUTOF10: 4
PAINLEVEL_OUTOF10: 0
PAINLEVEL_OUTOF10: 7
PAINLEVEL_OUTOF10: 0
PAINLEVEL_OUTOF10: 2
PAINLEVEL_OUTOF10: 0

## 2024-09-03 ASSESSMENT — PAIN DESCRIPTION - LOCATION
LOCATION: ABDOMEN;CHEST
LOCATION: CHEST

## 2024-09-03 ASSESSMENT — PAIN SCALES - WONG BAKER: WONGBAKER_NUMERICALRESPONSE: NO HURT

## 2024-09-03 ASSESSMENT — PAIN DESCRIPTION - DESCRIPTORS
DESCRIPTORS: ACHING
DESCRIPTORS: ACHING

## 2024-09-03 ASSESSMENT — PAIN DESCRIPTION - PAIN TYPE: TYPE: ACUTE PAIN

## 2024-09-03 ASSESSMENT — ENCOUNTER SYMPTOMS
ALLERGIC/IMMUNOLOGIC NEGATIVE: 1
GASTROINTESTINAL NEGATIVE: 1
RESPIRATORY NEGATIVE: 1
EYES NEGATIVE: 1

## 2024-09-03 ASSESSMENT — PAIN DESCRIPTION - FREQUENCY: FREQUENCY: INTERMITTENT

## 2024-09-03 ASSESSMENT — PAIN DESCRIPTION - ORIENTATION
ORIENTATION: ANTERIOR
ORIENTATION: MID

## 2024-09-03 ASSESSMENT — PAIN - FUNCTIONAL ASSESSMENT: PAIN_FUNCTIONAL_ASSESSMENT: ACTIVITIES ARE NOT PREVENTED

## 2024-09-03 NOTE — THERAPY EVALUATION
OCCUPATIONAL THERAPY EVALUATION/DISCHARGE  Patient: David Alfaro (58 y.o. male)  Date: 9/3/2024  Primary Diagnosis: Sepsis (HCC) [A41.9]       Precautions: Seizure     ASSESSMENT :  Based on the objective data below, the patient has no occupational therapy needs at this time. Patient reports prior to hospitalization and onset of symptoms he completed all ADL tasks independently using rollator for mobility. Patient reports his cousins live close by and check on him occasionally as well as bring him meals. Patient reports he has had 4-7 falls over the past several months that he believes are due to seizures as he \"blacks out\" and wakes up on the floor per his report. Patient demonstrates good sitting balance EOB upon arrival with nurse at side. Patient demonstrates independence with sit to stand and Mod I with short distance mobility to bathroom with RW. Patient demonstrates mod I with toilet transfer and toileting tasks. Patient demonstrates Mod I with UB and LB dressing and bathing tasks in seated position. Recommend tub seat for increased safety with bathing tasks. Patient with good bilateral UE strength and ROM.     Further skilled acute occupational therapy is not indicated at this time.     PLAN :  Recommend with staff: OOB for meals and toileting    Recommendation for discharge: (in order for the patient to meet his/her long term goals):   No skilled occupational therapy    Other factors to consider for discharge: lives alone, high risk for falls, and seizure d/o    IF patient discharges home will need the following DME: shower chair     SUBJECTIVE:   Patient stated, “I'm doing alright.”    OBJECTIVE DATA SUMMARY:     Past Medical History:   Diagnosis Date    Anxiety     Arthritis     GERD (gastroesophageal reflux disease)     Hypercholesterolemia     Hypertension     Mild depressed bipolar 1 disorder (HCC) 10/13/2020    Myocardial infarction (HCC)     Obstructive sleep apnea 10/13/2020    Psychotic

## 2024-09-04 VITALS
TEMPERATURE: 97.5 F | DIASTOLIC BLOOD PRESSURE: 80 MMHG | RESPIRATION RATE: 18 BRPM | HEART RATE: 89 BPM | SYSTOLIC BLOOD PRESSURE: 113 MMHG | BODY MASS INDEX: 22.46 KG/M2 | WEIGHT: 175 LBS | OXYGEN SATURATION: 99 % | HEIGHT: 74 IN

## 2024-09-04 LAB
ANION GAP SERPL CALC-SCNC: 7 MMOL/L (ref 5–15)
BASOPHILS # BLD: 0 K/UL (ref 0–0.1)
BASOPHILS NFR BLD: 0 % (ref 0–1)
BUN SERPL-MCNC: 17 MG/DL (ref 6–20)
BUN/CREAT SERPL: 18 (ref 12–20)
CA-I BLD-MCNC: 8.6 MG/DL (ref 8.5–10.1)
CHLORIDE SERPL-SCNC: 103 MMOL/L (ref 97–108)
CO2 SERPL-SCNC: 28 MMOL/L (ref 21–32)
CREAT SERPL-MCNC: 0.95 MG/DL (ref 0.7–1.3)
DIFFERENTIAL METHOD BLD: ABNORMAL
EOSINOPHIL # BLD: 0.1 K/UL (ref 0–0.4)
EOSINOPHIL NFR BLD: 1 % (ref 0–7)
ERYTHROCYTE [DISTWIDTH] IN BLOOD BY AUTOMATED COUNT: 12.8 % (ref 11.5–14.5)
GLUCOSE SERPL-MCNC: 81 MG/DL (ref 65–100)
HCT VFR BLD AUTO: 28 % (ref 36.6–50.3)
HGB BLD-MCNC: 9.9 G/DL (ref 12.1–17)
IMM GRANULOCYTES # BLD AUTO: 0.1 K/UL (ref 0–0.04)
IMM GRANULOCYTES NFR BLD AUTO: 2 % (ref 0–0.5)
LYMPHOCYTES # BLD: 1.4 K/UL (ref 0.8–3.5)
LYMPHOCYTES NFR BLD: 27 % (ref 12–49)
MAGNESIUM SERPL-MCNC: 1.6 MG/DL (ref 1.6–2.4)
MCH RBC QN AUTO: 34.6 PG (ref 26–34)
MCHC RBC AUTO-ENTMCNC: 35.4 G/DL (ref 30–36.5)
MCV RBC AUTO: 97.9 FL (ref 80–99)
MONOCYTES # BLD: 0.5 K/UL (ref 0–1)
MONOCYTES NFR BLD: 9 % (ref 5–13)
NEUTS SEG # BLD: 2.9 K/UL (ref 1.8–8)
NEUTS SEG NFR BLD: 61 % (ref 32–75)
NRBC # BLD: 0 K/UL (ref 0–0.01)
NRBC BLD-RTO: 0 PER 100 WBC
PLATELET # BLD AUTO: 391 K/UL (ref 150–400)
PMV BLD AUTO: 9.5 FL (ref 8.9–12.9)
POTASSIUM SERPL-SCNC: 4.1 MMOL/L (ref 3.5–5.1)
RBC # BLD AUTO: 2.86 M/UL (ref 4.1–5.7)
RBC MORPH BLD: ABNORMAL
RBC MORPH BLD: ABNORMAL
SODIUM SERPL-SCNC: 138 MMOL/L (ref 136–145)
WBC # BLD AUTO: 5 K/UL (ref 4.1–11.1)

## 2024-09-04 PROCEDURE — 94640 AIRWAY INHALATION TREATMENT: CPT

## 2024-09-04 PROCEDURE — 6370000000 HC RX 637 (ALT 250 FOR IP): Performed by: FAMILY MEDICINE

## 2024-09-04 PROCEDURE — 83735 ASSAY OF MAGNESIUM: CPT

## 2024-09-04 PROCEDURE — 2580000003 HC RX 258: Performed by: FAMILY MEDICINE

## 2024-09-04 PROCEDURE — 36415 COLL VENOUS BLD VENIPUNCTURE: CPT

## 2024-09-04 PROCEDURE — 85025 COMPLETE CBC W/AUTO DIFF WBC: CPT

## 2024-09-04 PROCEDURE — 80048 BASIC METABOLIC PNL TOTAL CA: CPT

## 2024-09-04 PROCEDURE — 6370000000 HC RX 637 (ALT 250 FOR IP): Performed by: HOSPITALIST

## 2024-09-04 PROCEDURE — 6360000002 HC RX W HCPCS: Performed by: FAMILY MEDICINE

## 2024-09-04 RX ORDER — LEVOFLOXACIN 500 MG/1
500 TABLET, FILM COATED ORAL DAILY
Qty: 14 TABLET | Refills: 0 | Status: SHIPPED | OUTPATIENT
Start: 2024-09-05 | End: 2024-09-19

## 2024-09-04 RX ORDER — GUAIFENESIN 600 MG/1
600 TABLET, EXTENDED RELEASE ORAL 2 TIMES DAILY
Qty: 14 TABLET | Refills: 0 | Status: SHIPPED | OUTPATIENT
Start: 2024-09-04 | End: 2024-09-11

## 2024-09-04 RX ORDER — BENZONATATE 100 MG/1
100 CAPSULE ORAL 3 TIMES DAILY
Qty: 21 CAPSULE | Refills: 0 | Status: SHIPPED | OUTPATIENT
Start: 2024-09-04 | End: 2024-09-11

## 2024-09-04 RX ORDER — LACTOBACILLUS RHAMNOSUS GG 10B CELL
1 CAPSULE ORAL 2 TIMES DAILY WITH MEALS
Qty: 30 CAPSULE | Refills: 0 | Status: SHIPPED | OUTPATIENT
Start: 2024-09-04 | End: 2024-09-19

## 2024-09-04 RX ADMIN — PANTOPRAZOLE SODIUM 40 MG: 40 INJECTION, POWDER, FOR SOLUTION INTRAVENOUS at 09:29

## 2024-09-04 RX ADMIN — Medication 2 PUFF: at 07:20

## 2024-09-04 RX ADMIN — BENZONATATE 100 MG: 100 CAPSULE ORAL at 09:29

## 2024-09-04 RX ADMIN — DOCUSATE SODIUM 100 MG: 100 CAPSULE, LIQUID FILLED ORAL at 09:29

## 2024-09-04 RX ADMIN — Medication 1 CAPSULE: at 07:49

## 2024-09-04 RX ADMIN — Medication 400 MG: at 09:29

## 2024-09-04 RX ADMIN — ISOSORBIDE DINITRATE 5 MG: 10 TABLET ORAL at 09:29

## 2024-09-04 RX ADMIN — CARVEDILOL 3.12 MG: 3.12 TABLET, FILM COATED ORAL at 07:49

## 2024-09-04 RX ADMIN — LEVOFLOXACIN 500 MG: 500 TABLET, FILM COATED ORAL at 09:29

## 2024-09-04 RX ADMIN — ASPIRIN 81 MG: 81 TABLET, COATED ORAL at 09:29

## 2024-09-04 RX ADMIN — RISPERIDONE 2 MG: 1 TABLET, FILM COATED ORAL at 09:29

## 2024-09-04 RX ADMIN — VENLAFAXINE HYDROCHLORIDE 75 MG: 75 CAPSULE, EXTENDED RELEASE ORAL at 09:28

## 2024-09-04 RX ADMIN — Medication 1 LOZENGE: at 11:00

## 2024-09-04 RX ADMIN — TAMSULOSIN HYDROCHLORIDE 0.4 MG: 0.4 CAPSULE ORAL at 09:29

## 2024-09-04 RX ADMIN — ENOXAPARIN SODIUM 40 MG: 100 INJECTION SUBCUTANEOUS at 09:26

## 2024-09-04 RX ADMIN — VENLAFAXINE HYDROCHLORIDE 150 MG: 150 CAPSULE, EXTENDED RELEASE ORAL at 09:28

## 2024-09-04 RX ADMIN — CYCLOBENZAPRINE 10 MG: 10 TABLET, FILM COATED ORAL at 09:27

## 2024-09-04 RX ADMIN — SODIUM CHLORIDE, PRESERVATIVE FREE 10 ML: 5 INJECTION INTRAVENOUS at 09:27

## 2024-09-04 RX ADMIN — OXYCODONE HYDROCHLORIDE AND ACETAMINOPHEN 500 MG: 500 TABLET ORAL at 09:29

## 2024-09-04 RX ADMIN — ARIPIPRAZOLE 5 MG: 5 TABLET ORAL at 09:29

## 2024-09-04 RX ADMIN — GUAIFENESIN 600 MG: 600 TABLET, EXTENDED RELEASE ORAL at 09:29

## 2024-09-04 RX ADMIN — Medication 1 LOZENGE: at 07:39

## 2024-09-04 ASSESSMENT — PAIN SCALES - GENERAL
PAINLEVEL_OUTOF10: 0
PAINLEVEL_OUTOF10: 0

## 2024-09-04 NOTE — DISCHARGE INSTRUCTIONS
NOTIFY YOUR PHYSICIAN FOR ANY OF THE FOLLOWING:   Fever over 101 degrees for 24 hours.   Chest pain, shortness of breath, fever, chills, nausea, vomiting, diarrhea, change in mentation, falling, weakness, bleeding. Severe pain or pain not relieved by medications, as well as any other signs or symptoms that you may have questions about    Continue to use incentive spirometry / deep breathing exercises   Encourage to stop using cocaine as your drug screen was positive      Follow-up Care/Patient Instructions:  Activity: activity as tolerated  Diet: cardiac diet and diabetic diet

## 2024-09-04 NOTE — CARE COORDINATION
[facility] orders for shower chair [facility] since he just got one in March.

## 2024-09-04 NOTE — DISCHARGE SUMMARY
Follow-up Care/Patient Instructions:  Activity: activity as tolerated  Diet: cardiac diet and diabetic diet      Cristobal Fraga Sr., MD  [address] [facility] [address] [phone]    Follow up  appt set for Sept 5,2024 @ 1:30      This is dictation was done by dragon, computer voice recognition software. Quite often unanticipated grammatical, syntax, homophones and other interpretive errors or inadvertently transcribed by the computer software. Please excuse errors that have escaped final proofreading. Thank you.       Spent 45 minutes evaluting and coordinating patient care and discharge home with home health     Signed:  Davi Ruth MD  9/4/2024  9:36 AM

## 2024-09-04 NOTE — PROGRESS NOTES
Hospitalist Progress Note          Dr. Davi Ruth MD      Date:9/3/2024       Room:[facility]  Patient Name:David Alfaro     YOB: 1965     Age:58 y.o.      Chief Complaint   Patient presents with    Fever    Fatigue         HPI as per admitting provider on 8/31/2024  David Alfaro is a 58 y.o. male followed by Cristobal Fraga Sr., MD and  has a past medical history of Anxiety, Arthritis, GERD (gastroesophageal reflux disease), Hypercholesterolemia, Hypertension, Mild depressed bipolar 1 disorder (HCC), Myocardial infarction (HCC), Obstructive sleep apnea, Psychotic disorder (HCC), Seizures (HCC), and Stroke (HCC).       Patient presented to the ER brought in by a neighbor/his cousin, complaint of fever, cough, chest pain, weakness, painful urination.  Has been confused the past few days per his family.      In the ER, patient was febrile to 103.2, and was initially quite tachycardic and tachypneic.  Blood pressure is on the softer side but MAP remained above 65 throughout.  Patient was given sepsis fluid bolus, Rocephin, Levaquin, vancomycin IV.     Labs revealed creatinine 1.76, lactic acid 2.8 which normalized after IV fluids, troponin negative, LFTs very slightly elevated, WBC 6.3, RBC macrocytosis observed.  No neutrophilia.      Chest x-ray showing multilobar pneumonia,CT of the chest showed multifocal bilateral pneumonia, patchy consolidative infiltrates in the lower lobes bilaterally with small patchy area in the right upper lobe, coronary artery calcium noted, no significant abnormality in the incidentally imaged upper abdomen.  Head CT was obtained which was negative for any acute findings.     Urinalysis is positive with bacteria, urine culture added.  UDS is pending.     In reviewing prior records, I see operative report from podiatry on 7/18/2024 patient underwent a right foot fifth metatarsal fracture open reduction with internal fixation.     Reviewed recent PCP 
Able to ambulate to the bathroom. Due med's given take well. Attended to needs. Promoted rest.  
Bedside shift change report given to caio rosado(oncoming nurse) by crow sanford (offgoing nurse). Report included the following information Nurse Handoff Report.    
Continues with cough causing soreness to sides. Has been up to BR without issues.  MD was contacted to   
Discharge instructions given pt. Verbalized understanding  
Md aware of c/o chest pain- no new orders at this time.  
Patient ambulated with PT and o2 sats 92 in chair and 93 after walk.  Therapist encouraged patient to deep breathe.  
Patient c/o mid sternum pain- he stated its' \"infection pain\"-he had it at home also. Skin warm and dry- no SOB- telemetry NSR - will notify MD  
Patient had x 3 episode of watery stool during the shift. No complaints of any pain.   
Patient up ambulating with OT went to bathroom return to chair resp 25 slightly labored and pulse ox 89% room air.  
Pharmacy - Vancomycin Dosing    Pharmacy consulted to dose vancomycin for this 58 y.o. male being treated for Sepsis    Other Current Antimicrobials: Levofloxacin  Significant Cultures: n/a      Paralysis, amputations, malnutrition   N/A   Height and Weight  Ht Readings from Last 1 Encounters:   07/08/24 1.778 m (5' 10\")      Wt Readings from Last 1 Encounters:   08/31/24 75.3 kg (165 lb 14.4 oz)         Renal Function Creatinine Clearance (ml/min):46 ml/min    Recent Labs     08/30/24  1000 08/31/24  2229   BUN 18 26*      WBC Recent Labs     08/31/24  2229   WBC 6.3      Temp 97.5 °F (36.4 °C) (Oral)     Loading dose: 2000mg  Maintenance dose: 1000mg every 24 hours     Pharmacy will follow daily and adjust as appropriate.    Thanks for the consult,  Yoni Vazquez RPH   
Pt. Transferred via w/c to [facility] for transport home by cousins.  
Resting quietly in bed.  No c/o pain.  States soreness when coughing.  Using urinal during shift.  Up to bathroom with aid of walker @ 0300, no bm only gas per patient and back to bed.  
Urine Negative Negative      WBC, UA 0-4 0 - 5 /hpf    RBC, UA 0-5 0 - 3 /hpf    BACTERIA, URINE 1+ (A) Negative /hpf    Urine Culture if Indicated Culture not indicated by UA result Culture not indicated by UA result     Urine Drug Screen    Collection Time: 09/01/24 10:55 AM   Result Value Ref Range    Amphetamine, Urine Negative Negative      Barbiturates, Urine Negative Negative      Benzodiazepines, Urine Negative Negative      Cocaine, Urine Positive (A) Negative      MDMA, Urine Negative Negative      Methadone, Urine Negative Negative      Opiates, Urine Positive (A) Negative      Phencyclidine, Urine Negative Negative      THC, TH-Cannabinol, Urine Negative Negative      Comments:        This test is a screen for drugs of abuse in a medical setting only (i.e., they are unconfirmed results and as such must not be used for non-medical purposes, e.g.,employment testing, legal testing). Due to its inherent nature, false positive (FP) and false negative (FN) results may be obtained. Therefore, if necessary for medical care, recommend confirmation of positive findings by GC/MS.     Comprehensive Metabolic Panel w/ Reflex to MG    Collection Time: 09/02/24  5:26 AM   Result Value Ref Range    Sodium 141 136 - 145 mmol/L    Potassium 3.8 3.5 - 5.1 mmol/L    Chloride 108 97 - 108 mmol/L    CO2 25 21 - 32 mmol/L    Anion Gap 8 5 - 15 mmol/L    Glucose 85 65 - 100 mg/dL    BUN 18 6 - 20 mg/dL    Creatinine 0.86 0.70 - 1.30 mg/dL    BUN/Creatinine Ratio 21 (H) 12 - 20      Est, Glom Filt Rate >90 >60 ml/min/1.73m2    Calcium 8.2 (L) 8.5 - 10.1 mg/dL    Total Bilirubin 0.3 0.2 - 1.0 mg/dL    AST 36 15 - 37 U/L    ALT 58 12 - 78 U/L    Alk Phosphatase 47 45 - 117 U/L    Total Protein 5.4 (L) 6.4 - 8.2 g/dL    Albumin 1.7 (L) 3.5 - 5.0 g/dL    Globulin 3.7 2.0 - 4.0 g/dL    Albumin/Globulin Ratio 0.5 (L) 1.1 - 2.2     CBC with Auto Differential    Collection Time: 09/02/24  5:26 AM   Result Value Ref Range    WBC 4.4 4.1

## 2024-09-04 NOTE — PLAN OF CARE
Problem: Pain  Goal: Verbalizes/displays adequate comfort level or baseline comfort level  9/1/2024 2027 by Tamiko Bowser RN  Outcome: Progressing  9/1/2024 1248 by Sheyla Jurado RN  Outcome: Adequate for Discharge     Problem: Safety - Adult  Goal: Free from fall injury  9/1/2024 2027 by Tamiko Bowser RN  Outcome: Progressing  9/1/2024 1248 by Sheyla Jurado RN  Outcome: Progressing     
  Problem: Pain  Goal: Verbalizes/displays adequate comfort level or baseline comfort level  9/2/2024 0809 by Sofia Burrows RN  Outcome: Progressing  Flowsheets (Taken 9/2/2024 0809)  Verbalizes/displays adequate comfort level or baseline comfort level:   Encourage patient to monitor pain and request assistance   Assess pain using appropriate pain scale   Administer analgesics based on type and severity of pain and evaluate response   Implement non-pharmacological measures as appropriate and evaluate response   Notify Licensed Independent Practitioner if interventions unsuccessful or patient reports new pain     Problem: Safety - Adult  Goal: Free from fall injury  Outcome: Progressing     
  Problem: Pain  Goal: Verbalizes/displays adequate comfort level or baseline comfort level  9/2/2024 0809 by Sofia Burrows, RN  Outcome: Progressing  Flowsheets (Taken 9/2/2024 0809)  Verbalizes/displays adequate comfort level or baseline comfort level:   Encourage patient to monitor pain and request assistance   Assess pain using appropriate pain scale   Administer analgesics based on type and severity of pain and evaluate response   Implement non-pharmacological measures as appropriate and evaluate response   Notify Licensed Independent Practitioner if interventions unsuccessful or patient reports new pain  9/1/2024 2027 by Tamiko Bowser, RN  Outcome: Progressing     
  Problem: Pain  Goal: Verbalizes/displays adequate comfort level or baseline comfort level  Outcome: Adequate for Discharge     Problem: Safety - Adult  Goal: Free from fall injury  Outcome: Progressing     
  Problem: Safety - Adult  Goal: Free from fall injury  9/2/2024 2030 by Zoila Snell LPN  Outcome: Progressing     
PHYSICAL THERAPY EVALUATION/DISCHARGE    Patient: David Alfaro (58 y.o. male)  Date: 9/3/2024  Primary Diagnosis: Sepsis ([facility]) [A41.9]       Precautions: Fall Risk                    ASSESSMENT AND RECOMMENDATIONS:  Based on the objective data below, the patient is ambulating and completing functional mobility at his prior level.  Pt has AD required at home and reports his current level of mobility is his baseline with no new complaints. Pt able to ambulate within the room with RW x 30ft.     Patient does not require further skilled physical therapy intervention at this level of care.     PLAN :  Recommendation for discharge: (in order for the patient to meet his/her long term goals): No skilled physical therapy    Other factors to consider for discharge: no additional factors    IF patient discharges home will need the following DME: none    Recommend with staff: therapy recommendations for staff: The patient is safe to mobilize ad merlin using a DME for pt use: rolling walker.           SUBJECTIVE:   Patient stated “I am doing good.”    OBJECTIVE DATA SUMMARY:     Past Medical History:   Diagnosis Date   • Anxiety    • Arthritis    • GERD (gastroesophageal reflux disease)    • Hypercholesterolemia    • Hypertension    • Mild depressed bipolar 1 disorder ([facility]) 10/13/2020   • Myocardial infarction ([facility])    • Obstructive sleep apnea 10/13/2020   • Psychotic disorder ([facility])    • Seizures ([facility])    • Stroke ([facility])      Past Surgical History:   Procedure Laterality Date   • ANKLE FRACTURE SURGERY Right 7/16/2024    RIGHT FOOT FIFTH METATARSAL FRACTURE OPEN REDUCTION WITH INTERNAL FIXATION performed by Markell Howard DPM at [facility] MAIN OR   • CARDIAC SURGERY     • [place] UNLISTED PROCEDURE ABDOMEN PERITONEUM & OMENTUM         Home Situation:  Social/Functional History  Lives With: Alone  Type of Home: House  Home Layout: One level  Bathroom Shower/Tub: Tub/Shower unit  Bathroom Toilet: Standard  Bathroom Accessibility: 
Assess vascular access sites hourly  3.  Every 4-6 hours minimum:  Change oxygen saturation probe site  4.  Every 4-6 hours:  If on nasal continuous positive airway pressure, respiratory therapy assess nares and determine need for appliance change or resting period.  9/4/2024 0319 by Martha Whalen RN  Outcome: Progressing     
Progressing

## 2024-09-06 LAB
BACTERIA SPEC CULT: NORMAL
Lab: NORMAL

## 2024-09-12 ENCOUNTER — APPOINTMENT (OUTPATIENT)
Facility: HOSPITAL | Age: 59
DRG: 194 | End: 2024-09-12
Attending: EMERGENCY MEDICINE
Payer: MEDICARE

## 2024-09-12 ENCOUNTER — HOSPITAL ENCOUNTER (INPATIENT)
Facility: HOSPITAL | Age: 59
LOS: 2 days | Discharge: HOME | DRG: 194 | End: 2024-09-14
Attending: EMERGENCY MEDICINE | Admitting: FAMILY MEDICINE
Payer: MEDICARE

## 2024-09-12 DIAGNOSIS — J18.9 PNEUMONIA OF BOTH LOWER LOBES DUE TO INFECTIOUS ORGANISM: Primary | ICD-10-CM

## 2024-09-12 PROBLEM — K94.03 COLOSTOMY AND ENTEROSTOMY MALFUNCTION (HCC): Status: ACTIVE | Noted: 2022-06-15

## 2024-09-12 PROBLEM — N18.30 STAGE 3 CHRONIC KIDNEY DISEASE (HCC): Status: ACTIVE | Noted: 2017-09-29

## 2024-09-12 PROBLEM — K94.13 COLOSTOMY AND ENTEROSTOMY MALFUNCTION (HCC): Status: ACTIVE | Noted: 2022-06-15

## 2024-09-12 PROBLEM — S62.316A CLOSED DISPLACED FRACTURE OF BASE OF FIFTH METACARPAL BONE OF RIGHT HAND: Status: ACTIVE | Noted: 2022-12-13

## 2024-09-12 LAB
ALBUMIN SERPL-MCNC: 2.7 G/DL (ref 3.5–5)
ALBUMIN/GLOB SERPL: 0.7 (ref 1.1–2.2)
ALP SERPL-CCNC: 62 U/L (ref 45–117)
ALT SERPL-CCNC: 18 U/L (ref 12–78)
ANION GAP SERPL CALC-SCNC: 7 MMOL/L (ref 2–12)
APPEARANCE UR: CLEAR
AST SERPL W P-5'-P-CCNC: 14 U/L (ref 15–37)
BACTERIA URNS QL MICRO: NEGATIVE /HPF
BASOPHILS # BLD: 0 K/UL (ref 0–0.1)
BASOPHILS NFR BLD: 1 % (ref 0–1)
BILIRUB SERPL-MCNC: 0.4 MG/DL (ref 0.2–1)
BILIRUB UR QL: NEGATIVE
BUN SERPL-MCNC: 23 MG/DL (ref 6–20)
BUN/CREAT SERPL: 19 (ref 12–20)
CA-I BLD-MCNC: 8.8 MG/DL (ref 8.5–10.1)
CHLORIDE SERPL-SCNC: 101 MMOL/L (ref 97–108)
CO2 SERPL-SCNC: 29 MMOL/L (ref 21–32)
COLOR UR: NORMAL
CREAT SERPL-MCNC: 1.21 MG/DL (ref 0.7–1.3)
DIFFERENTIAL METHOD BLD: ABNORMAL
EOSINOPHIL # BLD: 0 K/UL (ref 0–0.4)
EOSINOPHIL NFR BLD: 1 % (ref 0–7)
ERYTHROCYTE [DISTWIDTH] IN BLOOD BY AUTOMATED COUNT: 13.5 % (ref 11.5–14.5)
GLOBULIN SER CALC-MCNC: 3.9 G/DL (ref 2–4)
GLUCOSE SERPL-MCNC: 90 MG/DL (ref 65–100)
GLUCOSE UR STRIP.AUTO-MCNC: NEGATIVE MG/DL
HCT VFR BLD AUTO: 32.9 % (ref 36.6–50.3)
HGB BLD-MCNC: 11.1 G/DL (ref 12.1–17)
HGB UR QL STRIP: NEGATIVE
IMM GRANULOCYTES # BLD AUTO: 0.1 K/UL (ref 0–0.04)
IMM GRANULOCYTES NFR BLD AUTO: 1 % (ref 0–0.5)
KETONES UR QL STRIP.AUTO: NEGATIVE MG/DL
LACTATE SERPL-SCNC: 1.1 MMOL/L (ref 0.4–2)
LACTATE SERPL-SCNC: 1.6 MMOL/L (ref 0.4–2)
LEUKOCYTE ESTERASE UR QL STRIP.AUTO: NEGATIVE
LYMPHOCYTES # BLD: 1.3 K/UL (ref 0.8–3.5)
LYMPHOCYTES NFR BLD: 36 % (ref 12–49)
MCH RBC QN AUTO: 34.5 PG (ref 26–34)
MCHC RBC AUTO-ENTMCNC: 33.7 G/DL (ref 30–36.5)
MCV RBC AUTO: 102.2 FL (ref 80–99)
MONOCYTES # BLD: 0.3 K/UL (ref 0–1)
MONOCYTES NFR BLD: 10 % (ref 5–13)
NEUTS SEG # BLD: 1.9 K/UL (ref 1.8–8)
NEUTS SEG NFR BLD: 51 % (ref 32–75)
NITRITE UR QL STRIP.AUTO: NEGATIVE
NRBC # BLD: 0 K/UL (ref 0–0.01)
NRBC BLD-RTO: 0 PER 100 WBC
PH UR STRIP: 6 (ref 5–8)
PLATELET # BLD AUTO: 358 K/UL (ref 150–400)
PMV BLD AUTO: 8.6 FL (ref 8.9–12.9)
POTASSIUM SERPL-SCNC: 3.9 MMOL/L (ref 3.5–5.1)
PROCALCITONIN SERPL-MCNC: <0.05 NG/ML
PROT SERPL-MCNC: 6.6 G/DL (ref 6.4–8.2)
PROT UR STRIP-MCNC: NEGATIVE MG/DL
RBC # BLD AUTO: 3.22 M/UL (ref 4.1–5.7)
RBC #/AREA URNS HPF: NORMAL /HPF (ref 0–3)
SODIUM SERPL-SCNC: 137 MMOL/L (ref 136–145)
SP GR UR REFRACTOMETRY: 1.02 (ref 1–1.03)
TROPONIN I SERPL HS-MCNC: 5 NG/L (ref 0–76)
URINE CULTURE IF INDICATED: NORMAL
UROBILINOGEN UR QL STRIP.AUTO: 0.2 EU/DL (ref 0.2–1)
WBC # BLD AUTO: 3.6 K/UL (ref 4.1–11.1)
WBC URNS QL MICRO: NORMAL /HPF (ref 0–5)

## 2024-09-12 PROCEDURE — 87899 AGENT NOS ASSAY W/OPTIC: CPT

## 2024-09-12 PROCEDURE — 93005 ELECTROCARDIOGRAM TRACING: CPT | Performed by: EMERGENCY MEDICINE

## 2024-09-12 PROCEDURE — 1100000000 HC RM PRIVATE

## 2024-09-12 PROCEDURE — 80053 COMPREHEN METABOLIC PANEL: CPT

## 2024-09-12 PROCEDURE — 96365 THER/PROPH/DIAG IV INF INIT: CPT

## 2024-09-12 PROCEDURE — 87040 BLOOD CULTURE FOR BACTERIA: CPT

## 2024-09-12 PROCEDURE — 96375 TX/PRO/DX INJ NEW DRUG ADDON: CPT

## 2024-09-12 PROCEDURE — 71250 CT THORAX DX C-: CPT

## 2024-09-12 PROCEDURE — 96367 TX/PROPH/DG ADDL SEQ IV INF: CPT

## 2024-09-12 PROCEDURE — 2580000003 HC RX 258: Performed by: EMERGENCY MEDICINE

## 2024-09-12 PROCEDURE — 84145 PROCALCITONIN (PCT): CPT

## 2024-09-12 PROCEDURE — 96366 THER/PROPH/DIAG IV INF ADDON: CPT

## 2024-09-12 PROCEDURE — 85025 COMPLETE CBC W/AUTO DIFF WBC: CPT

## 2024-09-12 PROCEDURE — 81001 URINALYSIS AUTO W/SCOPE: CPT

## 2024-09-12 PROCEDURE — 84484 ASSAY OF TROPONIN QUANT: CPT

## 2024-09-12 PROCEDURE — 6360000002 HC RX W HCPCS: Performed by: EMERGENCY MEDICINE

## 2024-09-12 PROCEDURE — 99285 EMERGENCY DEPT VISIT HI MDM: CPT

## 2024-09-12 PROCEDURE — 83605 ASSAY OF LACTIC ACID: CPT

## 2024-09-12 PROCEDURE — 6360000004 HC RX CONTRAST MEDICATION: Performed by: EMERGENCY MEDICINE

## 2024-09-12 PROCEDURE — 80307 DRUG TEST PRSMV CHEM ANLYZR: CPT

## 2024-09-12 PROCEDURE — 74177 CT ABD & PELVIS W/CONTRAST: CPT

## 2024-09-12 PROCEDURE — 70450 CT HEAD/BRAIN W/O DYE: CPT

## 2024-09-12 RX ORDER — CLINDAMYCIN PHOSPHATE 600 MG/50ML
600 INJECTION, SOLUTION INTRAVENOUS EVERY 8 HOURS
Status: DISCONTINUED | OUTPATIENT
Start: 2024-09-13 | End: 2024-09-13

## 2024-09-12 RX ORDER — SODIUM CHLORIDE 0.9 % (FLUSH) 0.9 %
5-40 SYRINGE (ML) INJECTION PRN
Status: DISCONTINUED | OUTPATIENT
Start: 2024-09-12 | End: 2024-09-12

## 2024-09-12 RX ORDER — ENOXAPARIN SODIUM 100 MG/ML
40 INJECTION SUBCUTANEOUS DAILY
Status: DISCONTINUED | OUTPATIENT
Start: 2024-09-12 | End: 2024-09-14 | Stop reason: HOSPADM

## 2024-09-12 RX ORDER — METRONIDAZOLE 500 MG/100ML
500 INJECTION, SOLUTION INTRAVENOUS
Status: COMPLETED | OUTPATIENT
Start: 2024-09-12 | End: 2024-09-12

## 2024-09-12 RX ORDER — ACETAMINOPHEN 650 MG/1
650 SUPPOSITORY RECTAL EVERY 6 HOURS PRN
Status: DISCONTINUED | OUTPATIENT
Start: 2024-09-12 | End: 2024-09-12

## 2024-09-12 RX ORDER — ACETAMINOPHEN 325 MG/1
650 TABLET ORAL EVERY 6 HOURS PRN
Status: DISCONTINUED | OUTPATIENT
Start: 2024-09-12 | End: 2024-09-12

## 2024-09-12 RX ORDER — VANCOMYCIN 2 G/400ML
2000 INJECTION, SOLUTION INTRAVENOUS ONCE
Status: COMPLETED | OUTPATIENT
Start: 2024-09-12 | End: 2024-09-12

## 2024-09-12 RX ORDER — IOPAMIDOL 755 MG/ML
100 INJECTION, SOLUTION INTRAVASCULAR
Status: COMPLETED | OUTPATIENT
Start: 2024-09-12 | End: 2024-09-12

## 2024-09-12 RX ORDER — POTASSIUM CHLORIDE 1500 MG/1
40 TABLET, EXTENDED RELEASE ORAL PRN
Status: DISCONTINUED | OUTPATIENT
Start: 2024-09-12 | End: 2024-09-12

## 2024-09-12 RX ORDER — VANCOMYCIN 2 G/400ML
25 INJECTION, SOLUTION INTRAVENOUS ONCE
Status: DISCONTINUED | OUTPATIENT
Start: 2024-09-12 | End: 2024-09-12

## 2024-09-12 RX ORDER — SODIUM CHLORIDE 9 MG/ML
INJECTION, SOLUTION INTRAVENOUS PRN
Status: DISCONTINUED | OUTPATIENT
Start: 2024-09-12 | End: 2024-09-12

## 2024-09-12 RX ORDER — SODIUM CHLORIDE 0.9 % (FLUSH) 0.9 %
5-40 SYRINGE (ML) INJECTION EVERY 12 HOURS SCHEDULED
Status: DISCONTINUED | OUTPATIENT
Start: 2024-09-12 | End: 2024-09-12

## 2024-09-12 RX ORDER — MAGNESIUM SULFATE IN WATER 40 MG/ML
2000 INJECTION, SOLUTION INTRAVENOUS PRN
Status: DISCONTINUED | OUTPATIENT
Start: 2024-09-12 | End: 2024-09-12

## 2024-09-12 RX ORDER — POLYETHYLENE GLYCOL 3350 17 G/17G
17 POWDER, FOR SOLUTION ORAL DAILY PRN
Status: DISCONTINUED | OUTPATIENT
Start: 2024-09-12 | End: 2024-09-12

## 2024-09-12 RX ORDER — ONDANSETRON 4 MG/1
4 TABLET, ORALLY DISINTEGRATING ORAL EVERY 8 HOURS PRN
Status: DISCONTINUED | OUTPATIENT
Start: 2024-09-12 | End: 2024-09-12

## 2024-09-12 RX ORDER — 0.9 % SODIUM CHLORIDE 0.9 %
30 INTRAVENOUS SOLUTION INTRAVENOUS ONCE
Status: COMPLETED | OUTPATIENT
Start: 2024-09-12 | End: 2024-09-12

## 2024-09-12 RX ORDER — ONDANSETRON 2 MG/ML
4 INJECTION INTRAMUSCULAR; INTRAVENOUS EVERY 6 HOURS PRN
Status: DISCONTINUED | OUTPATIENT
Start: 2024-09-12 | End: 2024-09-12

## 2024-09-12 RX ORDER — POTASSIUM CHLORIDE 7.45 MG/ML
10 INJECTION INTRAVENOUS PRN
Status: DISCONTINUED | OUTPATIENT
Start: 2024-09-12 | End: 2024-09-12

## 2024-09-12 RX ADMIN — IOPAMIDOL 100 ML: 755 INJECTION, SOLUTION INTRAVENOUS at 18:49

## 2024-09-12 RX ADMIN — ENOXAPARIN SODIUM 40 MG: 100 INJECTION SUBCUTANEOUS at 22:15

## 2024-09-12 RX ADMIN — SODIUM CHLORIDE 2400 ML: 9 INJECTION, SOLUTION INTRAVENOUS at 18:14

## 2024-09-12 RX ADMIN — VANCOMYCIN 2000 MG: 2 INJECTION, SOLUTION INTRAVENOUS at 20:57

## 2024-09-12 RX ADMIN — CEFEPIME 2000 MG: 2 INJECTION, POWDER, FOR SOLUTION INTRAVENOUS at 18:15

## 2024-09-12 RX ADMIN — METRONIDAZOLE 500 MG: 500 INJECTION, SOLUTION INTRAVENOUS at 19:32

## 2024-09-12 ASSESSMENT — PAIN SCALES - GENERAL
PAINLEVEL_OUTOF10: 8
PAINLEVEL_OUTOF10: 7

## 2024-09-12 ASSESSMENT — PAIN - FUNCTIONAL ASSESSMENT: PAIN_FUNCTIONAL_ASSESSMENT: 0-10

## 2024-09-12 ASSESSMENT — PAIN DESCRIPTION - LOCATION: LOCATION: ABDOMEN

## 2024-09-12 NOTE — ED TRIAGE NOTES
PT reports he was admitted recently for pneumonia and states since he was in the hospital he has felt weak and continues to have a cough and pain with cough. PT reports he feels that his pneumonia has not resolved. PT reports he was having multiple seizures last one being PTA. PT reports he is not on any medications for seizures. MD to bedside

## 2024-09-12 NOTE — ED NOTES
Pt returned from imaging at this time. Report given to Charlee BUSTAMANTE. Advised of pending abx and 1,400NS to continue administration. No further questions at this time.

## 2024-09-12 NOTE — ED PROVIDER NOTES
[facility] EMERGENCY DEPT  EMERGENCY DEPARTMENT HISTORY AND PHYSICAL EXAM      Date: 9/12/2024  Patient Name: David Alfaro  MRN: [id number]  Birthdate 1965  Date of evaluation: 9/12/2024  Provider: Kings Shannon MD   Note Started: 5:51 PM EDT 9/12/24    HISTORY OF PRESENT ILLNESS     Chief Complaint   Patient presents with    Cough       History Provided By: patient    HPI: David Alfaro is a 58 y.o. male with PMH bipolar, CAD presenting with weakness, cough. Ongoing since recent [facility] admission, does not feel he got better. Still having frequent syncopes, 4-5 this week. States he thinks they are seizures but never been on AED, was never convulsive but \"blacked out.\" None on EEG on past review. Denies F/C but having diarrhea.    PAST MEDICAL HISTORY   Past Medical History:  Past Medical History:   Diagnosis Date    Anxiety     Arthritis     GERD (gastroesophageal reflux disease)     Hypercholesterolemia     Hypertension     Mild depressed bipolar 1 disorder ([facility]) 10/13/2020    Myocardial infarction ([facility])     Obstructive sleep apnea 10/13/2020    Psychotic disorder ([facility])     Seizures ([facility])     Stroke ([facility])        Past Surgical History:  Past Surgical History:   Procedure Laterality Date    ANKLE FRACTURE SURGERY Right 7/16/2024    RIGHT FOOT FIFTH METATARSAL FRACTURE OPEN REDUCTION WITH INTERNAL FIXATION performed by Markell Howard DPM at [facility] MAIN OR    CARDIAC SURGERY      [place] UNLISTED PROCEDURE ABDOMEN PERITONEUM & OMENTUM         Family History:  Family History   Problem Relation Age of Onset    Depression Mother     No Known Problems Father     Hypertension Sister     Diabetes Type 1  Brother     Hypertension Brother     Heart Failure Brother        Social History:  Social History     Tobacco Use    Smoking status: Former    Smokeless tobacco: Never   Vaping Use    Vaping status: Never Used   Substance Use Topics    Alcohol use: Not Currently     Alcohol/week: 2.0 standard drinks of alcohol    Drug use:

## 2024-09-13 PROBLEM — F14.10 COCAINE USE DISORDER (HCC): Status: ACTIVE | Noted: 2024-09-13

## 2024-09-13 PROBLEM — R05.9 COUGH: Status: ACTIVE | Noted: 2024-09-13

## 2024-09-13 LAB
AMPHET UR QL SCN: NEGATIVE
BARBITURATES UR QL SCN: NEGATIVE
BENZODIAZ UR QL: NEGATIVE
CANNABINOIDS UR QL SCN: NEGATIVE
COCAINE UR QL SCN: POSITIVE
EKG ATRIAL RATE: 82 BPM
EKG DIAGNOSIS: NORMAL
EKG P AXIS: 58 DEGREES
EKG P-R INTERVAL: 157 MS
EKG Q-T INTERVAL: 381 MS
EKG QRS DURATION: 91 MS
EKG QTC CALCULATION (BAZETT): 445 MS
EKG R AXIS: 41 DEGREES
EKG T AXIS: 67 DEGREES
EKG VENTRICULAR RATE: 82 BPM
Lab: ABNORMAL
MDMA, URINE: NEGATIVE
METHADONE UR QL: NEGATIVE
OPIATES UR QL: POSITIVE
PCP UR QL: NEGATIVE

## 2024-09-13 PROCEDURE — 6370000000 HC RX 637 (ALT 250 FOR IP): Performed by: FAMILY MEDICINE

## 2024-09-13 PROCEDURE — 2580000003 HC RX 258: Performed by: HOSPITALIST

## 2024-09-13 PROCEDURE — 87449 NOS EACH ORGANISM AG IA: CPT

## 2024-09-13 PROCEDURE — 6360000002 HC RX W HCPCS: Performed by: HOSPITALIST

## 2024-09-13 PROCEDURE — 1100000000 HC RM PRIVATE

## 2024-09-13 PROCEDURE — 6370000000 HC RX 637 (ALT 250 FOR IP): Performed by: HOSPITALIST

## 2024-09-13 PROCEDURE — 87389 HIV-1 AG W/HIV-1&-2 AB AG IA: CPT

## 2024-09-13 PROCEDURE — 94640 AIRWAY INHALATION TREATMENT: CPT

## 2024-09-13 PROCEDURE — G0378 HOSPITAL OBSERVATION PER HR: HCPCS

## 2024-09-13 PROCEDURE — 6360000002 HC RX W HCPCS: Performed by: EMERGENCY MEDICINE

## 2024-09-13 RX ORDER — ASPIRIN 81 MG/1
81 TABLET ORAL DAILY
Status: DISCONTINUED | OUTPATIENT
Start: 2024-09-13 | End: 2024-09-14 | Stop reason: HOSPADM

## 2024-09-13 RX ORDER — QUETIAPINE FUMARATE 50 MG/1
50 TABLET, FILM COATED ORAL NIGHTLY
Status: DISCONTINUED | OUTPATIENT
Start: 2024-09-13 | End: 2024-09-14 | Stop reason: HOSPADM

## 2024-09-13 RX ORDER — CARVEDILOL 3.12 MG/1
3.12 TABLET ORAL 2 TIMES DAILY WITH MEALS
Status: DISCONTINUED | OUTPATIENT
Start: 2024-09-13 | End: 2024-09-14 | Stop reason: HOSPADM

## 2024-09-13 RX ORDER — BUDESONIDE AND FORMOTEROL FUMARATE DIHYDRATE 80; 4.5 UG/1; UG/1
2 AEROSOL RESPIRATORY (INHALATION)
Status: DISCONTINUED | OUTPATIENT
Start: 2024-09-13 | End: 2024-09-14 | Stop reason: HOSPADM

## 2024-09-13 RX ORDER — CLINDAMYCIN HCL 150 MG
300 CAPSULE ORAL EVERY 8 HOURS
Status: DISCONTINUED | OUTPATIENT
Start: 2024-09-13 | End: 2024-09-14 | Stop reason: HOSPADM

## 2024-09-13 RX ORDER — ASCORBIC ACID 500 MG
500 TABLET ORAL DAILY
Status: DISCONTINUED | OUTPATIENT
Start: 2024-09-13 | End: 2024-09-14 | Stop reason: HOSPADM

## 2024-09-13 RX ORDER — TAMSULOSIN HYDROCHLORIDE 0.4 MG/1
0.4 CAPSULE ORAL DAILY
Status: DISCONTINUED | OUTPATIENT
Start: 2024-09-13 | End: 2024-09-14 | Stop reason: HOSPADM

## 2024-09-13 RX ORDER — LACTOBACILLUS RHAMNOSUS GG 10B CELL
1 CAPSULE ORAL 2 TIMES DAILY WITH MEALS
Status: DISCONTINUED | OUTPATIENT
Start: 2024-09-13 | End: 2024-09-14 | Stop reason: HOSPADM

## 2024-09-13 RX ORDER — ACETAMINOPHEN 325 MG/1
650 TABLET ORAL EVERY 6 HOURS PRN
Status: DISCONTINUED | OUTPATIENT
Start: 2024-09-13 | End: 2024-09-14 | Stop reason: HOSPADM

## 2024-09-13 RX ADMIN — CARVEDILOL 3.12 MG: 3.12 TABLET, FILM COATED ORAL at 09:43

## 2024-09-13 RX ADMIN — TAMSULOSIN HYDROCHLORIDE 0.4 MG: 0.4 CAPSULE ORAL at 09:44

## 2024-09-13 RX ADMIN — CEFEPIME 2000 MG: 2 INJECTION, POWDER, FOR SOLUTION INTRAVENOUS at 17:20

## 2024-09-13 RX ADMIN — ACETAMINOPHEN 650 MG: 325 TABLET ORAL at 22:04

## 2024-09-13 RX ADMIN — CLINDAMYCIN HYDROCHLORIDE 300 MG: 150 CAPSULE ORAL at 16:33

## 2024-09-13 RX ADMIN — Medication 2 PUFF: at 20:40

## 2024-09-13 RX ADMIN — CLINDAMYCIN PHOSPHATE 600 MG: 600 INJECTION, SOLUTION INTRAVENOUS at 00:43

## 2024-09-13 RX ADMIN — CEFEPIME 2000 MG: 2 INJECTION, POWDER, FOR SOLUTION INTRAVENOUS at 02:27

## 2024-09-13 RX ADMIN — OXYCODONE HYDROCHLORIDE AND ACETAMINOPHEN 500 MG: 500 TABLET ORAL at 09:45

## 2024-09-13 RX ADMIN — ASPIRIN 81 MG: 81 TABLET, COATED ORAL at 09:44

## 2024-09-13 RX ADMIN — Medication 1 CAPSULE: at 16:33

## 2024-09-13 RX ADMIN — CEFEPIME 2000 MG: 2 INJECTION, POWDER, FOR SOLUTION INTRAVENOUS at 09:48

## 2024-09-13 RX ADMIN — Medication 1 CAPSULE: at 08:56

## 2024-09-13 RX ADMIN — QUETIAPINE FUMARATE 50 MG: 50 TABLET ORAL at 20:48

## 2024-09-13 RX ADMIN — CLINDAMYCIN PHOSPHATE 600 MG: 600 INJECTION, SOLUTION INTRAVENOUS at 08:26

## 2024-09-13 RX ADMIN — Medication 2 PUFF: at 10:02

## 2024-09-13 RX ADMIN — ENOXAPARIN SODIUM 40 MG: 100 INJECTION SUBCUTANEOUS at 08:26

## 2024-09-13 ASSESSMENT — PAIN SCALES - GENERAL
PAINLEVEL_OUTOF10: 6
PAINLEVEL_OUTOF10: 7
PAINLEVEL_OUTOF10: 2
PAINLEVEL_OUTOF10: 7
PAINLEVEL_OUTOF10: 0

## 2024-09-13 ASSESSMENT — PAIN DESCRIPTION - LOCATION: LOCATION: HEAD

## 2024-09-13 ASSESSMENT — PAIN DESCRIPTION - DESCRIPTORS: DESCRIPTORS: ACHING

## 2024-09-13 ASSESSMENT — PAIN SCALES - WONG BAKER: WONGBAKER_NUMERICALRESPONSE: NO HURT

## 2024-09-13 NOTE — ASSESSMENT & PLAN NOTE
- Known hx with radiographic evidence of a large ventral wall hernia containing a portion of colon but no  evidence of intestinal obstruction present  - Continue bowel regimen and monitor bowel fxn  - Outpt Gen Surg eval recommended

## 2024-09-13 NOTE — ASSESSMENT & PLAN NOTE
- Intermittent pleurisy and cough associated with meals described w/ radiographic evidence of BLL consolidative changes & RUL infiltrates on CT chest  - CURB65 - 3 w/ ATBX initiated for CAP/aspirational coverage given hx and allergy profile  - Urine Ant and sputum cx ordered and adjust based on results  - ST evaluation requested and continue pulmonary hygiene

## 2024-09-13 NOTE — ASSESSMENT & PLAN NOTE
- Known hx w/o evidence of acute SI or psychosis  - Resume established med regimen w/ outpt follow up per PCP

## 2024-09-13 NOTE — H&P
V2.0  History and Physical      Name:  David Alfaro /Age/Sex: 1965  (58 y.o. male)   MRN & CSN:  [id number] & [id number] Encounter Date/Time: 24   Location:   PCP: Cristobal Fraga Sr., MD       Hospital Day: 2    Assessment and Plan:   David Alfaro is a 58 y.o. male with a pmh of BPH, depression and HTN who presents with complaints of increased fatigue and cough and pleurisy  Hospital Problems             Last Modified POA    Multifocal pneumonia 2024 Yes    Ventral hernia without obstruction or gangrene 2024 Yes    Severe recurrent major depression without psychotic features (HCC) 2024 Yes    Benign prostatic hyperplasia with lower urinary tract symptoms 2024 Yes     Plan:  Ventral hernia without obstruction or gangrene  - Known hx with radiographic evidence of a large ventral wall hernia containing a portion of colon but no  evidence of intestinal obstruction present  - Continue bowel regimen and monitor bowel fxn  - Outpt Gen Surg eval recommended    Benign prostatic hyperplasia with lower urinary tract symptoms  - Known hx w/o evidence of acute urinary retention  - Continue alpha blocker and monitor UOP/PVR    Severe recurrent major depression without psychotic features (HCC)  - Known hx w/o evidence of acute SI or psychosis  - Resume established med regimen w/ outpt follow up per PCP    Multifocal pneumonia  - Intermittent pleurisy and cough associated with meals described w/ radiographic evidence of BLL consolidative changes & RUL infiltrates on CT chest  - CURB65 - 3 w/ ATBX initiated for CAP/aspirational coverage given hx and allergy profile  - Urine Ant and sputum cx ordered and adjust based on results  - ST evaluation requested and continue pulmonary hygiene      Disposition:   Current Living situation: Home  Expected Disposition: Home  Estimated D/C: TBD    Diet ADULT DIET; Regular   DVT Prophylaxis [x] Lovenox, []  Heparin, [] SCDs, [] Ambulation,   8/18/20   Automatic Reconciliation, Ar   montelukast (SINGULAIR) 10 MG tablet Take 1 tablet by mouth daily 8/14/20   Automatic Reconciliation, Ar       Physical Exam:    This was a telemedicine encounter that was done with the assistance of the nurse at the bedside.  Communication was done with audio/video using a high-resolution camera.  Auscultation was done using an electronic stethoscope.  Physician was located in his office in [place]  Patient was located in the inpatient unit at [facility] in [place], [place].     Neuro: CN intact, CARRILLO, strength intact, no tremors  Cardio:  RRR w/o LADONNA  Pulm: CTA w/ diminished BS  Gastro: Soft, NT, ND BS+, No guarding  Extremity: No edema     Past Medical History:   PMHx   Past Medical History:   Diagnosis Date   • Benign prostatic hyperplasia with lower urinary tract symptoms 03/15/2024   • Closed displaced fracture of base of fifth metacarpal bone of right hand 12/13/2022   • Cocaine abuse ([facility]) 12/25/2023   • Colostomy and enterostomy malfunction ([facility]) 06/15/2022   • Essential hypertension    • ARDEN (generalized anxiety disorder)    • GERD (gastroesophageal reflux disease)    • History of cerebrovascular accident (CVA) with residual deficit 09/01/2024   • Mild depressed bipolar 1 disorder ([facility]) 10/13/2020   • Mixed hyperlipidemia    • DELTA on CPAP    • Seizures ([facility])    • Severe recurrent major depression without psychotic features ([facility]) 07/04/2023   • Ventral hernia without obstruction or gangrene 12/23/2023     PSHX:  has a past surgical history that includes pr unlisted procedure abdomen peritoneum & omentum; Cardiac surgery; and Ankle fracture surgery (Right, 7/16/2024).  Allergies:   Allergies   Allergen Reactions   • Codeine Swelling     Tongue swelling   • Penicillins Swelling     Tongue Swelling     Fam HX:  family history includes Depression in his mother; Diabetes Type 1  in his brother; Heart Failure in his brother; Hypertension in  abnormalities in the abdomen or pelvis. 3.  Large ventral wall hernia containing a portion of colon. Smaller right periumbilical hernia containing a short segment of small bowel. There is no evidence of intestinal obstruction. Electronically signed by KYRA Khanna    CT Head W/O Contrast    Result Date: 9/12/2024  EXAM: CT HEAD WO CONTRAST INDICATION: syncope, cough. COMPARISON: CT head on 9/1/2024 and 9/6/2023.. CONTRAST: None. TECHNIQUE: Unenhanced CT of the head was performed using 5 mm images. Brain and bone windows were generated. Coronal and sagittal reformats. CT dose reduction was achieved through use of a standardized protocol tailored for this examination and automatic exposure control for dose modulation.  FINDINGS: The ventricles and sulci are normal in size, shape and configuration. There is no significant white matter disease. There is no intracranial hemorrhage, extra-axial collection, or mass effect. The basilar cisterns are open. No CT evidence of acute infarct. The bone windows demonstrate no abnormalities. Left maxillary sinus small mucous retention cyst is unchanged. No sinusitis..     No acute process on noncontrast CT. No significant change. Electronically signed by Joaquin Segundo    Electronically signed by ANDREW RUSHING DO on 9/13/2024 at 8:46 AM

## 2024-09-13 NOTE — PLAN OF CARE
Problem: Pain  Goal: Verbalizes/displays adequate comfort level or baseline comfort level  9/13/2024 0921 by Shweta Sanchez LPN  Outcome: Progressing  9/12/2024 2209 by Terrance Zheng RN  Outcome: Progressing     Problem: Safety - Adult  Goal: Free from fall injury  9/13/2024 0921 by Shweta Sanchez LPN  Outcome: Progressing  9/12/2024 2209 by Terrance Zheng RN  Outcome: Progressing     Problem: Chronic Conditions and Co-morbidities  Goal: Patient's chronic conditions and co-morbidity symptoms are monitored and maintained or improved  9/13/2024 0921 by Shweta Sanchez LPN  Outcome: Progressing  Flowsheets (Taken 9/12/2024 2220 by Terrance Zheng, RN)  Care Plan - Patient's Chronic Conditions and Co-Morbidity Symptoms are Monitored and Maintained or Improved: Monitor and assess patient's chronic conditions and comorbid symptoms for stability, deterioration, or improvement  9/12/2024 2209 by Terrance Zheng, RN  Outcome: Progressing     Problem: Discharge Planning  Goal: Discharge to home or other facility with appropriate resources  9/13/2024 0921 by Shweta Sanchez LPN  Outcome: Progressing  9/12/2024 2209 by Terrance Zheng, RN  Outcome: Progressing

## 2024-09-14 VITALS
HEIGHT: 74 IN | OXYGEN SATURATION: 100 % | TEMPERATURE: 97.2 F | RESPIRATION RATE: 18 BRPM | WEIGHT: 177.3 LBS | HEART RATE: 57 BPM | SYSTOLIC BLOOD PRESSURE: 121 MMHG | DIASTOLIC BLOOD PRESSURE: 92 MMHG | BODY MASS INDEX: 22.75 KG/M2

## 2024-09-14 LAB
ANION GAP SERPL CALC-SCNC: 7 MMOL/L (ref 2–12)
BACTERIA SPEC CULT: NORMAL
BACTERIA SPEC CULT: NORMAL
BUN SERPL-MCNC: 18 MG/DL (ref 6–20)
BUN/CREAT SERPL: 18 (ref 12–20)
CA-I BLD-MCNC: 8.9 MG/DL (ref 8.5–10.1)
CHLORIDE SERPL-SCNC: 99 MMOL/L (ref 97–108)
CO2 SERPL-SCNC: 29 MMOL/L (ref 21–32)
CREAT SERPL-MCNC: 0.99 MG/DL (ref 0.7–1.3)
ERYTHROCYTE [DISTWIDTH] IN BLOOD BY AUTOMATED COUNT: 13.2 % (ref 11.5–14.5)
GLUCOSE SERPL-MCNC: 80 MG/DL (ref 65–100)
HCT VFR BLD AUTO: 31.5 % (ref 36.6–50.3)
HGB BLD-MCNC: 11.1 G/DL (ref 12.1–17)
HIV 1+2 AB+HIV1 P24 AG SERPL QL IA: NONREACTIVE
HIV 1/2 RESULT COMMENT: NORMAL
Lab: NORMAL
MCH RBC QN AUTO: 34.9 PG (ref 26–34)
MCHC RBC AUTO-ENTMCNC: 35.2 G/DL (ref 30–36.5)
MCV RBC AUTO: 99.1 FL (ref 80–99)
NRBC # BLD: 0 K/UL (ref 0–0.01)
NRBC BLD-RTO: 0 PER 100 WBC
PLATELET # BLD AUTO: 295 K/UL (ref 150–400)
PMV BLD AUTO: 8.6 FL (ref 8.9–12.9)
POTASSIUM SERPL-SCNC: 4.1 MMOL/L (ref 3.5–5.1)
RBC # BLD AUTO: 3.18 M/UL (ref 4.1–5.7)
SODIUM SERPL-SCNC: 135 MMOL/L (ref 136–145)
WBC # BLD AUTO: 3 K/UL (ref 4.1–11.1)

## 2024-09-14 PROCEDURE — 80048 BASIC METABOLIC PNL TOTAL CA: CPT

## 2024-09-14 PROCEDURE — 6360000002 HC RX W HCPCS: Performed by: HOSPITALIST

## 2024-09-14 PROCEDURE — 96366 THER/PROPH/DIAG IV INF ADDON: CPT

## 2024-09-14 PROCEDURE — 36415 COLL VENOUS BLD VENIPUNCTURE: CPT

## 2024-09-14 PROCEDURE — 96376 TX/PRO/DX INJ SAME DRUG ADON: CPT

## 2024-09-14 PROCEDURE — 6360000002 HC RX W HCPCS: Performed by: EMERGENCY MEDICINE

## 2024-09-14 PROCEDURE — 2580000003 HC RX 258: Performed by: HOSPITALIST

## 2024-09-14 PROCEDURE — 85027 COMPLETE CBC AUTOMATED: CPT

## 2024-09-14 PROCEDURE — G0378 HOSPITAL OBSERVATION PER HR: HCPCS

## 2024-09-14 PROCEDURE — 96372 THER/PROPH/DIAG INJ SC/IM: CPT

## 2024-09-14 PROCEDURE — 94640 AIRWAY INHALATION TREATMENT: CPT

## 2024-09-14 PROCEDURE — 6370000000 HC RX 637 (ALT 250 FOR IP): Performed by: FAMILY MEDICINE

## 2024-09-14 RX ORDER — LACTOBACILLUS RHAMNOSUS GG 10B CELL
1 CAPSULE ORAL 2 TIMES DAILY WITH MEALS
Qty: 30 CAPSULE | Refills: 0 | Status: SHIPPED | OUTPATIENT
Start: 2024-09-14 | End: 2024-09-29

## 2024-09-14 RX ORDER — DOXYCYCLINE HYCLATE 100 MG
100 TABLET ORAL 2 TIMES DAILY
Qty: 10 TABLET | Refills: 0 | Status: SHIPPED | OUTPATIENT
Start: 2024-09-14 | End: 2024-09-19

## 2024-09-14 RX ORDER — GUAIFENESIN 600 MG/1
600 TABLET, EXTENDED RELEASE ORAL 2 TIMES DAILY
Qty: 30 TABLET | Refills: 0 | Status: SHIPPED | OUTPATIENT
Start: 2024-09-14 | End: 2024-09-14

## 2024-09-14 RX ORDER — PREDNISONE 20 MG/1
20 TABLET ORAL DAILY
Qty: 6 TABLET | Refills: 0 | Status: SHIPPED | OUTPATIENT
Start: 2024-09-14 | End: 2024-09-20

## 2024-09-14 RX ORDER — PREDNISONE 20 MG/1
20 TABLET ORAL DAILY
Qty: 6 TABLET | Refills: 0 | Status: SHIPPED | OUTPATIENT
Start: 2024-09-14 | End: 2024-09-14

## 2024-09-14 RX ORDER — GUAIFENESIN 600 MG/1
600 TABLET, EXTENDED RELEASE ORAL 2 TIMES DAILY
Qty: 30 TABLET | Refills: 0 | Status: SHIPPED | OUTPATIENT
Start: 2024-09-14 | End: 2024-09-29

## 2024-09-14 RX ORDER — DOXYCYCLINE HYCLATE 100 MG
100 TABLET ORAL 2 TIMES DAILY
Qty: 10 TABLET | Refills: 0 | Status: SHIPPED | OUTPATIENT
Start: 2024-09-14 | End: 2024-09-14

## 2024-09-14 RX ORDER — PREDNISONE 20 MG/1
20 TABLET ORAL DAILY
Status: DISCONTINUED | OUTPATIENT
Start: 2024-09-14 | End: 2024-09-14 | Stop reason: HOSPADM

## 2024-09-14 RX ADMIN — Medication 2 PUFF: at 07:16

## 2024-09-14 RX ADMIN — TAMSULOSIN HYDROCHLORIDE 0.4 MG: 0.4 CAPSULE ORAL at 08:47

## 2024-09-14 RX ADMIN — CEFEPIME 2000 MG: 2 INJECTION, POWDER, FOR SOLUTION INTRAVENOUS at 02:15

## 2024-09-14 RX ADMIN — CEFEPIME 2000 MG: 2 INJECTION, POWDER, FOR SOLUTION INTRAVENOUS at 10:35

## 2024-09-14 RX ADMIN — ENOXAPARIN SODIUM 40 MG: 100 INJECTION SUBCUTANEOUS at 08:47

## 2024-09-14 RX ADMIN — Medication 1 CAPSULE: at 08:47

## 2024-09-14 RX ADMIN — CLINDAMYCIN HYDROCHLORIDE 300 MG: 150 CAPSULE ORAL at 08:47

## 2024-09-14 RX ADMIN — CARVEDILOL 3.12 MG: 3.12 TABLET, FILM COATED ORAL at 08:47

## 2024-09-14 RX ADMIN — OXYCODONE HYDROCHLORIDE AND ACETAMINOPHEN 500 MG: 500 TABLET ORAL at 08:47

## 2024-09-14 RX ADMIN — PREDNISONE 20 MG: 20 TABLET ORAL at 10:35

## 2024-09-14 RX ADMIN — CLINDAMYCIN HYDROCHLORIDE 300 MG: 150 CAPSULE ORAL at 00:36

## 2024-09-14 RX ADMIN — ASPIRIN 81 MG: 81 TABLET, COATED ORAL at 08:47

## 2024-09-14 ASSESSMENT — PAIN SCALES - GENERAL
PAINLEVEL_OUTOF10: 0

## 2024-09-14 NOTE — DISCHARGE SUMMARY
Physician Discharge Summary       Patient: David Alfaro MRN: [id number]     YOB: 1965  Age: 58 y.o.  Sex: male    PCP: Cristobal Fraga Sr., MD    Allergies: Codeine and Penicillins    Admit date: 9/12/2024  Admitting Provider: Vesna Silva MD    Discharge date: 9/14/2024  Discharging Provider: Vesna Silva MD    * Admission Diagnoses:   Pneumonia due to infectious agent [J18.9]  Pneumonia of both lower lobes due to infectious organism [J18.9]  Cough [R05.9]    * Discharge Diagnoses:    Active Hospital Problems    Cocaine use disorder (HCC)      Cough      Multifocal pneumonia      Benign prostatic hyperplasia with lower urinary tract symptoms      Ventral hernia without obstruction or gangrene      Severe recurrent major depression without psychotic features (HCC)         Chief Complaint   Patient presents with    Cough        Presentation on 9/12/2024  HPI as per admitting provider on 9/12/2024      Chief Complaint:   David Alfaro is a 58 y.o. male w/a hx bipolar dz, abd hernia and PNA who presented to the ED w/ complaints of progressive cough and pleurisy.  PT a poor historian but was recently hospitalized and treated for potential PNA but pt denied any significant improvement.  PT was afebrile and hemodynamically stable on arrival to the ED with decreased BS noted but no hypoxia present.  Infiltrate was again noted on CT but in no acute resp distress on admission or during my eval.  Increased cough with meals discribed but pt in fair condition on admission.       * Hospital Course:     Patient was brought in for observation as described above.  He was afebrile with normal vitals upon admission, and CT did show multifocal infiltrates but these were improved from prior and no significant new findings.  Patient was started on broad-spectrum IV antibiotics nonetheless, with aggressive pulmonary hygiene.  He did not have fever or hypoxia at any  mouth nightly     tamsulosin 0.4 MG capsule  Commonly known as: FLOMAX  Take 1 capsule by mouth daily     * venlafaxine 150 MG extended release capsule  Commonly known as: EFFEXOR XR     * venlafaxine 75 MG extended release capsule  Commonly known as: EFFEXOR XR     vitamin C 250 MG tablet           * This list has 2 medication(s) that are the same as other medications prescribed for you. Read the directions carefully, and ask your doctor or other care provider to review them with you.                STOP taking these medications      cyclobenzaprine 10 MG tablet  Commonly known as: FLEXERIL     isosorbide dinitrate 5 MG tablet  Commonly known as: ISORDIL     lisinopril 20 MG tablet  Commonly known as: PRINIVIL;ZESTRIL     risperiDONE 2 MG tablet  Commonly known as: RISPERDAL               Where to Get Your Medications        These medications were sent to [facility] - [facility] [address] [facility] - P [phone] - F [phone] [address] [facility] [address]      Phone: [phone]   lactobacillus capsule       These medications were sent to [facility] [address] [place] [address] - P [phone] - F [phone]  [address] [place] [address]      Phone: [phone]   doxycycline hyclate 100 MG tablet  guaiFENesin 600 MG extended release tablet  predniSONE 20 MG tablet           * Follow-up Care/Patient Instructions:  Activity: activity as tolerated  Diet: cardiac diet      Cristobal Fraga Sr., MD  [address] [facility] [address] [phone]    Follow up          1.  Cough  Patient recovering from recent multifocal pneumonia with sepsis a few weeks ago  CT of the chest shows this admission improvement of infiltrates from prior  Clinically the patient is fairly well-appearing, no fever, no hypoxia, no respiratory distress at all, lungs sound fairly good  Continue a brief course of additional antibiotics out of an abundance of caution in this

## 2024-09-14 NOTE — DISCHARGE INSTRUCTIONS
Us the blue acapella device, and the numbered incentive spirometer device, at home several times per day. These will help your lungs clear out, and may help prevent recurrence of pneumonia.     * Follow-up Care/Patient Instructions:  Activity: activity as tolerated  Diet: cardiac diet        Cristobal Fraga Sr., MD  [address] [facility] [address] [phone]     Follow up: The hospital will call and set up appointment and contact you next week with the date and time of appointment.

## 2024-09-14 NOTE — PLAN OF CARE
Problem: Pain  Goal: Verbalizes/displays adequate comfort level or baseline comfort level  9/13/2024 2307 by Tahira Sarah LPN  Outcome: Progressing  9/13/2024 0921 by Shweta Sanchez LPN  Outcome: Progressing     Problem: Safety - Adult  Goal: Free from fall injury  9/13/2024 2307 by Tahira Sarah LPN  Outcome: Progressing  9/13/2024 0921 by Shweta Sanchez LPN  Outcome: Progressing     Problem: Chronic Conditions and Co-morbidities  Goal: Patient's chronic conditions and co-morbidity symptoms are monitored and maintained or improved  9/13/2024 2307 by Tahira Sarah LPN  Outcome: Progressing  Flowsheets (Taken 9/13/2024 2031)  Care Plan - Patient's Chronic Conditions and Co-Morbidity Symptoms are Monitored and Maintained or Improved:   Monitor and assess patient's chronic conditions and comorbid symptoms for stability, deterioration, or improvement   Update acute care plan with appropriate goals if chronic or comorbid symptoms are exacerbated and prevent overall improvement and discharge   Collaborate with multidisciplinary team to address chronic and comorbid conditions and prevent exacerbation or deterioration  9/13/2024 0921 by Shweta Sanchez LPN  Outcome: Progressing  Flowsheets (Taken 9/12/2024 2220 by Terrance Zheng, RN)  Care Plan - Patient's Chronic Conditions and Co-Morbidity Symptoms are Monitored and Maintained or Improved: Monitor and assess patient's chronic conditions and comorbid symptoms for stability, deterioration, or improvement     Problem: Discharge Planning  Goal: Discharge to home or other facility with appropriate resources  9/13/2024 2307 by Tahira Sarah LPN  Outcome: Progressing  Flowsheets (Taken 9/13/2024 2031)  Discharge to home or other facility with appropriate resources:   Identify barriers to discharge with patient and caregiver   Arrange for needed discharge resources and transportation as appropriate   Identify discharge learning needs (meds, wound care, etc)

## 2024-09-14 NOTE — PLAN OF CARE
Problem: Pain  Goal: Verbalizes/displays adequate comfort level or baseline comfort level  9/14/2024 0728 by Sheyla Jurado RN  Outcome: Adequate for Discharge  9/13/2024 2307 by Tahira Sarah LPN  Outcome: Progressing     Problem: Safety - Adult  Goal: Free from fall injury  9/14/2024 0728 by Sheyla Jurado RN  Outcome: Adequate for Discharge  9/13/2024 2307 by Tahira Sarah LPN  Outcome: Progressing     Problem: Chronic Conditions and Co-morbidities  Goal: Patient's chronic conditions and co-morbidity symptoms are monitored and maintained or improved  9/14/2024 0728 by Sheyla Jurado RN  Outcome: Adequate for Discharge  9/13/2024 2307 by Tahira Sarah LPN  Outcome: Progressing  Flowsheets (Taken 9/13/2024 2031)  Care Plan - Patient's Chronic Conditions and Co-Morbidity Symptoms are Monitored and Maintained or Improved:   Monitor and assess patient's chronic conditions and comorbid symptoms for stability, deterioration, or improvement   Update acute care plan with appropriate goals if chronic or comorbid symptoms are exacerbated and prevent overall improvement and discharge   Collaborate with multidisciplinary team to address chronic and comorbid conditions and prevent exacerbation or deterioration     Problem: Discharge Planning  Goal: Discharge to home or other facility with appropriate resources  9/14/2024 0728 by Sheyla Jurado RN  Outcome: Adequate for Discharge  9/13/2024 2307 by Tahira Sarah LPN  Outcome: Progressing  Flowsheets (Taken 9/13/2024 2031)  Discharge to home or other facility with appropriate resources:   Identify barriers to discharge with patient and caregiver   Arrange for needed discharge resources and transportation as appropriate   Identify discharge learning needs (meds, wound care, etc)   Arrange for interpreters to assist at discharge as needed   Refer to discharge planning if patient needs post-hospital services based on physician order or complex needs

## 2024-09-14 NOTE — PROGRESS NOTES
Bedside shift change report given to Tahira LOMBARDI LPN (oncoming nurse) by Shweta Sanchez LPN (offgoing nurse). Report included the following information Nurse Handoff Report.    
Bedside shift change report given to caio rosado(oncoming nurse) by tea (offgoing nurse). Report included the following information Nurse Handoff Report.    
Cefepime Extended-Infusion Dosing/Monitoring  Current regimen:  Cefepime 1000 mg every 12 hours    Recent Labs     09/12/24  1807   CREATININE 1.21   BUN 23*     Estimated CrCl:  74 mL/min    Plan: Change to 2000 mg IV x 1 over 3 minutes followed by 2000 mg IV over 240 minutes every 8 hours per [facility] P&T Committee Protocol with respect to extended-infusion ?-lactam antibiotics. Pharmacy will continue to monitor patient daily and will make dosage adjustments based upon changing renal function.       
Discharge instructions reviewed with patient, IV removed without complications. Patient waiting for ride from family member.  
Nursing assessments reviewed.  
Patient arrived on floor via wheelchair, stable, no complaints, talking on phone. Phlebotomist at bedside, pain of 7/10 in the chest, skin intact, and call light within reach.  
Patient in bed resting, no complaints at this time. Patient stated that he fell at his cousins house today.  
Pt. Lying in bed talking on telephone voices no concerns call bell within reach.  
Visitor at bedside.  
cardiac complaints    5.  Cocaine use disorder  Patient does continue to use cocaine      Observation status      Discussion/MDM: Patient with multiple medical comorbidities, each with high likelihood for morbidity and mortality if left untreated.   I have reviewed patient's presenting subjective and objective findings, as well as all laboratory studies, imaging studies, and vital signs to date as well as treatment rendered and patient's response to those treatments.  In addition, prior medical, surgical and relevant social and family histories were reviewed. I have discussed management plan with patient/family and with nursing staff.          Electronically signed by Vesna Silva MD on 9/13/2024 at 8:34 AM

## 2024-09-15 LAB
BACTERIA SPEC CULT: NORMAL
BACTERIA SPEC CULT: NORMAL
FLUID CULTURE: NORMAL
L PNEUMO1 AG UR QL IA: NEGATIVE
Lab: NORMAL
ORGANISM ID: NORMAL
S PNEUM AG SPEC QL LA: NEGATIVE
SPECIMEN SOURCE: NORMAL
SPECIMEN SOURCE: NORMAL
SPECIMEN: NORMAL

## 2024-09-18 LAB
BACTERIA SPEC CULT: NORMAL
BACTERIA SPEC CULT: NORMAL
Lab: NORMAL
Lab: NORMAL

## 2024-09-29 ENCOUNTER — TELEPHONE (OUTPATIENT)
Facility: HOSPITAL | Age: 59
End: 2024-09-29

## 2024-10-13 PROBLEM — R05.9 COUGH: Status: RESOLVED | Noted: 2024-09-13 | Resolved: 2024-10-13

## 2025-01-03 ENCOUNTER — HOSPITAL ENCOUNTER (INPATIENT)
Facility: HOSPITAL | Age: 60
LOS: 18 days | Discharge: NURSING, REHAB OR LONG-TERM CARE | DRG: 885 | End: 2025-01-21
Attending: EMERGENCY MEDICINE | Admitting: PSYCHIATRY & NEUROLOGY
Payer: MEDICARE

## 2025-01-03 DIAGNOSIS — R45.851 DEPRESSION WITH SUICIDAL IDEATION: Primary | ICD-10-CM

## 2025-01-03 DIAGNOSIS — F32.A DEPRESSION WITH SUICIDAL IDEATION: Primary | ICD-10-CM

## 2025-01-03 PROBLEM — F29 PSYCHOSIS, UNSPECIFIED PSYCHOSIS TYPE (HCC): Status: ACTIVE | Noted: 2025-01-03

## 2025-01-03 PROBLEM — F32.9 MAJOR DEPRESSION, CHRONIC: Status: ACTIVE | Noted: 2025-01-03

## 2025-01-03 PROBLEM — F39 UNSPECIFIED MOOD (AFFECTIVE) DISORDER (HCC): Status: ACTIVE | Noted: 2025-01-03

## 2025-01-03 LAB
AMPHET UR QL SCN: NEGATIVE
ANION GAP SERPL CALC-SCNC: 9 MMOL/L (ref 2–12)
APPEARANCE UR: CLEAR
BACTERIA URNS QL MICRO: NEGATIVE /HPF
BARBITURATES UR QL SCN: NEGATIVE
BASOPHILS # BLD: 0 K/UL (ref 0–0.1)
BASOPHILS NFR BLD: 1 % (ref 0–1)
BENZODIAZ UR QL: NEGATIVE
BILIRUB UR QL: NEGATIVE
BUN SERPL-MCNC: 27 MG/DL (ref 6–20)
BUN/CREAT SERPL: 27 (ref 12–20)
CA-I BLD-MCNC: 8.8 MG/DL (ref 8.5–10.1)
CANNABINOIDS UR QL SCN: NEGATIVE
CHLORIDE SERPL-SCNC: 104 MMOL/L (ref 97–108)
CO2 SERPL-SCNC: 28 MMOL/L (ref 21–32)
COCAINE UR QL SCN: POSITIVE
COLOR UR: NORMAL
CREAT SERPL-MCNC: 1.01 MG/DL (ref 0.7–1.3)
DIFFERENTIAL METHOD BLD: ABNORMAL
EKG ATRIAL RATE: 57 BPM
EKG DIAGNOSIS: NORMAL
EKG P AXIS: 17 DEGREES
EKG P-R INTERVAL: 174 MS
EKG Q-T INTERVAL: 468 MS
EKG QRS DURATION: 101 MS
EKG QTC CALCULATION (BAZETT): 452 MS
EKG R AXIS: 15 DEGREES
EKG T AXIS: 46 DEGREES
EKG VENTRICULAR RATE: 56 BPM
EOSINOPHIL # BLD: 0.1 K/UL (ref 0–0.4)
EOSINOPHIL NFR BLD: 2 % (ref 0–7)
ERYTHROCYTE [DISTWIDTH] IN BLOOD BY AUTOMATED COUNT: 13.3 % (ref 11.5–14.5)
ETHANOL SERPL-MCNC: <10 MG/DL (ref 0–0.08)
GLUCOSE SERPL-MCNC: 99 MG/DL (ref 65–100)
GLUCOSE UR STRIP.AUTO-MCNC: NEGATIVE MG/DL
HCT VFR BLD AUTO: 41.9 % (ref 36.6–50.3)
HGB BLD-MCNC: 14.5 G/DL (ref 12.1–17)
HGB UR QL STRIP: NEGATIVE
IMM GRANULOCYTES # BLD AUTO: 0 K/UL (ref 0–0.04)
IMM GRANULOCYTES NFR BLD AUTO: 1 % (ref 0–0.5)
KETONES UR QL STRIP.AUTO: NEGATIVE MG/DL
LEUKOCYTE ESTERASE UR QL STRIP.AUTO: NEGATIVE
LYMPHOCYTES # BLD: 1.3 K/UL (ref 0.8–3.5)
LYMPHOCYTES NFR BLD: 31 % (ref 12–49)
Lab: ABNORMAL
MAGNESIUM SERPL-MCNC: 2.2 MG/DL (ref 1.6–2.4)
MCH RBC QN AUTO: 34.9 PG (ref 26–34)
MCHC RBC AUTO-ENTMCNC: 34.6 G/DL (ref 30–36.5)
MCV RBC AUTO: 100.7 FL (ref 80–99)
MDMA, URINE: NEGATIVE
METHADONE UR QL: NEGATIVE
MONOCYTES # BLD: 0.6 K/UL (ref 0–1)
MONOCYTES NFR BLD: 15 % (ref 5–13)
NEUTS SEG # BLD: 2.1 K/UL (ref 1.8–8)
NEUTS SEG NFR BLD: 50 % (ref 32–75)
NITRITE UR QL STRIP.AUTO: NEGATIVE
NRBC # BLD: 0 K/UL (ref 0–0.01)
NRBC BLD-RTO: 0 PER 100 WBC
OPIATES UR QL: NEGATIVE
PCP UR QL: NEGATIVE
PH UR STRIP: 7 (ref 5–8)
PLATELET # BLD AUTO: 108 K/UL (ref 150–400)
PMV BLD AUTO: 11.1 FL (ref 8.9–12.9)
POTASSIUM SERPL-SCNC: 3.8 MMOL/L (ref 3.5–5.1)
PROT UR STRIP-MCNC: NEGATIVE MG/DL
RBC # BLD AUTO: 4.16 M/UL (ref 4.1–5.7)
RBC #/AREA URNS HPF: NORMAL /HPF (ref 0–3)
SODIUM SERPL-SCNC: 141 MMOL/L (ref 136–145)
SP GR UR REFRACTOMETRY: 1.02 (ref 1–1.03)
UROBILINOGEN UR QL STRIP.AUTO: 0.2 EU/DL (ref 0.2–1)
WBC # BLD AUTO: 4.1 K/UL (ref 4.1–11.1)
WBC URNS QL MICRO: NORMAL /HPF (ref 0–5)

## 2025-01-03 PROCEDURE — 6370000000 HC RX 637 (ALT 250 FOR IP): Performed by: PSYCHIATRY & NEUROLOGY

## 2025-01-03 PROCEDURE — 99285 EMERGENCY DEPT VISIT HI MDM: CPT

## 2025-01-03 PROCEDURE — 80307 DRUG TEST PRSMV CHEM ANLYZR: CPT

## 2025-01-03 PROCEDURE — 81003 URINALYSIS AUTO W/O SCOPE: CPT

## 2025-01-03 PROCEDURE — 83735 ASSAY OF MAGNESIUM: CPT

## 2025-01-03 PROCEDURE — 80048 BASIC METABOLIC PNL TOTAL CA: CPT

## 2025-01-03 PROCEDURE — 94640 AIRWAY INHALATION TREATMENT: CPT

## 2025-01-03 PROCEDURE — 6370000000 HC RX 637 (ALT 250 FOR IP): Performed by: HOSPITALIST

## 2025-01-03 PROCEDURE — 82077 ASSAY SPEC XCP UR&BREATH IA: CPT

## 2025-01-03 PROCEDURE — 93005 ELECTROCARDIOGRAM TRACING: CPT | Performed by: EMERGENCY MEDICINE

## 2025-01-03 PROCEDURE — 1240000000 HC EMOTIONAL WELLNESS R&B

## 2025-01-03 PROCEDURE — 85025 COMPLETE CBC W/AUTO DIFF WBC: CPT

## 2025-01-03 PROCEDURE — 6370000000 HC RX 637 (ALT 250 FOR IP)

## 2025-01-03 RX ORDER — ISOSORBIDE DINITRATE 10 MG/1
5 TABLET ORAL 3 TIMES DAILY
Status: DISCONTINUED | OUTPATIENT
Start: 2025-01-03 | End: 2025-01-20

## 2025-01-03 RX ORDER — AMLODIPINE BESYLATE 10 MG/1
10 TABLET ORAL DAILY
Status: DISCONTINUED | OUTPATIENT
Start: 2025-01-04 | End: 2025-01-20

## 2025-01-03 RX ORDER — ACETAMINOPHEN 325 MG/1
650 TABLET ORAL EVERY 4 HOURS PRN
Status: DISCONTINUED | OUTPATIENT
Start: 2025-01-03 | End: 2025-01-21 | Stop reason: HOSPADM

## 2025-01-03 RX ORDER — SPIRONOLACTONE 25 MG/1
25 TABLET ORAL DAILY
Status: DISCONTINUED | OUTPATIENT
Start: 2025-01-04 | End: 2025-01-20

## 2025-01-03 RX ORDER — CARVEDILOL 3.12 MG/1
3.12 TABLET ORAL 2 TIMES DAILY WITH MEALS
Status: DISCONTINUED | OUTPATIENT
Start: 2025-01-03 | End: 2025-01-21 | Stop reason: HOSPADM

## 2025-01-03 RX ORDER — HYDROXYZINE HYDROCHLORIDE 50 MG/1
50 TABLET, FILM COATED ORAL 3 TIMES DAILY PRN
Status: DISCONTINUED | OUTPATIENT
Start: 2025-01-03 | End: 2025-01-21 | Stop reason: HOSPADM

## 2025-01-03 RX ORDER — MONTELUKAST SODIUM 10 MG/1
10 TABLET ORAL NIGHTLY
Status: DISCONTINUED | OUTPATIENT
Start: 2025-01-03 | End: 2025-01-21 | Stop reason: HOSPADM

## 2025-01-03 RX ORDER — M-VIT,TX,IRON,MINS/CALC/FOLIC 27MG-0.4MG
1 TABLET ORAL DAILY
COMMUNITY

## 2025-01-03 RX ORDER — HALOPERIDOL 5 MG/1
5 TABLET ORAL EVERY 4 HOURS PRN
Status: DISCONTINUED | OUTPATIENT
Start: 2025-01-03 | End: 2025-01-21 | Stop reason: HOSPADM

## 2025-01-03 RX ORDER — CELECOXIB 200 MG/1
200 CAPSULE ORAL 2 TIMES DAILY
Status: ON HOLD | COMMUNITY
End: 2025-01-20 | Stop reason: HOSPADM

## 2025-01-03 RX ORDER — LANOLIN ALCOHOL/MO/W.PET/CERES
400 CREAM (GRAM) TOPICAL DAILY
Status: DISCONTINUED | OUTPATIENT
Start: 2025-01-03 | End: 2025-01-21 | Stop reason: HOSPADM

## 2025-01-03 RX ORDER — M-VIT,TX,IRON,MINS/CALC/FOLIC 27MG-0.4MG
1 TABLET ORAL DAILY
Status: DISCONTINUED | OUTPATIENT
Start: 2025-01-04 | End: 2025-01-21 | Stop reason: HOSPADM

## 2025-01-03 RX ORDER — TAMSULOSIN HYDROCHLORIDE 0.4 MG/1
0.4 CAPSULE ORAL DAILY
Status: DISCONTINUED | OUTPATIENT
Start: 2025-01-04 | End: 2025-01-21 | Stop reason: HOSPADM

## 2025-01-03 RX ORDER — LEVETIRACETAM 500 MG/1
500 TABLET ORAL 2 TIMES DAILY
Status: ON HOLD | COMMUNITY
End: 2025-01-20 | Stop reason: HOSPADM

## 2025-01-03 RX ORDER — CETIRIZINE HYDROCHLORIDE 5 MG/1
10 TABLET ORAL DAILY
Status: DISCONTINUED | OUTPATIENT
Start: 2025-01-03 | End: 2025-01-21 | Stop reason: HOSPADM

## 2025-01-03 RX ORDER — ISOSORBIDE DINITRATE 10 MG/1
5 TABLET ORAL 3 TIMES DAILY
Status: ON HOLD | COMMUNITY
End: 2025-01-20 | Stop reason: HOSPADM

## 2025-01-03 RX ORDER — ALBUTEROL SULFATE 90 UG/1
2 INHALANT RESPIRATORY (INHALATION) EVERY 4 HOURS PRN
Status: DISCONTINUED | OUTPATIENT
Start: 2025-01-03 | End: 2025-01-21 | Stop reason: HOSPADM

## 2025-01-03 RX ORDER — TRAZODONE HYDROCHLORIDE 50 MG/1
50 TABLET, FILM COATED ORAL NIGHTLY PRN
Status: DISCONTINUED | OUTPATIENT
Start: 2025-01-03 | End: 2025-01-21 | Stop reason: HOSPADM

## 2025-01-03 RX ORDER — LEVETIRACETAM 500 MG/1
500 TABLET ORAL 2 TIMES DAILY
Status: DISCONTINUED | OUTPATIENT
Start: 2025-01-03 | End: 2025-01-21 | Stop reason: HOSPADM

## 2025-01-03 RX ORDER — LAMOTRIGINE 25 MG/1
25 TABLET ORAL DAILY
Status: DISCONTINUED | OUTPATIENT
Start: 2025-01-03 | End: 2025-01-14

## 2025-01-03 RX ORDER — AMLODIPINE BESYLATE 10 MG/1
10 TABLET ORAL DAILY
Status: ON HOLD | COMMUNITY
End: 2025-01-20 | Stop reason: HOSPADM

## 2025-01-03 RX ORDER — ASPIRIN 81 MG/1
81 TABLET ORAL DAILY
Status: DISCONTINUED | OUTPATIENT
Start: 2025-01-04 | End: 2025-01-21 | Stop reason: HOSPADM

## 2025-01-03 RX ORDER — MAGNESIUM HYDROXIDE/ALUMINUM HYDROXICE/SIMETHICONE 120; 1200; 1200 MG/30ML; MG/30ML; MG/30ML
30 SUSPENSION ORAL EVERY 6 HOURS PRN
Status: DISCONTINUED | OUTPATIENT
Start: 2025-01-03 | End: 2025-01-21 | Stop reason: HOSPADM

## 2025-01-03 RX ORDER — SPIRONOLACTONE 25 MG/1
25 TABLET ORAL DAILY
Status: ON HOLD | COMMUNITY
End: 2025-01-20 | Stop reason: HOSPADM

## 2025-01-03 RX ORDER — DIPHENHYDRAMINE HYDROCHLORIDE 50 MG/ML
50 INJECTION INTRAMUSCULAR; INTRAVENOUS EVERY 4 HOURS PRN
Status: DISCONTINUED | OUTPATIENT
Start: 2025-01-03 | End: 2025-01-21 | Stop reason: HOSPADM

## 2025-01-03 RX ORDER — LACTULOSE 10 G/15ML
20 SOLUTION ORAL 2 TIMES DAILY
Status: DISCONTINUED | OUTPATIENT
Start: 2025-01-03 | End: 2025-01-21 | Stop reason: HOSPADM

## 2025-01-03 RX ORDER — HALOPERIDOL 5 MG/ML
5 INJECTION INTRAMUSCULAR EVERY 4 HOURS PRN
Status: DISCONTINUED | OUTPATIENT
Start: 2025-01-03 | End: 2025-01-21 | Stop reason: HOSPADM

## 2025-01-03 RX ORDER — PANTOPRAZOLE SODIUM 40 MG/1
40 TABLET, DELAYED RELEASE ORAL
Status: DISCONTINUED | OUTPATIENT
Start: 2025-01-04 | End: 2025-01-21 | Stop reason: HOSPADM

## 2025-01-03 RX ORDER — RISPERIDONE 1 MG/1
1 TABLET ORAL 2 TIMES DAILY
Status: DISCONTINUED | OUTPATIENT
Start: 2025-01-03 | End: 2025-01-19

## 2025-01-03 RX ORDER — BUDESONIDE AND FORMOTEROL FUMARATE DIHYDRATE 160; 4.5 UG/1; UG/1
2 AEROSOL RESPIRATORY (INHALATION)
Status: DISCONTINUED | OUTPATIENT
Start: 2025-01-03 | End: 2025-01-21 | Stop reason: HOSPADM

## 2025-01-03 RX ADMIN — ISOSORBIDE DINITRATE 5 MG: 10 TABLET ORAL at 17:28

## 2025-01-03 RX ADMIN — LAMOTRIGINE 25 MG: 25 TABLET ORAL at 17:31

## 2025-01-03 RX ADMIN — Medication 2 PUFF: at 20:10

## 2025-01-03 RX ADMIN — MONTELUKAST 10 MG: 10 TABLET, FILM COATED ORAL at 21:14

## 2025-01-03 RX ADMIN — CARVEDILOL 3.12 MG: 3.12 TABLET, FILM COATED ORAL at 17:28

## 2025-01-03 RX ADMIN — RISPERIDONE 1 MG: 1 TABLET, FILM COATED ORAL at 21:14

## 2025-01-03 RX ADMIN — CETIRIZINE HYDROCHLORIDE 10 MG: 5 TABLET ORAL at 17:28

## 2025-01-03 RX ADMIN — Medication 400 MG: at 17:31

## 2025-01-03 RX ADMIN — LEVETIRACETAM 500 MG: 500 TABLET, FILM COATED ORAL at 21:14

## 2025-01-03 RX ADMIN — LACTULOSE 20 G: 20 SOLUTION ORAL at 21:14

## 2025-01-03 RX ADMIN — TRAZODONE HYDROCHLORIDE 50 MG: 50 TABLET ORAL at 22:16

## 2025-01-03 ASSESSMENT — SLEEP AND FATIGUE QUESTIONNAIRES
DO YOU USE A SLEEP AID: NO
DO YOU HAVE DIFFICULTY SLEEPING: YES
SLEEP PATTERN: DISTURBED/INTERRUPTED SLEEP;RESTLESSNESS
AVERAGE NUMBER OF SLEEP HOURS: 2

## 2025-01-03 ASSESSMENT — PAIN - FUNCTIONAL ASSESSMENT: PAIN_FUNCTIONAL_ASSESSMENT: NONE - DENIES PAIN

## 2025-01-03 ASSESSMENT — LIFESTYLE VARIABLES
HOW OFTEN DO YOU HAVE A DRINK CONTAINING ALCOHOL: NEVER
HOW MANY STANDARD DRINKS CONTAINING ALCOHOL DO YOU HAVE ON A TYPICAL DAY: PATIENT DOES NOT DRINK
HOW MANY STANDARD DRINKS CONTAINING ALCOHOL DO YOU HAVE ON A TYPICAL DAY: PATIENT DOES NOT DRINK
HOW OFTEN DO YOU HAVE A DRINK CONTAINING ALCOHOL: NEVER

## 2025-01-03 ASSESSMENT — PATIENT HEALTH QUESTIONNAIRE - PHQ9
SUM OF ALL RESPONSES TO PHQ QUESTIONS 1-9: 1
1. LITTLE INTEREST OR PLEASURE IN DOING THINGS: SEVERAL DAYS
2. FEELING DOWN, DEPRESSED OR HOPELESS: NOT AT ALL
SUM OF ALL RESPONSES TO PHQ9 QUESTIONS 1 & 2: 1
SUM OF ALL RESPONSES TO PHQ QUESTIONS 1-9: 1

## 2025-01-03 NOTE — ED TRIAGE NOTES
Pt reports SI with no plan-- states increased stress and has been out of medication, unsure of what he takes when asked.

## 2025-01-03 NOTE — CONSULTS
Hospitalist Consult Note             Chief Complaints:     Chief Complaint   Patient presents with    Suicidal         Subjective:     David Alfaro is a 59 y.o. male followed by Cristobal Fraga Sr., MD and  has a past medical history of Benign prostatic hyperplasia with lower urinary tract symptoms, Cerebral artery occlusion with cerebral infarction (HCC), Closed displaced fracture of base of fifth metacarpal bone of right hand, Cocaine abuse ([facility]), Colostomy and enterostomy malfunction (HCC), COPD (chronic obstructive pulmonary disease) ([facility]), Essential hypertension, ARDEN (generalized anxiety disorder), GERD (gastroesophageal reflux disease), History of cerebrovascular accident (CVA) with residual deficit, Mild depressed bipolar 1 disorder ([facility]), Mixed hyperlipidemia, DELTA on CPAP, Seizures (HCC), Severe recurrent major depression without psychotic features ([facility]), and Ventral hernia without obstruction or gangrene.    Patient has had several acute care admissions most recently in September for bilateral pneumonia.  Patient has numerous medications often noncompliant and chronic cocaine use was recently found again to be positive for cocaine  Patient not a good historian on my evaluation was often going into tangents when asked who orders his medications he talked about how he can afford his meds and it was noted that patient did not have money for his psychiatric meds and has not followed up with his nurse practitioner for about 1 year.  According to screening assessment he had suicidal ideations but no noted plan but had mentioned about shooting himself or overdosing on cocaine.  He noted he was having hallucinations mainly auditory of family talking negatively about him.  Patient in July 2023 had similar presentation to [facility] in the emergency room with suicidal ideations and at that time was admitted to [facility] and had a 10-day voluntary stay.  He states that he has shortness of breath and

## 2025-01-03 NOTE — ED NOTES
Pts belongings collected at this time, pt placed in paper scrubs. Security at bedside to jose pt.

## 2025-01-03 NOTE — ED NOTES
..Bedside and Verbal shift change report given to Charu YANCEY RN (oncoming nurse) by Chitra HOLLOWAY RN (offgoing nurse). Report included the following information Nurse Handoff Report, ED Encounter Summary, ED SBAR, MAR, Recent Results, and Neuro Assessment.

## 2025-01-03 NOTE — CARE COORDINATION
01/03/25 1025   ITP   Date of Plan 01/03/25   Date of Next Review 01/10/25   Primary Diagnosis Code Psychosis unspecified   Barriers to Treatment Need for psychoeducation;Psychiatric symptom (comment)   Strengths Incorporated in Plan Acknowledging need for assistance;Community supports;Family supports;Natural supports;Postive outlook;Seeking interactions   Plan of Care   Long Term Goal (LTG) Stated in patient/guardian terms On PSA   Short Term Goal 1   Short Term Goal 1 Client will learn and demonstrate improved self management skills   Baseline Functioning Unknown   Target TBD   Objectives Client will participate in individual therapy;Client will participate in group therapy   Intervention 1 Acknowledge client strengths   Frequency Daily   Measured by Behavioral data;Self report;Staff observation   Staff Responsible [facility] staff;Clinical staff   Intervention 2 Referral to community services   Frequency Weekly   Measured by Behavioral data;Self report;Staff observation   Staff Responsible [facility] staff;Clinical staff   Intervention 3 Group therapy   Frequency Daily   Measured by Self report;Staff observation;Behavioral data   Staff Responsible [facility] staff;Clinical staff   STG Goal 1 Status: Patient Appears to be  Treatment plan goal is unmet at this time   Short Term Goal 2   Short Term Goal 2 Client will maintain compliance with medication regime   Baseline Functioning Unknown   Target TBD   Objectives Client will participate in individual therapy;Client will participate in group therapy   Intervention 1 Referral to community services   Frequency Weekly   Measured by Behavioral data;Self report;Staff observation   Staff Responsible [facility] staff;Clinical staff   Intervention 2 Monitor medications   Frequency Daily   Measured by Behavioral data;Self report;Staff observation   Staff Responsible [facility] staff   STG Goal 2 Status: Patient Appears to be  Treatment plan goal is unmet at this time   Crisis/Safety/Discharge Plan

## 2025-01-03 NOTE — GROUP NOTE
Group Therapy Note    Date: 1/3/2025    Group Start Time: 1430  Group End Time: 1515  Group Topic: Psychoeducation    [facility]    Mary Choudhury        Group Therapy Note    Attendees: 3/4    Writer facilitated an inpatient psych-ed group. Writer provided a handout regarding Perfectionism versus Striving for Excellence. Writer encouraged patients to discuss the concepts and provide personal input. Writer held the space as patients processed. Writer concluded session with a grounding exercise.        Pt invited and encouraged to attend group but chose not to attend.           Signature:  Mary Choudhury

## 2025-01-03 NOTE — BSMART NOTE
Comprehensive Assessment Form Part 1      Section I - Disposition    Primary Diagnosis: MDD  Secondary Diagnosis:     The Medical Doctor to Psychiatrist conference was notcompleted.  The Medical Doctor is in agreement with intake disposition because the pt is meeting inpatient criteria.   The plan is inpatient admission pending medical clearance.  The on-call Psychiatrist consulted was Dr. PALAFOX.  The admitting Psychiatrist will be Dr. EMANUEL.  The admitting Diagnosis is MDD with SI.  The Payor source is Medicaid.      BSMART assessment completed, and suicide risk level noted to be HIGH. Primary Nurse Keya and Charge Nurse N/A and Physician General notified. Concerns not observed.     This writer reviewed the [place]icks[place] Suicide Severity Rating Scale in nursing flowsheet and the risk level assigned is low risk.  Based on this assessment, the risk of suicide is HIGH risk and the plan is inpatient admission pending medical clearance.    Section II - Integrated Summary  Summary:      This writer met with pt via teledoc at [facility] ED 03. Pt was dressed in paper scrubs and appeared disheveled. Pt was not accompanied by anyone. Pt presents in a depressed mood with a flat affect. Pt made fair eye contact. Pt did not appear to be responding to internal stimuli. Pt presents with poor insight and judgement. Pt endorsed SI with several plans. Denied HI and AVH.    Pt states he came to the ED because he has not been mentally feeling well. Pt endorsed SI that began a week ago. Pt endorses he has thoughts about shooting himself, or overdosing on cocaine. He noted that he has had several suicide attempts in the past. Pt also reports symptoms of poor sleep, irritability, isolation, and difficulty concentrating. He reports he has felt that his family is talking about him, even though the family denies that they are doing so. Pt expressed feelings of hopelessness and feeling unsafe. Pt states he does not trust himself. Pt did endorse cocaine

## 2025-01-03 NOTE — ED PROVIDER NOTES
[facility] EMERGENCY DEPT  EMERGENCY DEPARTMENT HISTORY AND PHYSICAL EXAM      Date: 1/3/2025  Patient Name: David Alfaro  MRN: [id number]  YOB: 1965  Date of evaluation: 1/3/2025  Provider: Melisa Irving MD   Note Started: 4:51 AM EST 1/3/25    HISTORY OF PRESENT ILLNESS     Chief Complaint   Patient presents with    Suicidal       History Provided By: Patient    HPI: David Alfaro is a 59 y.o. male suicidal ideation without a plan, depression, and and hallicination telling him to hurt himself for several months.  History of multiple psychiatric hospitalizations.  Patient states he stopped taking Celexa.  He denies homicidal ideations.    PAST MEDICAL HISTORY   Past Medical History:  Past Medical History:   Diagnosis Date    Benign prostatic hyperplasia with lower urinary tract symptoms 03/15/2024    Closed displaced fracture of base of fifth metacarpal bone of right hand 12/13/2022    Cocaine abuse (HCC) 12/25/2023    Colostomy and enterostomy malfunction ([facility]) 06/15/2022    Essential hypertension     ARDEN (generalized anxiety disorder)     GERD (gastroesophageal reflux disease)     History of cerebrovascular accident (CVA) with residual deficit 09/01/2024    Mild depressed bipolar 1 disorder ([facility]) 10/13/2020    Mixed hyperlipidemia     DELTA on CPAP     Seizures ([facility])     Severe recurrent major depression without psychotic features ([facility]) 07/04/2023    Ventral hernia without obstruction or gangrene 12/23/2023       Past Surgical History:  Past Surgical History:   Procedure Laterality Date    ANKLE FRACTURE SURGERY Right 7/16/2024    RIGHT FOOT FIFTH METATARSAL FRACTURE OPEN REDUCTION WITH INTERNAL FIXATION performed by Markell Howard DPM at [facility] MAIN OR    CARDIAC SURGERY      DC UNLISTED PROCEDURE ABDOMEN PERITONEUM & OMENTUM         Family History:  Family History   Problem Relation Age of Onset    Depression Mother     No Known Problems Father     Hypertension Sister     Diabetes Type 1  Brother

## 2025-01-03 NOTE — GROUP NOTE
Group Therapy Note    Date: 1/3/2025    Group Start Time: 1345  Group End Time: 1430  Group Topic: Process Group - Inpatient    [facility]    Mary Choudhury        Group Therapy Note    Attendees: 3/4    Writer facilitated an inpatient processing group. Writer had patients engage in an expressive arts therapeutic activity involving paint. Writer encouraged patients to discuss discharge plans, express feelings, etc. Writer held the space as patients processed. Writer concluded session with a grounding exercise and encouraging patients to provide input and feedback regarding the group.        Pt invited and encouraged to attend group but chose not to attend.           Signature:  Mary Choudhury

## 2025-01-04 PROCEDURE — 80061 LIPID PANEL: CPT

## 2025-01-04 PROCEDURE — 6370000000 HC RX 637 (ALT 250 FOR IP): Performed by: HOSPITALIST

## 2025-01-04 PROCEDURE — 83036 HEMOGLOBIN GLYCOSYLATED A1C: CPT

## 2025-01-04 PROCEDURE — 6370000000 HC RX 637 (ALT 250 FOR IP): Performed by: PSYCHIATRY & NEUROLOGY

## 2025-01-04 PROCEDURE — 90656 IIV3 VACC NO PRSV 0.5 ML IM: CPT | Performed by: HOSPITALIST

## 2025-01-04 PROCEDURE — 1240000000 HC EMOTIONAL WELLNESS R&B

## 2025-01-04 PROCEDURE — 6370000000 HC RX 637 (ALT 250 FOR IP)

## 2025-01-04 PROCEDURE — G0008 ADMIN INFLUENZA VIRUS VAC: HCPCS | Performed by: HOSPITALIST

## 2025-01-04 PROCEDURE — 6360000002 HC RX W HCPCS: Performed by: HOSPITALIST

## 2025-01-04 PROCEDURE — 94640 AIRWAY INHALATION TREATMENT: CPT

## 2025-01-04 RX ADMIN — Medication 400 MG: at 09:32

## 2025-01-04 RX ADMIN — LACTULOSE 20 G: 20 SOLUTION ORAL at 20:01

## 2025-01-04 RX ADMIN — CETIRIZINE HYDROCHLORIDE 10 MG: 5 TABLET ORAL at 09:33

## 2025-01-04 RX ADMIN — LACTULOSE 20 G: 20 SOLUTION ORAL at 09:39

## 2025-01-04 RX ADMIN — INFLUENZA A VIRUS A/VICTORIA/4897/2022 IVR-238 (H1N1) ANTIGEN (PROPIOLACTONE INACTIVATED), INFLUENZA A VIRUS A/THAILAND/8/2022 IVR-237 (H3N2) ANTIGEN (PROPIOLACTONE INACTIVATED), INFLUENZA B VIRUS B/AUSTRIA/1359417/2021 BVR-26 ANTIGEN (PROPIOLACTONE INACTIVATED) 0.5 ML: 15; 15; 15 INJECTION, SUSPENSION INTRAMUSCULAR at 15:31

## 2025-01-04 RX ADMIN — ISOSORBIDE DINITRATE 5 MG: 10 TABLET ORAL at 09:32

## 2025-01-04 RX ADMIN — ISOSORBIDE DINITRATE 5 MG: 10 TABLET ORAL at 12:26

## 2025-01-04 RX ADMIN — AMLODIPINE BESYLATE 10 MG: 10 TABLET ORAL at 09:33

## 2025-01-04 RX ADMIN — LAMOTRIGINE 25 MG: 25 TABLET ORAL at 09:33

## 2025-01-04 RX ADMIN — Medication 2 PUFF: at 20:07

## 2025-01-04 RX ADMIN — PANTOPRAZOLE SODIUM 40 MG: 40 TABLET, DELAYED RELEASE ORAL at 05:37

## 2025-01-04 RX ADMIN — TRAZODONE HYDROCHLORIDE 50 MG: 50 TABLET ORAL at 20:01

## 2025-01-04 RX ADMIN — MONTELUKAST 10 MG: 10 TABLET, FILM COATED ORAL at 20:01

## 2025-01-04 RX ADMIN — Medication 2 PUFF: at 09:07

## 2025-01-04 RX ADMIN — RISPERIDONE 1 MG: 1 TABLET, FILM COATED ORAL at 20:01

## 2025-01-04 RX ADMIN — SPIRONOLACTONE 25 MG: 25 TABLET ORAL at 09:32

## 2025-01-04 RX ADMIN — LEVETIRACETAM 500 MG: 500 TABLET, FILM COATED ORAL at 20:01

## 2025-01-04 RX ADMIN — CARVEDILOL 3.12 MG: 3.12 TABLET, FILM COATED ORAL at 16:50

## 2025-01-04 RX ADMIN — Medication 1 TABLET: at 09:34

## 2025-01-04 RX ADMIN — LEVETIRACETAM 500 MG: 500 TABLET, FILM COATED ORAL at 09:32

## 2025-01-04 RX ADMIN — ISOSORBIDE DINITRATE 5 MG: 10 TABLET ORAL at 16:50

## 2025-01-04 RX ADMIN — TAMSULOSIN HYDROCHLORIDE 0.4 MG: 0.4 CAPSULE ORAL at 09:32

## 2025-01-04 RX ADMIN — CARVEDILOL 3.12 MG: 3.12 TABLET, FILM COATED ORAL at 09:39

## 2025-01-04 RX ADMIN — RISPERIDONE 1 MG: 1 TABLET, FILM COATED ORAL at 09:34

## 2025-01-04 RX ADMIN — ASPIRIN 81 MG: 81 TABLET, COATED ORAL at 09:32

## 2025-01-04 ASSESSMENT — PAIN SCALES - GENERAL
PAINLEVEL_OUTOF10: 8
PAINLEVEL_OUTOF10: 2

## 2025-01-04 ASSESSMENT — PAIN DESCRIPTION - DESCRIPTORS: DESCRIPTORS: ACHING;SORE

## 2025-01-04 ASSESSMENT — PAIN - FUNCTIONAL ASSESSMENT: PAIN_FUNCTIONAL_ASSESSMENT: ACTIVITIES ARE NOT PREVENTED

## 2025-01-04 ASSESSMENT — PAIN DESCRIPTION - ONSET: ONSET: ON-GOING

## 2025-01-04 ASSESSMENT — PAIN DESCRIPTION - LOCATION: LOCATION: LEG

## 2025-01-04 ASSESSMENT — PAIN DESCRIPTION - FREQUENCY: FREQUENCY: CONTINUOUS

## 2025-01-04 ASSESSMENT — PAIN DESCRIPTION - PAIN TYPE: TYPE: CHRONIC PAIN

## 2025-01-04 ASSESSMENT — PAIN DESCRIPTION - ORIENTATION: ORIENTATION: LOWER

## 2025-01-04 NOTE — GROUP NOTE
Group Therapy Note    Date: 1/4/2025    Group Start Time: 0900  Group End Time: 0945  Group Topic: Community Meeting    [facility]    Marcela Paiz        Group Therapy Note    Attendees: 5       Patient's Goal:  To Rest    Notes:      Status After Intervention:  Improved    Participation Level: Active Listener    Participation Quality: Appropriate      Speech:  normal      Thought Process/Content: Logical      Affective Functioning: Congruent      Mood: anxious      Level of consciousness:  Alert      Response to Learning: Able to verbalize current knowledge/experience      Endings: None Reported    Modes of Intervention: Support      Discipline Responsible: Behavorial Health Tech      Signature:  Marcela Paiz

## 2025-01-04 NOTE — H&P
INITIAL PSYCHIATRIC EVALUATION            IDENTIFICATION:    Patient Name  Daivd Alfaro   Date of Birth 1965   [facility] [id number]   Medical Record Number  [id number]      Age  59 y.o.   PCP Cristobal Fraga Sr., MD   Admit date:  1/3/2025    Room Number  105/02  @ [facility]   Date of Service  1/3/2025            HISTORY         REASON FOR HOSPITALIZATION: Command Hallucinations, SI     CC: \"I  was having really bad depression\"       HISTORY OF PRESENT ILLNESS:     The patient, David Alfaro, is a 59 y.o. male with hx of Schizoaffective Disorder and Polysubstance Use  was a voluntary admission from the [facility] ED on 1/3/25.  He sees this provider outpatient at [facility] and his last appointment was in May 2024.  He reports having insurance issues, and has not been able to make an outpatient appointment.  He ran out of his medications but felt they were ineffective.  He had an upcoming appt in January but started to have command auditory hallucinations to hurt himself and decided to go to the ER.    He reports he tried to overdose on Cocaine, has been having difficulty sleeping, socially isolating himself, having paranoia, and decreased appetite.  He has lost several pounds.   He has a hx of multiple hospital admissions with prior suicide attempts in the past.  Past medical history includes seizures, CVA, COPD, DLETA but does not use a CPAP, and prostate issues,   He has not had any recent seizures.  He ambulates with a walker.  His 30 year old niece has been staying with him.  He denies smoking, drank 2 beers right before admission, and cocaine use.      Subjective:  Patient was sitting up on side of the bed.  Wearing green hospital gown.  Mildly irritable on approach.  Speech is a little difficult to understand at times but able to make needs known.  Difficult historian.  Some answers are vague.  Complains of severe depression with no SI.    Sleep is \"so

## 2025-01-04 NOTE — GROUP NOTE
Group Therapy Note    Date: 1/3/2025    Group Start Time: 2000  Group End Time: 2045  Group Topic: Wrap-Up    [facility]    Martha Mcgrath CNA        Group Therapy Note    Attendees: 4       Patient's Goal:  none    Notes:  participated in group minimal    Status After Intervention:  Unchanged    Participation Level: Minimal    Participation Quality: Appropriate      Speech:  pressured      Thought Process/Content: Logical      Affective Functioning: Flat      Mood: anxious and depressed      Level of consciousness:  Alert      Response to Learning: Able to verbalize current knowledge/experience and Resistant      Endings: None Reported    Modes of Intervention: Activity      Discipline Responsible: Behavorial Health Tech      Signature:  Martha Mcgrath CNA

## 2025-01-05 PROCEDURE — 6370000000 HC RX 637 (ALT 250 FOR IP): Performed by: HOSPITALIST

## 2025-01-05 PROCEDURE — 6370000000 HC RX 637 (ALT 250 FOR IP): Performed by: PSYCHIATRY & NEUROLOGY

## 2025-01-05 PROCEDURE — 6370000000 HC RX 637 (ALT 250 FOR IP)

## 2025-01-05 PROCEDURE — 94640 AIRWAY INHALATION TREATMENT: CPT

## 2025-01-05 PROCEDURE — 1240000000 HC EMOTIONAL WELLNESS R&B

## 2025-01-05 RX ADMIN — LAMOTRIGINE 25 MG: 25 TABLET ORAL at 09:12

## 2025-01-05 RX ADMIN — ISOSORBIDE DINITRATE 5 MG: 10 TABLET ORAL at 17:33

## 2025-01-05 RX ADMIN — TRAZODONE HYDROCHLORIDE 50 MG: 50 TABLET ORAL at 20:27

## 2025-01-05 RX ADMIN — Medication 2 PUFF: at 19:37

## 2025-01-05 RX ADMIN — LACTULOSE 20 G: 20 SOLUTION ORAL at 20:26

## 2025-01-05 RX ADMIN — CARVEDILOL 3.12 MG: 3.12 TABLET, FILM COATED ORAL at 09:12

## 2025-01-05 RX ADMIN — AMLODIPINE BESYLATE 10 MG: 10 TABLET ORAL at 09:12

## 2025-01-05 RX ADMIN — MONTELUKAST 10 MG: 10 TABLET, FILM COATED ORAL at 20:26

## 2025-01-05 RX ADMIN — Medication 1 TABLET: at 09:12

## 2025-01-05 RX ADMIN — SPIRONOLACTONE 25 MG: 25 TABLET ORAL at 09:24

## 2025-01-05 RX ADMIN — TAMSULOSIN HYDROCHLORIDE 0.4 MG: 0.4 CAPSULE ORAL at 09:12

## 2025-01-05 RX ADMIN — ISOSORBIDE DINITRATE 5 MG: 10 TABLET ORAL at 09:12

## 2025-01-05 RX ADMIN — ISOSORBIDE DINITRATE 5 MG: 10 TABLET ORAL at 13:42

## 2025-01-05 RX ADMIN — LACTULOSE 20 G: 20 SOLUTION ORAL at 09:12

## 2025-01-05 RX ADMIN — ASPIRIN 81 MG: 81 TABLET, COATED ORAL at 09:13

## 2025-01-05 RX ADMIN — LEVETIRACETAM 500 MG: 500 TABLET, FILM COATED ORAL at 20:27

## 2025-01-05 RX ADMIN — PANTOPRAZOLE SODIUM 40 MG: 40 TABLET, DELAYED RELEASE ORAL at 05:32

## 2025-01-05 RX ADMIN — Medication 2 PUFF: at 08:56

## 2025-01-05 RX ADMIN — RISPERIDONE 1 MG: 1 TABLET, FILM COATED ORAL at 20:26

## 2025-01-05 RX ADMIN — RISPERIDONE 1 MG: 1 TABLET, FILM COATED ORAL at 09:12

## 2025-01-05 RX ADMIN — CETIRIZINE HYDROCHLORIDE 10 MG: 5 TABLET ORAL at 09:12

## 2025-01-05 RX ADMIN — CARVEDILOL 3.12 MG: 3.12 TABLET, FILM COATED ORAL at 17:33

## 2025-01-05 RX ADMIN — Medication 400 MG: at 09:12

## 2025-01-05 RX ADMIN — LEVETIRACETAM 500 MG: 500 TABLET, FILM COATED ORAL at 09:11

## 2025-01-05 ASSESSMENT — PAIN SCALES - GENERAL: PAINLEVEL_OUTOF10: 0

## 2025-01-05 NOTE — GROUP NOTE
Group Therapy Note    Date: 1/4/2025    Group Start Time: 2000  Group End Time: 2045  Group Topic: Wrap-Up    [facility]    Martha Mcgrath CNA        Group Therapy Note    Attendees: 5       Patient's Goal:  no goals said he hav ea lot of pain in right leg    Notes:  participated to minimal stayed the whole time     Status After Intervention:  Unchanged    Participation Level: Minimal    Participation Quality: Appropriate      Speech:  pressured      Thought Process/Content: Logical      Affective Functioning: Congruent      Mood: anxious and depressed      Level of consciousness:  Alert      Response to Learning: Able to verbalize current knowledge/experience, Able to verbalize/acknowledge new learning, Able to retain information, Capable of insight, and Able to change behavior      Endings: None Reported    Modes of Intervention: Activity      Discipline Responsible: Behavorial Health Tech      Signature:  Martha Mcgrath CNA

## 2025-01-05 NOTE — GROUP NOTE
Group Therapy Note    Date: 2025    Group Start Time: 08  Group End Time: 830  Group Topic: Community Meeting    [facility]    Marcela Paiz        Group Therapy Note    Attendees: 4         Patient's Goal:  ***    Notes:  ***    Status After Intervention:  {Status After Intervention:994568745}    Participation Level: {Participation Level:971731037}    Participation Quality: {[facility] PARTICIPATION QUALITY:291860468}      Speech:  {[facility] CD_SPEECH:43399}      Thought Process/Content: {Thought Process/Content:406783551}      Affective Functioning: {Affective Functionin}      Mood: {Mood:513252850}      Level of consciousness:  {Level of consciousness:813794990}      Response to Learning: {[facility] Responses to Learnin}      Endings: {[facility] Endings:68719}    Modes of Intervention: {[facility] Modes of Intervention:178798739}      Discipline Responsible: {[facility] Multidisciplinary:357969924}      Signature:  Marcela Paiz

## 2025-01-06 LAB
CHOLEST SERPL-MCNC: 197 MG/DL
EST. AVERAGE GLUCOSE BLD GHB EST-MCNC: 108 MG/DL
HBA1C MFR BLD: 5.4 % (ref 4–5.6)
HDLC SERPL-MCNC: 81 MG/DL
HDLC SERPL: 2.4 (ref 0–5)
LDLC SERPL CALC-MCNC: 107.8 MG/DL (ref 0–100)
LIPID PANEL: ABNORMAL
TRIGL SERPL-MCNC: 41 MG/DL
VLDLC SERPL CALC-MCNC: 8.2 MG/DL

## 2025-01-06 PROCEDURE — 83036 HEMOGLOBIN GLYCOSYLATED A1C: CPT

## 2025-01-06 PROCEDURE — 94640 AIRWAY INHALATION TREATMENT: CPT

## 2025-01-06 PROCEDURE — 36415 COLL VENOUS BLD VENIPUNCTURE: CPT

## 2025-01-06 PROCEDURE — 6370000000 HC RX 637 (ALT 250 FOR IP): Performed by: HOSPITALIST

## 2025-01-06 PROCEDURE — 6370000000 HC RX 637 (ALT 250 FOR IP)

## 2025-01-06 PROCEDURE — 1240000000 HC EMOTIONAL WELLNESS R&B

## 2025-01-06 PROCEDURE — 6370000000 HC RX 637 (ALT 250 FOR IP): Performed by: PSYCHIATRY & NEUROLOGY

## 2025-01-06 PROCEDURE — 80061 LIPID PANEL: CPT

## 2025-01-06 RX ADMIN — LEVETIRACETAM 500 MG: 500 TABLET, FILM COATED ORAL at 20:29

## 2025-01-06 RX ADMIN — ISOSORBIDE DINITRATE 5 MG: 10 TABLET ORAL at 08:59

## 2025-01-06 RX ADMIN — MONTELUKAST 10 MG: 10 TABLET, FILM COATED ORAL at 20:29

## 2025-01-06 RX ADMIN — CARVEDILOL 3.12 MG: 3.12 TABLET, FILM COATED ORAL at 08:59

## 2025-01-06 RX ADMIN — Medication 1 TABLET: at 08:58

## 2025-01-06 RX ADMIN — TRAZODONE HYDROCHLORIDE 50 MG: 50 TABLET ORAL at 20:29

## 2025-01-06 RX ADMIN — Medication 2 PUFF: at 19:47

## 2025-01-06 RX ADMIN — RISPERIDONE 1 MG: 1 TABLET, FILM COATED ORAL at 20:29

## 2025-01-06 RX ADMIN — AMLODIPINE BESYLATE 10 MG: 10 TABLET ORAL at 08:58

## 2025-01-06 RX ADMIN — LACTULOSE 20 G: 20 SOLUTION ORAL at 09:06

## 2025-01-06 RX ADMIN — LEVETIRACETAM 500 MG: 500 TABLET, FILM COATED ORAL at 08:58

## 2025-01-06 RX ADMIN — LAMOTRIGINE 25 MG: 25 TABLET ORAL at 08:58

## 2025-01-06 RX ADMIN — Medication 2 PUFF: at 07:35

## 2025-01-06 RX ADMIN — Medication 400 MG: at 08:58

## 2025-01-06 RX ADMIN — TAMSULOSIN HYDROCHLORIDE 0.4 MG: 0.4 CAPSULE ORAL at 08:58

## 2025-01-06 RX ADMIN — PANTOPRAZOLE SODIUM 40 MG: 40 TABLET, DELAYED RELEASE ORAL at 05:36

## 2025-01-06 RX ADMIN — RISPERIDONE 1 MG: 1 TABLET, FILM COATED ORAL at 08:59

## 2025-01-06 RX ADMIN — ISOSORBIDE DINITRATE 5 MG: 10 TABLET ORAL at 13:59

## 2025-01-06 RX ADMIN — ISOSORBIDE DINITRATE 5 MG: 10 TABLET ORAL at 18:44

## 2025-01-06 RX ADMIN — CARVEDILOL 3.12 MG: 3.12 TABLET, FILM COATED ORAL at 15:40

## 2025-01-06 RX ADMIN — ASPIRIN 81 MG: 81 TABLET, COATED ORAL at 08:59

## 2025-01-06 RX ADMIN — SPIRONOLACTONE 25 MG: 25 TABLET ORAL at 08:58

## 2025-01-06 RX ADMIN — CETIRIZINE HYDROCHLORIDE 10 MG: 5 TABLET ORAL at 08:58

## 2025-01-06 ASSESSMENT — PAIN SCALES - GENERAL: PAINLEVEL_OUTOF10: 0

## 2025-01-06 NOTE — GROUP NOTE
Group Therapy Note    Date: 1/6/2025    Group Start Time: 1530  Group End Time: 1615  Group Topic: Process Group - Inpatient    [facility]    Mary Choudhury        Group Therapy Note    Attendees: 2/2    Note: Pts had minimal engagement at this time. Writer combined groups.     Writer facilitated an inpatient processing/substance use group combo. Writer provided a handout regarding triggers but substance use and encouraged patients to process. Writer encouraged patients to express feelings, discuss discharge plans, etc. Writer held the space as patients processed. Writer concluded session with a grounding exercise and encouraging patients to provide input and feedback regarding the group.        Patient's Goal:  To attend and participate in groups and activities daily.    Notes:  Pt presented as flat and quiet overall but was able to engage some. Pt was able to fill out the form for triggers and listened attentively.     Status After Intervention:  Improved    Participation Level: Active Listener    Participation Quality: Appropriate and Attentive      Speech:  normal      Thought Process/Content: Logical  Linear      Affective Functioning: Flat      Mood: depressed      Level of consciousness:  Alert, Oriented x4, and Attentive      Response to Learning: Capable of insight and Progressing to goal      Endings: None Reported    Modes of Intervention: Education, Exploration       Discipline Responsible: /Counselor      Signature:  Mary Choudhury

## 2025-01-06 NOTE — GROUP NOTE
Group Therapy Note    Date: 1/5/2025    Group Start Time: 2000  Group End Time: 2045  Group Topic: Wrap-Up    [facility]    Martha Mcgrath CNA        Group Therapy Note    Attendees: 5       Patient's Goal:  none    Notes:  participated in group    Status After Intervention:  Unchanged    Participation Level: Minimal    Participation Quality: Appropriate      Speech:  pressured      Thought Process/Content: Logical      Affective Functioning: Flat      Mood: anxious and depressed      Level of consciousness:  Alert      Response to Learning: Able to verbalize current knowledge/experience, Able to verbalize/acknowledge new learning, Able to retain information, and Able to change behavior      Endings: None Reported    Modes of Intervention: Activity      Discipline Responsible: Behavorial Health Tech      Signature:  Martha Mcgrath CNA

## 2025-01-06 NOTE — GROUP NOTE
Group Therapy Note    Date: 1/6/2025    Group Start Time: 1000  Group End Time: 1045  Group Topic: Community Meeting    [facility]    Marcela Paiz        Group Therapy Note    Attendees: 5       Patient's Goal: Patient wants to regain strength    Notes:      Status After Intervention:  Unchanged    Participation Level: Active Listener    Participation Quality: Appropriate      Speech:  normal      Thought Process/Content: Logical      Affective Functioning: Congruent      Mood: anxious      Level of consciousness:  Alert      Response to Learning: Able to verbalize/acknowledge new learning      Endings: None Reported    Modes of Intervention: Support      Discipline Responsible: Behavorial Health Tech      Signature:  Marcela Paiz

## 2025-01-07 PROBLEM — F33.3 MAJOR DEPRESSIVE DISORDER, RECURRENT EPISODE, SEVERE, WITH PSYCHOSIS (HCC): Status: ACTIVE | Noted: 2025-01-07

## 2025-01-07 PROCEDURE — 94640 AIRWAY INHALATION TREATMENT: CPT

## 2025-01-07 PROCEDURE — 6370000000 HC RX 637 (ALT 250 FOR IP): Performed by: PSYCHIATRY & NEUROLOGY

## 2025-01-07 PROCEDURE — 6370000000 HC RX 637 (ALT 250 FOR IP)

## 2025-01-07 PROCEDURE — 6370000000 HC RX 637 (ALT 250 FOR IP): Performed by: HOSPITALIST

## 2025-01-07 PROCEDURE — 1240000000 HC EMOTIONAL WELLNESS R&B

## 2025-01-07 RX ADMIN — LACTULOSE 20 G: 20 SOLUTION ORAL at 20:22

## 2025-01-07 RX ADMIN — ISOSORBIDE DINITRATE 5 MG: 10 TABLET ORAL at 16:57

## 2025-01-07 RX ADMIN — ISOSORBIDE DINITRATE 5 MG: 10 TABLET ORAL at 08:30

## 2025-01-07 RX ADMIN — RISPERIDONE 1 MG: 1 TABLET, FILM COATED ORAL at 08:29

## 2025-01-07 RX ADMIN — LAMOTRIGINE 25 MG: 25 TABLET ORAL at 08:29

## 2025-01-07 RX ADMIN — Medication 400 MG: at 08:29

## 2025-01-07 RX ADMIN — LACTULOSE 20 G: 20 SOLUTION ORAL at 08:30

## 2025-01-07 RX ADMIN — SPIRONOLACTONE 25 MG: 25 TABLET ORAL at 08:29

## 2025-01-07 RX ADMIN — LEVETIRACETAM 500 MG: 500 TABLET, FILM COATED ORAL at 20:22

## 2025-01-07 RX ADMIN — Medication 1 TABLET: at 08:29

## 2025-01-07 RX ADMIN — AMLODIPINE BESYLATE 10 MG: 10 TABLET ORAL at 08:30

## 2025-01-07 RX ADMIN — LEVETIRACETAM 500 MG: 500 TABLET, FILM COATED ORAL at 08:29

## 2025-01-07 RX ADMIN — ISOSORBIDE DINITRATE 5 MG: 10 TABLET ORAL at 12:55

## 2025-01-07 RX ADMIN — Medication 2 PUFF: at 19:28

## 2025-01-07 RX ADMIN — HYDROXYZINE HYDROCHLORIDE 50 MG: 50 TABLET, FILM COATED ORAL at 20:22

## 2025-01-07 RX ADMIN — PANTOPRAZOLE SODIUM 40 MG: 40 TABLET, DELAYED RELEASE ORAL at 05:46

## 2025-01-07 RX ADMIN — CETIRIZINE HYDROCHLORIDE 10 MG: 5 TABLET ORAL at 08:29

## 2025-01-07 RX ADMIN — ASPIRIN 81 MG: 81 TABLET, COATED ORAL at 08:30

## 2025-01-07 RX ADMIN — CARVEDILOL 3.12 MG: 3.12 TABLET, FILM COATED ORAL at 16:57

## 2025-01-07 RX ADMIN — RISPERIDONE 1 MG: 1 TABLET, FILM COATED ORAL at 20:22

## 2025-01-07 RX ADMIN — TAMSULOSIN HYDROCHLORIDE 0.4 MG: 0.4 CAPSULE ORAL at 08:29

## 2025-01-07 RX ADMIN — CARVEDILOL 3.12 MG: 3.12 TABLET, FILM COATED ORAL at 08:30

## 2025-01-07 RX ADMIN — MONTELUKAST 10 MG: 10 TABLET, FILM COATED ORAL at 20:22

## 2025-01-07 ASSESSMENT — PAIN SCALES - GENERAL: PAINLEVEL_OUTOF10: 0

## 2025-01-07 NOTE — CARE COORDINATION
01/07/25 1257   Short Term Goal 1   STG Goal 1 Status: Patient Appears to be  Progressing toward treatment plan goal   Short Term Goal 2   STG Goal 2 Status: Patient Appears to be  Progressing toward treatment plan goal   Crisis/Safety/Discharge Plan   Crisis/Safety Plan Standard program interventions and protocol   Discharge Plan Inpatient rehab

## 2025-01-07 NOTE — GROUP NOTE
Group Therapy Note    Date: 1/7/2025    Group Start Time: 1330  Group End Time: 1415  Group Topic: Process Group - Inpatient    [facility]    Mary Choudhury        Group Therapy Note    Attendees: 1/2    Writer facilitated an individual 1:1 therapy session due to pt being only one in group. Writer had patient engage in a reflective activity titled \"Where's My Energy?\" Writer encouraged patient to process various things he needs to let go of versus where he wants to put his energy. Writer held the space as patient processed.        Pt invited and encouraged to attend group but chose not to attend.         Signature:  Mary Choudhury, KEVIN

## 2025-01-07 NOTE — GROUP NOTE
Group Therapy Note    Date: 1/7/2025    Group Start Time: 1415  Group End Time: 1500  Group Topic: Psychoeducation    [facility]    Mary Choudhury        Group Therapy Note    Attendees: 1/2    Writer provided a psych-ed handout regarding a CBT exercise regarding fact versus fiction. Writer provided a media presentation regarding inpatient rehab. Therapeutic purpose of the activity was to prepare pt for what to expect, process, etc. Writer concluded session with a grounding exercise.      Pt invited and encouraged to attend group but chose not to attend.      Signature:  Mary Choudhury

## 2025-01-08 PROCEDURE — 6370000000 HC RX 637 (ALT 250 FOR IP): Performed by: PSYCHIATRY & NEUROLOGY

## 2025-01-08 PROCEDURE — 6370000000 HC RX 637 (ALT 250 FOR IP): Performed by: HOSPITALIST

## 2025-01-08 PROCEDURE — 94640 AIRWAY INHALATION TREATMENT: CPT

## 2025-01-08 PROCEDURE — 1240000000 HC EMOTIONAL WELLNESS R&B

## 2025-01-08 PROCEDURE — 6370000000 HC RX 637 (ALT 250 FOR IP)

## 2025-01-08 RX ADMIN — Medication 400 MG: at 07:38

## 2025-01-08 RX ADMIN — RISPERIDONE 1 MG: 1 TABLET, FILM COATED ORAL at 07:39

## 2025-01-08 RX ADMIN — RISPERIDONE 1 MG: 1 TABLET, FILM COATED ORAL at 20:21

## 2025-01-08 RX ADMIN — Medication 2 PUFF: at 08:17

## 2025-01-08 RX ADMIN — PANTOPRAZOLE SODIUM 40 MG: 40 TABLET, DELAYED RELEASE ORAL at 06:01

## 2025-01-08 RX ADMIN — AMLODIPINE BESYLATE 10 MG: 10 TABLET ORAL at 07:39

## 2025-01-08 RX ADMIN — ISOSORBIDE DINITRATE 5 MG: 10 TABLET ORAL at 16:05

## 2025-01-08 RX ADMIN — Medication 1 TABLET: at 07:38

## 2025-01-08 RX ADMIN — CARVEDILOL 3.12 MG: 3.12 TABLET, FILM COATED ORAL at 16:05

## 2025-01-08 RX ADMIN — MONTELUKAST 10 MG: 10 TABLET, FILM COATED ORAL at 20:21

## 2025-01-08 RX ADMIN — Medication 2 PUFF: at 20:50

## 2025-01-08 RX ADMIN — ASPIRIN 81 MG: 81 TABLET, COATED ORAL at 07:38

## 2025-01-08 RX ADMIN — TRAZODONE HYDROCHLORIDE 50 MG: 50 TABLET ORAL at 20:21

## 2025-01-08 RX ADMIN — SPIRONOLACTONE 25 MG: 25 TABLET ORAL at 07:39

## 2025-01-08 RX ADMIN — LAMOTRIGINE 25 MG: 25 TABLET ORAL at 07:39

## 2025-01-08 RX ADMIN — CETIRIZINE HYDROCHLORIDE 10 MG: 5 TABLET ORAL at 07:38

## 2025-01-08 RX ADMIN — LEVETIRACETAM 500 MG: 500 TABLET, FILM COATED ORAL at 07:38

## 2025-01-08 RX ADMIN — LACTULOSE 20 G: 20 SOLUTION ORAL at 07:38

## 2025-01-08 RX ADMIN — CARVEDILOL 3.12 MG: 3.12 TABLET, FILM COATED ORAL at 07:38

## 2025-01-08 RX ADMIN — ISOSORBIDE DINITRATE 5 MG: 10 TABLET ORAL at 07:39

## 2025-01-08 RX ADMIN — LACTULOSE 20 G: 20 SOLUTION ORAL at 20:21

## 2025-01-08 RX ADMIN — LEVETIRACETAM 500 MG: 500 TABLET, FILM COATED ORAL at 20:21

## 2025-01-08 RX ADMIN — TAMSULOSIN HYDROCHLORIDE 0.4 MG: 0.4 CAPSULE ORAL at 07:38

## 2025-01-08 NOTE — GROUP NOTE
Group Therapy Note    Date: 1/8/2025    Group Start Time: 1415  Group End Time: 1500  Group Topic: Psychoeducation    [facility]    Mary Choudhury        Group Therapy Note    Attendees: 1/2    Writer facilitated an inpatient psych-ed group. Writer converted session to 1:1 counseling due to pt being the only one in attendance. Writer provided a psych-ed handout regarding toxic positivity versus validating statements and encouraged pt to process. Writer concluded session with a grounding exercise.        Pt invited and encouraged to attend groups but chose not to attend.      Signature:  Mary Choudhury

## 2025-01-08 NOTE — GROUP NOTE
Group Therapy Note    Date: 1/8/2025    Group Start Time: 1100  Group End Time: 1145  Group Topic: Nursing    [facility]    Amanda Ponce LPN        Group Therapy Note    Attendees: 2/2       Patient's Goal:  to be able to keep insurance to keep medications    Notes:  medication compliance group:  Pt. States he needs them to make sure he keeps his insurance and has the money to always get his medications, with insurance coming and going, he loses his medications and has a hard time getting them back.    Status After Intervention:  Unchanged    Participation Level: Minimal    Participation Quality: Appropriate and Attentive      Speech:  normal      Thought Process/Content: Logical      Affective Functioning: Blunted      Mood: irritable      Level of consciousness:  Alert, Oriented x4, and Attentive      Response to Learning: Progressing to goal      Endings: None Reported    Modes of Intervention: Education      Discipline Responsible: Licensed Practical Nurse      Signature:  Amanda Ponce LPN

## 2025-01-08 NOTE — GROUP NOTE
Group Therapy Note    Date: 1/8/2025    Group Start Time: 1330  Group End Time: 1415  Group Topic: Process Group - Inpatient    [facility]    Mary Choudhury        Group Therapy Note    Attendees: 1/2    Writer facilitated a 1:1 session due to only one patient being in attendance. Writer had patient engage in an expressive arts therapeutic processing activity involving paint. Writer encouraged pt to discuss discharge plans, express feelings, etc. Writer held the space as patient processed. Writer concluded session with a grounding exercise.       Pt invited and encouraged to attend group but chose not to attend.       Signature:  Mary Choudhury

## 2025-01-08 NOTE — GROUP NOTE
Group Therapy Note    Date: 1/8/2025    Group Start Time: 1515  Group End Time: 1600  Group Topic: Activity    [facility]    Vielka Walker LPN        Group Therapy Note    Attendees: 2       Patient's Goal:  UNKNOWN    Notes:  TV-MOVIE    Status After Intervention:  Improved    Participation Level: Active Listener    Participation Quality: Appropriate and Attentive      Speech:  normal      Thought Process/Content: Logical      Affective Functioning: Congruent      Mood: CALM      Level of consciousness:  Alert and Oriented x4      Response to Learning: Able to verbalize current knowledge/experience, Able to retain information, Capable of insight, Able to change behavior, and Progressing to goal      Endings: None Reported    Modes of Intervention: Socialization and Activity      Discipline Responsible: Licensed Practical Nurse and Behavorial Health Tech      Signature:  Vielka Walker LPN

## 2025-01-09 PROCEDURE — 1240000000 HC EMOTIONAL WELLNESS R&B

## 2025-01-09 PROCEDURE — 6370000000 HC RX 637 (ALT 250 FOR IP): Performed by: HOSPITALIST

## 2025-01-09 PROCEDURE — 6370000000 HC RX 637 (ALT 250 FOR IP): Performed by: PSYCHIATRY & NEUROLOGY

## 2025-01-09 PROCEDURE — 94640 AIRWAY INHALATION TREATMENT: CPT

## 2025-01-09 PROCEDURE — 6370000000 HC RX 637 (ALT 250 FOR IP)

## 2025-01-09 RX ADMIN — LAMOTRIGINE 25 MG: 25 TABLET ORAL at 08:43

## 2025-01-09 RX ADMIN — ISOSORBIDE DINITRATE 5 MG: 10 TABLET ORAL at 17:23

## 2025-01-09 RX ADMIN — TRAZODONE HYDROCHLORIDE 50 MG: 50 TABLET ORAL at 20:07

## 2025-01-09 RX ADMIN — Medication 2 PUFF: at 20:31

## 2025-01-09 RX ADMIN — LACTULOSE 20 G: 20 SOLUTION ORAL at 08:43

## 2025-01-09 RX ADMIN — SPIRONOLACTONE 25 MG: 25 TABLET ORAL at 08:43

## 2025-01-09 RX ADMIN — ISOSORBIDE DINITRATE 5 MG: 10 TABLET ORAL at 14:01

## 2025-01-09 RX ADMIN — AMLODIPINE BESYLATE 10 MG: 10 TABLET ORAL at 08:43

## 2025-01-09 RX ADMIN — ASPIRIN 81 MG: 81 TABLET, COATED ORAL at 08:42

## 2025-01-09 RX ADMIN — LEVETIRACETAM 500 MG: 500 TABLET, FILM COATED ORAL at 08:44

## 2025-01-09 RX ADMIN — PANTOPRAZOLE SODIUM 40 MG: 40 TABLET, DELAYED RELEASE ORAL at 05:33

## 2025-01-09 RX ADMIN — RISPERIDONE 1 MG: 1 TABLET, FILM COATED ORAL at 08:44

## 2025-01-09 RX ADMIN — CETIRIZINE HYDROCHLORIDE 10 MG: 5 TABLET ORAL at 08:44

## 2025-01-09 RX ADMIN — CARVEDILOL 3.12 MG: 3.12 TABLET, FILM COATED ORAL at 17:21

## 2025-01-09 RX ADMIN — CARVEDILOL 3.12 MG: 3.12 TABLET, FILM COATED ORAL at 08:44

## 2025-01-09 RX ADMIN — ISOSORBIDE DINITRATE 5 MG: 10 TABLET ORAL at 08:43

## 2025-01-09 RX ADMIN — LACTULOSE 20 G: 20 SOLUTION ORAL at 20:07

## 2025-01-09 RX ADMIN — MONTELUKAST 10 MG: 10 TABLET, FILM COATED ORAL at 20:07

## 2025-01-09 RX ADMIN — Medication 2 PUFF: at 09:44

## 2025-01-09 RX ADMIN — Medication 1 TABLET: at 08:44

## 2025-01-09 RX ADMIN — LEVETIRACETAM 500 MG: 500 TABLET, FILM COATED ORAL at 20:07

## 2025-01-09 RX ADMIN — Medication 400 MG: at 08:43

## 2025-01-09 RX ADMIN — RISPERIDONE 1 MG: 1 TABLET, FILM COATED ORAL at 20:07

## 2025-01-09 RX ADMIN — TAMSULOSIN HYDROCHLORIDE 0.4 MG: 0.4 CAPSULE ORAL at 08:42

## 2025-01-09 NOTE — GROUP NOTE
Group Therapy Note    Date: 1/8/2025    Group Start Time: 2000  Group End Time: 2045  Group Topic: Wrap-Up    [facility]    Martha Mcgrath CNA        Group Therapy Note    Attendees: 3       Patient's Goal:  To continue to stay calm , feel better after he talk to his     Notes:  participated in group    Status After Intervention:  Improved    Participation Level: Active Listener    Participation Quality: Appropriate      Speech:  normal      Thought Process/Content: Logical      Affective Functioning: Congruent and Flat      Mood: anxious and depressed      Level of consciousness:  Alert      Response to Learning: Able to verbalize current knowledge/experience, Able to verbalize/acknowledge new learning, Able to retain information, Capable of insight, Able to change behavior, and Progressing to goal      Endings: None Reported    Modes of Intervention: Activity      Discipline Responsible: Behavorial Health Tech      Signature:  Martha Mcgrath CNA

## 2025-01-09 NOTE — GROUP NOTE
Group Therapy Note    Date: 1/9/2025    Group Start Time: 1100  Group End Time: 1145  Group Topic: Nursing    [facility]    Amanda Ponce LPN        Group Therapy Note    Attendees: 3/4       Patient's Goal:  to get paperwork taken care  of to get to rehab and get some help to not be in trouble.    Notes:  Pt.  States he wants better things just do not work out for him, according to him.    Status After Intervention:  Unchanged    Participation Level: Minimal    Participation Quality: Appropriate, Attentive, and Sharing      Speech:  normal      Thought Process/Content: Logical      Affective Functioning: Congruent      Mood: depressed      Level of consciousness:  Alert, Oriented x4, and Attentive      Response to Learning: Able to retain information, Capable of insight, and Progressing to goal      Endings: None Reported    Modes of Intervention: Education      Discipline Responsible: Licensed Practical Nurse      Signature:  Amanda Ponce LPN

## 2025-01-09 NOTE — GROUP NOTE
Group Therapy Note    Date: 1/9/2025    Group Start Time: 1415  Group End Time: 1500  Group Topic: Psychoeducation    [facility]    Mary Choudhury        Group Therapy Note    Attendees: 3/4    Writer facilitated an inpatient psych-ed group. Writer provided a handout regarding Anxiety and the cycle. Writer encouraged patients to process their understanding of anxiety and share personal experiences. Writer held the space as patients processed. Writer concluded session with a grounding exercise.        Patient's Goal:  To attend and participate in groups and activities daily.    Notes:  Pt actively participated. Pt was able to discuss anxiety and provided examples.     Status After Intervention:  Improved    Participation Level: Active Listener and Interactive    Participation Quality: Appropriate, Attentive, and Sharing      Speech:  normal      Thought Process/Content: Logical  Linear      Affective Functioning: Congruent      Mood: euthymic      Level of consciousness:  Alert, Oriented x4, and Attentive      Response to Learning: Able to retain information, Capable of insight, and Progressing to goal      Endings: None Reported    Modes of Intervention: Education      Discipline Responsible: /Counselor      Signature:  Mary Choudhury LPC

## 2025-01-09 NOTE — GROUP NOTE
Group Therapy Note    Date: 1/9/2025    Group Start Time: 1000  Group End Time: 1045  Group Topic: Community Meeting    [facility]    Marcela Paiz        Group Therapy Note    Attendees: 3       Patient's Goal:  To work on placement.    Notes:      Status After Intervention:  Improved    Participation Level: Active Listener    Participation Quality: Appropriate      Speech:  normal      Thought Process/Content: Logical      Affective Functioning: Congruent      Mood: anxious      Level of consciousness:  Alert      Response to Learning: Able to verbalize current knowledge/experience      Endings: None Reported    Modes of Intervention: Support      Discipline Responsible: Behavorial Health Tech      Signature:  Marcela Paiz

## 2025-01-09 NOTE — GROUP NOTE
Group Therapy Note    Date: 1/9/2025    Group Start Time: 1330  Group End Time: 1415  Group Topic: Process Group - Inpatient    [facility]    Mary Choudhury        Group Therapy Note    Attendees: 3/4    Writer facilitated an inpatient processing group. Writer had patients engage in a therapeutic reflective activity in which patients were asked to design \"Shadow Masks.\" Writer encouraged patients to express feelings, discuss discharge plans, etc. Writer held the space as patients processed. Writer concluded session with a grounding exercise and encouraging patients to provide input and feedback regarding the group.        Patient's Goal:  To attend and participate in groups and activities daily    Notes:  Pt actively participated. Pt displayed flat affect but was able to discuss some discharge planning.     Status After Intervention:  Improved    Participation Level: Active Listener    Participation Quality: Appropriate and Attentive      Speech:  normal      Thought Process/Content: Logical  Linear      Affective Functioning: Congruent      Mood: euthymic      Level of consciousness:  Alert, Oriented x4, and Attentive      Response to Learning: Able to retain information and Progressing to goal      Endings: None Reported    Modes of Intervention: Exploration/Activity       Discipline Responsible: /Counselor      Signature:  Mary Choudhury LPC

## 2025-01-10 PROCEDURE — 6370000000 HC RX 637 (ALT 250 FOR IP)

## 2025-01-10 PROCEDURE — 1240000000 HC EMOTIONAL WELLNESS R&B

## 2025-01-10 PROCEDURE — 6370000000 HC RX 637 (ALT 250 FOR IP): Performed by: HOSPITALIST

## 2025-01-10 PROCEDURE — 94640 AIRWAY INHALATION TREATMENT: CPT

## 2025-01-10 PROCEDURE — 6370000000 HC RX 637 (ALT 250 FOR IP): Performed by: PSYCHIATRY & NEUROLOGY

## 2025-01-10 RX ADMIN — AMLODIPINE BESYLATE 10 MG: 10 TABLET ORAL at 07:45

## 2025-01-10 RX ADMIN — CETIRIZINE HYDROCHLORIDE 10 MG: 5 TABLET ORAL at 07:45

## 2025-01-10 RX ADMIN — Medication 2 PUFF: at 19:57

## 2025-01-10 RX ADMIN — ISOSORBIDE DINITRATE 5 MG: 10 TABLET ORAL at 12:37

## 2025-01-10 RX ADMIN — CARVEDILOL 3.12 MG: 3.12 TABLET, FILM COATED ORAL at 16:41

## 2025-01-10 RX ADMIN — Medication 2 PUFF: at 07:43

## 2025-01-10 RX ADMIN — ISOSORBIDE DINITRATE 5 MG: 10 TABLET ORAL at 16:41

## 2025-01-10 RX ADMIN — ASPIRIN 81 MG: 81 TABLET, COATED ORAL at 07:45

## 2025-01-10 RX ADMIN — LEVETIRACETAM 500 MG: 500 TABLET, FILM COATED ORAL at 07:45

## 2025-01-10 RX ADMIN — PANTOPRAZOLE SODIUM 40 MG: 40 TABLET, DELAYED RELEASE ORAL at 05:32

## 2025-01-10 RX ADMIN — RISPERIDONE 1 MG: 1 TABLET, FILM COATED ORAL at 07:45

## 2025-01-10 RX ADMIN — Medication 1 TABLET: at 07:45

## 2025-01-10 RX ADMIN — LEVETIRACETAM 500 MG: 500 TABLET, FILM COATED ORAL at 21:07

## 2025-01-10 RX ADMIN — SPIRONOLACTONE 25 MG: 25 TABLET ORAL at 07:45

## 2025-01-10 RX ADMIN — HYDROXYZINE HYDROCHLORIDE 50 MG: 50 TABLET, FILM COATED ORAL at 21:07

## 2025-01-10 RX ADMIN — LAMOTRIGINE 25 MG: 25 TABLET ORAL at 07:45

## 2025-01-10 RX ADMIN — Medication 400 MG: at 07:45

## 2025-01-10 RX ADMIN — RISPERIDONE 1 MG: 1 TABLET, FILM COATED ORAL at 21:07

## 2025-01-10 RX ADMIN — TAMSULOSIN HYDROCHLORIDE 0.4 MG: 0.4 CAPSULE ORAL at 07:45

## 2025-01-10 RX ADMIN — LACTULOSE 20 G: 20 SOLUTION ORAL at 07:45

## 2025-01-10 RX ADMIN — CARVEDILOL 3.12 MG: 3.12 TABLET, FILM COATED ORAL at 07:45

## 2025-01-10 RX ADMIN — MONTELUKAST 10 MG: 10 TABLET, FILM COATED ORAL at 21:07

## 2025-01-10 RX ADMIN — ISOSORBIDE DINITRATE 5 MG: 10 TABLET ORAL at 07:45

## 2025-01-10 RX ADMIN — LACTULOSE 20 G: 20 SOLUTION ORAL at 21:07

## 2025-01-10 ASSESSMENT — PAIN SCALES - GENERAL: PAINLEVEL_OUTOF10: 0

## 2025-01-10 NOTE — GROUP NOTE
Group Therapy Note    Date: 1/9/2025    Group Start Time: 2000  Group End Time: 2045  Group Topic: Wrap-Up    [facility]    Martha Mcgrath CNA        Group Therapy Note    Attendees: 4       Patient's Goal:  patient say he's trying to meet his groups     Notes:  participating in groups more    Status After Intervention:  Improved    Participation Level: Active Listener    Participation Quality: Appropriate      Speech:  normal      Thought Process/Content: Logical      Affective Functioning: Congruent      Mood: anxious and depressed      Level of consciousness:  Alert      Response to Learning: Able to verbalize current knowledge/experience, Able to verbalize/acknowledge new learning, Able to retain information, Capable of insight, Able to change behavior, and Progressing to goal      Endings: None Reported    Modes of Intervention: Activity      Discipline Responsible: Behavorial Health Tech      Signature:  Martha Mcgrath CNA

## 2025-01-10 NOTE — GROUP NOTE
Group Therapy Note    Date: 1/10/2025    Group Start Time: 1415  Group End Time: 1500  Group Topic: Psychoeducation    [facility]    Mary Choudhury        Group Therapy Note    Attendees: 4/4    Writer facilitated an inpatient psych-ed group. Writer had patients engage in a discussion regarding long term and short term goal planning. Writer encouraged patients to provide examples.        Patient's Goal:  To attend and participate in groups and activities daily.    Notes:  Pt actively participated. Pt was able to discuss short term and long term goals.     Status After Intervention:  Improved    Participation Level: Active Listener and Interactive    Participation Quality: Appropriate, Attentive, and Sharing      Speech:  normal      Thought Process/Content: Logical  Linear      Affective Functioning: Congruent      Mood: euthymic      Level of consciousness:  Alert, Oriented x4, and Attentive      Response to Learning: Able to retain information, Capable of insight, and Progressing to goal      Endings: None Reported    Modes of Intervention: Education      Discipline Responsible: /Counselor      Signature:  Mary Choudhury LPC

## 2025-01-10 NOTE — GROUP NOTE
Group Therapy Note    Date: 1/10/2025    Group Start Time: 1330  Group End Time: 1415  Group Topic: Process Group - Inpatient    [facility]    Mary Choudhury        Group Therapy Note    Attendees: 4/4    Writer facilitated an inpatient processing group. Writer had patients engage in a therapeutic expressive arts activity in which they were asked to create a vision board. Writer encouraged patients to express feelings, discuss discharge plans, etc. Writer held the space as patients processed. Writer concluded session with a grounding exercise.        Patient's Goal:  To attend and participate in groups and activities daily.    Notes:  Pt actively participated. Pt was able to discuss discharge plans and engaged in expressive arts activities.     Status After Intervention:  Improved    Participation Level: Active Listener and Interactive    Participation Quality: Appropriate, Attentive, and Sharing      Speech:  normal      Thought Process/Content: Logical  Linear      Affective Functioning: Congruent      Mood: euthymic      Level of consciousness:  Alert, Oriented x4, and Attentive      Response to Learning: Able to retain information, Capable of insight, and Progressing to goal      Endings: None Reported    Modes of Intervention: Exploration and Activity      Discipline Responsible: /Counselor      Signature:  Mary Choudhury LPC

## 2025-01-10 NOTE — GROUP NOTE
Group Therapy Note    Date: 1/10/2025    Group Start Time: 1000  Group End Time: 1045  Group Topic: Community Meeting    [facility]    Sameera Noel        Group Therapy Note    Attendees: 4       Patient's Goal:  Be able to go to class for my problems    Notes:  N/A    Status After Intervention:  Improved    Participation Level: Active Listener    Participation Quality: Appropriate      Speech:  normal      Thought Process/Content: Logical      Affective Functioning: Congruent      Mood: anxious      Level of consciousness:  Alert      Response to Learning: Able to verbalize current knowledge/experience      Endings: None Reported    Modes of Intervention: Support      Discipline Responsible: Behavorial Health Tech      Signature:  Sameera Noel

## 2025-01-11 PROCEDURE — 6370000000 HC RX 637 (ALT 250 FOR IP): Performed by: PSYCHIATRY & NEUROLOGY

## 2025-01-11 PROCEDURE — 6370000000 HC RX 637 (ALT 250 FOR IP): Performed by: HOSPITALIST

## 2025-01-11 PROCEDURE — 6370000000 HC RX 637 (ALT 250 FOR IP)

## 2025-01-11 PROCEDURE — 94640 AIRWAY INHALATION TREATMENT: CPT

## 2025-01-11 PROCEDURE — 1240000000 HC EMOTIONAL WELLNESS R&B

## 2025-01-11 RX ADMIN — SPIRONOLACTONE 25 MG: 25 TABLET ORAL at 09:25

## 2025-01-11 RX ADMIN — Medication 2 PUFF: at 07:26

## 2025-01-11 RX ADMIN — ISOSORBIDE DINITRATE 5 MG: 10 TABLET ORAL at 09:23

## 2025-01-11 RX ADMIN — Medication 1 TABLET: at 09:23

## 2025-01-11 RX ADMIN — TAMSULOSIN HYDROCHLORIDE 0.4 MG: 0.4 CAPSULE ORAL at 09:25

## 2025-01-11 RX ADMIN — LACTULOSE 20 G: 20 SOLUTION ORAL at 09:25

## 2025-01-11 RX ADMIN — Medication 400 MG: at 09:25

## 2025-01-11 RX ADMIN — CARVEDILOL 3.12 MG: 3.12 TABLET, FILM COATED ORAL at 16:14

## 2025-01-11 RX ADMIN — RISPERIDONE 1 MG: 1 TABLET, FILM COATED ORAL at 09:25

## 2025-01-11 RX ADMIN — LACTULOSE 20 G: 20 SOLUTION ORAL at 20:46

## 2025-01-11 RX ADMIN — Medication 2 PUFF: at 19:07

## 2025-01-11 RX ADMIN — LAMOTRIGINE 25 MG: 25 TABLET ORAL at 09:25

## 2025-01-11 RX ADMIN — LEVETIRACETAM 500 MG: 500 TABLET, FILM COATED ORAL at 20:46

## 2025-01-11 RX ADMIN — MONTELUKAST 10 MG: 10 TABLET, FILM COATED ORAL at 20:46

## 2025-01-11 RX ADMIN — ISOSORBIDE DINITRATE 5 MG: 10 TABLET ORAL at 16:14

## 2025-01-11 RX ADMIN — HYDROXYZINE HYDROCHLORIDE 50 MG: 50 TABLET, FILM COATED ORAL at 20:46

## 2025-01-11 RX ADMIN — PANTOPRAZOLE SODIUM 40 MG: 40 TABLET, DELAYED RELEASE ORAL at 06:00

## 2025-01-11 RX ADMIN — ASPIRIN 81 MG: 81 TABLET, COATED ORAL at 09:25

## 2025-01-11 RX ADMIN — AMLODIPINE BESYLATE 10 MG: 10 TABLET ORAL at 09:25

## 2025-01-11 RX ADMIN — RISPERIDONE 1 MG: 1 TABLET, FILM COATED ORAL at 20:46

## 2025-01-11 RX ADMIN — CETIRIZINE HYDROCHLORIDE 10 MG: 5 TABLET ORAL at 09:23

## 2025-01-11 RX ADMIN — LEVETIRACETAM 500 MG: 500 TABLET, FILM COATED ORAL at 09:25

## 2025-01-11 RX ADMIN — ISOSORBIDE DINITRATE 5 MG: 10 TABLET ORAL at 14:12

## 2025-01-11 RX ADMIN — CARVEDILOL 3.12 MG: 3.12 TABLET, FILM COATED ORAL at 09:25

## 2025-01-11 NOTE — GROUP NOTE
Group Therapy Note    Date: 1/11/2025    Group Start Time: 0900  Group End Time: 1000  Group Topic: Community Meeting    [facility]    Katrin Santizo        Group Therapy Note    Attendees: Four       Patient's Goal:   I am waiting to go to my classes  but I have to stay calm and  wait      Notes:  Sleep quality:  Doing  a little better but  stay off and on sleeping.'   Ate all meals    Depression 0 Anxiety 0 Pain 7  Progress 0     Ability to manage symptoms : 10  Problem Situation : handled well: Waiting to be seen/or class. I know it will help me a lot.  No thoughts of self harm or harming others    Is taking prescribed medication - NO side effects              Status After Intervention:  Improved    Participation Level: Active Listener and Interactive    Participation Quality: Appropriate and Attentive      Speech:  normal      Thought Process/Content: Linear      Affective Functioning: Congruent      Mood: depressed      Level of consciousness:  Alert      Response to Learning: Progressing to goal      Endings: None Reported    Modes of Intervention: Support      Discipline Responsible: Behavorial Health Tech      Signature:  Katrin Snell

## 2025-01-11 NOTE — GROUP NOTE
Group Therapy Note    Date: 1/10/2025    Group Start Time: 2000  Group End Time: 2045  Group Topic: Wrap-Up    [facility]    Ajith Angelo        Group Therapy Note    Attendees: 4       Patient's Goal:  n/a    Notes:  happy    Status After Intervention:  Improved    Participation Level: Active Listener    Participation Quality: Supportive      Speech:  normal      Thought Process/Content: Logical      Affective Functioning: Congruent      Mood:  happy      Level of consciousness:  Alert      Response to Learning: Able to verbalize current knowledge/experience      Endings: None Reported    Modes of Intervention: Socialization      Discipline Responsible: BehavSocialProof Tech      Signature:  Ajith Angelo

## 2025-01-12 PROCEDURE — 6370000000 HC RX 637 (ALT 250 FOR IP): Performed by: PSYCHIATRY & NEUROLOGY

## 2025-01-12 PROCEDURE — 6370000000 HC RX 637 (ALT 250 FOR IP): Performed by: HOSPITALIST

## 2025-01-12 PROCEDURE — 1240000000 HC EMOTIONAL WELLNESS R&B

## 2025-01-12 PROCEDURE — 6370000000 HC RX 637 (ALT 250 FOR IP)

## 2025-01-12 PROCEDURE — 94640 AIRWAY INHALATION TREATMENT: CPT

## 2025-01-12 RX ADMIN — LEVETIRACETAM 500 MG: 500 TABLET, FILM COATED ORAL at 07:49

## 2025-01-12 RX ADMIN — Medication 2 PUFF: at 07:51

## 2025-01-12 RX ADMIN — CETIRIZINE HYDROCHLORIDE 10 MG: 5 TABLET ORAL at 07:50

## 2025-01-12 RX ADMIN — ISOSORBIDE DINITRATE 5 MG: 10 TABLET ORAL at 12:19

## 2025-01-12 RX ADMIN — PANTOPRAZOLE SODIUM 40 MG: 40 TABLET, DELAYED RELEASE ORAL at 06:01

## 2025-01-12 RX ADMIN — CARVEDILOL 3.12 MG: 3.12 TABLET, FILM COATED ORAL at 16:54

## 2025-01-12 RX ADMIN — ISOSORBIDE DINITRATE 5 MG: 10 TABLET ORAL at 07:50

## 2025-01-12 RX ADMIN — LACTULOSE 20 G: 20 SOLUTION ORAL at 20:36

## 2025-01-12 RX ADMIN — AMLODIPINE BESYLATE 10 MG: 10 TABLET ORAL at 07:50

## 2025-01-12 RX ADMIN — SPIRONOLACTONE 25 MG: 25 TABLET ORAL at 07:49

## 2025-01-12 RX ADMIN — ISOSORBIDE DINITRATE 5 MG: 10 TABLET ORAL at 16:54

## 2025-01-12 RX ADMIN — ASPIRIN 81 MG: 81 TABLET, COATED ORAL at 07:50

## 2025-01-12 RX ADMIN — MONTELUKAST 10 MG: 10 TABLET, FILM COATED ORAL at 20:36

## 2025-01-12 RX ADMIN — HYDROXYZINE HYDROCHLORIDE 50 MG: 50 TABLET, FILM COATED ORAL at 20:36

## 2025-01-12 RX ADMIN — Medication 400 MG: at 07:50

## 2025-01-12 RX ADMIN — LAMOTRIGINE 25 MG: 25 TABLET ORAL at 07:50

## 2025-01-12 RX ADMIN — Medication 1 TABLET: at 07:50

## 2025-01-12 RX ADMIN — LEVETIRACETAM 500 MG: 500 TABLET, FILM COATED ORAL at 20:36

## 2025-01-12 RX ADMIN — TAMSULOSIN HYDROCHLORIDE 0.4 MG: 0.4 CAPSULE ORAL at 07:50

## 2025-01-12 RX ADMIN — RISPERIDONE 1 MG: 1 TABLET, FILM COATED ORAL at 07:50

## 2025-01-12 RX ADMIN — LACTULOSE 20 G: 20 SOLUTION ORAL at 07:50

## 2025-01-12 RX ADMIN — CARVEDILOL 3.12 MG: 3.12 TABLET, FILM COATED ORAL at 07:50

## 2025-01-12 RX ADMIN — RISPERIDONE 1 MG: 1 TABLET, FILM COATED ORAL at 20:36

## 2025-01-12 RX ADMIN — Medication 2 PUFF: at 19:07

## 2025-01-12 NOTE — GROUP NOTE
Group Therapy Note    Date: 1/12/2025    Group Start Time: 1000  Group End Time: 1045  Group Topic: Community Meeting    [facility]    Sameera Noel        Group Therapy Note    Attendees: 5     Patient's Goal:  To start my classes soon so I can get better    Notes:  N/A    Status After Intervention:  Improved    Participation Level: Active Listener    Participation Quality: Appropriate      Speech:  normal      Thought Process/Content: Logical      Affective Functioning: Congruent      Mood: anxious      Level of consciousness:  Alert      Response to Learning: Able to verbalize current knowledge/experience      Endings: None Reported    Modes of Intervention: Support      Discipline Responsible: Behavorial Health Tech      Signature:  Sameera Noel

## 2025-01-13 PROCEDURE — 6370000000 HC RX 637 (ALT 250 FOR IP): Performed by: PSYCHIATRY & NEUROLOGY

## 2025-01-13 PROCEDURE — 1240000000 HC EMOTIONAL WELLNESS R&B

## 2025-01-13 PROCEDURE — 6370000000 HC RX 637 (ALT 250 FOR IP): Performed by: HOSPITALIST

## 2025-01-13 PROCEDURE — 6370000000 HC RX 637 (ALT 250 FOR IP)

## 2025-01-13 PROCEDURE — 94640 AIRWAY INHALATION TREATMENT: CPT

## 2025-01-13 RX ADMIN — ASPIRIN 81 MG: 81 TABLET, COATED ORAL at 08:55

## 2025-01-13 RX ADMIN — CETIRIZINE HYDROCHLORIDE 10 MG: 5 TABLET ORAL at 08:49

## 2025-01-13 RX ADMIN — LAMOTRIGINE 25 MG: 25 TABLET ORAL at 08:50

## 2025-01-13 RX ADMIN — PANTOPRAZOLE SODIUM 40 MG: 40 TABLET, DELAYED RELEASE ORAL at 06:02

## 2025-01-13 RX ADMIN — CARVEDILOL 3.12 MG: 3.12 TABLET, FILM COATED ORAL at 08:54

## 2025-01-13 RX ADMIN — Medication 2 PUFF: at 08:50

## 2025-01-13 RX ADMIN — ISOSORBIDE DINITRATE 5 MG: 10 TABLET ORAL at 12:30

## 2025-01-13 RX ADMIN — Medication 400 MG: at 08:50

## 2025-01-13 RX ADMIN — ISOSORBIDE DINITRATE 5 MG: 10 TABLET ORAL at 16:20

## 2025-01-13 RX ADMIN — LACTULOSE 20 G: 20 SOLUTION ORAL at 20:32

## 2025-01-13 RX ADMIN — RISPERIDONE 1 MG: 1 TABLET, FILM COATED ORAL at 20:32

## 2025-01-13 RX ADMIN — AMLODIPINE BESYLATE 10 MG: 10 TABLET ORAL at 08:52

## 2025-01-13 RX ADMIN — LACTULOSE 20 G: 20 SOLUTION ORAL at 08:56

## 2025-01-13 RX ADMIN — LEVETIRACETAM 500 MG: 500 TABLET, FILM COATED ORAL at 08:51

## 2025-01-13 RX ADMIN — RISPERIDONE 1 MG: 1 TABLET, FILM COATED ORAL at 08:55

## 2025-01-13 RX ADMIN — LEVETIRACETAM 500 MG: 500 TABLET, FILM COATED ORAL at 20:32

## 2025-01-13 RX ADMIN — SPIRONOLACTONE 25 MG: 25 TABLET ORAL at 08:51

## 2025-01-13 RX ADMIN — Medication 2 PUFF: at 19:41

## 2025-01-13 RX ADMIN — TRAZODONE HYDROCHLORIDE 50 MG: 50 TABLET ORAL at 20:31

## 2025-01-13 RX ADMIN — CARVEDILOL 3.12 MG: 3.12 TABLET, FILM COATED ORAL at 16:20

## 2025-01-13 RX ADMIN — TAMSULOSIN HYDROCHLORIDE 0.4 MG: 0.4 CAPSULE ORAL at 08:51

## 2025-01-13 RX ADMIN — Medication 1 TABLET: at 08:51

## 2025-01-13 RX ADMIN — ISOSORBIDE DINITRATE 5 MG: 10 TABLET ORAL at 08:53

## 2025-01-13 RX ADMIN — MONTELUKAST 10 MG: 10 TABLET, FILM COATED ORAL at 20:31

## 2025-01-13 NOTE — GROUP NOTE
Group Therapy Note    Date: 1/13/2025    Group Start Time: 0900  Group End Time: 1000  Group Topic: Community Meeting    [facility]    Marcela Paiz        Group Therapy Note    Attendees:4       Patient's Goal:  To wait on placement.    Notes:      Status After Intervention:  Improved    Participation Level: Active Listener    Participation Quality: Appropriate      Speech:  normal      Thought Process/Content: Logical      Affective Functioning: Congruent      Mood: anxious      Level of consciousness:  Alert      Response to Learning: Able to verbalize current knowledge/experience      Endings: None Reported    Modes of Intervention: Support      Discipline Responsible: Behavorial Health Tech      Signature:  Marcela Paiz

## 2025-01-13 NOTE — GROUP NOTE
Group Therapy Note    Date: 1/12/2025    Group Start Time: 2000  Group End Time: 2045  Group Topic: Wrap-Up    [facility]    Ajith Angelo        Group Therapy Note    Attendees: 5         Patient's Goal:  n/a    Notes:  happy    Status After Intervention:  Improved    Participation Level: Active Listener    Participation Quality: Supportive      Speech:  normal      Thought Process/Content: Logical      Affective Functioning: Congruent      Mood:  happy      Level of consciousness:  Alert      Response to Learning: Able to verbalize current knowledge/experience      Endings: None Reported    Modes of Intervention: Socialization      Discipline Responsible: BehaviCharts Tech      Signature:  Ajith Angelo

## 2025-01-13 NOTE — GROUP NOTE
Group Therapy Note    Date: 1/13/2025    Group Start Time: 1430  Group End Time: 1515  Group Topic: Psychoeducation    [facility]    Mary Choudhury        Group Therapy Note    Attendees: 5/5    Writer facilitated an inpatient psych-ed group. Writer provided a handout regarding the concept of Forgiveness. Writer had patients discuss the difference between what forgiveness is versus what it is not. Writer held the space as patients processed. Writer concluded session with a grounding exercise.        Patient's Goal:  To attend and participate in groups and activities daily.    Notes:  Pt actively participated. Pt was able to discuss forgiveness concepts.    Status After Intervention:  Improved    Participation Level: Active Listener and Interactive    Participation Quality: Appropriate, Attentive, and Sharing      Speech:  normal      Thought Process/Content: Logical  Linear      Affective Functioning: Congruent      Mood: euthymic      Level of consciousness:  Alert, Oriented x4, and Attentive      Response to Learning: Able to retain information, Capable of insight, and Progressing to goal      Endings: None Reported    Modes of Intervention: Education      Discipline Responsible: /Counselor      Signature:  Mary Choudhury LPC

## 2025-01-13 NOTE — GROUP NOTE
Group Therapy Note    Date: 1/13/2025    Group Start Time: 1345  Group End Time: 1430  Group Topic: Process Group - Inpatient    [facility]    Mary Choudhury        Group Therapy Note    Attendees: 5/5    Writer facilitated an inpatient processing group. Writer had patients engage in a reflective activity in which they were asked to discuss various parts of their identify and ways they want to bring them out. Writer encouraged patients to express any feelings they may be having, discuss discharge plans, etc. Writer held the space as patients processed. Writer concluded session with a grounding exercise.        Patient's Goal:  To attend and participate in groups and activities daily    Notes:  Pt actively participated. Pt was able to discuss discharge plans for rehab.    Status After Intervention:  Improved    Participation Level: Active Listener and Interactive    Participation Quality: Appropriate, Attentive, and Sharing      Speech:  normal      Thought Process/Content: Logical  Linear      Affective Functioning: Congruent      Mood: euthymic      Level of consciousness:  Alert, Oriented x4, and Attentive      Response to Learning: Able to retain information, Capable of insight, and Progressing to goal      Endings: None Reported    Modes of Intervention: Exploration and Activity      Discipline Responsible: /Counselor      Signature:  Mary Choudhury LPC

## 2025-01-14 PROCEDURE — 6370000000 HC RX 637 (ALT 250 FOR IP): Performed by: HOSPITALIST

## 2025-01-14 PROCEDURE — 94640 AIRWAY INHALATION TREATMENT: CPT

## 2025-01-14 PROCEDURE — 6370000000 HC RX 637 (ALT 250 FOR IP): Performed by: PSYCHIATRY & NEUROLOGY

## 2025-01-14 PROCEDURE — 6370000000 HC RX 637 (ALT 250 FOR IP)

## 2025-01-14 PROCEDURE — 1240000000 HC EMOTIONAL WELLNESS R&B

## 2025-01-14 RX ORDER — ESCITALOPRAM OXALATE 10 MG/1
10 TABLET ORAL DAILY
Status: DISCONTINUED | OUTPATIENT
Start: 2025-01-14 | End: 2025-01-21 | Stop reason: HOSPADM

## 2025-01-14 RX ADMIN — Medication 2 PUFF: at 09:18

## 2025-01-14 RX ADMIN — RISPERIDONE 1 MG: 1 TABLET, FILM COATED ORAL at 08:44

## 2025-01-14 RX ADMIN — SPIRONOLACTONE 25 MG: 25 TABLET ORAL at 08:44

## 2025-01-14 RX ADMIN — LAMOTRIGINE 25 MG: 25 TABLET ORAL at 08:44

## 2025-01-14 RX ADMIN — LACTULOSE 20 G: 20 SOLUTION ORAL at 20:47

## 2025-01-14 RX ADMIN — Medication 2 PUFF: at 19:45

## 2025-01-14 RX ADMIN — MONTELUKAST 10 MG: 10 TABLET, FILM COATED ORAL at 20:47

## 2025-01-14 RX ADMIN — PANTOPRAZOLE SODIUM 40 MG: 40 TABLET, DELAYED RELEASE ORAL at 05:35

## 2025-01-14 RX ADMIN — ISOSORBIDE DINITRATE 5 MG: 10 TABLET ORAL at 08:42

## 2025-01-14 RX ADMIN — CARVEDILOL 3.12 MG: 3.12 TABLET, FILM COATED ORAL at 08:44

## 2025-01-14 RX ADMIN — ISOSORBIDE DINITRATE 5 MG: 10 TABLET ORAL at 17:45

## 2025-01-14 RX ADMIN — CARVEDILOL 3.12 MG: 3.12 TABLET, FILM COATED ORAL at 17:47

## 2025-01-14 RX ADMIN — LACTULOSE 20 G: 20 SOLUTION ORAL at 08:44

## 2025-01-14 RX ADMIN — TRAZODONE HYDROCHLORIDE 50 MG: 50 TABLET ORAL at 20:47

## 2025-01-14 RX ADMIN — HYDROXYZINE HYDROCHLORIDE 50 MG: 50 TABLET, FILM COATED ORAL at 20:47

## 2025-01-14 RX ADMIN — CETIRIZINE HYDROCHLORIDE 10 MG: 5 TABLET ORAL at 08:43

## 2025-01-14 RX ADMIN — LEVETIRACETAM 500 MG: 500 TABLET, FILM COATED ORAL at 08:44

## 2025-01-14 RX ADMIN — ASPIRIN 81 MG: 81 TABLET, COATED ORAL at 08:44

## 2025-01-14 RX ADMIN — Medication 1 TABLET: at 08:44

## 2025-01-14 RX ADMIN — ESCITALOPRAM OXALATE 10 MG: 10 TABLET ORAL at 17:47

## 2025-01-14 RX ADMIN — LEVETIRACETAM 500 MG: 500 TABLET, FILM COATED ORAL at 20:47

## 2025-01-14 RX ADMIN — ISOSORBIDE DINITRATE 5 MG: 10 TABLET ORAL at 13:52

## 2025-01-14 RX ADMIN — AMLODIPINE BESYLATE 10 MG: 10 TABLET ORAL at 08:44

## 2025-01-14 RX ADMIN — TAMSULOSIN HYDROCHLORIDE 0.4 MG: 0.4 CAPSULE ORAL at 08:44

## 2025-01-14 RX ADMIN — RISPERIDONE 1 MG: 1 TABLET, FILM COATED ORAL at 20:47

## 2025-01-14 RX ADMIN — Medication 400 MG: at 08:44

## 2025-01-14 NOTE — GROUP NOTE
Group Therapy Note    Date: 1/13/2025    Group Start Time: 2000  Group End Time: 2045  Group Topic: Wrap-Up    [facility]    Martha Mcgrath CNA        Group Therapy Note    Attendees: 4       Patient's Goal:  No goals ! Got rejected from going to the rehab ,but hope things changes    Notes:  participated in group    Status After Intervention:  Improved    Participation Level: Active Listener    Participation Quality: Appropriate      Speech:  normal      Thought Process/Content: Logical      Affective Functioning: Congruent      Mood: depressed      Level of consciousness:  Alert      Response to Learning: Able to verbalize current knowledge/experience, Able to verbalize/acknowledge new learning, Able to retain information, Capable of insight, Able to change behavior, and Progressing to goal      Endings: None Reported    Modes of Intervention: Activity      Discipline Responsible: Behavorial Health Tech      Signature:  Martha Mcgrath CNA

## 2025-01-14 NOTE — GROUP NOTE
Group Therapy Note    Date: 1/14/2025    Group Start Time: 1345  Group End Time: 1430  Group Topic: Process Group - Inpatient    [facility]    Mary Choudhury        Group Therapy Note    Attendees: 2/4    Writer facilitated an inpatient processing group. Writer had patients engage in a reflective expressive arts activity involving mandalas. Writer encouraged patients to discuss discharge plans, express feelings, etc. Writer held the space as patients processed. Writer concluded session with a grounding exercise.        Pt sleeping during group.         Signature:  Mary Choudhury

## 2025-01-14 NOTE — GROUP NOTE
Group Therapy Note    Date: 1/14/2025    Group Start Time: 1430  Group End Time: 1510  Group Topic: Psychoeducation    [facility]    Mray Choudhury        Group Therapy Note    Attendees: 2/4    Writer facilitated an inpatient psych-ed group. Writer provided a handout regarding the DBT concept of a wise mind. Therapeutic purpose of the activity was for patients to discuss emotional regulation skills. Writer held the space as patients processed. Writer concluded session by encouraging pts to provide input and feedback regarding the group.        Pt came at the end of group.    Signature:  Mary Choudhury

## 2025-01-14 NOTE — GROUP NOTE
Group Therapy Note    Date: 1/14/2025    Group Start Time: 1000  Group End Time: 1045  Group Topic: Community Meeting    [facility]    Sameera Noel        Group Therapy Note    Attendees: 5       Patient's Goal:  To be able to go to class soon.    Notes:  N/A    Status After Intervention:  Improved    Participation Level: Active Listener    Participation Quality: Appropriate      Speech:  normal      Thought Process/Content: Logical      Affective Functioning: Congruent      Mood: anxious      Level of consciousness:  Alert      Response to Learning: Able to verbalize current knowledge/experience      Endings: None Reported    Modes of Intervention: Support      Discipline Responsible: Behavorial Health Tech      Signature:  Sameera Noel

## 2025-01-15 PROCEDURE — 6370000000 HC RX 637 (ALT 250 FOR IP): Performed by: HOSPITALIST

## 2025-01-15 PROCEDURE — 94640 AIRWAY INHALATION TREATMENT: CPT

## 2025-01-15 PROCEDURE — 6370000000 HC RX 637 (ALT 250 FOR IP)

## 2025-01-15 PROCEDURE — 1240000000 HC EMOTIONAL WELLNESS R&B

## 2025-01-15 PROCEDURE — 6370000000 HC RX 637 (ALT 250 FOR IP): Performed by: PSYCHIATRY & NEUROLOGY

## 2025-01-15 RX ADMIN — AMLODIPINE BESYLATE 10 MG: 10 TABLET ORAL at 08:57

## 2025-01-15 RX ADMIN — Medication 400 MG: at 08:56

## 2025-01-15 RX ADMIN — RISPERIDONE 1 MG: 1 TABLET, FILM COATED ORAL at 20:42

## 2025-01-15 RX ADMIN — ESCITALOPRAM OXALATE 10 MG: 10 TABLET ORAL at 08:57

## 2025-01-15 RX ADMIN — RISPERIDONE 1 MG: 1 TABLET, FILM COATED ORAL at 08:58

## 2025-01-15 RX ADMIN — ISOSORBIDE DINITRATE 5 MG: 10 TABLET ORAL at 17:28

## 2025-01-15 RX ADMIN — Medication 1 TABLET: at 08:57

## 2025-01-15 RX ADMIN — ISOSORBIDE DINITRATE 5 MG: 10 TABLET ORAL at 08:55

## 2025-01-15 RX ADMIN — Medication 2 PUFF: at 07:46

## 2025-01-15 RX ADMIN — CETIRIZINE HYDROCHLORIDE 10 MG: 5 TABLET ORAL at 08:58

## 2025-01-15 RX ADMIN — Medication 2 PUFF: at 19:45

## 2025-01-15 RX ADMIN — PANTOPRAZOLE SODIUM 40 MG: 40 TABLET, DELAYED RELEASE ORAL at 05:25

## 2025-01-15 RX ADMIN — CARVEDILOL 3.12 MG: 3.12 TABLET, FILM COATED ORAL at 08:58

## 2025-01-15 RX ADMIN — LEVETIRACETAM 500 MG: 500 TABLET, FILM COATED ORAL at 20:42

## 2025-01-15 RX ADMIN — SPIRONOLACTONE 25 MG: 25 TABLET ORAL at 08:55

## 2025-01-15 RX ADMIN — LEVETIRACETAM 500 MG: 500 TABLET, FILM COATED ORAL at 08:57

## 2025-01-15 RX ADMIN — CARVEDILOL 3.12 MG: 3.12 TABLET, FILM COATED ORAL at 17:27

## 2025-01-15 RX ADMIN — MONTELUKAST 10 MG: 10 TABLET, FILM COATED ORAL at 20:41

## 2025-01-15 RX ADMIN — TAMSULOSIN HYDROCHLORIDE 0.4 MG: 0.4 CAPSULE ORAL at 08:55

## 2025-01-15 RX ADMIN — ASPIRIN 81 MG: 81 TABLET, COATED ORAL at 08:54

## 2025-01-15 RX ADMIN — LACTULOSE 20 G: 20 SOLUTION ORAL at 20:41

## 2025-01-15 RX ADMIN — TRAZODONE HYDROCHLORIDE 50 MG: 50 TABLET ORAL at 20:42

## 2025-01-15 RX ADMIN — ISOSORBIDE DINITRATE 5 MG: 10 TABLET ORAL at 14:09

## 2025-01-15 RX ADMIN — LACTULOSE 20 G: 20 SOLUTION ORAL at 08:58

## 2025-01-15 ASSESSMENT — PAIN SCALES - GENERAL: PAINLEVEL_OUTOF10: 0

## 2025-01-15 NOTE — GROUP NOTE
Group Therapy Note    Date: 1/14/2025    Group Start Time: 2000  Group End Time: 2045  Group Topic: Wrap-Up    [facility]    Martha Mcgrath CNA        Group Therapy Note    Attendees: 4       Patient's Goal:  to be discharge    Notes:  participated in group    Status After Intervention:  Improved    Participation Level: Active Listener    Participation Quality: Appropriate      Speech:  normal      Thought Process/Content: Logical      Affective Functioning: Congruent      Mood: depressed      Level of consciousness:  Alert      Response to Learning: Able to verbalize current knowledge/experience, Able to verbalize/acknowledge new learning, Able to retain information, Capable of insight, Able to change behavior, and Progressing to goal      Endings: None Reported    Modes of Intervention: Activity      Discipline Responsible: Behavorial Health Tech      Signature:  Martha Mcgrath CNA

## 2025-01-15 NOTE — GROUP NOTE
Group Therapy Note    Date: 1/15/2025    Group Start Time: 0930  Group End Time: 1000  Group Topic: Community Meeting    [facility]    Alex Morse; Katrin Santizo        Group Therapy Note    Attendees: 3 Three  .       Patient's Goal:   To go to class soon and get better    Notes:  Sleep quality  : Slept well but  slept off and on    Ate   all meals   Depression 0 Anxiety 0 Pain 7  Progress 0   Symptom Mgmt  8   Handled well:  Waiting for class and  staying calm   Problem with : Insurance issue reference co-pays and transportation   No thoughts of self harm or harming others   Taking medication / No side effects      Status After Intervention:  Improved    Participation Level: Active Listener and Interactive    Participation Quality: Attentive and Sharing      Speech:  normal      Thought Process/Content: Linear      Affective Functioning: Congruent      Mood:  Relaxed       Level of consciousness:  Preoccupied      Response to Learning: Progressing to goal      Endings: None Reported    Modes of Intervention: Support      Discipline Responsible: Behavorial Health Tech      Signature:  Katrin Snell

## 2025-01-15 NOTE — GROUP NOTE
Group Therapy Note    Date: 1/14/2025    Group Start Time: 2000  Group End Time: 2045  Group Topic: Wrap-Up    [facility]    Martha Mcgrath CNA        Group Therapy Note    Attendees: 4       Patient's Goal:  Trying to be able to go to Rehab     Notes:  participated in group    Status After Intervention:  Improved    Participation Level: Active Listener    Participation Quality: Appropriate      Speech:  normal      Thought Process/Content: Logical      Affective Functioning: Congruent      Mood: depressed      Level of consciousness:  Alert      Response to Learning: Able to verbalize current knowledge/experience, Able to verbalize/acknowledge new learning, Able to retain information, Capable of insight, and Able to change behavior      Endings: None Reported    Modes of Intervention: Activity      Discipline Responsible: Behavorial Health Tech      Signature:  Martha Mcgrath CNA

## 2025-01-16 PROCEDURE — 1240000000 HC EMOTIONAL WELLNESS R&B

## 2025-01-16 PROCEDURE — 6370000000 HC RX 637 (ALT 250 FOR IP): Performed by: HOSPITALIST

## 2025-01-16 PROCEDURE — 94640 AIRWAY INHALATION TREATMENT: CPT

## 2025-01-16 PROCEDURE — 6370000000 HC RX 637 (ALT 250 FOR IP): Performed by: PSYCHIATRY & NEUROLOGY

## 2025-01-16 PROCEDURE — 6370000000 HC RX 637 (ALT 250 FOR IP)

## 2025-01-16 RX ADMIN — LACTULOSE 20 G: 20 SOLUTION ORAL at 07:33

## 2025-01-16 RX ADMIN — MONTELUKAST 10 MG: 10 TABLET, FILM COATED ORAL at 20:55

## 2025-01-16 RX ADMIN — LEVETIRACETAM 500 MG: 500 TABLET, FILM COATED ORAL at 07:33

## 2025-01-16 RX ADMIN — TAMSULOSIN HYDROCHLORIDE 0.4 MG: 0.4 CAPSULE ORAL at 07:33

## 2025-01-16 RX ADMIN — AMLODIPINE BESYLATE 10 MG: 10 TABLET ORAL at 07:33

## 2025-01-16 RX ADMIN — ESCITALOPRAM OXALATE 10 MG: 10 TABLET ORAL at 07:32

## 2025-01-16 RX ADMIN — PANTOPRAZOLE SODIUM 40 MG: 40 TABLET, DELAYED RELEASE ORAL at 06:30

## 2025-01-16 RX ADMIN — RISPERIDONE 1 MG: 1 TABLET, FILM COATED ORAL at 20:55

## 2025-01-16 RX ADMIN — CARVEDILOL 3.12 MG: 3.12 TABLET, FILM COATED ORAL at 17:10

## 2025-01-16 RX ADMIN — ISOSORBIDE DINITRATE 5 MG: 10 TABLET ORAL at 07:33

## 2025-01-16 RX ADMIN — ISOSORBIDE DINITRATE 5 MG: 10 TABLET ORAL at 17:09

## 2025-01-16 RX ADMIN — Medication 2 PUFF: at 20:14

## 2025-01-16 RX ADMIN — RISPERIDONE 1 MG: 1 TABLET, FILM COATED ORAL at 07:33

## 2025-01-16 RX ADMIN — CETIRIZINE HYDROCHLORIDE 10 MG: 5 TABLET ORAL at 07:33

## 2025-01-16 RX ADMIN — ISOSORBIDE DINITRATE 5 MG: 10 TABLET ORAL at 12:57

## 2025-01-16 RX ADMIN — Medication 1 TABLET: at 07:32

## 2025-01-16 RX ADMIN — Medication 2 PUFF: at 07:37

## 2025-01-16 RX ADMIN — Medication 400 MG: at 07:32

## 2025-01-16 RX ADMIN — LACTULOSE 20 G: 20 SOLUTION ORAL at 20:55

## 2025-01-16 RX ADMIN — SPIRONOLACTONE 25 MG: 25 TABLET ORAL at 07:33

## 2025-01-16 RX ADMIN — LEVETIRACETAM 500 MG: 500 TABLET, FILM COATED ORAL at 20:56

## 2025-01-16 RX ADMIN — ASPIRIN 81 MG: 81 TABLET, COATED ORAL at 07:32

## 2025-01-16 RX ADMIN — CARVEDILOL 3.12 MG: 3.12 TABLET, FILM COATED ORAL at 07:32

## 2025-01-16 ASSESSMENT — PAIN SCALES - GENERAL
PAINLEVEL_OUTOF10: 0
PAINLEVEL_OUTOF10: 0

## 2025-01-16 NOTE — GROUP NOTE
Group Therapy Note    Date: 1/16/2025    Group Start Time: 1330  Group End Time: 1335  Group Topic:  Group    [facility]    Mary Choudhury        Group Therapy Note    Attendees: 0    Writer attempted group. Pts not wanting to attend.        Pt invited and encouraged to attend group but chose not to attend.       Signature:  Mary Choudhury

## 2025-01-16 NOTE — CARE COORDINATION
01/16/25 1250   Short Term Goal 1   STG Goal 1 Status: Patient Appears to be  Progressing toward treatment plan goal   Short Term Goal 2   STG Goal 2 Status: Patient Appears to be  Progressing toward treatment plan goal   Crisis/Safety/Discharge Plan   Discharge Plan Inpatient rehab

## 2025-01-17 PROCEDURE — 6370000000 HC RX 637 (ALT 250 FOR IP): Performed by: PSYCHIATRY & NEUROLOGY

## 2025-01-17 PROCEDURE — 6370000000 HC RX 637 (ALT 250 FOR IP): Performed by: HOSPITALIST

## 2025-01-17 PROCEDURE — 1240000000 HC EMOTIONAL WELLNESS R&B

## 2025-01-17 PROCEDURE — 6370000000 HC RX 637 (ALT 250 FOR IP)

## 2025-01-17 PROCEDURE — 94640 AIRWAY INHALATION TREATMENT: CPT

## 2025-01-17 RX ADMIN — LACTULOSE 20 G: 20 SOLUTION ORAL at 19:57

## 2025-01-17 RX ADMIN — LACTULOSE 20 G: 20 SOLUTION ORAL at 08:22

## 2025-01-17 RX ADMIN — AMLODIPINE BESYLATE 10 MG: 10 TABLET ORAL at 08:22

## 2025-01-17 RX ADMIN — ISOSORBIDE DINITRATE 5 MG: 10 TABLET ORAL at 08:21

## 2025-01-17 RX ADMIN — LEVETIRACETAM 500 MG: 500 TABLET, FILM COATED ORAL at 19:56

## 2025-01-17 RX ADMIN — ISOSORBIDE DINITRATE 5 MG: 10 TABLET ORAL at 17:23

## 2025-01-17 RX ADMIN — MONTELUKAST 10 MG: 10 TABLET, FILM COATED ORAL at 19:57

## 2025-01-17 RX ADMIN — ESCITALOPRAM OXALATE 10 MG: 10 TABLET ORAL at 08:21

## 2025-01-17 RX ADMIN — LEVETIRACETAM 500 MG: 500 TABLET, FILM COATED ORAL at 08:21

## 2025-01-17 RX ADMIN — ISOSORBIDE DINITRATE 5 MG: 10 TABLET ORAL at 13:41

## 2025-01-17 RX ADMIN — Medication 400 MG: at 08:22

## 2025-01-17 RX ADMIN — Medication 2 PUFF: at 09:08

## 2025-01-17 RX ADMIN — TAMSULOSIN HYDROCHLORIDE 0.4 MG: 0.4 CAPSULE ORAL at 08:22

## 2025-01-17 RX ADMIN — CARVEDILOL 3.12 MG: 3.12 TABLET, FILM COATED ORAL at 17:23

## 2025-01-17 RX ADMIN — CETIRIZINE HYDROCHLORIDE 10 MG: 5 TABLET ORAL at 08:22

## 2025-01-17 RX ADMIN — PANTOPRAZOLE SODIUM 40 MG: 40 TABLET, DELAYED RELEASE ORAL at 06:24

## 2025-01-17 RX ADMIN — SPIRONOLACTONE 25 MG: 25 TABLET ORAL at 08:22

## 2025-01-17 RX ADMIN — ASPIRIN 81 MG: 81 TABLET, COATED ORAL at 08:22

## 2025-01-17 RX ADMIN — Medication 1 TABLET: at 08:21

## 2025-01-17 RX ADMIN — CARVEDILOL 3.12 MG: 3.12 TABLET, FILM COATED ORAL at 08:22

## 2025-01-17 RX ADMIN — Medication 2 PUFF: at 20:21

## 2025-01-17 RX ADMIN — RISPERIDONE 1 MG: 1 TABLET, FILM COATED ORAL at 19:57

## 2025-01-17 RX ADMIN — RISPERIDONE 1 MG: 1 TABLET, FILM COATED ORAL at 08:21

## 2025-01-17 ASSESSMENT — PAIN SCALES - GENERAL: PAINLEVEL_OUTOF10: 0

## 2025-01-17 NOTE — GROUP NOTE
Group Therapy Note    Date: 1/17/2025    Group Start Time: 0900  Group End Time: 0946  Group Topic: Community Meeting    [facility]    Katrin Santizo        Group Therapy Note    Attendees: Two (2)    Today's Topic : Difference  in Reacting & Responding         Notes:  Patient's Goal:  To be able to  go to class    Slept quality:  5-7 hrs   Ate all meals     Depression 0 Anxiety 0 Pain  7  Progress 0  Ability to manage symptoms : 7  Situation handled well: Be able to go to class  Had a problem with:  emotions, moods,    NO thoughts of self harm or harming others   Taking medications... NO side effects    Status After Intervention:  Improved    Participation Level: Interactive    Participation Quality: Appropriate      Speech:  normal      Thought Process/Content: Logical      Affective Functioning: Congruent      Mood:  Calm      Level of consciousness:  Alert      Response to Learning: Progressing to goal      Endings: None Reported    Modes of Intervention: Education      Discipline Responsible: Behavorial Health Tech      Signature:  Katrin Snell

## 2025-01-17 NOTE — GROUP NOTE
Group Therapy Note    Date: 1/17/2025    Group Start Time: 1100  Group End Time: 1145  Group Topic:  Group    [facility]    Mary Choudhury        Group Therapy Note    Attendees: 1/2    Note: Writer converted this session to 1:1 individual therapy due to other pt refusing to attend. Writer and pt processed feelings regarding leaving for rehab next week. Writer encouraged patient to discuss with family members, support system, etc. Writer provided psych-ed discussion regarding program expectations and preparations.        Patient's Goal:  To get into rehab    Notes:  Pt actively participated. Pt open and receptive to going to [facility]. Pt states he will be talking to  and family members before going to the program Tuesday.     Status After Intervention:  Improved    Participation Level: Active Listener and Interactive    Participation Quality: Appropriate, Attentive, and Sharing      Speech:  normal      Thought Process/Content: Logical  Linear      Affective Functioning: Congruent      Mood: euthymic      Level of consciousness:  Alert, Oriented x4, and Attentive      Response to Learning: Able to retain information, Capable of insight, and Progressing to goal      Endings: None Reported    Modes of Intervention: Education and Exploration      Discipline Responsible: /Counselor      Signature:  Mary Choudhury LPC

## 2025-01-17 NOTE — GROUP NOTE
Group Therapy Note    Date: 1/17/2025    Group Start Time: 1100  Group End Time: 1145  Group Topic: Nursing    [facility]    Amanda Ponce LPN        Group Therapy Note    Attendees: 1/2       Patient's Goal:  to find a rehab to go to and get the proper help     Notes:  Individual group, discharge planning, pt discussed everything he has to get ready to go to rehab Tuesday, now that he was accepted.  Pt. Making phone calls to gather up belongings.    Status After Intervention:  Improved    Participation Level: Active Listener    Participation Quality: Appropriate, Attentive, and Sharing      Speech:   normal      Thought Process/Content: Logical      Affective Functioning: Congruent      Mood: euthymic      Level of consciousness:  Alert, Oriented x4, and Attentive      Response to Learning: Able to verbalize current knowledge/experience, Capable of insight, Able to change behavior, and Progressing to goal      Endings: None Reported    Modes of Intervention: Education      Discipline Responsible: Licensed Practical Nurse      Signature:  Amanda Ponce LPN

## 2025-01-18 PROCEDURE — 6370000000 HC RX 637 (ALT 250 FOR IP): Performed by: PSYCHIATRY & NEUROLOGY

## 2025-01-18 PROCEDURE — 1240000000 HC EMOTIONAL WELLNESS R&B

## 2025-01-18 PROCEDURE — 94640 AIRWAY INHALATION TREATMENT: CPT

## 2025-01-18 PROCEDURE — 6370000000 HC RX 637 (ALT 250 FOR IP): Performed by: HOSPITALIST

## 2025-01-18 PROCEDURE — 6370000000 HC RX 637 (ALT 250 FOR IP)

## 2025-01-18 RX ADMIN — MONTELUKAST 10 MG: 10 TABLET, FILM COATED ORAL at 19:51

## 2025-01-18 RX ADMIN — Medication 1 TABLET: at 08:28

## 2025-01-18 RX ADMIN — SPIRONOLACTONE 25 MG: 25 TABLET ORAL at 08:15

## 2025-01-18 RX ADMIN — Medication 2 PUFF: at 19:52

## 2025-01-18 RX ADMIN — LEVETIRACETAM 500 MG: 500 TABLET, FILM COATED ORAL at 08:28

## 2025-01-18 RX ADMIN — Medication 2 PUFF: at 08:43

## 2025-01-18 RX ADMIN — ISOSORBIDE DINITRATE 5 MG: 10 TABLET ORAL at 17:28

## 2025-01-18 RX ADMIN — LEVETIRACETAM 500 MG: 500 TABLET, FILM COATED ORAL at 19:51

## 2025-01-18 RX ADMIN — RISPERIDONE 1 MG: 1 TABLET, FILM COATED ORAL at 08:28

## 2025-01-18 RX ADMIN — CETIRIZINE HYDROCHLORIDE 10 MG: 5 TABLET ORAL at 08:15

## 2025-01-18 RX ADMIN — CARVEDILOL 3.12 MG: 3.12 TABLET, FILM COATED ORAL at 08:13

## 2025-01-18 RX ADMIN — ISOSORBIDE DINITRATE 5 MG: 10 TABLET ORAL at 13:37

## 2025-01-18 RX ADMIN — ASPIRIN 81 MG: 81 TABLET, COATED ORAL at 08:15

## 2025-01-18 RX ADMIN — LACTULOSE 20 G: 20 SOLUTION ORAL at 19:51

## 2025-01-18 RX ADMIN — TAMSULOSIN HYDROCHLORIDE 0.4 MG: 0.4 CAPSULE ORAL at 08:28

## 2025-01-18 RX ADMIN — Medication 400 MG: at 08:28

## 2025-01-18 RX ADMIN — AMLODIPINE BESYLATE 10 MG: 10 TABLET ORAL at 08:15

## 2025-01-18 RX ADMIN — ISOSORBIDE DINITRATE 5 MG: 10 TABLET ORAL at 08:13

## 2025-01-18 RX ADMIN — RISPERIDONE 1 MG: 1 TABLET, FILM COATED ORAL at 19:51

## 2025-01-18 RX ADMIN — ESCITALOPRAM OXALATE 10 MG: 10 TABLET ORAL at 08:28

## 2025-01-18 RX ADMIN — LACTULOSE 20 G: 20 SOLUTION ORAL at 08:18

## 2025-01-18 RX ADMIN — CARVEDILOL 3.12 MG: 3.12 TABLET, FILM COATED ORAL at 16:46

## 2025-01-18 RX ADMIN — PANTOPRAZOLE SODIUM 40 MG: 40 TABLET, DELAYED RELEASE ORAL at 05:39

## 2025-01-18 NOTE — GROUP NOTE
Group Therapy Note    Date: 1/17/2025    Group Start Time: 0800  Group End Time: 0845  Group Topic: Wrap-Up    [facility]    Sameera Noel        Group Therapy Note    Attendees: 2       Patient's Goal:  To get the help I need    Notes:  N/A    Status After Intervention:  Improved    Participation Level: Active Listener    Participation Quality: Appropriate      Speech:  normal      Thought Process/Content: Logical      Affective Functioning: Congruent      Mood: anxious      Level of consciousness:  Alert      Response to Learning: Able to verbalize current knowledge/experience      Endings: None Reported    Modes of Intervention: Support      Discipline Responsible: Behavorial Health Tech      Signature:  Sameera Noel

## 2025-01-18 NOTE — GROUP NOTE
Group Therapy Note    Date: 1/18/2025    Group Start Time: 0900  Group End Time: 0945  Group Topic: Community Meeting    [facility]    Marcela Paiz        Group Therapy Note    Attendees: 2       Patient's Goal: To Go Rehab    Notes:      Status After Intervention:  Improved    Participation Level: Active Listener    Participation Quality: Appropriate      Speech:  normal      Thought Process/Content: Logical      Affective Functioning: Congruent      Mood: anxious      Level of consciousness:  Alert      Response to Learning: Able to verbalize/acknowledge new learning      Endings: None Reported    Modes of Intervention: Support      Discipline Responsible: Behavorial Health Tech      Signature:  Marcela Paiz

## 2025-01-19 PROCEDURE — 1240000000 HC EMOTIONAL WELLNESS R&B

## 2025-01-19 PROCEDURE — 6370000000 HC RX 637 (ALT 250 FOR IP): Performed by: HOSPITALIST

## 2025-01-19 PROCEDURE — 94640 AIRWAY INHALATION TREATMENT: CPT

## 2025-01-19 PROCEDURE — 6370000000 HC RX 637 (ALT 250 FOR IP)

## 2025-01-19 PROCEDURE — 6370000000 HC RX 637 (ALT 250 FOR IP): Performed by: PSYCHIATRY & NEUROLOGY

## 2025-01-19 RX ADMIN — PANTOPRAZOLE SODIUM 40 MG: 40 TABLET, DELAYED RELEASE ORAL at 05:53

## 2025-01-19 RX ADMIN — Medication 2 PUFF: at 19:39

## 2025-01-19 RX ADMIN — LACTULOSE 20 G: 20 SOLUTION ORAL at 08:54

## 2025-01-19 RX ADMIN — CETIRIZINE HYDROCHLORIDE 10 MG: 5 TABLET ORAL at 08:53

## 2025-01-19 RX ADMIN — Medication 2 PUFF: at 08:32

## 2025-01-19 RX ADMIN — LACTULOSE 20 G: 20 SOLUTION ORAL at 21:02

## 2025-01-19 RX ADMIN — Medication 400 MG: at 08:53

## 2025-01-19 RX ADMIN — AMLODIPINE BESYLATE 10 MG: 10 TABLET ORAL at 08:53

## 2025-01-19 RX ADMIN — RISPERIDONE 1 MG: 1 TABLET, FILM COATED ORAL at 08:53

## 2025-01-19 RX ADMIN — LEVETIRACETAM 500 MG: 500 TABLET, FILM COATED ORAL at 08:53

## 2025-01-19 RX ADMIN — TAMSULOSIN HYDROCHLORIDE 0.4 MG: 0.4 CAPSULE ORAL at 08:52

## 2025-01-19 RX ADMIN — ASPIRIN 81 MG: 81 TABLET, COATED ORAL at 08:53

## 2025-01-19 RX ADMIN — ISOSORBIDE DINITRATE 5 MG: 10 TABLET ORAL at 08:53

## 2025-01-19 RX ADMIN — ISOSORBIDE DINITRATE 5 MG: 10 TABLET ORAL at 14:25

## 2025-01-19 RX ADMIN — SPIRONOLACTONE 25 MG: 25 TABLET ORAL at 08:53

## 2025-01-19 RX ADMIN — CARVEDILOL 3.12 MG: 3.12 TABLET, FILM COATED ORAL at 08:53

## 2025-01-19 RX ADMIN — MONTELUKAST 10 MG: 10 TABLET, FILM COATED ORAL at 21:02

## 2025-01-19 RX ADMIN — Medication 1 TABLET: at 08:53

## 2025-01-19 RX ADMIN — ESCITALOPRAM OXALATE 10 MG: 10 TABLET ORAL at 08:53

## 2025-01-19 RX ADMIN — LEVETIRACETAM 500 MG: 500 TABLET, FILM COATED ORAL at 21:02

## 2025-01-19 NOTE — GROUP NOTE
Group Therapy Note    Date: 1/18/2025    Group Start Time: 0800  Group End Time: 0845  Group Topic: Wrap-Up    [facility]    Sameera Noel        Group Therapy Note    Attendees: 2       Patient's Goal:  Things going better, ready for rehab    Notes:  N/A    Status After Intervention:  Improved    Participation Level: Active Listener    Participation Quality: Appropriate      Speech:  normal      Thought Process/Content: Logical      Affective Functioning: Congruent      Mood: anxious      Level of consciousness:  Alert      Response to Learning: Able to verbalize current knowledge/experience      Endings: None Reported    Modes of Intervention: Support      Discipline Responsible: Behavorial Health Tech      Signature:  Sameera Noel

## 2025-01-19 NOTE — GROUP NOTE
Group Therapy Note    Date: 1/19/2025    Group Start Time: 0930  Group End Time: 1000  Group Topic: Community Meeting    [facility]    Marcela Paiz        Group Therapy Note    Attendees: 2       Patient's Goal:  2      Notes:  To make sure all areas are covered so that discharge can go smooth.    Status After Intervention:  Improved    Participation Level: Active Listener    Participation Quality: Appropriate      Speech:  normal      Thought Process/Content: Logical      Affective Functioning: Congruent      Mood: anxious      Level of consciousness:  Alert      Response to Learning: Able to verbalize current knowledge/experience      Endings: None Reported    Modes of Intervention: Support      Discipline Responsible: Behavorial Health Tech      Signature:  Marcela Paiz

## 2025-01-20 PROCEDURE — 94640 AIRWAY INHALATION TREATMENT: CPT

## 2025-01-20 PROCEDURE — 6370000000 HC RX 637 (ALT 250 FOR IP): Performed by: PSYCHIATRY & NEUROLOGY

## 2025-01-20 PROCEDURE — 1240000000 HC EMOTIONAL WELLNESS R&B

## 2025-01-20 PROCEDURE — 6370000000 HC RX 637 (ALT 250 FOR IP): Performed by: HOSPITALIST

## 2025-01-20 RX ORDER — ISOSORBIDE MONONITRATE 30 MG/1
30 TABLET, EXTENDED RELEASE ORAL DAILY
Status: DISCONTINUED | OUTPATIENT
Start: 2025-01-21 | End: 2025-01-21 | Stop reason: HOSPADM

## 2025-01-20 RX ORDER — MONTELUKAST SODIUM 10 MG/1
10 TABLET ORAL NIGHTLY
Qty: 30 TABLET | Refills: 0 | Status: SHIPPED | OUTPATIENT
Start: 2025-01-21 | End: 2025-02-20

## 2025-01-20 RX ORDER — AMLODIPINE BESYLATE 5 MG/1
5 TABLET ORAL DAILY
Qty: 30 TABLET | Refills: 0 | Status: SHIPPED | OUTPATIENT
Start: 2025-01-21 | End: 2025-02-20

## 2025-01-20 RX ORDER — ISOSORBIDE MONONITRATE 30 MG/1
30 TABLET, EXTENDED RELEASE ORAL DAILY
Qty: 30 TABLET | Refills: 0 | Status: SHIPPED | OUTPATIENT
Start: 2025-01-21 | End: 2025-02-20

## 2025-01-20 RX ORDER — SPIRONOLACTONE 25 MG/1
12.5 TABLET ORAL DAILY
Status: DISCONTINUED | OUTPATIENT
Start: 2025-01-21 | End: 2025-01-21 | Stop reason: HOSPADM

## 2025-01-20 RX ORDER — TRAZODONE HYDROCHLORIDE 50 MG/1
50 TABLET, FILM COATED ORAL NIGHTLY PRN
Qty: 30 TABLET | Refills: 0 | Status: SHIPPED | OUTPATIENT
Start: 2025-01-20 | End: 2025-02-19

## 2025-01-20 RX ORDER — CETIRIZINE HYDROCHLORIDE 10 MG/1
10 TABLET ORAL DAILY
Qty: 30 TABLET | Refills: 0 | OUTPATIENT
Start: 2025-01-21

## 2025-01-20 RX ORDER — LEVETIRACETAM 500 MG/1
500 TABLET ORAL 2 TIMES DAILY
Qty: 60 TABLET | Refills: 0 | Status: SHIPPED | OUTPATIENT
Start: 2025-01-21 | End: 2025-02-20

## 2025-01-20 RX ORDER — SPIRONOLACTONE 25 MG/1
12.5 TABLET ORAL DAILY
Qty: 30 TABLET | Refills: 3 | OUTPATIENT
Start: 2025-01-21

## 2025-01-20 RX ORDER — PANTOPRAZOLE SODIUM 40 MG/1
40 TABLET, DELAYED RELEASE ORAL
Qty: 30 TABLET | Refills: 0 | Status: SHIPPED | OUTPATIENT
Start: 2025-01-21 | End: 2025-02-20

## 2025-01-20 RX ORDER — CARVEDILOL 3.12 MG/1
3.12 TABLET ORAL 2 TIMES DAILY WITH MEALS
Qty: 60 TABLET | Refills: 0 | Status: SHIPPED | OUTPATIENT
Start: 2025-01-20 | End: 2025-02-19

## 2025-01-20 RX ORDER — AMLODIPINE BESYLATE 5 MG/1
5 TABLET ORAL DAILY
Status: DISCONTINUED | OUTPATIENT
Start: 2025-01-21 | End: 2025-01-21 | Stop reason: HOSPADM

## 2025-01-20 RX ORDER — SPIRONOLACTONE 25 MG/1
12.5 TABLET ORAL DAILY
Qty: 15 TABLET | Refills: 0 | Status: SHIPPED | OUTPATIENT
Start: 2025-01-21 | End: 2025-02-20

## 2025-01-20 RX ORDER — CETIRIZINE HYDROCHLORIDE 10 MG/1
10 TABLET ORAL DAILY
Qty: 14 TABLET | Refills: 0 | Status: SHIPPED | OUTPATIENT
Start: 2025-01-21 | End: 2025-02-04

## 2025-01-20 RX ORDER — ASPIRIN 81 MG/1
81 TABLET ORAL DAILY
Qty: 30 TABLET | Refills: 0 | Status: SHIPPED | OUTPATIENT
Start: 2025-01-20 | End: 2025-02-19

## 2025-01-20 RX ORDER — TAMSULOSIN HYDROCHLORIDE 0.4 MG/1
0.4 CAPSULE ORAL DAILY
Qty: 30 CAPSULE | Refills: 0 | Status: SHIPPED | OUTPATIENT
Start: 2025-01-21 | End: 2025-02-20

## 2025-01-20 RX ORDER — ESCITALOPRAM OXALATE 10 MG/1
10 TABLET ORAL DAILY
Qty: 30 TABLET | Refills: 0 | Status: SHIPPED | OUTPATIENT
Start: 2025-01-21 | End: 2025-02-20

## 2025-01-20 RX ORDER — AMLODIPINE BESYLATE 5 MG/1
5 TABLET ORAL DAILY
Qty: 30 TABLET | Refills: 3 | OUTPATIENT
Start: 2025-01-21

## 2025-01-20 RX ORDER — ISOSORBIDE MONONITRATE 30 MG/1
30 TABLET, EXTENDED RELEASE ORAL DAILY
Qty: 30 TABLET | Refills: 3 | OUTPATIENT
Start: 2025-01-21

## 2025-01-20 RX ADMIN — PANTOPRAZOLE SODIUM 40 MG: 40 TABLET, DELAYED RELEASE ORAL at 05:33

## 2025-01-20 RX ADMIN — LACTULOSE 20 G: 20 SOLUTION ORAL at 08:20

## 2025-01-20 RX ADMIN — LEVETIRACETAM 500 MG: 500 TABLET, FILM COATED ORAL at 08:21

## 2025-01-20 RX ADMIN — Medication 1 TABLET: at 08:22

## 2025-01-20 RX ADMIN — TAMSULOSIN HYDROCHLORIDE 0.4 MG: 0.4 CAPSULE ORAL at 08:21

## 2025-01-20 RX ADMIN — AMLODIPINE BESYLATE 10 MG: 10 TABLET ORAL at 08:21

## 2025-01-20 RX ADMIN — Medication 2 PUFF: at 19:44

## 2025-01-20 RX ADMIN — CETIRIZINE HYDROCHLORIDE 10 MG: 5 TABLET ORAL at 08:21

## 2025-01-20 RX ADMIN — CARVEDILOL 3.12 MG: 3.12 TABLET, FILM COATED ORAL at 08:22

## 2025-01-20 RX ADMIN — ISOSORBIDE DINITRATE 5 MG: 10 TABLET ORAL at 08:22

## 2025-01-20 RX ADMIN — CARVEDILOL 3.12 MG: 3.12 TABLET, FILM COATED ORAL at 17:21

## 2025-01-20 RX ADMIN — Medication 400 MG: at 08:22

## 2025-01-20 RX ADMIN — ISOSORBIDE DINITRATE 5 MG: 10 TABLET ORAL at 11:53

## 2025-01-20 RX ADMIN — SPIRONOLACTONE 25 MG: 25 TABLET ORAL at 08:20

## 2025-01-20 RX ADMIN — ESCITALOPRAM OXALATE 10 MG: 10 TABLET ORAL at 08:21

## 2025-01-20 RX ADMIN — ASPIRIN 81 MG: 81 TABLET, COATED ORAL at 08:21

## 2025-01-20 RX ADMIN — LACTULOSE 20 G: 20 SOLUTION ORAL at 20:48

## 2025-01-20 RX ADMIN — LEVETIRACETAM 500 MG: 500 TABLET, FILM COATED ORAL at 20:48

## 2025-01-20 RX ADMIN — MONTELUKAST 10 MG: 10 TABLET, FILM COATED ORAL at 20:48

## 2025-01-20 RX ADMIN — Medication 2 PUFF: at 09:02

## 2025-01-20 ASSESSMENT — PAIN SCALES - GENERAL: PAINLEVEL_OUTOF10: 0

## 2025-01-20 NOTE — GROUP NOTE
Group Therapy Note    Date: 1/19/2025    Group Start Time: 0800  Group End Time: 0845  Group Topic: Wrap-Up    [facility]    Sameera Noel        Group Therapy Note    Attendees: 3       Patient's Goal:  Happy I'm going to rehab    Notes:  N/A    Status After Intervention:  Improved    Participation Level: Active Listener    Participation Quality: Appropriate      Speech:  normal      Thought Process/Content: Logical      Affective Functioning: Congruent      Mood: anxious      Level of consciousness:  Alert      Response to Learning: Able to verbalize current knowledge/experience      Endings: None Reported    Modes of Intervention: Support      Discipline Responsible: Behavorial Health Tech      Signature:  Sameera Noel

## 2025-01-20 NOTE — PLAN OF CARE
Problem: Chronic Conditions and Co-morbidities  Goal: Patient's chronic conditions and co-morbidity symptoms are monitored and maintained or improved  1/11/2025 2355 by Gary Camacho RN  Outcome: Progressing  1/11/2025 1045 by Amanda Ponce LPN  Outcome: Progressing     Problem: Discharge Planning  Goal: Discharge to home or other facility with appropriate resources  1/11/2025 2355 by Gary Camacho RN  Outcome: Progressing  1/11/2025 1045 by Amanda Ponce LPN  Outcome: Progressing     Problem: Safety - Adult  Goal: Free from fall injury  1/11/2025 2355 by Gary Camacho RN  Outcome: Progressing  1/11/2025 1045 by Amanda Ponce LPN  Outcome: Progressing     Problem: Depression  Goal: Will be euthymic at discharge  Description: INTERVENTIONS:  1. Administer medication as ordered  2. Provide emotional support via 1:1 interaction with staff  3. Encourage involvement in milieu/groups/activities  4. Monitor for social isolation  1/11/2025 2355 by Gary Camacho RN  Outcome: Progressing  1/11/2025 1045 by Amanda Ponce LPN  Outcome: Progressing     Problem: Anxiety  Goal: Will report anxiety at manageable levels  Description: INTERVENTIONS:  1. Administer medication as ordered  2. Teach and rehearse alternative coping skills  3. Provide emotional support with 1:1 interaction with staff  1/11/2025 2355 by Gary Camacho RN  Outcome: Progressing  1/11/2025 1045 by Amanda Ponce LPN  Outcome: Progressing     Problem: Sleep Disturbance  Goal: Will exhibit normal sleeping pattern  Description: INTERVENTIONS:  1. Administer medication as ordered  2. Decrease environmental stimuli, including noise, as appropriate  3. Discourage social isolation and naps during the day  1/11/2025 2355 by Gary Camacho RN  Outcome: Progressing  1/11/2025 1045 by Amanda Ponce LPN  Outcome: Progressing     Problem: Neurosensory - Adult  Goal: Absence of seizures  1/11/2025 2355 by 
  Problem: Chronic Conditions and Co-morbidities  Goal: Patient's chronic conditions and co-morbidity symptoms are monitored and maintained or improved  1/13/2025 0005 by Gary aCmacho RN  Outcome: Progressing  1/12/2025 1012 by Amanda Ponce LPN  Outcome: Progressing     Problem: Discharge Planning  Goal: Discharge to home or other facility with appropriate resources  1/13/2025 0005 by Gary Camacho RN  Outcome: Progressing  1/12/2025 1012 by Amanda Ponce LPN  Outcome: Progressing     Problem: Safety - Adult  Goal: Free from fall injury  1/13/2025 0005 by Gary Camacho RN  Outcome: Progressing  1/12/2025 1012 by Amanda Ponce LPN  Outcome: Progressing     Problem: Depression  Goal: Will be euthymic at discharge  Description: INTERVENTIONS:  1. Administer medication as ordered  2. Provide emotional support via 1:1 interaction with staff  3. Encourage involvement in milieu/groups/activities  4. Monitor for social isolation  1/13/2025 0005 by Gary Camacho RN  Outcome: Progressing  1/12/2025 1012 by Amanda Ponce LPN  Outcome: Progressing     Problem: Anxiety  Goal: Will report anxiety at manageable levels  Description: INTERVENTIONS:  1. Administer medication as ordered  2. Teach and rehearse alternative coping skills  3. Provide emotional support with 1:1 interaction with staff  1/13/2025 0005 by Gary Camacho RN  Outcome: Progressing  1/12/2025 1012 by Amanda Ponce LPN  Outcome: Progressing     Problem: Sleep Disturbance  Goal: Will exhibit normal sleeping pattern  Description: INTERVENTIONS:  1. Administer medication as ordered  2. Decrease environmental stimuli, including noise, as appropriate  3. Discourage social isolation and naps during the day  1/13/2025 0005 by Gary Camacho RN  Outcome: Progressing  1/12/2025 1012 by Amanda Ponce LPN  Outcome: Progressing     Problem: Neurosensory - Adult  Goal: Absence of seizures  1/13/2025 0005 by 
  Problem: Chronic Conditions and Co-morbidities  Goal: Patient's chronic conditions and co-morbidity symptoms are monitored and maintained or improved  1/17/2025 2028 by Enid Jiang RN  Outcome: Progressing     Problem: Discharge Planning  Goal: Discharge to home or other facility with appropriate resources  1/17/2025 2028 by Enid Jiang RN  Outcome: Progressing     Problem: Safety - Adult  Goal: Free from fall injury  Recent Flowsheet Documentation  Taken 1/18/2025 1008 by Stacie Billingsley LPN  Free From Fall Injury: Instruct family/caregiver on patient safety  1/17/2025 2028 by Enid Jiang RN  Outcome: Progressing     Problem: Depression  Goal: Will be euthymic at discharge  Description: INTERVENTIONS:  1. Administer medication as ordered  2. Provide emotional support via 1:1 interaction with staff  3. Encourage involvement in milieu/groups/activities  4. Monitor for social isolation  1/17/2025 2028 by Enid Jiang RN  Outcome: Progressing     Problem: Sleep Disturbance  Goal: Will exhibit normal sleeping pattern  Description: INTERVENTIONS:  1. Administer medication as ordered  2. Decrease environmental stimuli, including noise, as appropriate  3. Discourage social isolation and naps during the day  1/17/2025 2028 by Enid Jiang RN  Outcome: Progressing     Problem: Neurosensory - Adult  Goal: Absence of seizures  1/17/2025 2028 by Enid Jiang RN  Outcome: Progressing  Goal: Remains free of injury related to seizures activity  1/17/2025 2028 by Enid Jiang RN  Outcome: Progressing     Problem: Cardiovascular - Adult  Goal: Maintains optimal cardiac output and hemodynamic stability  1/17/2025 2028 by Enid Jiang RN  Outcome: Progressing     Problem: Musculoskeletal - Adult  Goal: Return mobility to safest level of function  1/17/2025 2028 by Enid Jiang RN  Outcome: Progressing  Goal: Maintain proper alignment of affected body part  1/17/2025 2028 by Enid Jiang RN  Outcome: 
  Problem: Chronic Conditions and Co-morbidities  Goal: Patient's chronic conditions and co-morbidity symptoms are monitored and maintained or improved  1/8/2025 2158 by Endi Jiang RN  Outcome: Progressing  1/8/2025 0917 by Amanda Ponce LPN  Outcome: Progressing     Problem: Discharge Planning  Goal: Discharge to home or other facility with appropriate resources  1/8/2025 2158 by Enid Jiang RN  Outcome: Progressing  1/8/2025 0917 by Amanda Ponce LPN  Outcome: Progressing     Problem: Safety - Adult  Goal: Free from fall injury  1/8/2025 2158 by Enid Jiang RN  Outcome: Progressing  1/8/2025 0917 by Amanda Ponce LPN  Outcome: Progressing     Problem: Depression  Goal: Will be euthymic at discharge  Description: INTERVENTIONS:  1. Administer medication as ordered  2. Provide emotional support via 1:1 interaction with staff  3. Encourage involvement in milieu/groups/activities  4. Monitor for social isolation  1/8/2025 2158 by Enid Jiang RN  Outcome: Progressing  1/8/2025 0917 by Amanda Ponce LPN  Outcome: Progressing     Problem: Anxiety  Goal: Will report anxiety at manageable levels  Description: INTERVENTIONS:  1. Administer medication as ordered  2. Teach and rehearse alternative coping skills  3. Provide emotional support with 1:1 interaction with staff  1/8/2025 2158 by Enid Jiang RN  Outcome: Progressing  1/8/2025 0917 by Amanda Ponce LPN  Outcome: Progressing     Problem: Sleep Disturbance  Goal: Will exhibit normal sleeping pattern  Description: INTERVENTIONS:  1. Administer medication as ordered  2. Decrease environmental stimuli, including noise, as appropriate  3. Discourage social isolation and naps during the day  1/8/2025 2158 by Enid Jiang RN  Outcome: Progressing  1/8/2025 0917 by Amanda Ponce LPN  Outcome: Progressing     Problem: Neurosensory - Adult  Goal: Absence of seizures  1/8/2025 2158 by Enid Jiang RN  Outcome: Progressing  1/8/2025 
  Problem: Chronic Conditions and Co-morbidities  Goal: Patient's chronic conditions and co-morbidity symptoms are monitored and maintained or improved  1/9/2025 2323 by Enid Jiang RN  Outcome: Progressing  1/9/2025 1544 by Mariluz River RN  Outcome: Progressing     Problem: Discharge Planning  Goal: Discharge to home or other facility with appropriate resources  1/9/2025 2323 by Enid Jiang RN  Outcome: Progressing  1/9/2025 1544 by Mariluz River RN  Outcome: Progressing     Problem: Safety - Adult  Goal: Free from fall injury  1/9/2025 2323 by Enid Jiang RN  Outcome: Progressing  1/9/2025 1544 by Mariluz River RN  Outcome: Progressing     Problem: Depression  Goal: Will be euthymic at discharge  Description: INTERVENTIONS:  1. Administer medication as ordered  2. Provide emotional support via 1:1 interaction with staff  3. Encourage involvement in milieu/groups/activities  4. Monitor for social isolation  1/9/2025 2323 by Enid Jiang RN  Outcome: Progressing  1/9/2025 1544 by Mariluz River RN  Outcome: Progressing     Problem: Anxiety  Goal: Will report anxiety at manageable levels  Description: INTERVENTIONS:  1. Administer medication as ordered  2. Teach and rehearse alternative coping skills  3. Provide emotional support with 1:1 interaction with staff  1/9/2025 2323 by Enid Jiang RN  Outcome: Progressing  1/9/2025 1544 by Mariluz River RN  Outcome: Progressing     Problem: Sleep Disturbance  Goal: Will exhibit normal sleeping pattern  Description: INTERVENTIONS:  1. Administer medication as ordered  2. Decrease environmental stimuli, including noise, as appropriate  3. Discourage social isolation and naps during the day  1/9/2025 2323 by Enid Jiang RN  Outcome: Progressing  1/9/2025 1544 by Mariluz River RN  Outcome: Progressing     Problem: Neurosensory - Adult  Goal: Absence of seizures  1/9/2025 2323 by Enid Jiang RN  Outcome: Progressing  1/9/2025 1544 by Perico 
  Problem: Chronic Conditions and Co-morbidities  Goal: Patient's chronic conditions and co-morbidity symptoms are monitored and maintained or improved  Outcome: Progressing     Problem: Discharge Planning  Goal: Discharge to home or other facility with appropriate resources  Outcome: Progressing     Problem: Safety - Adult  Goal: Free from fall injury  1/5/2025 2147 by Enid Jiang RN  Outcome: Progressing  1/5/2025 1144 by Ernestina Fleming RN  Outcome: Progressing     Problem: Depression  Goal: Will be euthymic at discharge  Description: INTERVENTIONS:  1. Administer medication as ordered  2. Provide emotional support via 1:1 interaction with staff  3. Encourage involvement in milieu/groups/activities  4. Monitor for social isolation  1/5/2025 2147 by Enid Jiang RN  Outcome: Progressing  1/5/2025 1144 by Ernestina Fleming RN  Outcome: Progressing     Problem: Anxiety  Goal: Will report anxiety at manageable levels  Description: INTERVENTIONS:  1. Administer medication as ordered  2. Teach and rehearse alternative coping skills  3. Provide emotional support with 1:1 interaction with staff  1/5/2025 2147 by Enid Jiang RN  Outcome: Progressing  1/5/2025 1144 by Ernestina Fleming RN  Outcome: Progressing     Problem: Drug Abuse/Detox  Goal: Will have no detox symptoms and will verbalize plan for changing drug-related behavior  Description: INTERVENTIONS:  1. Administer medication as ordered  2. Monitor physical status  3. Provide emotional support with 1:1 interaction with staff  4. Encourage  recovery focused treatment   1/5/2025 2147 by Enid Jiang RN  Outcome: Progressing  1/5/2025 1144 by Ernestina Fleming RN  Outcome: Progressing     Problem: Sleep Disturbance  Goal: Will exhibit normal sleeping pattern  Description: INTERVENTIONS:  1. Administer medication as ordered  2. Decrease environmental stimuli, including noise, as appropriate  3. Discourage social isolation and naps during the 
  Problem: Chronic Conditions and Co-morbidities  Goal: Patient's chronic conditions and co-morbidity symptoms are monitored and maintained or improved  Outcome: Progressing     Problem: Discharge Planning  Goal: Discharge to home or other facility with appropriate resources  Outcome: Progressing     Problem: Safety - Adult  Goal: Free from fall injury  1/6/2025 2122 by Gary Camacho RN  Outcome: Progressing  1/6/2025 1721 by Ernestina Fleming RN  Outcome: Progressing     Problem: Depression  Goal: Will be euthymic at discharge  Description: INTERVENTIONS:  1. Administer medication as ordered  2. Provide emotional support via 1:1 interaction with staff  3. Encourage involvement in milieu/groups/activities  4. Monitor for social isolation  1/6/2025 2122 by Gary Camacho RN  Outcome: Progressing  1/6/2025 1721 by Ernestina Fleming RN  Outcome: Progressing     Problem: Anxiety  Goal: Will report anxiety at manageable levels  Description: INTERVENTIONS:  1. Administer medication as ordered  2. Teach and rehearse alternative coping skills  3. Provide emotional support with 1:1 interaction with staff  1/6/2025 2122 by Gary Camacho RN  Outcome: Progressing  1/6/2025 1721 by Ernestina Fleming RN  Outcome: Progressing     Problem: Drug Abuse/Detox  Goal: Will have no detox symptoms and will verbalize plan for changing drug-related behavior  Description: INTERVENTIONS:  1. Administer medication as ordered  2. Monitor physical status  3. Provide emotional support with 1:1 interaction with staff  4. Encourage  recovery focused treatment   1/6/2025 2122 by Gary Camacho RN  Outcome: Progressing  1/6/2025 1721 by Ernestina Fleming RN  Outcome: Progressing     Problem: Sleep Disturbance  Goal: Will exhibit normal sleeping pattern  Description: INTERVENTIONS:  1. Administer medication as ordered  2. Decrease environmental stimuli, including noise, as appropriate  3. Discourage social 
  Problem: Chronic Conditions and Co-morbidities  Goal: Patient's chronic conditions and co-morbidity symptoms are monitored and maintained or improved  Outcome: Progressing     Problem: Discharge Planning  Goal: Discharge to home or other facility with appropriate resources  Outcome: Progressing     Problem: Safety - Adult  Goal: Free from fall injury  Outcome: Progressing     
  Problem: Chronic Conditions and Co-morbidities  Goal: Patient's chronic conditions and co-morbidity symptoms are monitored and maintained or improved  Outcome: Progressing     Problem: Discharge Planning  Goal: Discharge to home or other facility with appropriate resources  Outcome: Progressing     Problem: Safety - Adult  Goal: Free from fall injury  Outcome: Progressing     Problem: Depression  Goal: Will be euthymic at discharge  Description: INTERVENTIONS:  1. Administer medication as ordered  2. Provide emotional support via 1:1 interaction with staff  3. Encourage involvement in milieu/groups/activities  4. Monitor for social isolation  Outcome: Progressing     Problem: Anxiety  Goal: Will report anxiety at manageable levels  Description: INTERVENTIONS:  1. Administer medication as ordered  2. Teach and rehearse alternative coping skills  3. Provide emotional support with 1:1 interaction with staff  Outcome: Progressing     Problem: Sleep Disturbance  Goal: Will exhibit normal sleeping pattern  Description: INTERVENTIONS:  1. Administer medication as ordered  2. Decrease environmental stimuli, including noise, as appropriate  3. Discourage social isolation and naps during the day  Outcome: Progressing     Problem: Neurosensory - Adult  Goal: Absence of seizures  Outcome: Progressing     Problem: Pain  Goal: Verbalizes/displays adequate comfort level or baseline comfort level  Outcome: Progressing     Problem: Musculoskeletal - Adult  Goal: Return mobility to safest level of function  Outcome: Progressing  Goal: Maintain proper alignment of affected body part  Outcome: Progressing  Goal: Return ADL status to a safe level of function  Outcome: Progressing     
  Problem: Chronic Conditions and Co-morbidities  Goal: Patient's chronic conditions and co-morbidity symptoms are monitored and maintained or improved  Outcome: Progressing     Problem: Discharge Planning  Goal: Discharge to home or other facility with appropriate resources  Outcome: Progressing     Problem: Safety - Adult  Goal: Free from fall injury  Outcome: Progressing  Flowsheets (Taken 1/18/2025 1008)  Free From Fall Injury: Instruct family/caregiver on patient safety     
  Problem: Chronic Conditions and Co-morbidities  Goal: Patient's chronic conditions and co-morbidity symptoms are monitored and maintained or improved  Outcome: Progressing     Problem: Safety - Adult  Goal: Free from fall injury  Outcome: Progressing     Problem: Neurosensory - Adult  Goal: Absence of seizures  Outcome: Progressing     Problem: Cardiovascular - Adult  Goal: Maintains optimal cardiac output and hemodynamic stability  Outcome: Progressing     
  Problem: Discharge Planning  Goal: Discharge to home or other facility with appropriate resources  Outcome: Progressing     
  Problem: Pain  Goal: Verbalizes/displays adequate comfort level or baseline comfort level  Outcome: Not Progressing  Note: Refusing any OTC and requesting a injection for pain     Problem: Chronic Conditions and Co-morbidities  Goal: Patient's chronic conditions and co-morbidity symptoms are monitored and maintained or improved  1/4/2025 2157 by Enid Jiang RN  Outcome: Progressing  1/4/2025 1122 by Amanda Ponce LPN  Outcome: Progressing     Problem: Discharge Planning  Goal: Discharge to home or other facility with appropriate resources  1/4/2025 2157 by Enid Jiang RN  Outcome: Progressing  1/4/2025 1122 by Amanda Ponce LPN  Outcome: Progressing     Problem: Safety - Adult  Goal: Free from fall injury  1/4/2025 2157 by Enid Jiang RN  Outcome: Progressing  1/4/2025 1122 by Amanda Ponce LPN  Outcome: Progressing     Problem: Depression  Goal: Will be euthymic at discharge  Description: INTERVENTIONS:  1. Administer medication as ordered  2. Provide emotional support via 1:1 interaction with staff  3. Encourage involvement in milieu/groups/activities  4. Monitor for social isolation  1/4/2025 2157 by Enid Jiang RN  Outcome: Progressing  1/4/2025 1122 by Amanda Ponce LPN  Outcome: Progressing     Problem: Anxiety  Goal: Will report anxiety at manageable levels  Description: INTERVENTIONS:  1. Administer medication as ordered  2. Teach and rehearse alternative coping skills  3. Provide emotional support with 1:1 interaction with staff  1/4/2025 2157 by Enid Jiang RN  Outcome: Progressing  1/4/2025 1122 by Amanda Ponce LPN  Outcome: Progressing     Problem: Drug Abuse/Detox  Goal: Will have no detox symptoms and will verbalize plan for changing drug-related behavior  Description: INTERVENTIONS:  1. Administer medication as ordered  2. Monitor physical status  3. Provide emotional support with 1:1 interaction with staff  4. Encourage  recovery focused treatment   1/4/2025 2157 
isolation and naps during the day  1/7/2025 2241 by Gary Camacho RN  Outcome: Progressing  1/7/2025 0902 by Ernestina Fleming RN  Outcome: Progressing     Problem: Neurosensory - Adult  Goal: Absence of seizures  1/7/2025 2241 by Gary Camacho RN  Outcome: Progressing  1/7/2025 0902 by Ernestina Fleming RN  Outcome: Progressing  Goal: Remains free of injury related to seizures activity  1/7/2025 0902 by Ernestina Fleming RN  Outcome: Progressing     Problem: Cardiovascular - Adult  Goal: Maintains optimal cardiac output and hemodynamic stability  1/7/2025 0902 by Ernestina Fleming RN  Outcome: Progressing     Problem: Pain  Goal: Verbalizes/displays adequate comfort level or baseline comfort level  1/7/2025 2241 by Gary Camacho RN  Outcome: Progressing  1/7/2025 0902 by Ernestina Fleming RN  Outcome: Progressing     
1554 by Adesuyi, Mariluz, RN  Outcome: Progressing  1/13/2025 1553 by Mariluz River RN  Outcome: Progressing  Goal: Maintain proper alignment of affected body part  1/13/2025 1555 by Mariluz River RN  Outcome: Progressing  1/13/2025 1554 by Mariluz River RN  Outcome: Progressing  1/13/2025 1553 by Mariluz River RN  Outcome: Progressing  Goal: Return ADL status to a safe level of function  1/13/2025 1555 by Mariluz River RN  Outcome: Progressing  1/13/2025 1554 by Mariluz River RN  Outcome: Progressing  1/13/2025 1553 by Mariluz River RN  Outcome: Progressing     Problem: Anxiety  Goal: Will report anxiety at manageable levels  Description: INTERVENTIONS:  1. Administer medication as ordered  2. Teach and rehearse alternative coping skills  3. Provide emotional support with 1:1 interaction with staff  1/14/2025 0012 by Enid Jiang RN  Outcome: Completed  1/13/2025 1555 by Mariluz River RN  Outcome: Progressing  1/13/2025 1554 by Mariluz River RN  Outcome: Progressing  Flowsheets (Taken 1/13/2025 1554)  Will report anxiety at manageable levels:   Administer medication as ordered   Teach and rehearse alternative coping skills   Provide emotional support with 1:1 interaction with staff  1/13/2025 1553 by Mariluz River RN  Outcome: Progressing

## 2025-01-21 ENCOUNTER — HOSPITAL ENCOUNTER (EMERGENCY)
Facility: HOSPITAL | Age: 60
Discharge: HOME | End: 2025-01-22
Attending: EMERGENCY MEDICINE
Payer: MEDICARE

## 2025-01-21 VITALS
SYSTOLIC BLOOD PRESSURE: 141 MMHG | RESPIRATION RATE: 18 BRPM | TEMPERATURE: 98 F | BODY MASS INDEX: 21.44 KG/M2 | WEIGHT: 167 LBS | OXYGEN SATURATION: 99 % | DIASTOLIC BLOOD PRESSURE: 89 MMHG | HEART RATE: 73 BPM

## 2025-01-21 VITALS
BODY MASS INDEX: 21.43 KG/M2 | TEMPERATURE: 97.2 F | WEIGHT: 167 LBS | SYSTOLIC BLOOD PRESSURE: 90 MMHG | OXYGEN SATURATION: 99 % | HEART RATE: 64 BPM | HEIGHT: 74 IN | DIASTOLIC BLOOD PRESSURE: 58 MMHG | RESPIRATION RATE: 18 BRPM

## 2025-01-21 DIAGNOSIS — F32.A DEPRESSION, UNSPECIFIED DEPRESSION TYPE: Primary | ICD-10-CM

## 2025-01-21 PROCEDURE — 6370000000 HC RX 637 (ALT 250 FOR IP): Performed by: HOSPITALIST

## 2025-01-21 PROCEDURE — 99282 EMERGENCY DEPT VISIT SF MDM: CPT

## 2025-01-21 PROCEDURE — 6370000000 HC RX 637 (ALT 250 FOR IP): Performed by: PSYCHIATRY & NEUROLOGY

## 2025-01-21 PROCEDURE — 94640 AIRWAY INHALATION TREATMENT: CPT

## 2025-01-21 RX ADMIN — CETIRIZINE HYDROCHLORIDE 10 MG: 5 TABLET ORAL at 09:20

## 2025-01-21 RX ADMIN — Medication 1 TABLET: at 09:20

## 2025-01-21 RX ADMIN — LACTULOSE 20 G: 20 SOLUTION ORAL at 09:20

## 2025-01-21 RX ADMIN — ESCITALOPRAM OXALATE 10 MG: 10 TABLET ORAL at 09:20

## 2025-01-21 RX ADMIN — ASPIRIN 81 MG: 81 TABLET, COATED ORAL at 09:19

## 2025-01-21 RX ADMIN — Medication 2 PUFF: at 09:54

## 2025-01-21 RX ADMIN — Medication 400 MG: at 09:19

## 2025-01-21 RX ADMIN — TAMSULOSIN HYDROCHLORIDE 0.4 MG: 0.4 CAPSULE ORAL at 09:20

## 2025-01-21 RX ADMIN — PANTOPRAZOLE SODIUM 40 MG: 40 TABLET, DELAYED RELEASE ORAL at 06:16

## 2025-01-21 RX ADMIN — LEVETIRACETAM 500 MG: 500 TABLET, FILM COATED ORAL at 09:20

## 2025-01-21 ASSESSMENT — PAIN SCALES - GENERAL: PAINLEVEL_OUTOF10: 0

## 2025-01-21 NOTE — DISCHARGE SUMMARY
PSYCHIATRIC DISCHARGE SUMMARY         IDENTIFICATION:    Patient Name  David Alfaro   Date of Birth 1965   [facility] [id number]   Medical Record Number  [id number]      Age  59 y.o.   PCP Cristobal Fraga Sr., MD   Admit date:  1/3/2025    Discharge date: 1/21/2025   Room Number  105/02  @ [facility]   Date of Service  1/21/2025            TYPE OF DISCHARGE: REGULAR               CONDITION AT DISCHARGE: Improved       PROVISIONAL & DISCHARGE DIAGNOSES:    Admission Diagnoses:   Depression with suicidal ideation [F32.A, R45.851]  Psychosis, unspecified psychosis type (HCC) [F29]  Unspecified mood (affective) disorder (HCC) [F39]  Major depression, chronic [F32.9]    Discharge Diagnoses:      Hospital Problems             Last Modified POA    Cocaine use disorder (HCC) 1/7/2025 Yes    Major depressive disorder, recurrent episode, severe, with psychosis (HCC) 1/7/2025 Yes           CC & HISTORY OF PRESENT ILLNESS:  Per admit note:  \"The patient, David Alfaro, is a 59 y.o. male with hx of Schizoaffective Disorder and Polysubstance Use  was a voluntary admission from the [facility] ED on 1/3/25.  He sees this provider outpatient at [facility] and his last appointment was in May 2024.  He reports having insurance issues, and has not been able to make an outpatient appointment.  He ran out of his medications but felt they were ineffective.  He had an upcoming appt in January but started to have command auditory hallucinations to hurt himself and decided to go to the ER.    He reports he tried to overdose on Cocaine, has been having difficulty sleeping, socially isolating himself, having paranoia, and decreased appetite.  He has lost several pounds.   He has a hx of multiple hospital admissions with prior suicide attempts in the past.  Past medical history includes seizures, CVA, COPD, DELTA but does not use a CPAP, and prostate issues,   He has not had any recent

## 2025-01-21 NOTE — PROGRESS NOTES
Psychiatric Progress Note      Patient: David Alfaro MRN: [id number]  SSN: [id number]    YOB: 1965  Age: 59 y.o.  Sex: male      Admit Date: 1/3/2025       Subjective:     David Alfaro states that he is feeling the same.  Reported some improvement to suicidal ideations and denied any this morning.  Attending groups and work on coping skills.  Denied any withdrawal symptoms.  Motivated go to rehab.  Reported good sleep and appetite.  Denies side effects of medications.    Objective:     Vitals:    01/06/25 1530 01/06/25 1947 01/07/25 0545 01/07/25 0829   BP: (!) 144/95  125/81 125/81   Pulse: (!) 109 (!) 112 90 90   Resp: 18 18 16    Temp: 98.2 °F (36.8 °C)  98.7 °F (37.1 °C)    TempSrc: Temporal  Temporal    SpO2: 98% 98% 97%    Weight:       Height:            Mental Status Exam:     Appearance-fairly groomed  Alert, oriented to self and situation  Speech -normal rate, volume and rhythm  Mood-depressed  Affect- constricted  Thought process-linear and goal directed  Thought content-delusions   Thought perception-no auditory or visual hallucinations   No suicidal or homicidal ideations   Insight fair  Judgement fair     MEDICATIONS:  Current Facility-Administered Medications   Medication Dose Route Frequency    acetaminophen (TYLENOL) tablet 650 mg  650 mg Oral Q4H PRN    hydrOXYzine HCl (ATARAX) tablet 50 mg  50 mg Oral TID PRN    haloperidol (HALDOL) tablet 5 mg  5 mg Oral Q4H PRN    Or    haloperidol lactate (HALDOL) injection 5 mg  5 mg IntraMUSCular Q4H PRN    diphenhydrAMINE (BENADRYL) injection 50 mg  50 mg IntraMUSCular Q4H PRN    traZODone (DESYREL) tablet 50 mg  50 mg Oral Nightly PRN    magnesium hydroxide (MILK OF MAGNESIA) 400 MG/5ML suspension 30 mL  30 mL Oral Daily PRN    aluminum & magnesium hydroxide-simethicone (MAALOX) 200-200-20 MG/5ML suspension 30 mL  30 mL Oral Q6H PRN    risperiDONE (RISPERDAL) tablet 1 mg  1 mg Oral BID    lamoTRIgine (LAMICTAL) tablet 25 mg  
       Psychiatric Progress Note      Patient: David Alfaro MRN: [id number]  SSN: [id number]    YOB: 1965  Age: 59 y.o.  Sex: male      Admit Date: 1/3/2025       Subjective:     David Alfaro stated he is feeling better with his mood.  Feeling less depressed and anxious.  Able to ambulate without support.  Reported some improvement with energy levels.  Denied any suicidal or homicidal ideations.  Attending groups and working coping skills.  Denies side effects of medications.  Motivated to go to rehab.    Objective:     Vitals:    01/10/25 0555 01/10/25 0744 01/10/25 1500 01/10/25 1957   BP: 117/71  105/69    Pulse: 82  96    Resp: 17  18    Temp: 97.7 °F (36.5 °C)  97.3 °F (36.3 °C)    TempSrc: Temporal  Temporal    SpO2: 99% 99% 96% 97%   Weight:       Height:            Mental Status Exam:     Appearance-fairly groomed  Alert, oriented to self and situation  Speech -normal rate, volume and rhythm  Mood-depressed  Affect- constricted  Thought process-linear and goal directed  Thought content-delusions   Thought perception-no auditory or visual hallucinations   No suicidal or homicidal ideations   Insight fair  Judgement fair     MEDICATIONS:  Current Facility-Administered Medications   Medication Dose Route Frequency    acetaminophen (TYLENOL) tablet 650 mg  650 mg Oral Q4H PRN    hydrOXYzine HCl (ATARAX) tablet 50 mg  50 mg Oral TID PRN    haloperidol (HALDOL) tablet 5 mg  5 mg Oral Q4H PRN    Or    haloperidol lactate (HALDOL) injection 5 mg  5 mg IntraMUSCular Q4H PRN    diphenhydrAMINE (BENADRYL) injection 50 mg  50 mg IntraMUSCular Q4H PRN    traZODone (DESYREL) tablet 50 mg  50 mg Oral Nightly PRN    magnesium hydroxide (MILK OF MAGNESIA) 400 MG/5ML suspension 30 mL  30 mL Oral Daily PRN    aluminum & magnesium hydroxide-simethicone (MAALOX) 200-200-20 MG/5ML suspension 30 mL  30 mL Oral Q6H PRN    risperiDONE (RISPERDAL) tablet 1 mg  1 mg Oral BID    lamoTRIgine (LAMICTAL) tablet 
       Psychiatric Progress Note      Patient: David Alfaro MRN: [id number]  SSN: [id number]    YOB: 1965  Age: 59 y.o.  Sex: male      Admit Date: 1/3/2025       Subjective:     David Alfaro overall some improvement in his mood symptoms compared to admission.  Denied any suicidal or homicidal ideations.  Able to ambulate with support.  Denied any withdrawal symptoms.  Reported good sleep and appetite.  Motivated go to rehab.  Denied any side effects of medications.    Objective:     Vitals:    01/11/25 0552 01/11/25 0923 01/11/25 1515 01/11/25 1907   BP: 122/80 122/80 127/86    Pulse: 83 83 78 97   Resp: 16  16 18   Temp: 98.2 °F (36.8 °C)  97.8 °F (36.6 °C)    TempSrc: Temporal  Temporal    SpO2: 99%  96% 97%   Weight:       Height:            Mental Status Exam:     Appearance-fairly groomed  Alert, oriented to self and situation  Speech -normal rate, volume and rhythm  Mood-depressed  Affect- constricted  Thought process-linear and goal directed  Thought content-delusions   Thought perception-no auditory or visual hallucinations   No suicidal or homicidal ideations   Insight fair  Judgement fair     MEDICATIONS:  Current Facility-Administered Medications   Medication Dose Route Frequency    acetaminophen (TYLENOL) tablet 650 mg  650 mg Oral Q4H PRN    hydrOXYzine HCl (ATARAX) tablet 50 mg  50 mg Oral TID PRN    haloperidol (HALDOL) tablet 5 mg  5 mg Oral Q4H PRN    Or    haloperidol lactate (HALDOL) injection 5 mg  5 mg IntraMUSCular Q4H PRN    diphenhydrAMINE (BENADRYL) injection 50 mg  50 mg IntraMUSCular Q4H PRN    traZODone (DESYREL) tablet 50 mg  50 mg Oral Nightly PRN    magnesium hydroxide (MILK OF MAGNESIA) 400 MG/5ML suspension 30 mL  30 mL Oral Daily PRN    aluminum & magnesium hydroxide-simethicone (MAALOX) 200-200-20 MG/5ML suspension 30 mL  30 mL Oral Q6H PRN    risperiDONE (RISPERDAL) tablet 1 mg  1 mg Oral BID    lamoTRIgine (LAMICTAL) tablet 25 mg  25 mg Oral Daily    
       Psychiatric Progress Note      Patient: David Alfaro MRN: [id number]  SSN: [id number]    YOB: 1965  Age: 59 y.o.  Sex: male      Admit Date: 1/3/2025       Subjective:     David Alfaro stated he is feeling better with his mood.  Feeling depressed sometimes.  Feeling less anxious.  Denied any suicidal or homicidal ideations.  Ambulating using walker.  Denied any fall.  Reported good sleep and appetite.  Attending groups and working coping skills.  Denied any side effects of medications.    Objective:     Vitals:    01/11/25 1515 01/11/25 1907 01/12/25 0551 01/12/25 1110   BP: 127/86  (!) 130/90    Pulse: 78 97 85    Resp: 16 18 16    Temp: 97.8 °F (36.6 °C)  97.7 °F (36.5 °C)    TempSrc: Temporal  Temporal    SpO2: 96% 97% 99%    Weight:    75.8 kg (167 lb)   Height:            Mental Status Exam:     Appearance-fairly groomed  Alert, oriented to self and situation  Speech -normal rate, volume and rhythm  Mood-depressed  Affect- constricted  Thought process-linear and goal directed  Thought content-delusions   Thought perception-no auditory or visual hallucinations   No suicidal or homicidal ideations   Insight fair  Judgement fair     MEDICATIONS:  Current Facility-Administered Medications   Medication Dose Route Frequency    acetaminophen (TYLENOL) tablet 650 mg  650 mg Oral Q4H PRN    hydrOXYzine HCl (ATARAX) tablet 50 mg  50 mg Oral TID PRN    haloperidol (HALDOL) tablet 5 mg  5 mg Oral Q4H PRN    Or    haloperidol lactate (HALDOL) injection 5 mg  5 mg IntraMUSCular Q4H PRN    diphenhydrAMINE (BENADRYL) injection 50 mg  50 mg IntraMUSCular Q4H PRN    traZODone (DESYREL) tablet 50 mg  50 mg Oral Nightly PRN    magnesium hydroxide (MILK OF MAGNESIA) 400 MG/5ML suspension 30 mL  30 mL Oral Daily PRN    aluminum & magnesium hydroxide-simethicone (MAALOX) 200-200-20 MG/5ML suspension 30 mL  30 mL Oral Q6H PRN    risperiDONE (RISPERDAL) tablet 1 mg  1 mg Oral BID    lamoTRIgine 
       Psychiatric Progress Note      Patient: David Alfaro MRN: [id number]  SSN: [id number]    YOB: 1965  Age: 59 y.o.  Sex: male      Admit Date: 1/3/2025       Subjective:     David Alfaro stated he continues to feel depressed sometimes.  Overall some improvement with his mood symptoms compared to admission.  Attending groups and working coping skills.  Denied any suicidal or homicidal ideations.  Reported good sleep and appetite.  Denies side effects of medications.    Objective:     Vitals:    01/14/25 1945 01/15/25 0601 01/15/25 0750 01/15/25 1945   BP:  134/83 134/83    Pulse: 96 72 72 86   Resp: 18 17 17 18   Temp:  97.3 °F (36.3 °C) 97.3 °F (36.3 °C)    TempSrc:  Temporal Temporal    SpO2: 98% 99% 98% 98%   Weight:       Height:            Mental Status Exam:     Appearance-fairly groomed  Alert, oriented to self and situation  Speech -normal rate, volume and rhythm  Mood-depressed  Affect- constricted  Thought process-linear and goal directed  Thought content-delusions   Thought perception-no auditory or visual hallucinations   No suicidal or homicidal ideations   Insight fair  Judgement fair     MEDICATIONS:  Current Facility-Administered Medications   Medication Dose Route Frequency    escitalopram (LEXAPRO) tablet 10 mg  10 mg Oral Daily    acetaminophen (TYLENOL) tablet 650 mg  650 mg Oral Q4H PRN    hydrOXYzine HCl (ATARAX) tablet 50 mg  50 mg Oral TID PRN    haloperidol (HALDOL) tablet 5 mg  5 mg Oral Q4H PRN    Or    haloperidol lactate (HALDOL) injection 5 mg  5 mg IntraMUSCular Q4H PRN    diphenhydrAMINE (BENADRYL) injection 50 mg  50 mg IntraMUSCular Q4H PRN    traZODone (DESYREL) tablet 50 mg  50 mg Oral Nightly PRN    magnesium hydroxide (MILK OF MAGNESIA) 400 MG/5ML suspension 30 mL  30 mL Oral Daily PRN    aluminum & magnesium hydroxide-simethicone (MAALOX) 200-200-20 MG/5ML suspension 30 mL  30 mL Oral Q6H PRN    risperiDONE (RISPERDAL) tablet 1 mg  1 mg Oral BID    
       Psychiatric Progress Note      Patient: David Alfaro MRN: [id number]  SSN: [id number]    YOB: 1965  Age: 59 y.o.  Sex: male      Admit Date: 1/3/2025       Subjective:     David Alfaro overall some improvement in mood symptoms compared to admission.  Reported feeling down sometimes.  Denied any anxious mood.  Denied any suicidal or homicidal ideations.  Attending groups working coping skills.  Reported good sleep and appetite.  Denied any side effects of medications.  Motivated go to rehab.    Objective:     Vitals:    01/12/25 1510 01/12/25 1907 01/13/25 0550 01/13/25 0850   BP: 102/71  127/85    Pulse: 84 96 73 (!) 105   Resp: 20 20 16 20   Temp: 97.7 °F (36.5 °C)  97.5 °F (36.4 °C)    TempSrc: Temporal  Temporal    SpO2: 98% 97% 97% 94%   Weight:       Height:            Mental Status Exam:     Appearance-fairly groomed  Alert, oriented to self and situation  Speech -normal rate, volume and rhythm  Mood-depressed  Affect- constricted  Thought process-linear and goal directed  Thought content-delusions   Thought perception-no auditory or visual hallucinations   No suicidal or homicidal ideations   Insight fair  Judgement fair     MEDICATIONS:  Current Facility-Administered Medications   Medication Dose Route Frequency    acetaminophen (TYLENOL) tablet 650 mg  650 mg Oral Q4H PRN    hydrOXYzine HCl (ATARAX) tablet 50 mg  50 mg Oral TID PRN    haloperidol (HALDOL) tablet 5 mg  5 mg Oral Q4H PRN    Or    haloperidol lactate (HALDOL) injection 5 mg  5 mg IntraMUSCular Q4H PRN    diphenhydrAMINE (BENADRYL) injection 50 mg  50 mg IntraMUSCular Q4H PRN    traZODone (DESYREL) tablet 50 mg  50 mg Oral Nightly PRN    magnesium hydroxide (MILK OF MAGNESIA) 400 MG/5ML suspension 30 mL  30 mL Oral Daily PRN    aluminum & magnesium hydroxide-simethicone (MAALOX) 200-200-20 MG/5ML suspension 30 mL  30 mL Oral Q6H PRN    risperiDONE (RISPERDAL) tablet 1 mg  1 mg Oral BID    lamoTRIgine (LAMICTAL) 
       Psychiatric Progress Note      Patient: David Alfaro MRN: [id number]  SSN: [id number]    YOB: 1965  Age: 59 y.o.  Sex: male      Admit Date: 1/3/2025       Subjective:     David Alfaro overall significant improvement with his mood symptoms compared to admission.  Feeling less dizzy.  Denied any fall.  Ambulating using walker.  Stated he is not drinking enough fluids.  Denied any suicidal or homicidal ideations.  Denied any suicidal or homicidal ideations.  Denied any side effects of medications.    Objective:     Vitals:    01/19/25 1939 01/20/25 0545 01/20/25 0820 01/20/25 0902   BP:  (!) 120/91 (!) 120/91    Pulse: 69 67 67 59   Resp: 16 16  16   Temp:  97.5 °F (36.4 °C)     TempSrc:  Temporal     SpO2: 99% 99%  99%   Weight:       Height:            Mental Status Exam:     Appearance-fairly groomed  Alert, oriented to self and situation  Speech -normal rate, volume and rhythm  Mood-depressed  Affect- constricted  Thought process-linear and goal directed  Thought content-delusions   Thought perception-no auditory or visual hallucinations   No suicidal or homicidal ideations   Insight fair  Judgement fair     MEDICATIONS:  Current Facility-Administered Medications   Medication Dose Route Frequency    escitalopram (LEXAPRO) tablet 10 mg  10 mg Oral Daily    acetaminophen (TYLENOL) tablet 650 mg  650 mg Oral Q4H PRN    hydrOXYzine HCl (ATARAX) tablet 50 mg  50 mg Oral TID PRN    haloperidol (HALDOL) tablet 5 mg  5 mg Oral Q4H PRN    Or    haloperidol lactate (HALDOL) injection 5 mg  5 mg IntraMUSCular Q4H PRN    diphenhydrAMINE (BENADRYL) injection 50 mg  50 mg IntraMUSCular Q4H PRN    traZODone (DESYREL) tablet 50 mg  50 mg Oral Nightly PRN    magnesium hydroxide (MILK OF MAGNESIA) 400 MG/5ML suspension 30 mL  30 mL Oral Daily PRN    aluminum & magnesium hydroxide-simethicone (MAALOX) 200-200-20 MG/5ML suspension 30 mL  30 mL Oral Q6H PRN    amLODIPine (NORVASC) tablet 10 mg  10 mg 
       Psychiatric Progress Note      Patient: David Alfaro MRN: [id number]  SSN: [id number]    YOB: 1965  Age: 59 y.o.  Sex: male      Admit Date: 1/3/2025       Subjective:     David Alfaro stated he continues to have depressed mood.  He reported some improved the suicidal ideations.  Continues to have paranoid thoughts but improved compared to admission.  Denied any audiovisual hallucinations.  Denied any withdrawal symptoms.  Denied any homicidal ideations.  Motivated to go to rehab.    Objective:     Vitals:    01/05/25 1937 01/06/25 0600 01/06/25 0735 01/06/25 0858   BP:  129/85  129/85   Pulse:  (!) 105 91 91   Resp: 18 18 20    Temp:  98 °F (36.7 °C)     TempSrc:  Temporal     SpO2: 97% 99% 97%    Weight:       Height:            Mental Status Exam:     Appearance-fairly groomed  Alert, oriented to self and situation  Speech -normal rate, volume and rhythm  Mood-depressed  Affect- constricted  Thought process-linear and goal directed  Thought content-delusions   Thought perception-no auditory or visual hallucinations   No suicidal or homicidal ideations   Insight fair  Judgement fair     MEDICATIONS:  Current Facility-Administered Medications   Medication Dose Route Frequency    acetaminophen (TYLENOL) tablet 650 mg  650 mg Oral Q4H PRN    hydrOXYzine HCl (ATARAX) tablet 50 mg  50 mg Oral TID PRN    haloperidol (HALDOL) tablet 5 mg  5 mg Oral Q4H PRN    Or    haloperidol lactate (HALDOL) injection 5 mg  5 mg IntraMUSCular Q4H PRN    diphenhydrAMINE (BENADRYL) injection 50 mg  50 mg IntraMUSCular Q4H PRN    traZODone (DESYREL) tablet 50 mg  50 mg Oral Nightly PRN    magnesium hydroxide (MILK OF MAGNESIA) 400 MG/5ML suspension 30 mL  30 mL Oral Daily PRN    aluminum & magnesium hydroxide-simethicone (MAALOX) 200-200-20 MG/5ML suspension 30 mL  30 mL Oral Q6H PRN    risperiDONE (RISPERDAL) tablet 1 mg  1 mg Oral BID    lamoTRIgine (LAMICTAL) tablet 25 mg  25 mg Oral Daily    amLODIPine 
       Psychiatric Progress Note      Patient: David Alfaro MRN: [id number]  SSN: [id number]    YOB: 1965  Age: 59 y.o.  Sex: male      Admit Date: 1/3/2025       Subjective:     David Alfaro stated he is feeling better with his mood.  Feeling less depressed and anxious.  Continues to have low energy levels.  Attending groups and working coping skills.  Denied any suicidal or homicidal ideations.  Denied any side effects of medications.    Objective:     Vitals:    01/07/25 1657 01/07/25 1929 01/07/25 2026 01/08/25 0551   BP: 107/77   (!) 127/90   Pulse: 100 (!) 102 95 86   Resp:  18 16 16   Temp:   98 °F (36.7 °C) 97.1 °F (36.2 °C)   TempSrc:   Temporal Temporal   SpO2:  98% 97% 97%   Weight:       Height:            Mental Status Exam:     Appearance-fairly groomed  Alert, oriented to self and situation  Speech -normal rate, volume and rhythm  Mood-depressed  Affect- constricted  Thought process-linear and goal directed  Thought content-delusions   Thought perception-no auditory or visual hallucinations   No suicidal or homicidal ideations   Insight fair  Judgement fair     MEDICATIONS:  Current Facility-Administered Medications   Medication Dose Route Frequency    acetaminophen (TYLENOL) tablet 650 mg  650 mg Oral Q4H PRN    hydrOXYzine HCl (ATARAX) tablet 50 mg  50 mg Oral TID PRN    haloperidol (HALDOL) tablet 5 mg  5 mg Oral Q4H PRN    Or    haloperidol lactate (HALDOL) injection 5 mg  5 mg IntraMUSCular Q4H PRN    diphenhydrAMINE (BENADRYL) injection 50 mg  50 mg IntraMUSCular Q4H PRN    traZODone (DESYREL) tablet 50 mg  50 mg Oral Nightly PRN    magnesium hydroxide (MILK OF MAGNESIA) 400 MG/5ML suspension 30 mL  30 mL Oral Daily PRN    aluminum & magnesium hydroxide-simethicone (MAALOX) 200-200-20 MG/5ML suspension 30 mL  30 mL Oral Q6H PRN    risperiDONE (RISPERDAL) tablet 1 mg  1 mg Oral BID    lamoTRIgine (LAMICTAL) tablet 25 mg  25 mg Oral Daily    amLODIPine (NORVASC) tablet 10 
       Psychiatric Progress Note      Patient: David Alfaro MRN: [id number]  SSN: [id number]    YOB: 1965  Age: 59 y.o.  Sex: male      Admit Date: 1/3/2025       Subjective:     David Alfaro overall some improvement with his mood symptoms compared to admission.  Continues to have low energy levels and low motivation to things.  Denied feeling hopeless.  Denied any suicidal or homicidal ideations.  Attending some groups.  Reported good sleep and appetite.  Denied any side effects of medications.    Objective:     Vitals:    01/09/25 1008 01/09/25 1449 01/09/25 1721 01/09/25 2048   BP: 115/77 113/74 113/74    Pulse: 68 80 80    Resp:  17     Temp:  97.5 °F (36.4 °C)     TempSrc:  Temporal     SpO2:  96%  96%   Weight:       Height:            Mental Status Exam:     Appearance-fairly groomed  Alert, oriented to self and situation  Speech -normal rate, volume and rhythm  Mood-depressed  Affect- constricted  Thought process-linear and goal directed  Thought content-delusions   Thought perception-no auditory or visual hallucinations   No suicidal or homicidal ideations   Insight fair  Judgement fair     MEDICATIONS:  Current Facility-Administered Medications   Medication Dose Route Frequency    acetaminophen (TYLENOL) tablet 650 mg  650 mg Oral Q4H PRN    hydrOXYzine HCl (ATARAX) tablet 50 mg  50 mg Oral TID PRN    haloperidol (HALDOL) tablet 5 mg  5 mg Oral Q4H PRN    Or    haloperidol lactate (HALDOL) injection 5 mg  5 mg IntraMUSCular Q4H PRN    diphenhydrAMINE (BENADRYL) injection 50 mg  50 mg IntraMUSCular Q4H PRN    traZODone (DESYREL) tablet 50 mg  50 mg Oral Nightly PRN    magnesium hydroxide (MILK OF MAGNESIA) 400 MG/5ML suspension 30 mL  30 mL Oral Daily PRN    aluminum & magnesium hydroxide-simethicone (MAALOX) 200-200-20 MG/5ML suspension 30 mL  30 mL Oral Q6H PRN    risperiDONE (RISPERDAL) tablet 1 mg  1 mg Oral BID    lamoTRIgine (LAMICTAL) tablet 25 mg  25 mg Oral Daily    
       Psychiatric Progress Note      Patient: David Alfaro MRN: [id number]  SSN: [id number]    YOB: 1965  Age: 59 y.o.  Sex: male      Admit Date: 1/3/2025       Subjective:     David Alfaro  stated he felt dizzy yesterday after he stood up from the bed and had a fall.  Denied any episodes of confusion after that.  Stated he was able to remember what happened before the fall.  Denied any injury.  Denied any pain.  Denied any suicidal or homicidal ideations.  Ambulating using a walker.  Reported good sleep and appetite.  Denied any other side effects of medications.    Objective:     Vitals:    01/18/25 1624 01/18/25 2035 01/19/25 0602 01/19/25 0853   BP: 105/67  119/83 119/83   Pulse: 75  63 63   Resp: 16  18    Temp: 97.1 °F (36.2 °C)  97.1 °F (36.2 °C)    TempSrc: Temporal  Temporal    SpO2: 99% 98% 98%    Weight:       Height:            Mental Status Exam:     Appearance-fairly groomed  Alert, oriented to self and situation  Speech -normal rate, volume and rhythm  Mood-depressed  Affect- constricted  Thought process-linear and goal directed  Thought content-delusions   Thought perception-no auditory or visual hallucinations   No suicidal or homicidal ideations   Insight fair  Judgement fair     MEDICATIONS:  Current Facility-Administered Medications   Medication Dose Route Frequency    escitalopram (LEXAPRO) tablet 10 mg  10 mg Oral Daily    acetaminophen (TYLENOL) tablet 650 mg  650 mg Oral Q4H PRN    hydrOXYzine HCl (ATARAX) tablet 50 mg  50 mg Oral TID PRN    haloperidol (HALDOL) tablet 5 mg  5 mg Oral Q4H PRN    Or    haloperidol lactate (HALDOL) injection 5 mg  5 mg IntraMUSCular Q4H PRN    diphenhydrAMINE (BENADRYL) injection 50 mg  50 mg IntraMUSCular Q4H PRN    traZODone (DESYREL) tablet 50 mg  50 mg Oral Nightly PRN    magnesium hydroxide (MILK OF MAGNESIA) 400 MG/5ML suspension 30 mL  30 mL Oral Daily PRN    aluminum & magnesium hydroxide-simethicone (MAALOX) 200-200-20 
       Psychiatric Progress Note      Patient: David Alfaro MRN: [id number]  SSN: [id number]    YOB: 1965  Age: 59 y.o.  Sex: male      Admit Date: 1/3/2025       Subjective:     David Alfaro overall some improvement with his mood symptoms compared to admission.  Denied any suicidal or homicidal ideations.  Attending groups and working coping skills.  Denied any suicidal or homicidal ideations.  Motivated to go to rehab.    Objective:     Vitals:    01/15/25 0750 01/15/25 1945 01/16/25 0630 01/16/25 0740   BP: 134/83  102/76    Pulse: 72 86 81    Resp: 17 18 18    Temp: 97.3 °F (36.3 °C)  97.8 °F (36.6 °C)    TempSrc: Temporal  Temporal    SpO2: 98% 98% 97% 97%   Weight:       Height:            Mental Status Exam:     Appearance-fairly groomed  Alert, oriented to self and situation  Speech -normal rate, volume and rhythm  Mood-depressed  Affect- constricted  Thought process-linear and goal directed  Thought content-delusions   Thought perception-no auditory or visual hallucinations   No suicidal or homicidal ideations   Insight fair  Judgement fair     MEDICATIONS:  Current Facility-Administered Medications   Medication Dose Route Frequency    escitalopram (LEXAPRO) tablet 10 mg  10 mg Oral Daily    acetaminophen (TYLENOL) tablet 650 mg  650 mg Oral Q4H PRN    hydrOXYzine HCl (ATARAX) tablet 50 mg  50 mg Oral TID PRN    haloperidol (HALDOL) tablet 5 mg  5 mg Oral Q4H PRN    Or    haloperidol lactate (HALDOL) injection 5 mg  5 mg IntraMUSCular Q4H PRN    diphenhydrAMINE (BENADRYL) injection 50 mg  50 mg IntraMUSCular Q4H PRN    traZODone (DESYREL) tablet 50 mg  50 mg Oral Nightly PRN    magnesium hydroxide (MILK OF MAGNESIA) 400 MG/5ML suspension 30 mL  30 mL Oral Daily PRN    aluminum & magnesium hydroxide-simethicone (MAALOX) 200-200-20 MG/5ML suspension 30 mL  30 mL Oral Q6H PRN    risperiDONE (RISPERDAL) tablet 1 mg  1 mg Oral BID    amLODIPine (NORVASC) tablet 10 mg  10 mg Oral Daily 
       Psychiatric Progress Note      Patient: David Alfaro MRN: [id number]  SSN: [id number]    YOB: 1965  Age: 59 y.o.  Sex: male      Admit Date: 1/3/2025       Subjective:     David Alfaro stated he continues to have depressed  mood sometimes.  Some improvement compared to admission.  Denied any suicidal ideations.  Ambulating using walker.  Attending groups and work on coping skills.  Reported good sleep and appetite.  Motivated go to rehab.  Denied any side effects of medications.    Objective:     Vitals:    01/16/25 2014 01/17/25 0630 01/17/25 0908 01/17/25 1445   BP:  118/77  110/76   Pulse:  86 78 69   Resp:  18 16 18   Temp:  97.7 °F (36.5 °C)  97.1 °F (36.2 °C)   TempSrc:  Temporal  Temporal   SpO2: 97% 98% 100% 98%   Weight:       Height:            Mental Status Exam:     Appearance-fairly groomed  Alert, oriented to self and situation  Speech -normal rate, volume and rhythm  Mood-depressed  Affect- constricted  Thought process-linear and goal directed  Thought content-delusions   Thought perception-no auditory or visual hallucinations   No suicidal or homicidal ideations   Insight fair  Judgement fair     MEDICATIONS:  Current Facility-Administered Medications   Medication Dose Route Frequency    escitalopram (LEXAPRO) tablet 10 mg  10 mg Oral Daily    acetaminophen (TYLENOL) tablet 650 mg  650 mg Oral Q4H PRN    hydrOXYzine HCl (ATARAX) tablet 50 mg  50 mg Oral TID PRN    haloperidol (HALDOL) tablet 5 mg  5 mg Oral Q4H PRN    Or    haloperidol lactate (HALDOL) injection 5 mg  5 mg IntraMUSCular Q4H PRN    diphenhydrAMINE (BENADRYL) injection 50 mg  50 mg IntraMUSCular Q4H PRN    traZODone (DESYREL) tablet 50 mg  50 mg Oral Nightly PRN    magnesium hydroxide (MILK OF MAGNESIA) 400 MG/5ML suspension 30 mL  30 mL Oral Daily PRN    aluminum & magnesium hydroxide-simethicone (MAALOX) 200-200-20 MG/5ML suspension 30 mL  30 mL Oral Q6H PRN    risperiDONE (RISPERDAL) tablet 1 mg  1 
       Psychiatric Progress Note      Patient: David Alfaro MRN: [id number]  SSN: [id number]    YOB: 1965  Age: 59 y.o.  Sex: male      Admit Date: 1/3/2025       Subjective:     David Alfaro stated that he continues to have depressed mood sometimes.  Overall some improvement compared to admission.  Denied any suicidal or homicidal ideations.  Attending groups and working coping skills.  Motivated go to rehab.  Reported good sleep and appetite.    Objective:     Vitals:    01/13/25 1506 01/13/25 1941 01/14/25 0600 01/14/25 0918   BP: 92/67  122/89    Pulse: 93 81 70 91   Resp: 22 18 17 18   Temp: 98 °F (36.7 °C)  97.5 °F (36.4 °C)    TempSrc: Temporal  Temporal    SpO2: 99% 99% 99% 95%   Weight:       Height:            Mental Status Exam:     Appearance-fairly groomed  Alert, oriented to self and situation  Speech -normal rate, volume and rhythm  Mood-depressed  Affect- constricted  Thought process-linear and goal directed  Thought content-delusions   Thought perception-no auditory or visual hallucinations   No suicidal or homicidal ideations   Insight fair  Judgement fair     MEDICATIONS:  Current Facility-Administered Medications   Medication Dose Route Frequency    acetaminophen (TYLENOL) tablet 650 mg  650 mg Oral Q4H PRN    hydrOXYzine HCl (ATARAX) tablet 50 mg  50 mg Oral TID PRN    haloperidol (HALDOL) tablet 5 mg  5 mg Oral Q4H PRN    Or    haloperidol lactate (HALDOL) injection 5 mg  5 mg IntraMUSCular Q4H PRN    diphenhydrAMINE (BENADRYL) injection 50 mg  50 mg IntraMUSCular Q4H PRN    traZODone (DESYREL) tablet 50 mg  50 mg Oral Nightly PRN    magnesium hydroxide (MILK OF MAGNESIA) 400 MG/5ML suspension 30 mL  30 mL Oral Daily PRN    aluminum & magnesium hydroxide-simethicone (MAALOX) 200-200-20 MG/5ML suspension 30 mL  30 mL Oral Q6H PRN    risperiDONE (RISPERDAL) tablet 1 mg  1 mg Oral BID    lamoTRIgine (LAMICTAL) tablet 25 mg  25 mg Oral Daily    amLODIPine (NORVASC) tablet 
       Psychiatric Progress Note      Patient: David Alfaro MRN: [id number]  SSN: [id number]    YOB: 1965  Age: 59 y.o.  Sex: male      Admit Date: 1/3/2025       Subjective:     David Alfaro overall some improvement with his mood symptoms compared to admission.  Feeling less depressed and anxious.  Reported some chest discomfort.  Denied any other associated symptoms.  Denied any suicidal or homicidal ideations.  Attending groups.  Motivated go to rehab.    Objective:     Vitals:    01/17/25 1445 01/17/25 2021 01/18/25 0555 01/18/25 1624   BP: 110/76  115/77 105/67   Pulse: 69  77 75   Resp: 18  18 16   Temp: 97.1 °F (36.2 °C)  97.7 °F (36.5 °C) 97.1 °F (36.2 °C)   TempSrc: Temporal  Temporal Temporal   SpO2: 98% 98% 98% 99%   Weight:       Height:            Mental Status Exam:     Appearance-fairly groomed  Alert, oriented to self and situation  Speech -normal rate, volume and rhythm  Mood-depressed  Affect- constricted  Thought process-linear and goal directed  Thought content-delusions   Thought perception-no auditory or visual hallucinations   No suicidal or homicidal ideations   Insight fair  Judgement fair     MEDICATIONS:  Current Facility-Administered Medications   Medication Dose Route Frequency    escitalopram (LEXAPRO) tablet 10 mg  10 mg Oral Daily    acetaminophen (TYLENOL) tablet 650 mg  650 mg Oral Q4H PRN    hydrOXYzine HCl (ATARAX) tablet 50 mg  50 mg Oral TID PRN    haloperidol (HALDOL) tablet 5 mg  5 mg Oral Q4H PRN    Or    haloperidol lactate (HALDOL) injection 5 mg  5 mg IntraMUSCular Q4H PRN    diphenhydrAMINE (BENADRYL) injection 50 mg  50 mg IntraMUSCular Q4H PRN    traZODone (DESYREL) tablet 50 mg  50 mg Oral Nightly PRN    magnesium hydroxide (MILK OF MAGNESIA) 400 MG/5ML suspension 30 mL  30 mL Oral Daily PRN    aluminum & magnesium hydroxide-simethicone (MAALOX) 200-200-20 MG/5ML suspension 30 mL  30 mL Oral Q6H PRN    risperiDONE (RISPERDAL) tablet 1 mg  1 mg 
 EXAMINATION:        Pt. Appears Agitated, Cooperative, Needed Prompting, Negative, and Defensive.      Pt's speech is shows no evidence of impairment.     Pt's mood is depressed, irritable, and labile.      Pt.'s thinking istangentialLoose and Paranoid.      Pt's associations are Loose and Paranoid.     Pt.Convincingly exhibits  Negative.  suicidal ideation.      Pt. Exhibits Negative,  homicidal ideation      Pt's CSSR-S level is rated low.       Pt's judgement is age appropriate.      Pt. does not exhibit anxiety with  a good(>30min) Attention span.    Pt. Noted walking with steady gait in his room without walker.  When pt. Comes into hallway he utilizes walker, walking slowly, stating he can barely walk he needs a \"pain shot\" for his legs.  Pt. Was offered Tylenol, Pt. States he cannot take Tylenol, it does not help.  Pt. Was closing his eyes and hanging his head at the table, Not \"responding\" to tech's verbalization.  Upon writer entering room saying his name, pt. Opened his eyes looked at writer. Writer encouraged pt to use his walker and stand up to get to his room to rest for a while.  Writer at side in case of need for assistance.  Pt stood up without issue and walked with walker to his room.  At one point, pt stopped and moaned, and stated, \" I really need some strong pain meds for my legs\" writer explained again, he has Tylenol ordered, if he would like to try it,pt once again stated, \" no it does not help\".  Pt. Got into bed without issue.      BP (!) 140/88   Pulse 72   Temp 97.1 °F (36.2 °C) (Temporal)   Resp 18   Ht 1.88 m (6' 2\")   Wt 77.1 kg (170 lb)   SpO2 100%   BMI 21.83 kg/m²     NURSING INTERVENTIONS:  Nursing to administer medications to Pt, with monitoring and recording of compliance symptoms, and possible side effects as appropriate.        RESPONSE TO MEDICATION: Pt. Is   compliant with Medications.    PT. was engaged. Pt. Was  encouraged to participate in activities Showing  Fair 
 EXAMINATION:        Pt. Appears Neatly Groomed, Attentive, Cooperative, Motivated, and Caretaking.      Pt's speech is shows no evidence of impairment.     Pt's mood is euthymic.      Pt.'s thinking is unremarkableOrganized and Goal Directed.      Pt's associations are Organized.     Pt.Convincingly exhibits  Negative.  suicidal ideation.      Pt. Exhibits Negative,  homicidal ideation      Pt's CSSR-S level is rated low.       Pt's judgement is age appropriate.      Pt. does not exhibit anxiety with  a good(>30min) Attention span.    Pt. Up participating in groups, speaking with peers in room interacting. Calm, cooperative.        /80   Pulse 83   Temp 98.2 °F (36.8 °C) (Temporal)   Resp 16   Ht 1.88 m (6' 2\")   Wt 77.1 kg (170 lb)   SpO2 99%   BMI 21.83 kg/m²     NURSING INTERVENTIONS:  Nursing to administer medications to Pt, with monitoring and recording of compliance symptoms, and possible side effects as appropriate.        RESPONSE TO MEDICATION: Pt. Is   compliant with Medications.    PT. was engaged. Pt. Was  encouraged to participate in activities Showing  Good participation.      Emotional Support and encouragement were provided to Pt.  Pt's response to this intervention appeared to be effective.       Treatment plans up to date.              
 EXAMINATION:        Pt. Appears Neatly Groomed, Attentive, Cooperative, Motivated, and Enthusiastic.      Pt's speech is shows no evidence of impairment.     Pt's mood is euthymic.      Pt.'s thinking is unremarkableOrganized and Goal Directed.      Pt's associations are Organized and Goal Directed.     Pt.Convincingly exhibits  Negative.  suicidal ideation.      Pt. Exhibits Negative,  homicidal ideation      Pt's CSSR-S level is rated low.       Pt's judgement is good.      Pt. does not exhibit anxiety with  a good(>30min) Attention span.    Pt. States he is patiently waiting to go to a Rehab, he said he got news that he was denied to a couple due to insurance, but there are still a couple pending acceptance.  Pt. Is hoping he can go to one soon.      /76   Pulse 81   Temp 97.8 °F (36.6 °C) (Temporal)   Resp 18   Ht 1.88 m (6' 2\")   Wt 75.8 kg (167 lb)   SpO2 97%   BMI 21.44 kg/m²     NURSING INTERVENTIONS:  Nursing to administer medications to Pt, with monitoring and recording of compliance symptoms, and possible side effects as appropriate.        RESPONSE TO MEDICATION: Pt. Is   compliant with Medications.    PT. was engaged. Pt. Was  encouraged to participate in activities Showing  Good participation.      Emotional Support and encouragement were provided to Pt.  Pt's response to this intervention appeared to be effective.       Treatment plans up to date.              
 EXAMINATION:        Pt. Appears Neatly Groomed, Attentive, Cooperative, Motivated, and Enthusiastic.      Pt's speech is shows no evidence of impairment.     Pt's mood is within normal limits.      Pt.'s thinking is unremarkablePreoccupied and Goal Directed.      Pt's associations are Preoccupied and Goal Directed.     Pt.Convincingly exhibits  Negative.  suicidal ideation.      Pt. Exhibits Negative,  homicidal ideation      Pt's CSSR-S level is rated low.       Pt's judgement is good.      Pt. does not exhibit anxiety with  a good(>30min) Attention span.    Pt. Smiling, laughing, interacting with peers in a positive manner. Pt. States he slept well last night, participated in groups, denies complaints. Continues to walk with roller walker with steady gait and occasionally walks without walker with steady gait.      BP (!) 130/90   Pulse 85   Temp 97.7 °F (36.5 °C) (Temporal)   Resp 16   Ht 1.88 m (6' 2\")   Wt 77.1 kg (170 lb)   SpO2 99%   BMI 21.83 kg/m²     NURSING INTERVENTIONS:  Nursing to administer medications to Pt, with monitoring and recording of compliance symptoms, and possible side effects as appropriate.        RESPONSE TO MEDICATION: Pt. Is   compliant with Medications.    PT. was engaged. Pt. Was  encouraged to participate in activities Showing  Good participation.      Emotional Support and encouragement were provided to Pt.  Pt's response to this intervention appeared to be effective.       Treatment plans up to date.              
 EXAMINATION:        Pt. Appears Neatly Groomed, Attentive, Cooperative, and Motivated.      Pt's speech is shows no evidence of impairment.     Pt's mood is euthymic.      Pt.'s thinking is unremarkableOrganized and Goal Directed.      Pt's associations are Organized and Goal Directed.     Pt.Convincingly exhibits  Negative.  suicidal ideation.      Pt. Exhibits Negative,  homicidal ideation      Pt's CSSR-S level is rated low.       Pt's judgement is age appropriate.      Pt. does not exhibit anxiety with  a good(>30min) Attention span.    Pt. States he hopes the interview today goes well so he can go to a rehab.      /77   Pulse 78   Temp 97.7 °F (36.5 °C) (Temporal)   Resp 16   Ht 1.88 m (6' 2\")   Wt 75.8 kg (167 lb)   SpO2 100%   BMI 21.44 kg/m²     NURSING INTERVENTIONS:  Nursing to administer medications to Pt, with monitoring and recording of compliance symptoms, and possible side effects as appropriate.        RESPONSE TO MEDICATION: Pt. Is   compliant with Medications.    PT. was engaged. Pt. Was  encouraged to participate in activities Showing  Good participation.      Emotional Support and encouragement were provided to Pt.  Pt's response to this intervention appeared to be effective.       Treatment plans up to date.              
 EXAMINATION:        Pt. Appears Neatly Groomed, Attentive, Cooperative, and Needed Prompting.      Pt's speech is shows no evidence of impairment.     Pt's mood is labile.      Pt.'s thinking is unremarkablePreoccupied and Goal Directed.      Pt's associations are Preoccupied and Goal Directed.     Pt.Convincingly exhibits  Negative.  suicidal ideation.      Pt. Exhibits Negative,  homicidal ideation      Pt's CSSR-S level is rated low.       Pt's judgement is age appropriate.      Pt. does not exhibit anxiety with  a good(>30min) Attention span.    Pt. Up for breakfast quick this am, walking with roller walker, steady gait, noted on camera and observed by staff in day room with him, walking to sink at back of the room and back to his seat by door without walker, gait steady.  Pt. States he is ready for Rehab., and to get some help.      /71   Pulse 82   Temp 97.7 °F (36.5 °C) (Temporal)   Resp 17   Ht 1.88 m (6' 2\")   Wt 77.1 kg (170 lb)   SpO2 99%   BMI 21.83 kg/m²     NURSING INTERVENTIONS:  Nursing to administer medications to Pt, with monitoring and recording of compliance symptoms, and possible side effects as appropriate.        RESPONSE TO MEDICATION: Pt. Is   compliant with Medications.    PT. was engaged. Pt. Was  encouraged to participate in activities Showing  Good participation.      Emotional Support and encouragement were provided to Pt.  Pt's response to this intervention appeared to be effective.       Treatment plans up to date.              
 EXAMINATION:        Pt. Appears Neatly Groomed, Attentive, Cooperative, and Negative.      Pt's speech is shows no evidence of impairment.     Pt's mood is labile.      Pt.'s thinking iscircumstantialPreoccupied.      Pt's associations are Preoccupied.     Pt.Convincingly exhibits  Negative.  suicidal ideation.      Pt. Exhibits Negative,  homicidal ideation      Pt's CSSR-S level is rated low.       Pt's judgement is age appropriate.      Pt. does not exhibit anxiety with  a good(>30min) Attention span.    Pt. Ambulates with a roller walker with steady gait, to bed then into bed independently, resting quietly at this time.      BP (!) 127/90   Pulse 86   Temp 97.1 °F (36.2 °C) (Temporal)   Resp 16   Ht 1.88 m (6' 2\")   Wt 77.1 kg (170 lb)   SpO2 97%   BMI 21.83 kg/m²     NURSING INTERVENTIONS:  Nursing to administer medications to Pt, with monitoring and recording of compliance symptoms, and possible side effects as appropriate.        RESPONSE TO MEDICATION: Pt. Is   compliant with Medications.    PT. was engaged. Pt. Was  encouraged to participate in activities Showing  Fair participation.      Emotional Support and encouragement were provided to Pt.  Pt's response to this intervention appeared to be effective.       Treatment plans up to date.              
Pt rested well tonight with no complaints voiced or problmes noted on rounds. Safe on unit will continue to monitor.  
Pt rested well with no complaints voiced and no problems noted on rounds. Safe on unit will continue to monitor.  
Pt slept well with no complaints voiced and no problmes noted on rounds. Safe on unit will continue to monitor.  
oriented.          Active Medications           Current Facility-Administered Medications   Medication Dose Route Frequency    escitalopram (LEXAPRO) tablet 10 mg  10 mg Oral Daily    acetaminophen (TYLENOL) tablet 650 mg  650 mg Oral Q4H PRN    hydrOXYzine HCl (ATARAX) tablet 50 mg  50 mg Oral TID PRN    haloperidol (HALDOL) tablet 5 mg  5 mg Oral Q4H PRN    Or    haloperidol lactate (HALDOL) injection 5 mg  5 mg IntraMUSCular Q4H PRN    diphenhydrAMINE (BENADRYL) injection 50 mg  50 mg IntraMUSCular Q4H PRN    traZODone (DESYREL) tablet 50 mg  50 mg Oral Nightly PRN    magnesium hydroxide (MILK OF MAGNESIA) 400 MG/5ML suspension 30 mL  30 mL Oral Daily PRN    aluminum & magnesium hydroxide-simethicone (MAALOX) 200-200-20 MG/5ML suspension 30 mL  30 mL Oral Q6H PRN    amLODIPine (NORVASC) tablet 10 mg  10 mg Oral Daily    aspirin EC tablet 81 mg  81 mg Oral Daily    carvedilol (COREG) tablet 3.125 mg  3.125 mg Oral BID WC    budesonide-formoterol (SYMBICORT) 160-4.5 MCG/ACT inhaler 2 puff  2 puff Inhalation BID RT    isosorbide dinitrate (ISORDIL) tablet 5 mg  5 mg Oral TID    lactulose (CHRONULAC) 10 GM/15ML solution 20 g  20 g Oral BID    levETIRAcetam (KEPPRA) tablet 500 mg  500 mg Oral BID    magnesium oxide (MAG-OX) tablet 400 mg  400 mg Oral Daily    montelukast (SINGULAIR) tablet 10 mg  10 mg Oral Nightly    therapeutic multivitamin-minerals 1 tablet  1 tablet Oral Daily    pantoprazole (PROTONIX) tablet 40 mg  40 mg Oral QAM AC    spironolactone (ALDACTONE) tablet 25 mg  25 mg Oral Daily    tamsulosin (FLOMAX) capsule 0.4 mg  0.4 mg Oral Daily    albuterol sulfate HFA (PROVENTIL;VENTOLIN;PROAIR) 108 (90 Base) MCG/ACT inhaler 2 puff  2 puff Inhalation Q4H PRN    cetirizine (ZYRTEC) tablet 10 mg  10 mg Oral Daily         Allergies         Codeine and Penicillins     Labs/Imaging/Diagnostics      Labs:  No results found for this or any previous visit (from the past 48 hour(s)).     Imaging:  No results found.

## 2025-01-21 NOTE — GROUP NOTE
Group Therapy Note    Date: 1/20/2025    Group Start Time: 2000  Group End Time: 2045  Group Topic: Wrap-Up    [facility] BSMART    Ajith Angelo        Group Therapy Note    Attendees: 4         Patient's Goal:  n/a    Notes:  happy    Status After Intervention:  Improved    Participation Level: Active Listener    Participation Quality: Supportive      Speech:  normal      Thought Process/Content: Logical      Affective Functioning: Congruent      Mood:  happy      Level of consciousness:  Alert      Response to Learning: Able to verbalize current knowledge/experience      Endings: None Reported    Modes of Intervention: Socialization      Discipline Responsible: BehavEvino Tech      Signature:  Ajiht Angelo

## 2025-01-22 ASSESSMENT — ENCOUNTER SYMPTOMS
RESPIRATORY NEGATIVE: 1
GASTROINTESTINAL NEGATIVE: 1

## 2025-01-22 NOTE — BSMART NOTE
BSMART assessment completed, and suicide risk level noted to be LOW RISK. Primary Nurse Sindy  and Physician Dr. Gaspar notified. Concerns not observed.

## 2025-01-22 NOTE — ED TRIAGE NOTES
Patient arrives from rehab for suicidal ideation and homicidal ideation. Patient was discharged from [facility] earlier today and per the patient was supposed to check into rehab today but for reasons unclear did not stay. Patient rambling in triage and difficult to redirect. Patient expressing frustration with the aforementioned situation.

## 2025-01-22 NOTE — ED NOTES
Patient provided with a warm blanket and pillow.  Repositioned patient and dimmed lights.  Patient pleasant and said he was going to rest.  No other needs at this time.

## 2025-01-22 NOTE — ED NOTES
[facility] EMERGENCY DEPARTMENT  EMERGENCY DEPARTMENT ENCOUNTER      Pt Name: David Alfaro  MRN: [id number]  Birthdate 1965  Date of evaluation: 1/21/2025  Provider: Alberto Gaspar MD  7:32 AM    CHIEF COMPLAINT       Chief Complaint   Patient presents with    Suicidal    Homicidal         HISTORY OF PRESENT ILLNESS    David Alfaro is a 59 y.o. male with history of depression, psychosis, and substance use disorder, who presents to the emergency department with complaint of depression and frustration.  He was discharged from [facility] this morning and sent to rehab.  Supposedly, he was turned away at rehab because he uses a walker which is not accepted at rehab.  He is frustrated and irate that he was turned away at the door.  Patient says he is depressed because of his situation.  He has an apartment in this town, and lives alone.    HPI    Nursing Notes were reviewed.    REVIEW OF SYSTEMS       Review of Systems   Constitutional: Negative.    HENT: Negative.     Respiratory: Negative.     Cardiovascular: Negative.    Gastrointestinal: Negative.    Psychiatric/Behavioral:  Positive for dysphoric mood.        Except as noted above the remainder of the review of systems was reviewed and negative.       PAST MEDICAL HISTORY     Past Medical History:   Diagnosis Date    Benign prostatic hyperplasia with lower urinary tract symptoms 03/15/2024    Cerebral artery occlusion with cerebral infarction ([facility])     Closed displaced fracture of base of fifth metacarpal bone of right hand 12/13/2022    Cocaine abuse ([facility]) 12/25/2023    Colostomy and enterostomy malfunction ([facility]) 06/15/2022    COPD (chronic obstructive pulmonary disease) ([facility]CC)     Essential hypertension     ARDEN (generalized anxiety disorder)     GERD (gastroesophageal reflux disease)     History of cerebrovascular accident (CVA) with residual deficit 09/01/2024    Mild depressed bipolar 1 disorder ([facility]) 10/13/2020    Mixed hyperlipidemia     DELTA on

## 2025-01-22 NOTE — BSMART NOTE
Comprehensive Assessment Form Part 1      Section I - Disposition    Primary Diagnosis: Mild Depressed Bipolar 1 Disorder per chart review.       Past Medical History:   Diagnosis Date    Benign prostatic hyperplasia with lower urinary tract symptoms 03/15/2024    Cerebral artery occlusion with cerebral infarction (HCC)     Closed displaced fracture of base of fifth metacarpal bone of right hand 12/13/2022    Cocaine abuse (HCC) 12/25/2023    Colostomy and enterostomy malfunction (HCC) 06/15/2022    COPD (chronic obstructive pulmonary disease) (HCC)     Essential hypertension     ARDEN (generalized anxiety disorder)     GERD (gastroesophageal reflux disease)     History of cerebrovascular accident (CVA) with residual deficit 09/01/2024    Mild depressed bipolar 1 disorder (HCC) 10/13/2020    Mixed hyperlipidemia     DELTA on CPAP     Seizures (HCC)     Severe recurrent major depression without psychotic features (HCC) 07/04/2023    Ventral hernia without obstruction or gangrene 12/23/2023        The Medical Doctor to Psychiatrist conference was not completed.  The Medical Doctor is in agreement with BSMART clinician because patient does not meet criteria for admission at this time.    The plan is discharge with recommendation of case management support.  The on-call Psychiatrist consulted was Dr. PALAFOX.  The admitting Psychiatrist will be Dr. BARKLEY/ROSAS.  The admitting Diagnosis is N/A.  The Payor source is Medicaid.      This writer reviewed the [place] Suicide Severity Rating Scale in nursing flowsheet and the risk level assigned is LOW risk.  Based on this assessment, the risk of suicide is LOW risk and the plan is discharge with recommendation of case management follow up.    BSMART assessment completed, and suicide risk level noted to be LOW RISK. Primary Nurse Sindy  and Physician Dr. Gaspar notified. Concerns not observed.     Section II - Integrated Summary  Summary:  Per triage, \"Patient arrives from rehab for suicidal

## 2025-01-22 NOTE — ED NOTES
[facility] contacted and reporting that they will call back when available for an interview with the patient.

## 2025-01-23 NOTE — BH NOTE
EXAMINATION:        Pt. Appears Agitated, Attentive, Cooperative, and Negative.      Pt's speech is shows no evidence of impairment.     Pt's mood is labile.      Pt.'s thinking is tangential.      Pt's associations are Paranoid.     Pt.Convincingly exhibits  Negative.  suicidal ideation.      Pt. Exhibits Negative,  homicidal ideation      Pt's CSSR-S level is rated low.       Pt's judgement is age appropriate.      Pt. does not exhibit anxiety with  a good(>30min) Attention span.      BP (!) 140/88   Pulse 72   Temp 97.1 °F (36.2 °C) (Temporal)   Resp 18   Ht 1.88 m (6' 2\")   Wt 77.1 kg (170 lb)   SpO2 100%   BMI 21.83 kg/m²     NURSING INTERVENTIONS:  Nursing to administer medications to Pt, with monitoring and recording of compliance symptoms, and possible side effects as appropriate.        RESPONSE TO MEDICATION: Pt. Is   compliant with Medications.    PT. was engaged. Pt. Was  encouraged to participate in activities Showing  Good participation.      Emotional Support and encouragement were provided to Pt.  Pt's response to this intervention appeared to be effective.       Treatment plans up to date.    Pt expressed not being able to deal with the pain in his legs and still refusing any OTC. Pt said he usually gets pain injections from [facility] or [facility]. Pt explained that he still doesn't trust anyone but knows he can't live alone.              
 EXAMINATION:        Pt. Appears Attentive and Cooperative.      Pt's speech is shows no evidence of impairment.     Pt's mood is sad.      Pt.'s thinking is tangential and Paranoid.      Pt's associations are Paranoid and Preoccupied.     Pt.Convincingly exhibits  Negative.  suicidal ideation.      Pt. Exhibits Negative,  homicidal ideation      Pt's CSSR-S level is rated low.       Pt's judgement is fair.      Pt. does exhibit anxiety with  a fair(15-30min) Attention span.      BP (!) 152/90   Pulse 67   Temp 97.1 °F (36.2 °C) (Temporal)   Resp 18   Ht 1.88 m (6' 2\")   Wt 77.1 kg (170 lb)   SpO2 99%   BMI 21.83 kg/m²     NURSING INTERVENTIONS:  Nursing to administer medications to Pt, with monitoring and recording of compliance symptoms, and possible side effects as appropriate.        RESPONSE TO MEDICATION: Pt. Is   compliant with Medications.    PT. was engaged. Pt. Was  encouraged to participate in activities Showing  Good participation.      Emotional Support and encouragement were provided to Pt.  Pt's response to this intervention appeared to be effective.       Treatment plans up to date.    Pt sitting on side of expressing that he felt confused and very tired. Pt said living in his apartment which is unsafe because the landlord doesn't fix anything and that he has been to court for payment and was told he didn't have to pay. Pt explained not having any support and that his family talks about him because he can hear it through the walls, pt said he stays in his room because he doesn't trust his family and really don't like people but afraid to be alone. Pt expressed all his family wants is his money and he blearily have enough to last throughout the mouth. Pt starts crying and said he has had multiple OD because life is that bad. Advised pt to let his psych meds get in his system, rest and process with staff so we can help him because drugs are the way. Pt took his night medication with no problems 
 EXAMINATION:        Pt. Appears Disheveled and Cooperative.      Pt's speech is shows no evidence of impairment.     Pt's mood is depressed.      Pt.'s thinking is Paranoid and Preoccupied.      Pt's associations are Paranoid and Preoccupied.     Pt.Convincingly exhibits  Negative.  suicidal ideation.      Pt. Exhibits Negative,  homicidal ideation      Pt's CSSR-S level is rated low.       Pt's judgement is limited.      Pt. does not exhibit anxiety with  a good(>30min) Attention span.      BP (!) 157/91   Pulse (!) 118   Temp 98.7 °F (37.1 °C) (Temporal)   Resp 18   Ht 1.88 m (6' 2\")   Wt 77.1 kg (170 lb)   SpO2 97%   BMI 21.83 kg/m²     NURSING INTERVENTIONS:  Nursing to administer medications to Pt, with monitoring and recording of compliance symptoms, and possible side effects as appropriate.        RESPONSE TO MEDICATION: Pt. Is   compliant with Medications.    PT. was engaged. Pt. Was  encouraged to participate in activities Showing  Fair participation.      Emotional Support and encouragement were provided to Pt.  Pt's response to this intervention appeared to be effective.       Treatment plans up to date.    Pt remains irritable and focus on pain medication. Still believes his family is talking about him and not supportive.             
 EXAMINATION:        Pt. Appears Disheveled, Attentive, and Cooperative.      Pt's speech is shows no evidence of impairment.     Pt's mood is calm and content.      Pt.'s thinking is circumstantial at times.      Pt's associations are Preoccupied.     Pt.Convincingly exhibits  Negative.  suicidal ideation.      Pt. Exhibits Negative,  homicidal ideation      Pt's CSSR-S level is rated no risk.       Pt's judgement is age appropriate.      Pt. does not exhibit anxiety with  a good(>30min) Attention span.     Pt noted with poor hygiene and encourageexpressed feeling a lot better. Has decided to go to Rehab. Still feels his family isn't supportive.       /74   Pulse 80   Temp 97.5 °F (36.4 °C) (Temporal)   Resp 17   Ht 1.88 m (6' 2\")   Wt 77.1 kg (170 lb)   SpO2 96%   BMI 21.83 kg/m²     NURSING INTERVENTIONS:  Nursing to administer medications to Pt, with monitoring and recording of compliance symptoms, and possible side effects as appropriate.        RESPONSE TO MEDICATION: Pt. Is   compliant with Medications.    PT. was engaged. Pt. Was  encouraged to participate in activities Showing  Good participation.      Emotional Support and encouragement were provided to Pt.  Pt's response to this intervention appeared to be effective.       Treatment plans up to date.             
 EXAMINATION:        Pt. Appears Disheveled, Attentive, and Cooperative.      Pt's speech is shows no evidence of impairment.     Pt's mood is logical.      Pt.'s thinking is circumstantial.      Pt's associations are Preoccupied.     Pt.Convincingly exhibits  Negative.  suicidal ideation.      Pt. Exhibits Negative,  homicidal ideation      Pt's CSSR-S level is rated no risk.       Pt's judgement is age appropriate.      Pt. does not exhibit anxiety with  a good(>30min) Attention span.     Pt expressed to writer that he didn't have a great morning but feeling better now. Said he is willing to go to rehab because he doesn't want to go to residential, said he was too old.    /65   Pulse 95   Temp 97.8 °F (36.6 °C) (Temporal)   Resp 20   Ht 1.88 m (6' 2\")   Wt 77.1 kg (170 lb)   SpO2 98%   BMI 21.83 kg/m²     NURSING INTERVENTIONS:  Nursing to administer medications to Pt, with monitoring and recording of compliance symptoms, and possible side effects as appropriate.        RESPONSE TO MEDICATION: Pt. Is   compliant with Medications.    PT. was engaged. Pt. Was  encouraged to participate in activities Showing  Good participation.      Emotional Support and encouragement were provided to Pt.  Pt's response to this intervention appeared to be effective.       Treatment plans up to date.             
 EXAMINATION:        Pt. Appears Neatly Groomed and Cooperative.      Pt's speech is is soft.     Pt's mood is anxious and labile.      Pt.'s thinking iscircumstantialOrganized and Goal Directed.      Pt's associations are Negativistic and Other .     Pt.Convincingly exhibits  Negative.  suicidal ideation and homicidal ideation.      Pt. Exhibits Negative,  suicidal ideation and homicidal ideation      Pt's CSSR-S level is rated low.       Pt's judgement is age appropriate and fair.      Pt. does exhibit anxiety with  a fair(15-30min) Attention span.      /83   Pulse 72   Temp 97.3 °F (36.3 °C) (Temporal)   Resp 17   Ht 1.88 m (6' 2\")   Wt 75.8 kg (167 lb)   SpO2 98%   BMI 21.44 kg/m²     NURSING INTERVENTIONS:  Nursing to administer medications to Pt, with monitoring and recording of compliance symptoms, and possible side effects as appropriate.        RESPONSE TO MEDICATION: Pt. Is   compliant with Medications.    PT. was engaged. Pt. Was  encouraged to participate in activities Showing  Good participation.      Emotional Support and encouragement were provided to Pt.  Pt's response to this intervention appeared to be effective.       Treatment plans up to date.             
 EXAMINATION:        Pt. Appears Neatly Groomed and Cooperative.      Pt's speech is is soft.     Pt's mood is anxious and labile.      Pt.'s thinking iscircumstantialPreoccupied and Goal Directed.      Pt's associations are Preoccupied and Organized.     Pt.Convincingly exhibits  Negative.  suicidal ideation and homicidal ideation.      Pt. Exhibits Negative,  homicidal ideation      Pt's CSSR-S level is rated low.       Pt's judgement is age appropriate and good.      Pt. does exhibit anxiety with  a fair(15-30min) Attention span.      /64   Pulse 84   Temp 97.7 °F (36.5 °C) (Temporal)   Resp 20   Ht 1.88 m (6' 2\")   Wt 75.8 kg (167 lb)   SpO2 99%   BMI 21.44 kg/m²     NURSING INTERVENTIONS:  Nursing to administer medications to Pt, with monitoring and recording of compliance symptoms, and possible side effects as appropriate.        RESPONSE TO MEDICATION: Pt. Is   compliant with Medications.    PT. was engaged. Pt. Was  encouraged to participate in activities Showing  Good participation.      Emotional Support and encouragement were provided to Pt.  Pt's response to this intervention appeared to be effective.       Treatment plans up to date.             
 EXAMINATION:        Pt. Appears Neatly Groomed and Cooperative.      Pt's speech is is soft.     Pt's mood is labile and within normal limits.      Pt.'s thinking iscircumstantialOrganized and Goal Directed.      Pt's associations are Organized and Goal Directed.     Pt.Convincingly exhibits  Negative.  suicidal ideation and homicidal ideation.      Pt. Exhibits Negative,  homicidal ideation      Pt's CSSR-S level is rated low.       Pt's judgement is age appropriate and fair.      Pt. does not exhibit anxiety with  a fair(15-30min) Attention span.      BP 92/67   Pulse 93   Temp 98 °F (36.7 °C) (Temporal)   Resp 22   Ht 1.88 m (6' 2\")   Wt 75.8 kg (167 lb)   SpO2 99%   BMI 21.44 kg/m²     NURSING INTERVENTIONS:  Nursing to administer medications to Pt, with monitoring and recording of compliance symptoms, and possible side effects as appropriate.        RESPONSE TO MEDICATION: Pt. Is   compliant with Medications.    PT. was engaged. Pt. Was  encouraged to participate in activities Showing  Good participation.      Emotional Support and encouragement were provided to Pt.  Pt's response to this intervention appeared to be effective.       Treatment plans up to date.             
 EXAMINATION:        Pt. Appears Neatly Groomed and Cooperative.      Pt's speech is shows no evidence of impairment.     Pt's mood is within normal limits.      Pt.'s thinking isOrganized     Pt's associations are Goal Directed.     Pt.Convincingly exhibits  Negative  Suicidal Ideation.      Pt. Exhibits Negative,  homicidal ideation      Pt's CSSR-S level is rated low.       Pt's judgement is fair.      Pt. does not exhibit anxiety with  a fair(15-30min) Attention span.      BP (!) 130/91   Pulse 73   Temp 97.8 °F (36.6 °C) (Temporal)   Resp 18   Ht 1.88 m (6' 2\")   Wt 75.8 kg (167 lb)   SpO2 96%   BMI 21.44 kg/m²     NURSING INTERVENTIONS:  Nursing to administer medications to Pt, with monitoring and recording of compliance symptoms, and possible side effects as appropriate.        RESPONSE TO MEDICATION: Pt. Is   compliant with Medications.    PT. was engaged. Pt. Was  encouraged to participate in activities Showing  Poor participation.      Emotional Support and encouragement were provided to Pt.  Pt's response to this intervention appeared to be somewhat effective.       Treatment plans up to date.             
 EXAMINATION:        Pt. Appears Neatly Groomed, Attentive, Cooperative, and Motivated.      Pt's speech is shows no evidence of impairment.     Pt's mood is euthymic.      Pt.'s thinking is remarkable and Organized.      Pt's associations are Organized and Goal Directed.     Pt.Convincingly exhibits  Negative.  suicidal ideation.      Pt. Exhibits Negative,  homicidal ideation      Pt's CSSR-S level is rated no risk.       Pt's judgement is good.      Pt. does not exhibit anxiety with  a good(>30min) Attention span.      BP (!) 87/52   Pulse 69   Temp 98 °F (36.7 °C) (Temporal)   Resp 16   Ht 1.88 m (6' 2\")   Wt 75.8 kg (167 lb)   SpO2 99%   BMI 21.44 kg/m²     NURSING INTERVENTIONS:  Nursing to administer medications to Pt, with monitoring and recording of compliance symptoms, and possible side effects as appropriate.        RESPONSE TO MEDICATION: Pt. Is   compliant with Medications.    PT. was engaged. Pt. Was  encouraged to participate in activities Showing  Good participation.      Emotional Support and encouragement were provided to Pt.  Pt's response to this intervention appeared to be effective.       Treatment plans up to date.             
 EXAMINATION:        Pt. Appears Neatly Groomed, Attentive, Cooperative, and Motivated.      Pt's speech is shows no evidence of impairment.     Pt's mood is euthymic.      Pt.'s thinking is unremarkable and Organized.      Pt's associations are Organized and Goal Directed.     Pt.Convincingly exhibits  Negative.  suicidal ideation.      Pt. Exhibits Negative,  homicidal ideation      Pt's CSSR-S level is rated no risk.       Pt's judgement is age appropriate.      Pt. does not exhibit anxiety with  a good(>30min) Attention span.      /77   Pulse 77   Temp 97.7 °F (36.5 °C) (Temporal)   Resp 18   Ht 1.88 m (6' 2\")   Wt 75.8 kg (167 lb)   SpO2 98%   BMI 21.44 kg/m²     NURSING INTERVENTIONS:  Nursing to administer medications to Pt, with monitoring and recording of compliance symptoms, and possible side effects as appropriate.        RESPONSE TO MEDICATION: Pt. Is   compliant with Medications.    PT. was engaged. Pt. Was  encouraged to participate in activities Showing  Good participation.      Emotional Support and encouragement were provided to Pt.  Pt's response to this intervention appeared to be effective.       Treatment plans up to date.              
 EXAMINATION:        Pt. Appears Neatly Groomed, Attentive, Cooperative, and Motivated.      Pt's speech is shows no evidence of impairment.     Pt's mood is euthymic.      Pt.'s thinking is unremarkable and Organized.      Pt's associations are Organized and Goal Directed.     Pt.Convincingly exhibits  Negative.  suicidal ideation.      Pt. Exhibits Negative,  homicidal ideation      Pt's CSSR-S level is rated no risk.       Pt's judgement is age appropriate.      Pt. does not exhibit anxiety with  a good(>30min) Attention span.     Pt noted smiling and laughing. Excited about Tuesday with being accepted into Rehab. Pt said \"I didn't think it was going to happen\"       /76   Pulse 69   Temp 97.1 °F (36.2 °C) (Temporal)   Resp 18   Ht 1.88 m (6' 2\")   Wt 75.8 kg (167 lb)   SpO2 98%   BMI 21.44 kg/m²     NURSING INTERVENTIONS:  Nursing to administer medications to Pt, with monitoring and recording of compliance symptoms, and possible side effects as appropriate.        RESPONSE TO MEDICATION: Pt. Is   compliant with Medications.    PT. was engaged. Pt. Was  encouraged to participate in activities Showing  Good participation.      Emotional Support and encouragement were provided to Pt.  Pt's response to this intervention appeared to be effective.       Treatment plans up to date.             
 EXAMINATION:        Pt. Appears Neatly Groomed, Attentive, Cooperative, and Motivated.      Pt's speech shows no evidence of impairment.     Pt's mood is sad.      Pt.'s thinking is logical and linear.      Pt's associations are Organized and Goal Directed.     Pt.Convincingly exhibits  Negative.  suicidal ideation.      Pt. Exhibits Negative,  homicidal ideation      Pt's CSSR-S level is rated low.       Pt's judgement is good.      Pt. does not exhibit anxiety with  a good(>30min) Attention span.      /83   Pulse 63   Temp 97.1 °F (36.2 °C) (Temporal)   Resp 18   Ht 1.88 m (6' 2\")   Wt 75.8 kg (167 lb)   SpO2 98%   BMI 21.44 kg/m²     NURSING INTERVENTIONS:  Nursing to administer medications to Pt, with monitoring and recording of compliance symptoms, and possible side effects as appropriate.        RESPONSE TO MEDICATION: Pt. Is   compliant with Medications.    PT. was engaged. Pt. Was  encouraged to participate in activities Showing  Good participation.      Emotional Support and encouragement were provided to Pt.  Pt's response to this intervention appeared to be effective.       Treatment plans up to date.    Pt states,\"I feel good right now. I just feel sad sometimes. I'm going to be focused when I get to rehab. I am ready.\"     Writer encouraged pt to get a therapist who can help him work through his emotions and have a better understanding of his feelings. Pt verbalized agreement and understanding.           
 EXAMINATION:        Pt. Appears Neatly Groomed, Attentive, Cooperative, and Negative.      Pt's speech is shows no evidence of impairment.     Pt's mood is labile.      Pt.'s thinking is Negative and Preoccupied.      Pt's associations are Preoccupied.     Pt.Convincingly exhibits  Negative.  suicidal ideation.      Pt. Exhibits Negative,  homicidal ideation      Pt's CSSR-S level is rated no risk.       Pt's judgement is age appropriate and fair due to thinking negative about several places denying him for Rehab. Explained this is a normal process. Pt said he has to have another interview tomorrow because he couldn't hear the person on the phone today with [facility].      Pt. does not exhibit anxiety with  a good(>30min) Attention span.      /64   Pulse 96   Temp 97.7 °F (36.5 °C) (Temporal)   Resp 18   Ht 1.88 m (6' 2\")   Wt 75.8 kg (167 lb)   SpO2 98%   BMI 21.44 kg/m²     NURSING INTERVENTIONS:  Nursing to administer medications to Pt, with monitoring and recording of compliance symptoms, and possible side effects as appropriate.        RESPONSE TO MEDICATION: Pt. Is   compliant with Medications.    PT. was not engaged. Pt. Was  encouraged to participate in activities Showing  Fair participation.      Emotional Support and encouragement were provided to Pt.  Pt's response to this intervention appeared to be somewhat effective.       Treatment plans up to date.             
 EXAMINATION:        Pt. Appears Neatly Groomed, Attentive, and Cooperative.      Pt's speech is is loud.     Pt's mood is labile and within normal limits.      Pt.'s thinking iscircumstantialOrganized and Goal Directed.      Pt's associations are Organized and Goal Directed.     Pt.Convincingly exhibits  Negative.  suicidal ideation and homicidal ideation.      Pt. Exhibits Negative,  suicidal ideation and homicidal ideation      Pt's CSSR-S level is rated low.       Pt's judgement is age appropriate and good.      Pt. does not exhibit anxiety with  a good(>30min) Attention span.      /87   Pulse 64   Temp 97 °F (36.1 °C)   Resp 16   Ht 1.88 m (6' 2\")   Wt 75.8 kg (167 lb)   SpO2 99%   BMI 21.44 kg/m²     NURSING INTERVENTIONS:  Nursing to administer medications to Pt, with monitoring and recording of compliance symptoms, and possible side effects as appropriate.        RESPONSE TO MEDICATION: Pt. Is   compliant with Medications.    PT. was engaged. Pt. Was  encouraged to participate in activities Showing  Good participation.      Emotional Support and encouragement were provided to Pt.  Pt's response to this intervention appeared to be effective.       Treatment plans up to date.             
 EXAMINATION:        Pt. Appears Neatly Groomed, Attentive, and Cooperative.      Pt's speech is is soft.     Pt's mood is labile and within normal limits.      Pt.'s thinking iscircumstantialOrganized and Goal Directed.      Pt's associations are Organized and Goal Directed.     Pt.Convincingly exhibits  Negative.  suicidal ideation and homicidal ideation.      Pt. Exhibits Negative,  suicidal ideation and homicidal ideation      Pt's CSSR-S level is rated low.       Pt's judgement is age appropriate and good.      Pt. does not exhibit anxiety with  a good(>30min) Attention span.      BP (!) 90/58   Pulse 64   Temp 97.2 °F (36.2 °C) (Temporal)   Resp 18   Ht 1.88 m (6' 2\")   Wt 75.8 kg (167 lb)   SpO2 99%   BMI 21.44 kg/m²     NURSING INTERVENTIONS:  Nursing to administer medications to Pt, with monitoring and recording of compliance symptoms, and possible side effects as appropriate.        RESPONSE TO MEDICATION: Pt. Is   compliant with Medications.    PT. was engaged. Pt. Was  encouraged to participate in activities Showing  Good participation.      Emotional Support and encouragement were provided to Pt.  Pt's response to this intervention appeared to be effective.       Treatment plans up to date.             
 EXAMINATION:        Pt. Appears Neatly Groomed, Attentive, and Cooperative.      Pt's speech is shows no evidence of impairment.     Pt's mood is anxious.      Pt.'s thinking is unremarkable. Preoccupied.      Pt's associations are Preoccupied.     Pt.Convincingly exhibits  Negative.  suicidal ideation.      Pt. Exhibits Negative,  homicidal ideation      Pt's CSSR-S level is rated no risk.       Pt's judgement is age appropriate.      Pt. does exhibit anxiety with  a fair(15-30min) Attention span.     Pt noted anxious and worried due to being told by one Rehab that they would accept him. Explained, that is normal and not to get worry. Explained that his therapist sent clinical to several places and he has to trust the process. Pt said \" I should just call my PO and just prepare for group home\" Advised pt that we are going to encourage positivity.      BP 92/67   Pulse 81   Temp 98 °F (36.7 °C) (Temporal)   Resp 18   Ht 1.88 m (6' 2\")   Wt 75.8 kg (167 lb)   SpO2 99%   BMI 21.44 kg/m²     NURSING INTERVENTIONS:  Nursing to administer medications to Pt, with monitoring and recording of compliance symptoms, and possible side effects as appropriate.        RESPONSE TO MEDICATION: Pt. Is   compliant with Medications.    PT. was engaged. Pt. Was  encouraged to participate in activities Showing  Good participation.      Emotional Support and encouragement were provided to Pt.  Pt's response to this intervention appeared to be effective.       Treatment plans up to date.             
 EXAMINATION:        Pt. Appears Neatly Groomed, Attentive, and Cooperative.      Pt's speech is shows no evidence of impairment.     Pt's mood is calm and content.      Pt.'s thinking is unremarkanle.      Pt's associations are Organized and Goal Directed.     Pt.Convincingly exhibits  Negative.  suicidal ideation.      Pt. Exhibits Negative,  homicidal ideation      Pt's CSSR-S level is rated no risk.       Pt's judgement is age appropriate.      Pt. does not exhibit anxiety with  a good(>30min) Attention span.      /67   Pulse 75   Temp 97.1 °F (36.2 °C) (Temporal)   Resp 16   Ht 1.88 m (6' 2\")   Wt 75.8 kg (167 lb)   SpO2 99%   BMI 21.44 kg/m²     NURSING INTERVENTIONS:  Nursing to administer medications to Pt, with monitoring and recording of compliance symptoms, and possible side effects as appropriate.        RESPONSE TO MEDICATION: Pt. Is   compliant with Medications.    PT. was engaged. Pt. Was  encouraged to participate in activities Showing  Good participation.      Emotional Support and encouragement were provided to Pt.  Pt's response to this intervention appeared to be effective.       Treatment plans up to date.             
 EXAMINATION:        Pt. Appears Neatly Groomed, Attentive, and Cooperative.      Pt's speech shows no evidence of impairment.     Pt's mood is calm and content.      Pt.'s thinking is logical.      Pt's associations are Organized and Goal Directed.     Pt.Convincingly exhibits  Negative.  suicidal ideation.      Pt. Exhibits Negative,  homicidal ideation      Pt's CSSR-S level is rated no risk.       Pt's judgement is good.      Pt. does not exhibit anxiety with  a good(>30min) Attention span.      /81   Pulse 90   Temp 98.7 °F (37.1 °C) (Temporal)   Resp 16   Ht 1.88 m (6' 2\")   Wt 77.1 kg (170 lb)   SpO2 97%   BMI 21.83 kg/m²     NURSING INTERVENTIONS:  Nursing to administer medications to Pt, with monitoring and recording of compliance symptoms, and possible side effects as appropriate.        RESPONSE TO MEDICATION: Pt. Is   compliant with Medications.    PT. was engaged. Pt. Was  encouraged to participate in activities Showing  Good participation.      Emotional Support and encouragement were provided to Pt.  Pt's response to this intervention appeared to be effective.       Treatment plans up to date.      Pt states, \"I feel better when I'm in here. When I leave here, I'm just going to stay to myself and in my room.\"     Pt reports he was on parole and needs to call his . Pt was guarded when writer asked him what he was on parole for.      
 EXAMINATION:        Pt. Appears Neatly Groomed, Attentive, and Cooperative.      Pt's speech shows no evidence of impairment.     Pt's mood is sad.      Pt.'s thinking is logical.      Pt's associations are Paranoid.     Pt.Convincingly exhibits  Negative.  suicidal ideation.      Pt. Exhibits Negative,  homicidal ideation      Pt's CSSR-S level is rated no risk.       Pt's judgement is impaired due to pt does not follow up with his mental health and is medication noncompliant at home.      Pt. does not exhibit anxiety with  a good(>30min) Attention span.      BP (!) 144/92   Pulse 72   Temp 97 °F (36.1 °C) (Temporal)   Resp 18   Ht 1.88 m (6' 2\")   Wt 77.1 kg (170 lb)   SpO2 98%   BMI 21.83 kg/m²     NURSING INTERVENTIONS:  Nursing to administer medications to Pt, with monitoring and recording of compliance symptoms, and possible side effects as appropriate.        RESPONSE TO MEDICATION: Pt. Is   compliant with Medications.    PT. was engaged. Pt. Was  encouraged to participate in activities Showing  Good participation.      Emotional Support and encouragement were provided to Pt.  Pt's response to this intervention appeared to be effective.       Treatment plans up to date.      Pt reports he is doing fine. Rates his anxiety and depression a 9/10. Is unsure of what his goal is for today.     Pt states, \"I am just confused about everything that is going on. I know my family is talking about me. They say stuff about me not being able to do anything for my self. I think my niece is tired of taking care of me. I don't know if I'll be able to go back with her.\"        
 EXAMINATION:        Pt. Appears Neatly Groomed, Cooperative, and Motivated.      Pt's speech is is soft.     Pt's mood is labile and within normal limits.      Pt.'s thinking iscircumstantialPreoccupied.      Pt's associations are Preoccupied.     Pt.Convincingly exhibits  Negative.  suicidal ideation.      Pt. Exhibits Negative,  homicidal ideation      Pt's CSSR-S level is rated No risk.       Pt's judgement is fair.      Pt. does  not exhibit anxiety with  a fair(15-30min) Attention span.      /77   Pulse 68   Temp 97.5 °F (36.4 °C) (Temporal)   Resp 20   Ht 1.88 m (6' 2\")   Wt 77.1 kg (170 lb)   SpO2 98%   BMI 21.83 kg/m²     NURSING INTERVENTIONS:  Nursing to administer medications to Pt, with monitoring and recording of compliance symptoms, and possible side effects as appropriate.        RESPONSE TO MEDICATION: Pt. Is   compliant with Medications.    PT. was engaged. Pt. Was  encouraged to participate in activities Showing  Good participation.      Emotional Support and encouragement were provided to Pt.  Pt's response to this intervention appeared to be effective.       Treatment plans up to date.01/09/2025             
 EXAMINATION:        Pt. Appears Neatly Groomed.      Pt's speech is shows no evidence of impairment.     Pt's mood is within normal limits.      Pt.'s thinking is Clear,Organized.      Pt's associations are Goal Directed.     Pt.Convincingly exhibits  Negative.  suicidal ideation.      Pt. Exhibits Negative,  homicidal ideation      Pt's CSSR-S level is rated low.       Pt's judgement is good.      Pt. does not exhibit anxiety with  a fair(15-30min) Attention span.      /87   Pulse 86   Temp 97 °F (36.1 °C) (Temporal) Comment (Src): Simultaneous filing. User may not have seen previous data.  Resp 18   Ht 1.88 m (6' 2\")   Wt 75.8 kg (167 lb)   SpO2 98%   BMI 21.44 kg/m²     NURSING INTERVENTIONS:  Nursing to administer medications to Pt, with monitoring and recording of compliance symptoms, and possible side effects as appropriate.        RESPONSE TO MEDICATION: Pt. Is   compliant with Medications.    PT. was engaged. Pt. Was  encouraged to participate in activities Showing  Fair participation.      Emotional Support and encouragement were provided to Pt.  Pt's response to this intervention appeared to be effective.       Treatment plans up to date.             
 EXAMINATION:      Pt. Appears Neatly Groomed, Attentive, and Cooperative.      Pt's speech shows no evidence of impairment.    Pt's mood is sad.      Pt.'s thinking is logical.     Pt's associations are Organized and Goal Directed.     Pt.Convincingly exhibits  Negative.  suicidal ideation.     Pt. Exhibits Negative,  homicidal ideation     Pt's CSSR-S level is rated no risk.      Pt's judgement is good.     Pt. does not exhibit anxiety with  a good(>30min) Attention span.      BP (!) 144/95   Pulse (!) 109   Temp 98.2 °F (36.8 °C) (Temporal)   Resp 18   Ht 1.88 m (6' 2\")   Wt 77.1 kg (170 lb)   SpO2 98%   BMI 21.83 kg/m²     NURSING INTERVENTIONS:  Nursing to administer medications to Pt, with monitoring and recording of compliance symptoms, and possible side effects as appropriate.        RESPONSE TO MEDICATION: Pt. Is   compliant with Medications.    PT. was engaged. Pt. Was  encouraged to participate in activities Showing  Good participation.      Emotional Support and encouragement were provided to Pt.  Pt's response to this intervention appeared to be effective.       Treatment plans up to date.      Pt states, \"I feel better because I have been going to groups. I have learned to cope with my problems.\"     Pt reports he is interested in rehab.         
[phone] [facility]    Spoke with admissions, person who did the prescreen. Writer explained that pt stated he was turned away.     Director called this writer back. Director stated that pt was asked to do some things that he was unable to do so he was turned away from the program. Writer explained to director that during prescreen, it was understood that even if pt took longer to walk places, that pt could still do the program. Director stated that pt appeared \"too medical\" when he arrived to program. Director described program as \"rigorous.\" Director stated that he had provided pt resources before asking him to leave.       
B:   Patient alert and oriented x 4.   Pt. States depression is Low.  Pt. States Anxiety is Low.  Pt. denies Hallucinations.  Pt. denies Delusions.  Pt. denies SI.  Pt. denies HI.   Pt. cooperative with Assessment.  Pt.'s behavior Cooperative.   States he still wants to go to Rehab and hopes to get his insurance to work.    I:    If patient is disoriented, reorient pt.  Build trust with patient, by therapeutic listening and Groups.  Encourage pt. To attend and Participate in Groups.  Provide Medications as ordered and needed.  Encourage pt. To be up for all meals and snacks, and consume all of each. Encourage pt. To interact with staff and peers in a positive manner.  Encourage pt. To keep good hygiene.  Q 15 minute safety checks.    R:   Pt. did not attend and Participate in Group.  Pt. Is Compliant with Medications   Yes.   Pt. is getting up for meals and snacks.  Pt. Consumes 100% of Meals.  Pt. is, interacting with Peers.   Pt.'s hygiene is Good.  Pt. does not, have any safety issues.    P:   Pt. Will develop and continue to utilize positive Coping skills.  Pt. Will continue to comply with Plan of Care toward Discharge.  Pt. Will continue to stay safe on the unit.   
Behavioral Health Transition Record to Provider    Patient Name: David Alfaro  YOB: 1965  Medical Record Number: [id number]  Date of Admission: 1/3/2025  Date of Discharge: 1/21/2025    Attending Provider: Italo Helms MD  Discharging Provider: Italo Helms MD  To contact this individual call [phone] and ask the  to page.  If unavailable, ask to be transferred to Behavioral Health Provider on call.  A Behavioral Health Provider will be available on call 24/7 and during holidays.    Primary Care Provider: Cristobal Fraga Sr., MD    Allergies   Allergen Reactions    Codeine Swelling     Tongue swelling    Penicillins Swelling     Tongue Swelling       Reason for Admission:   REASON FOR HOSPITALIZATION: Command Hallucinations, SI      CC: \"I  was having really bad depression\"         HISTORY OF PRESENT ILLNESS:      The patient, David Alfaro, is a 59 y.o. male with hx of Schizoaffective Disorder and Polysubstance Use  was a voluntary admission from the [facility] ED on 1/3/25.  He sees this provider outpatient at [facility] and his last appointment was in May 2024.  He reports having insurance issues, and has not been able to make an outpatient appointment.  He ran out of his medications but felt they were ineffective.  He had an upcoming appt in January but started to have command auditory hallucinations to hurt himself and decided to go to the ER.    He reports he tried to overdose on Cocaine, has been having difficulty sleeping, socially isolating himself, having paranoia, and decreased appetite.  He has lost several pounds.   He has a hx of multiple hospital admissions with prior suicide attempts in the past.  Past medical history includes seizures, CVA, COPD, DELTA but does not use a CPAP, and prostate issues,   He has not had any recent seizures.  He ambulates with a walker.  His 30 year old niece has been staying with him.  He denies 
Behavioral Health Treatment Team Note     Patient goal(s) for today: Pt reports \"Get into rehab.\"  Treatment team focus/goals: medication management, bed to bed for rehab    Writer spoke with MARIA C Patel. Pt will not be discharged on Trazadone or Hydroxyzine. Medications only given PRN while in hospital.     Progress note: Pt observed seated in the day room. Pt motivated to go to rehab. Pt alert and oriented x4. Pt denies SI, HI,AVH. Pt had intake with this writer at [facility]. Pending placement on Tuesday, January 21st. Inpatient level needed in order to coordinate bed to bed to reduce risk of relapse.     LOS:  14  Expected LOS: Pending D/C Tuesday next week    Insurance info/prescription coverage:  Medicare/Medicaid  Date of last family contact:  Pt has been talking to family about inpatient rehab  Family requesting physician contact today:  No  Discharge plan:  Inpatient rehab  Guns in the home:  No  Outpatient provider(s):  [facility]    Participating treatment team members: Mary Garcia, LPC   
Behavioral Health Treatment Team Note     Patient goal(s) for today: Pt reports \"Get ready for rehab.\"  Treatment team focus/goals: medication management, safe discharge planning, attend groups and activities daily    Progress note: Pt observed seated in the day room. Pt more pleasant and alert and oriented x4. Pt has been attending groups. Pt denies current SI, HI, AVH. Pt reports feeling better with his mood since admission. Inpatient level of care is needed in order to stabilize pt further on medications and to coordinate door to door to reduce relapse risk.     LOS:  10  Expected LOS: 12-13 days    Insurance info/prescription coverage: Medicare/Medicaid   Date of last family contact:  Pt has not signed release. Pt has been talking to family about rehab.  Family requesting physician contact today:  No  Discharge plan:  Inpatient rehab  Guns in the home:  No  Outpatient provider(s):  [facility]    Participating treatment team members: David Alfaro, Mary Choudhury, LPC   
Behavioral Health Treatment Team Note     Patient goal(s) for today: Pt reports \"Get rest. Go to rehab.\"  Treatment team focus/goals: medication management, safe discharge planning, attend groups and activities daily    Progress note: Pt observed seated in his room. Pt alert and oriented x4. Pt continues to endorse some depression. Writer provided update regarding rehab placement. Writer called and left message with [facility] in [place]. Pt denies SI, HI, AVH. Inpatient level of care is needed in order to coordinate rehab placement (bed to bed) to reduce risk of relapse.     LOS:  13  Expected LOS: 14-15 days    Insurance info/prescription coverage:  Medicare/Medicaid  Date of last family contact:  Pt has been talking to niece regarding rehab plans.  Family requesting physician contact today:  No  Discharge plan:  Inpatient rehab  Guns in the home:  No  Outpatient provider(s):  [facility]    Participating treatment team members: David Alfaro, Mary Choudhury, LPC   
Behavioral Health Treatment Team Note     Patient goal(s) for today: Pt reports \"Get up more. Go to rehab.\"  Treatment team focus/goals: medication management, safe discharge planning, attend groups and activities daily    Progress note: Pt observed lying in bed. Pt alert and oriented x4. Pt denies current SI, HI, AVH. Pt continues to display a flat affect and endorses some depression. Writer processed with pt regarding the importance of attending groups. Inpatient level of care is needed in order to stabilize pt further on medications and to coordinate bed to bed to reduce relapse risk.     LOS:  6  Expected LOS: 8-9 days    Insurance info/prescription coverage:  Medicare/Medicaid   Date of last family contact:  Pt has been talking to niece and PO  Family requesting physician contact today:  No  Discharge plan:  Inpatient rehab  Guns in the home:  No  Outpatient provider(s):  [facility]    Participating treatment team members: David Alfaro, Mary Choudhury, LPC  
Behavioral Health Treatment Team Note     Patient goal(s) for today: Pt reports \"Go to groups. Get ready for rehab.\"  Treatment team focus/goals: medication management, safe discharge planning, attend groups and activities daily    Progress note: Pt observed seated in the day room. Pt pleasant and talkative. Pt reports feeling ready to go to rehab next week. Pt alert and oriented x4. Pt denies current SI, HI, AVH. Inpatient level of care is needed in order to stabilize pt further on medications and to coordinate bed to bed to reduce relapse risk.    LOS:  7  Expected LOS: 9-10 days    Insurance info/prescription coverage:  Medicare/Medicaid   Date of last family contact:  Pt has not signed release  Family requesting physician contact today:  No  Discharge plan:  Inpatient rehab  Guns in the home:  No  Outpatient provider(s):  [facility]    Participating treatment team members: Mary Garcia, LPC  
Behavioral Health Treatment Team Note     Patient goal(s) for today: Pt reports \"To be able to go to rehab soon.\"  Treatment team focus/goals: medication management, safe discharge planning, attend groups and activities daily    Progress note: Pt observed seated in the day room. Pt endorses some depression. Pt alert and oriented x4. Pt denies SI, HI, AVH. Writer provided update regarding placement and insurance issues for rehab. Inpatient level of care needed in order to stabilize pt further on medications and to coordinate bed to bed to reduce relapse risk.     LOS:  11  Expected LOS: 13-24 days    Insurance info/prescription coverage:  Medicare/Medicaid  Date of last family contact:  Pt has been talking to niece.   Family requesting physician contact today:  No  Discharge plan:  Inpatient rehab  Guns in the home:  No  Outpatient provider(s):  [facility]    Participating treatment team members: David Alfaro, Mary Choudhury, LPC   
Behavioral Health Treatment Team Note     Patient goal(s) for today: Pt reports \"To do rehab.\"  Treatment team focus/goals: medication management, safe discharge planning, attend groups and activities daily    Progress note: Pt observed seated in his room. Pt alert and oriented x4. Pt endorses some passive SI but reports feeling safe in the hospital. Pt continues to endorse depression and displays a flat affect. Pt is attempting groups. Pt denies HI, AVH. Inpatient level of care is needed in order to stabilize pt further on medications and to coordinate bed to bed to reduce risk of relapse.    LOS:  4  Expected LOS: 7-8 days    Insurance info/prescription coverage:  Medicare/Medicaid   Date of last family contact:  Pt has not signed release   Family requesting physician contact today:  No  Discharge plan:  Inpatient rehab  Guns in the home:  No  Outpatient provider(s):  Pt does not currently have any outpatient providers    Participating treatment team members: David Alfaro, Mary Choudhury, LPC   
Behavioral Health Treatment Team Note     Patient goal(s) for today: Pt reports \"To get rest.\"  Treatment team focus/goals: medication management, safe discharge planning, attend groups and activities daily    Progress note: Pt observed lying in bed. Pt reports low energy levels. Pt states he is still interested in going to rehab. Pt denies current SI, HI, AVH. Pt alert and oriented x4. Pt has been attempting some groups. Inpatient level of care is needed in order to stabilize pt further on medications and to coordinate bed to bed to reduce relapse risk.     LOS:  5  Expected LOS: 7-8 days    Insurance info/prescription coverage:  Medicare/Medicaid   Date of last family contact:  Pt has not signed release   Family requesting physician contact today:  No  Discharge plan:  Inpatient rehab  Guns in the home:  No  Outpatient provider(s):  [facility]    Participating treatment team members: David Alfaro, Mary Choudhury, LPC   
Behavioral Health Treatment Team Note     Patient goal(s) for today: Pt reports \"To rest.\"  Treatment team focus/goals: medication management, safe discharge planning, attend groups and activities daily    Progress note: Pt observed seated in the day room. Pt presents as flat and depressed. Pt states he is thinking about inpatient rehab after discharge. Pt alert and oriented x4. Pt denies current SI, HI, AVH. Inpatient level of care is needed in order to stabilize pt further on medications.    LOS:  3  Expected LOS: 5-7 days    Insurance info/prescription coverage:  Medicare/Medicaid  Date of last family contact: Pt has not signed release   Family requesting physician contact today:  No  Discharge plan:  Possible inpatient rehab  Guns in the home:  No  Outpatient provider(s):  [facility]    Participating treatment team members: Mary Garcia, KEVIN  
DISCHARGE SUMMARY    NAME:David Alfaro  : 1965  MRN: [id number]    The patient David Alfaro exhibits the ability to control behavior in a less restrictive environment.  Patient's level of functioning is improving.  No assaultive/destructive behavior has been observed for the past 24 hours.  No suicidal/homicidal threat or behavior has been observed for the past 24 hours.  There is no evidence of serious medication side effects.  Patient has not been in physical or protective restraints for at least the past 24 hours.    If weapons involved, how are they secured? N/A    Is patient aware of and in agreement with discharge plan? Yes    Arrangements for medication:  Prescriptions on file    Copy of discharge instructions to provider?:  Yes    Arrangements for transportation home:  Pt will be taken via cab to [facility]    Keep all follow up appointments as scheduled, continue to take prescribed medications per physician instructions.  Mental health crisis number:  911 or your local mental health crisis line number at [phone]      Mental Health Emergency WARM LINE      2-676-787-MHAV (6428)      M-F: 9am to 9pm      Sat & Sun: 5pm - 9pm  National suicide prevention lines:                             2-134-VQFVIDE (3-722-706-4259)       1-820-970-TALK (1-719.175.1680)    Crisis Text Line:  Text HOME to 088310  
Did not attend group  
Did not participate in group..has not been on unit long.  
Dr. Helms in to see pt.     Pt c/o of dizziness when standing. Concern of orthostatic hypotension. Provider d/c risperidone for pt.     Pt in agreement and understanding   
Dr. Helms saw pt. Via Skype with writer.  No new orders.  Discussed sending clinicals to rehab starting tomorrow, for hopeful admission to rehab this week. Pt. In agreement.  
Dr. Helms saw pt. Via Skype, no new orders.  
Follow up: Writer left message with [facility] to inquire what happened. Writer received report that pt was denied at the door due to having a walker. However, during intake with this writer and pt,  was aware that pt had a walker and could walk short distances without it.  had confirmed admission after intake knowing pt utilized walker.   
[facility] be admitted on Tuesday, December 21st. Writer will schedule cab for 10:00 am     [facility]  [address] [place] [address]    Pharmacy:     [facility]  [address] [place] [id number]  Phone: [phone]  
LPN's assessment reviewed  
[facility] and does not require pt to have a 1:1 on the [facility] at this time.  
On the upon date @12:16 pm  this writer observed patient standing to discard his tray when he begin to tremble and become unsteady on his feet. This writer helped patient back to his seat and called for our nurse to help with the patient.Patient begin to talk to the nurse and the returned to his room without any issues.   
PSYCHIATRIC PROGRESS NOTE         Patient Name  David Alfaro   Date of Birth 1965   [facility] [id number]   Medical Record Number  [id number]      Age  59 y.o.   PCP Cristobal Fraga Sr., MD   Admit date:  1/3/2025    Room Number  105/02   [facility]   Date of Service  1/5/2025            HISTORY OF PRESENT ILLNESS/INTERVAL HISTORY:      The patient, David Alfaro, is a 59 y.o. male with hx of Schizoaffective Disorder and Polysubstance Use  was a voluntary admission from the [facility] ED on 1/3/25.  He sees this provider outpatient at [facility] and his last appointment was in May 2024.  He reports having insurance issues, and has not been able to make an outpatient appointment.  He ran out of his medications but felt they were ineffective.  He had an upcoming appt in January but started to have command auditory hallucinations to hurt himself and decided to go to the ER.    He reports he tried to overdose on Cocaine, has been having difficulty sleeping, socially isolating himself, having paranoia, and decreased appetite.  He has lost several pounds.   He has a hx of multiple hospital admissions with prior suicide attempts in the past.  Past medical history includes seizures, CVA, COPD, DELTA but does not use a CPAP, and prostate issues,   He has not had any recent seizures.  He ambulates with a walker.  His 30 year old niece has been staying with him.  He denies smoking, drank 2 beers right before admission, and cocaine use.     Subjective:  Patient is lying in bed.  Reports sleep was \"of and on\"  Depression is 9/10.  Hallucinations continue but are better.  No reported side effects to meds.  Having suicidal ideations but \"trying not to think about it.\"  Med compliant .  Participating in groups.  No apparent withdrawal symptoms.  No HI            MENTAL STATUS EXAM & VITALS       Alert and oriented x's 3.  Wearing green hospital gown. Ambulating with a walker.  
PSYCHOSOCIAL ASSESSMENT  :Patient identifying info:   David Alfaro is a 59 y.o., male admitted 1/3/2025  4:31 AM   Pt presents as irritable with minimal engagement at this time. Writer will gather additional information with pt more oriented.   Presenting problem and precipitating factors: Pt admitted voluntarily due to SI with a plan to overdose. Pt reports that he wanted to overdose on cocaine. Pt states that he has been having hallucinations with negative voices telling him to hurt himself. Pt states he has been out of his medication for some time.    Mental status assessment: Pt alert and oriented x4. Pt presents as flat and depressed. Pt denies current SI, HI, AVH. Pt presents as irritable and limited insight at this time.    Strengths/Recreation/Coping Skills: Could not identify at this time.     Collateral information: Pt has not signed release at this time     Current psychiatric /substance abuse providers and contact info: Pt states he was at [facility] but could not get an appointment.     Previous psychiatric/substance abuse providers and response to treatment: Pt reports being hospitalized previously and has been seen at [facility] in the past. Pt could not remember what medications he was taking.     Family history of mental illness or substance abuse: None reported.    Substance abuse history:    Social History     Tobacco Use    Smoking status: Former    Smokeless tobacco: Never    Tobacco comments:     Not a smoker   Substance Use Topics    Alcohol use: Not Currently     Alcohol/week: 2.0 standard drinks of alcohol       History of biomedical complications associated with substance abuse: None reported.    Patient's current acceptance of treatment or motivation for change: Pt open and receptive to starting back on medication. Pt has minimal engagement at this time.    Family constellation: Pt lives with his niece. Pt does not have any children.    Is significant other involved? 
Provider in to see pt this morning via skype. No new orders.   
Pt BP was 87/52.     Writer has been encouraging pt to drink plenty of fluids throughout the day.     Nighttime BP medications held due to low BP  
Pt filled out paper and went back to sleep.  
Pt raymond Booth picked the ambrosio and cell phone tonight.  
Pt received in bed sleeping.     Pt attended wrap up group for short time and filled group paper and ate snack, pt rated depression as  1  and anxiety as1 in group paper.     Upon assessment, pt denies SI/HI, denies A/V hallucinations, denies pain. Pt using Rolator as assistive device  for ambulation.      Pt accepted all  scheduled HS medication prescribed.   At 2026 Temp 98.0     Pt given at 2022 po prn Atarax 50mg for insomnia , prn  helpful .     Pt start sleeping by 2100.     No violent no self harming behavior noticed or reported.  
Pt received in bed sleeping.     Pt attended wrap up group fr short time and filled group paper and ate snack, pt rated depression as  1  and anxiety as1 in group paper.     Upon assessment, pt denies SI/HI, denies A/V hallucinations, denies pain. Pt using Rolator as assistive device  for ambulation.      Pt accepted all  scheduled HS medication prescribed. But refused HS dose  lactulose solution .     Pt given at 2025 po prn trazodone  50mg for insomnia , prn  helpful .    Pt start sleeping by 2100.     No violent no self harming behavior noticed or reported.  
Pt received in hallway walking, later he made phone call.      Pt  didn't attended wrap up group but joined late by 2045 and ate snack     Upon assessment, pt denies SI/HI, denies A/V hallucinations, denies pain. Pt using Rolator as assistive device  for ambulation.        pt received his breathing treatment at 1907 given by Respiratory therapist.      Pt accepted all  scheduled HS medication prescribed.        Pt given at 2046 po prn Atarax 50mg for insomnia , prn  helpful .     Pt start sleeping by 2130.     pt noticed to have episode of coughing , but when checking on him he will be asleep.  At 2325 awake, breathing sounds are clear, he asked for his inhaler, respiratory therapist came at 2352 pt was sleeping, no wheezing heard no respiratory distress noticed. Prn Albuterol not given.     Remained sleeping as of this time.    No violent no self harming behavior noticed or reported.  
Pt received in hallway walking, later he made phone call.      Pt attended wrap up group and ate snack, pt rated depression as  1  and anxiety as1 in group paper.     Upon assessment, pt denies SI/HI, denies A/V hallucinations, denies pain. Pt using Rolator as assistive device  for ambulation.       pt received his breathing treatment at 1957 given by Respiratory therapist.     Pt accepted all  scheduled HS medication prescribed.       Pt given at 2107 po prn Atarax 50mg for insomnia , prn  helpful .     Pt start sleeping by 2130     No violent no self harming behavior noticed or reported.  
Pt received in his room resting in bed.      Pt   attended wrap up group and ate snack, rated depression as 0 and anxiety as 4, preoccupied with getting his shower iotems from now for the morning time. informed  items will be given on time shower time.      Upon assessment, pt denies SI/HI, denies A/V hallucinations, denies pain. Pt using Rolator as assistive device  for ambulation.        pt received his breathing treatment at 1907 given by Respiratory therapist.      Pt accepted all  scheduled HS medication prescribed.        Pt given at 2036 po prn Atarax 50mg for insomnia , prn  helpful .     Pt start sleeping by 2200     Remained sleeping as of this time.     No violent no self harming behavior noticed or reported.  
Pt slept over night, with wake interval to use bathroom, then slept again Remained sleeping as of this time      No violent no self harming behavior noticed or reported.  
Pt slept overnight  with waking interval to use bathroom, , remained sleeping as of this time      No violent no self harming behavior noticed or reported.   
Pt slept overnight, remained sleeping as of this time      No violent no self harming behavior noticed or reported.   
Pt was sleeping   
Pt's  Raul PLATA Called and scheduled a time to visit with David.  He is planning on being here at 145pm on Sunday 1/19/2025.  Pt. Aware, thankful.   Instructed,  to leave phone and all belongings in his car. And to come to ER registration and tell them he is here for a visit in [facility].    Writer will pass along to staff.   
Pt's pharmacy has been changed and updated in the system according to the facility he is being discharged too.  
Pt. Asked for security bag to give his niece, one of his cell phones and a key to his bedroom off of his key ring.  Writer obtained security bag, pt. Removed what he wanted niece to have, 1 cell phone and 1 key.    Writer repackaged cell phone, chargers, and keys in a new security bag # [id number], signed it, sealed and sent back to security.  
Pt. Belongings given back to pt., verified all there, signed for. Discharge instructions explained to pt. Including, [facility]      Medications called into [facility] [place].  Pt. Denies SI/HI at time of discharge.     Discharge Paperwork and H & P, faxed to [facility], With success sheet.     Pt. Displayed no safety concerns at discharge.      Pt. Escorted to front by staff for transportation by Cab, to home.   
Pt. Came to nurses station window stating the erika said he is going to have to do something different for pt. To hear him better then the erika from [facility], Seth, hung up on patient.    Writer assured pt. Between writer and therapist tomorrow, we will try to work something out for him to hear him better and be able to talk.  
Pt. Currently on with the [facility] services  [phone], doing a phone intake.      
Pt. Has phone intake scheduled for 11:00 am at [facility]  [phone]. Will provide speaker phone.   
Pt. Received a call from Tiffanie Ayers from .  She stated Ernestina called her for pt. Yesterday and asked her to call back. Tiffanie stated she is only in until noon today.  Pt. Was sleeping, writer woke pt. Up to tell him he was on the phone for him and that she was leaving by noon today, Pt. Stated to get her number and he would try her tomorrow, if she did not call back, he was tired today, and did not feel like getting up to talk.    Writer relayed message to Tiffanie, she stated she will try back tomorrow.  
Pt. [facility] via taxi in [place] Cond. Pt. Ambulated out of hospital to the taxi with a steady gait escorted by [facility] staff. Pt. Voiced no thoughts of wanting to harm self or others.  
Pt. Was in bathroom with walker, pt. Walked with steady  gait, good eye contact.  Pt. Had an extra large BM, stated he felt better after having BM.  New clothing and clean bed attire changed for pt.  Pt. Resting in bed at current moment.  
Pt. Was observed walking around in his room and to the bathroom from his room without his walker, with steady gait x 4 this am.  
Pt.'s  came today and asked if he could visit.  Staff arranged for  to visit in multipurpose room in camera view.  They concluded the visit within a half and hour.  Pt. Was happy for the visit.  Staff explained with pt. Present to his , when he is ready to visit again, just call and we will set  up a time for him to visit.  Pt. And  verbalized understanding.  
Release of information sent over to this writer for pt's , Larry Schultz. Phone: [phone]    Release placed on chart. Pt asked this writer to coordinate.   
Spoke with Kristin in Lab, Lipid and Hgb A1c sent off yesterday.  
TREATMENT TEAM COMPLETED     Attending meeting was as follows:  Patient, Mary Choudhury, Therapist, Writer, and Dr. Helms.       Meeting was conducted :       In Person  no      Skype   yes         Daily Treatment Team consists of the following:       Pt. Cognitive status:  alert and oriented x 3    Pt. Attending Groups: Yes    Pt. Participating in groups:  Yes    Pt. Homicidal / Suicidal: normal    Pt. Behaviors:  Cooperative and Pleasant    Pt. Compliant with Medications:  Yes          New Medication orders:  No      Discharge Plan:  Home   
TREATMENT TEAM COMPLETED     Attending meeting was as follows:  Patient, Mary Choudhury, Therapist, Writer, and Dr. Helms.       Meeting was conducted :       In Person  no      Skype   yes         Daily Treatment Team consists of the following:       Pt. Cognitive status:  alert and oriented x 3, good eye contact, and concentration/judgement good    Pt. Attending Groups: Yes    Pt. Participating in groups:  Yes    Pt. Homicidal / Suicidal: normal    Pt. Behaviors:  Cooperative and Pleasant    Pt. Compliant with Medications:  Yes          New Medication orders:  No      Discharge Plan:  Home or Rehab.  Pt. Is endorsing Rehab at this time.    
TREATMENT TEAM COMPLETED     Attending meeting was as follows:  Patient, Mary Choudhury, Therapist, Writer, and Dr. Helms.       Meeting was conducted :       In Person  no      Skype   yes         Daily Treatment Team consists of the following:       Pt. Cognitive status:  alert and oriented x 3, pleasant and cooperative, good eye contact, recall good, and concentration/judgement good    Pt. Attending Groups: Yes    Pt. Participating in groups:  Yes    Pt. Homicidal / Suicidal: normal    Pt. Behaviors:  Cooperative and Pleasant    Pt. Compliant with Medications:  Yes          New Medication orders:  No      Discharge Plan:  Rehab, pending acceptance , paperwork (clinicals) being sent to several.    
TREATMENT TEAM COMPLETED     Attending meeting was as follows:  Patient, Mary Choudhury, Therapist, Writer, and Dr. Helms.       Meeting was conducted :       In Person  no      Skype   yes         Daily Treatment Team consists of the following:       Pt. Cognitive status:  alert and oriented x 3, pleasant and cooperative, good eye contact, well groomed, recall good, and concentration/judgement good    Pt. Attending Groups: Yes    Pt. Participating in groups:  Yes    Pt. Homicidal / Suicidal: normal    Pt. Behaviors:  Anxious, Cooperative, and Pleasant    Pt. Compliant with Medications:  Yes          New Medication orders:  No      Discharge Plan:  Rehab    
TREATMENT TEAM COMPLETED     Attending meeting was as follows:  Patient, aMry Choudhury, Therapist, Writer, and Dr. Helms.       Meeting was conducted :       In Person  no      Skype   yes         Daily Treatment Team consists of the following:       Pt. Cognitive status:  alert and oriented x 3, pleasant and cooperative, good eye contact, well groomed, and concentration/judgement good    Pt. Attending Groups: Yes    Pt. Participating in groups:  Yes    Pt. Homicidal / Suicidal: normal    Pt. Behaviors:  Cooperative and Pleasant    Pt. Compliant with Medications:  Yes          New Medication orders:  No      Discharge Plan:  Rehab    
Writer attempted to call [facility] again to investigate why pt was turned away. Writer was transferred. Unable to leave message.   
Writer called and left a message with [facility].   
Writer faxed clinicals to various rehabs:     [facility]- denied due to insurance  [facility]: denied due to insurance  [facility]: denied due to insurance    Waiting to hear from following:    Rick  [facility] and [facility]   
Writer was called into day room, pt. Was half off his chair, with staff holding him, tray on floor.. Writer and staff assisted pt. To sit upright in chair.  Pt. Was talking  to writer, stated, he was not hungry it was okay about the tray he did not want another one.  Writer and staff encouraged pt. To try and eat, pt stated with good eye contact, \" No I am good, I will eat later thank you\".  Pt sat in chair responding to verbal cues from Tech and respiratory therapist, when she came in.  Upon writer speaking with pt. He did not answer, however eyes were moving around, looking at Tech and other peer.  Respiratory therapy came and completed the inhaler, pt. Was able to follow commands.  Writer assisted pt. Back to his bed with roller walker.  Pt. Resting quietly at this time, no jerking movements.  /82, p - 76 r -16, T-98.1  
close observation  Discussed meds  Provided support, psycho edu  Discussed with staff in the treatment team, the progress made in therapy, psychosocial needs and nursing concerns.    The following regarding medications was addressed during rounds with patient:   the risks and benefits of the proposed medication.   The patient was given the opportunity to ask questions.   Informed consent given to the use of the above medications.     Obtain psychiatric records from previous [facility] hospitals to further elucidate the nature of patient's psychopathology and review once available.    Gather additional collateral information from friends, family and o/p treatment team to further elucidate the nature of patient's psychopathology and baselline level of psychiatric functioning.         I certify that this patient's inpatient psychiatric hospital services continue to be, required for treatment that could reasonably be expected to improve the patient's condition and that the patient continues to need, on a daily basis, active treatment furnished directly by or requiring the supervision of inpatient psychiatric facility personnel. In addition, the hospital records show that services furnished were intensive treatment services.    Signed By:   DARIELA Georges - BIB  1/4/2025

## 2025-02-25 ENCOUNTER — HOSPITAL ENCOUNTER (EMERGENCY)
Facility: HOSPITAL | Age: 60
Discharge: HOME | End: 2025-02-25
Attending: EMERGENCY MEDICINE
Payer: MEDICARE

## 2025-02-25 ENCOUNTER — APPOINTMENT (OUTPATIENT)
Facility: HOSPITAL | Age: 60
End: 2025-02-25
Payer: MEDICARE

## 2025-02-25 VITALS
HEIGHT: 74 IN | HEART RATE: 68 BPM | BODY MASS INDEX: 21.43 KG/M2 | TEMPERATURE: 97.6 F | SYSTOLIC BLOOD PRESSURE: 148 MMHG | WEIGHT: 167 LBS | OXYGEN SATURATION: 100 % | RESPIRATION RATE: 18 BRPM | DIASTOLIC BLOOD PRESSURE: 92 MMHG

## 2025-02-25 DIAGNOSIS — F25.1 SCHIZOAFFECTIVE DISORDER, DEPRESSIVE TYPE (HCC): Primary | ICD-10-CM

## 2025-02-25 LAB
ALBUMIN SERPL-MCNC: 3.8 G/DL (ref 3.5–5)
ALBUMIN/GLOB SERPL: 1.1 (ref 1.1–2.2)
ALP SERPL-CCNC: 60 U/L (ref 45–117)
ALT SERPL-CCNC: 21 U/L (ref 12–78)
ANION GAP SERPL CALC-SCNC: 9 MMOL/L (ref 2–12)
APAP SERPL-MCNC: <2 UG/ML (ref 10–30)
AST SERPL W P-5'-P-CCNC: 14 U/L (ref 15–37)
BASOPHILS # BLD: 0.03 K/UL (ref 0–0.1)
BASOPHILS NFR BLD: 1 % (ref 0–1)
BILIRUB SERPL-MCNC: 0.7 MG/DL (ref 0.2–1)
BUN SERPL-MCNC: 31 MG/DL (ref 6–20)
BUN/CREAT SERPL: 25 (ref 12–20)
CA-I BLD-MCNC: 9.2 MG/DL (ref 8.5–10.1)
CHLORIDE SERPL-SCNC: 105 MMOL/L (ref 97–108)
CO2 SERPL-SCNC: 29 MMOL/L (ref 21–32)
CREAT SERPL-MCNC: 1.24 MG/DL (ref 0.7–1.3)
DATE LAST DOSE: ABNORMAL
DATE LAST DOSE: ABNORMAL
DIFFERENTIAL METHOD BLD: ABNORMAL
DOSE AMOUNT: ABNORMAL UNITS
DOSE AMOUNT: ABNORMAL UNITS
EOSINOPHIL # BLD: 0.09 K/UL (ref 0–0.4)
EOSINOPHIL NFR BLD: 3.1 % (ref 0–7)
ERYTHROCYTE [DISTWIDTH] IN BLOOD BY AUTOMATED COUNT: 12.7 % (ref 11.5–14.5)
ETHANOL SERPL-MCNC: <10 MG/DL (ref 0–0.08)
GLOBULIN SER CALC-MCNC: 3.5 G/DL (ref 2–4)
GLUCOSE SERPL-MCNC: 90 MG/DL (ref 65–100)
HCT VFR BLD AUTO: 41.9 % (ref 36.6–50.3)
HGB BLD-MCNC: 14.6 G/DL (ref 12.1–17)
IMM GRANULOCYTES # BLD AUTO: 0.01 K/UL (ref 0–0.04)
IMM GRANULOCYTES NFR BLD AUTO: 0.3 % (ref 0–0.5)
LYMPHOCYTES # BLD: 1.5 K/UL (ref 0.8–3.5)
LYMPHOCYTES NFR BLD: 51.6 % (ref 12–49)
MCH RBC QN AUTO: 34 PG (ref 26–34)
MCHC RBC AUTO-ENTMCNC: 34.8 G/DL (ref 30–36.5)
MCV RBC AUTO: 97.7 FL (ref 80–99)
MONOCYTES # BLD: 0.33 K/UL (ref 0–1)
MONOCYTES NFR BLD: 11.5 % (ref 5–13)
NEUTS SEG # BLD: 0.94 K/UL (ref 1.8–8)
NEUTS SEG NFR BLD: 32.5 % (ref 32–75)
NRBC # BLD: 0 K/UL (ref 0–0.01)
NRBC BLD-RTO: 0 PER 100 WBC
PLATELET # BLD AUTO: 174 K/UL (ref 150–400)
PMV BLD AUTO: 9.7 FL (ref 8.9–12.9)
POTASSIUM SERPL-SCNC: 3.9 MMOL/L (ref 3.5–5.1)
PROT SERPL-MCNC: 7.3 G/DL (ref 6.4–8.2)
RBC # BLD AUTO: 4.29 M/UL (ref 4.1–5.7)
RBC MORPH BLD: ABNORMAL
SALICYLATES SERPL-MCNC: <1.7 MG/DL (ref 2.8–20)
SODIUM SERPL-SCNC: 143 MMOL/L (ref 136–145)
WBC # BLD AUTO: 2.9 K/UL (ref 4.1–11.1)

## 2025-02-25 PROCEDURE — 99284 EMERGENCY DEPT VISIT MOD MDM: CPT

## 2025-02-25 PROCEDURE — 80179 DRUG ASSAY SALICYLATE: CPT

## 2025-02-25 PROCEDURE — 80143 DRUG ASSAY ACETAMINOPHEN: CPT

## 2025-02-25 PROCEDURE — 82077 ASSAY SPEC XCP UR&BREATH IA: CPT

## 2025-02-25 PROCEDURE — 85025 COMPLETE CBC W/AUTO DIFF WBC: CPT

## 2025-02-25 PROCEDURE — 36415 COLL VENOUS BLD VENIPUNCTURE: CPT

## 2025-02-25 PROCEDURE — 80053 COMPREHEN METABOLIC PANEL: CPT

## 2025-02-25 PROCEDURE — 71045 X-RAY EXAM CHEST 1 VIEW: CPT

## 2025-02-25 ASSESSMENT — PAIN - FUNCTIONAL ASSESSMENT: PAIN_FUNCTIONAL_ASSESSMENT: 0-10

## 2025-02-25 ASSESSMENT — PAIN SCALES - GENERAL: PAINLEVEL_OUTOF10: 0

## 2025-02-25 NOTE — ED NOTES
Patients discharge discussed at this time patient reports he will be at appt on thursday at 10 with our  this week

## 2025-02-25 NOTE — BSMART NOTE
Comprehensive Assessment Form Part 1      Section I - Disposition    The Medical Doctor to Psychiatrist conference was not completed.  The Medical Doctor is in agreement with intake's disposition.   The plan is discharge with follow up meeting with  at [facility] on 2/27 at 10am.  The on-call Psychiatrist was Dr. Helms.  The admitting Psychiatrist will be Dr. PALAFOX.  The admitting Diagnosis is N/A.      Section II - Integrated Summary    Patient assessed in ED room 3 at [facility] initially via [facility], but had to transitioned to phone due to technical issues with computer. Patient dressed in blue paper scrub top and bottoms, sitting up in stretcher during assessment. Patient cooperative throughout assessment, but did require some redirection. Patient A&O x4. Patient presents as somewhat irritable with congruent affect. Patient presents with clear, loud speech. Patient appears to be hard of hearing and responds better with writer using louder speech. Patient presents with linear thought process. Patient did not appear to be responding to internal stimuli. When asked what brought patient to ED today, patient states \"I can't afford my medications, I've been taking my old medications.\" Patient reports SI, but denies current plan and/or intent. When asked about firearms, but notes that he does not own and/or have access to firearms. Patient denies HI, but states \"I can't afford to get upset with someone.\" He denies AVH.    Patient states that he was discharged from [facility] last month (appears to be on 1/21) to go to rehab in [place] ([facility] Patient states that he arrived to [facility] the same day that he was discharged from [facility]. He states that he arrived at [facility] at \"11 in the morning, had to wait to be checked in until 4 in the afternoon, and then turned away because I have a walker.\" Due to patient going directly to [facility] upon discharge from [facility] on 1/21,

## 2025-05-21 NOTE — ED NOTES
HPI   Chief Complaint   Patient presents with    Hip Pain     Pt fell at homeless shelter 48 hours ago and hurt hip. Reports using cocaine, less than her usual amount       27-year-old female presents to the emergency department for right hip and rib injury.  Patient is currently homeless, states she was walking out of her tent 2 days ago and tripped, landing on her right hip and right ribs.  She has been taking Tylenol and using cocaine for pain relief but it has not really been helping.  She can walk but with pain.  She denies shortness of breath, abdominal pain, vomiting, bloody stools, bloody urine, back pain.              Patient History   Medical History[1]  Surgical History[2]  Family History[3]  Social History[4]    Physical Exam   ED Triage Vitals [05/21/25 1604]   Temperature Heart Rate Respirations BP   36.8 °C (98.2 °F) 77 20 127/76      Pulse Ox Temp Source Heart Rate Source Patient Position   100 % Temporal Monitor Sitting      BP Location FiO2 (%)     Right arm --       Physical Exam  Vitals and nursing note reviewed.   Constitutional:       General: She is not in acute distress.  HENT:      Head: Atraumatic.      Mouth/Throat:      Mouth: Mucous membranes are moist.      Pharynx: Oropharynx is clear.   Eyes:      Extraocular Movements: Extraocular movements intact.      Conjunctiva/sclera: Conjunctivae normal.      Pupils: Pupils are equal, round, and reactive to light.   Cardiovascular:      Rate and Rhythm: Normal rate and regular rhythm.      Pulses: Normal pulses.   Pulmonary:      Effort: Pulmonary effort is normal. No respiratory distress.      Breath sounds: Normal breath sounds.   Abdominal:      General: There is no distension.      Palpations: Abdomen is soft.      Tenderness: There is no abdominal tenderness. There is no guarding or rebound.   Musculoskeletal:         General: No deformity.      Cervical back: Neck supple. No tenderness.      Comments: No evidence of trauma to the left  Report to Terrance BUSTAMANTE    hip.  Patient has an antalgic gait but is able to walk.  She has tenderness to palpation of the lateral hip.  No low back or knee pain.  Tenderness to palpation of the right anterior ribs with no crepitus, edema, erythema, ecchymosis.   Skin:     General: Skin is warm and dry.   Neurological:      Mental Status: She is alert and oriented to person, place, and time. Mental status is at baseline.      Cranial Nerves: No cranial nerve deficit.      Sensory: No sensory deficit.      Motor: No weakness.   Psychiatric:         Mood and Affect: Mood normal.         Behavior: Behavior normal.           ED Course & MDM   Diagnoses as of 05/21/25 1751   Contusion of right hip, initial encounter   Rib contusion, right, initial encounter                 No data recorded     Tigist Coma Scale Score: 15 (05/21/25 1604 : Tiara Lind RN)                           Medical Decision Making  27-year-old female presents emergency department for evaluation after a fall, injuring her right hip and right ribs.  On my exam, patient is able to walk, although with pain.  She has no evidence of trauma or deformity of the right hip.  She has tenderness palpation of the lateral hip.  She has tenderness of the anterior right ribs with no crepitus or evidence of trauma.  Lungs are clear.  Abdomen is soft and nontender.  X-ray of the right ribs shows no acute fracture  X-ray of the right hip shows no acute fracture  I discussed the findings with the patient.  I recommended follow-up with primary care/orthopedics.  Recommended RICE.  We gave her crutches for ease of mobility.  I prescribed her Tylenol, Flexeril, lidocaine patches.  Discussed results with patient and/or family/friend and recommended close follow up with primary care or specialist.  Reviewed return precautions at length.  I answered all questions.           Procedure  Procedures       [1]   Past Medical History:  Diagnosis Date    Acute vaginitis     Bacterial vaginitis    Acute  vaginitis     Bacterial vaginosis    Ankle sprain 2007    Encounter for immunization     Need for Tdap vaccination    Encounter for insertion of intrauterine contraceptive device     Encounter for IUD insertion    Encounter for routine postpartum follow-up     6 weeks postpartum follow-up    Encounter for screening for infections with a predominantly sexual mode of transmission     Screening for STD (sexually transmitted disease)    Encounter for supervision of normal pregnancy, unspecified, unspecified trimester     Encounter for supervision of normal pregnancy    Knee sprain 2008    Obesity complicating pregnancy, third trimester ([facility])     Obesity in pregnancy, antepartum, third trimester    Other abnormal findings in urine     Leukocytes in urine    Other disorders of lactation     Lactation symptom    Other mental disorders complicating pregnancy, third trimester     Depression affecting pregnancy in third trimester, antepartum    Other skin changes     Skin irritation    Other specified diseases and conditions complicating pregnancy ([facility])     Bacteriuria during pregnancy in third trimester    Other specified noninflammatory disorders of vagina     Vaginal odor    Pelvic and perineal pain     Pelvic pressure in female    Personal history of other diseases of the digestive system     History of constipation    Personal history of other diseases of the digestive system     History of gastroesophageal reflux (GERD)    Personal history of other diseases of the digestive system     History of ventral hernia    Personal history of other diseases of the female genital tract     History of amenorrhea    Personal history of other diseases of the female genital tract     History of vaginal discharge    Personal history of other diseases of the musculoskeletal system and connective tissue     History of low back pain    Personal history of other diseases of the respiratory system     History of upper respiratory  infection    Personal history of other mental and behavioral disorders     History of depression    Personal history of other specified conditions     History of nausea    Personal history of other specified conditions     History of gross hematuria    Tendinitis of knee 2018    Unspecified infection of urinary tract in pregnancy, unspecified trimester ([facility])     UTI in pregnancy   [2]   Past Surgical History:  Procedure Laterality Date    BARIATRIC SURGERY  2024    OTHER SURGICAL HISTORY  2021    [place] tooth extraction    OTHER SURGICAL HISTORY  2021    Esophagogastroduodenoscopy   [3]   Family History  Problem Relation Name Age of Onset    Depression Mother Viki     Diabetes Mother Viki     Migraines Mother Viki     Stroke Mother Viki     Asthma Sister Razia     Depression Sister Razia     Cancer Other Moise     Diabetes Other Moise     Depression Other Quin    [4]   Social History  Tobacco Use    Smoking status: Former     Current packs/day: 0.00     Average packs/day: 0.3 packs/day for 1 year (0.3 ttl pk-yrs)     Types: Cigarettes     Quit date: 2017     Years since quittin.3    Smokeless tobacco: Never   Substance Use Topics    Alcohol use: Not Currently     Alcohol/week: 4.0 standard drinks of alcohol     Types: 1 Glasses of wine, 3 Standard drinks or equivalent per week    Drug use: Yes     Types: Cocaine        Eden Santoyo PA-C  [phone]

## 2025-06-21 NOTE — ED PROVIDER NOTES
[facility] EMERGENCY DEPARTMENT  EMERGENCY DEPARTMENT ENCOUNTER      Pt Name: David Alfaro  MRN: [id number]  Birthdate 1965  Date of evaluation: 2/25/2025  Provider: Aaron Schultz MD    CHIEF COMPLAINT       Chief Complaint   Patient presents with    Suicidal         HISTORY OF PRESENT ILLNESS   (Location/Symptom, Timing/Onset, Context/Setting, Quality, Duration, Modifying Factors, Severity)  Note limiting factors.   David Alfaro is a 59 y.o. male who presents to the emergency department complaining of worsening depression hearing voices,\" very angry at certain people- I might have to harm them\".  Also with active suicidal thoughts underlying schizoaffective disorder, major depression and polysubstance abuse.  Patient was hospitalized here in behavioral health unit and discharged to rehab facility in [place] which she was unable to access he states because of his walker.  He has been living off-and-on at home.  He is not taking the prescribed medications at time of discharge so he has been \"taking my old medications.\"  Decreased sleep active auditory hallucinations.  Denies recent illness \"though I been coughing\">    HPI    Nursing Notes were reviewed.    REVIEW OF SYSTEMS    (2-9 systems for level 4, 10 or more for level 5)     Review of Systems   Psychiatric/Behavioral:  Positive for agitation, behavioral problems, decreased concentration, dysphoric mood, hallucinations, sleep disturbance and suicidal ideas. Negative for confusion and self-injury. The patient is nervous/anxious. The patient is not hyperactive.    All other systems reviewed and are negative.      Except as noted above the remainder of the review of systems was reviewed and negative.       PAST MEDICAL HISTORY     Past Medical History:   Diagnosis Date    Benign prostatic hyperplasia with lower urinary tract symptoms 03/15/2024    Cerebral artery occlusion with cerebral infarction (HCC)     Closed displaced fracture of base of  Can provide history with frequent re-direction/alert and oriented x3

## 2025-06-27 ENCOUNTER — HOSPITAL ENCOUNTER (INPATIENT)
Facility: HOSPITAL | Age: 60
LOS: 4 days | Discharge: HOME | DRG: 885 | End: 2025-07-01
Attending: EMERGENCY MEDICINE | Admitting: PSYCHIATRY & NEUROLOGY
Payer: MEDICARE
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
## 2025-06-27 PROBLEM — F32.3 MAJOR DEPRESSION WITH PSYCHOTIC FEATURES (HCC): Status: ACTIVE | Noted: 2025-06-27

## 2025-06-27 PROBLEM — R45.851 SUICIDAL IDEATION: Status: ACTIVE | Noted: 2025-06-27

## 2025-06-27 LAB
ALBUMIN SERPL-MCNC: 4.3 G/DL (ref 3.5–5)
ALBUMIN/GLOB SERPL: 1.3 (ref 1.1–2.2)
ALP SERPL-CCNC: 63 U/L (ref 45–117)
ALT SERPL-CCNC: 37 U/L (ref 12–78)
AMPHET UR QL SCN: NEGATIVE
ANION GAP SERPL CALC-SCNC: 7 MMOL/L (ref 2–12)
APAP SERPL-MCNC: <2 UG/ML (ref 10–30)
APPEARANCE UR: CLEAR
AST SERPL W P-5'-P-CCNC: 24 U/L (ref 15–37)
BACTERIA URNS QL MICRO: NEGATIVE /HPF
BARBITURATES UR QL SCN: NEGATIVE
BASOPHILS # BLD: 0.03 K/UL (ref 0–0.1)
BASOPHILS NFR BLD: 0.8 % (ref 0–1)
BENZODIAZ UR QL: NEGATIVE
BILIRUB SERPL-MCNC: 0.7 MG/DL (ref 0.2–1)
BILIRUB UR QL: NEGATIVE
BUN SERPL-MCNC: 16 MG/DL (ref 6–20)
BUN/CREAT SERPL: 15 (ref 12–20)
CA-I BLD-MCNC: 9.5 MG/DL (ref 8.5–10.1)
CANNABINOIDS UR QL SCN: POSITIVE
CHLORIDE SERPL-SCNC: 103 MMOL/L (ref 97–108)
CO2 SERPL-SCNC: 28 MMOL/L (ref 21–32)
COCAINE UR QL SCN: POSITIVE
COLOR UR: ABNORMAL
CREAT SERPL-MCNC: 1.05 MG/DL (ref 0.7–1.3)
DATE LAST DOSE: ABNORMAL
DATE LAST DOSE: ABNORMAL
DIFFERENTIAL METHOD BLD: ABNORMAL
DOSE AMOUNT: ABNORMAL UNITS
DOSE AMOUNT: ABNORMAL UNITS
EKG ATRIAL RATE: 48 BPM
EKG DIAGNOSIS: NORMAL
EKG P-R INTERVAL: 172 MS
EKG Q-T INTERVAL: 496 MS
EKG QRS DURATION: 98 MS
EKG QTC CALCULATION (BAZETT): 443 MS
EKG R AXIS: 0 DEGREES
EKG T AXIS: 25 DEGREES
EKG VENTRICULAR RATE: 48 BPM
EOSINOPHIL # BLD: 0.02 K/UL (ref 0–0.4)
EOSINOPHIL NFR BLD: 0.6 % (ref 0–7)
EPITH CASTS URNS QL MICRO: ABNORMAL /LPF
ERYTHROCYTE [DISTWIDTH] IN BLOOD BY AUTOMATED COUNT: 12.2 % (ref 11.5–14.5)
ETHANOL SERPL-MCNC: <10 MG/DL (ref 0–0.08)
GLOBULIN SER CALC-MCNC: 3.4 G/DL (ref 2–4)
GLUCOSE SERPL-MCNC: 98 MG/DL (ref 65–100)
GLUCOSE UR STRIP.AUTO-MCNC: NEGATIVE MG/DL
HCT VFR BLD AUTO: 42.6 % (ref 36.6–50.3)
HGB BLD-MCNC: 14.8 G/DL (ref 12.1–17)
HGB UR QL STRIP: NEGATIVE
HYALINE CASTS URNS QL MICRO: ABNORMAL /LPF (ref 0–5)
IMM GRANULOCYTES # BLD AUTO: 0.01 K/UL (ref 0–0.04)
IMM GRANULOCYTES NFR BLD AUTO: 0.3 % (ref 0–0.5)
KETONES UR QL STRIP.AUTO: NEGATIVE MG/DL
LEUKOCYTE ESTERASE UR QL STRIP.AUTO: NEGATIVE
LYMPHOCYTES # BLD: 1.43 K/UL (ref 0.8–3.5)
LYMPHOCYTES NFR BLD: 40.1 % (ref 12–49)
Lab: ABNORMAL
MCH RBC QN AUTO: 33.6 PG (ref 26–34)
MCHC RBC AUTO-ENTMCNC: 34.7 G/DL (ref 30–36.5)
MCV RBC AUTO: 96.8 FL (ref 80–99)
METHADONE UR QL: NEGATIVE
MONOCYTES # BLD: 0.33 K/UL (ref 0–1)
MONOCYTES NFR BLD: 9.2 % (ref 5–13)
MUCOUS THREADS URNS QL MICRO: ABNORMAL /LPF
NEUTS SEG # BLD: 1.75 K/UL (ref 1.8–8)
NEUTS SEG NFR BLD: 49 % (ref 32–75)
NITRITE UR QL STRIP.AUTO: NEGATIVE
NRBC # BLD: 0 K/UL (ref 0–0.01)
NRBC BLD-RTO: 0 PER 100 WBC
OPIATES UR QL: NEGATIVE
PCP UR QL: NEGATIVE
PH UR STRIP: 5 (ref 5–8)
PLATELET # BLD AUTO: 197 K/UL (ref 150–400)
PMV BLD AUTO: 9 FL (ref 8.9–12.9)
POTASSIUM SERPL-SCNC: 3.8 MMOL/L (ref 3.5–5.1)
PROT SERPL-MCNC: 7.7 G/DL (ref 6.4–8.2)
PROT UR STRIP-MCNC: 30 MG/DL
RBC # BLD AUTO: 4.4 M/UL (ref 4.1–5.7)
RBC #/AREA URNS HPF: ABNORMAL /HPF (ref 0–5)
SALICYLATES SERPL-MCNC: <1.7 MG/DL (ref 2.8–20)
SODIUM SERPL-SCNC: 138 MMOL/L (ref 136–145)
SP GR UR REFRACTOMETRY: 1.02 (ref 1–1.03)
TROPONIN I SERPL HS-MCNC: 7 NG/L (ref 0–76)
UROBILINOGEN UR QL STRIP.AUTO: 0.1 EU/DL (ref 0.1–1)
WBC # BLD AUTO: 3.6 K/UL (ref 4.1–11.1)
WBC URNS QL MICRO: ABNORMAL /HPF (ref 0–4)

## 2025-06-27 PROCEDURE — 1240000000 HC EMOTIONAL WELLNESS R&B

## 2025-06-27 PROCEDURE — 82077 ASSAY SPEC XCP UR&BREATH IA: CPT

## 2025-06-27 PROCEDURE — 80179 DRUG ASSAY SALICYLATE: CPT

## 2025-06-27 PROCEDURE — 80143 DRUG ASSAY ACETAMINOPHEN: CPT

## 2025-06-27 PROCEDURE — 85025 COMPLETE CBC W/AUTO DIFF WBC: CPT

## 2025-06-27 PROCEDURE — 99285 EMERGENCY DEPT VISIT HI MDM: CPT

## 2025-06-27 PROCEDURE — 80307 DRUG TEST PRSMV CHEM ANLYZR: CPT

## 2025-06-27 PROCEDURE — 94761 N-INVAS EAR/PLS OXIMETRY MLT: CPT

## 2025-06-27 PROCEDURE — 80053 COMPREHEN METABOLIC PANEL: CPT

## 2025-06-27 PROCEDURE — 6370000000 HC RX 637 (ALT 250 FOR IP): Performed by: PSYCHIATRY & NEUROLOGY

## 2025-06-27 PROCEDURE — 84484 ASSAY OF TROPONIN QUANT: CPT

## 2025-06-27 PROCEDURE — GZHZZZZ GROUP PSYCHOTHERAPY: ICD-10-PCS | Performed by: PSYCHIATRY & NEUROLOGY

## 2025-06-27 PROCEDURE — 81001 URINALYSIS AUTO W/SCOPE: CPT

## 2025-06-27 PROCEDURE — 93005 ELECTROCARDIOGRAM TRACING: CPT | Performed by: EMERGENCY MEDICINE

## 2025-06-27 RX ORDER — PANTOPRAZOLE SODIUM 40 MG/1
40 TABLET, DELAYED RELEASE ORAL
Status: DISCONTINUED | OUTPATIENT
Start: 2025-06-28 | End: 2025-07-01 | Stop reason: HOSPADM

## 2025-06-27 RX ORDER — LEVETIRACETAM 500 MG/1
500 TABLET ORAL 2 TIMES DAILY
Status: DISCONTINUED | OUTPATIENT
Start: 2025-06-27 | End: 2025-07-01 | Stop reason: HOSPADM

## 2025-06-27 RX ORDER — AMLODIPINE BESYLATE 5 MG/1
5 TABLET ORAL DAILY
Status: DISCONTINUED | OUTPATIENT
Start: 2025-06-27 | End: 2025-07-01 | Stop reason: HOSPADM

## 2025-06-27 RX ORDER — LANOLIN ALCOHOL/MO/W.PET/CERES
400 CREAM (GRAM) TOPICAL DAILY
Status: DISCONTINUED | OUTPATIENT
Start: 2025-06-27 | End: 2025-07-01 | Stop reason: HOSPADM

## 2025-06-27 RX ORDER — ASPIRIN 81 MG/1
81 TABLET, CHEWABLE ORAL DAILY
Status: DISCONTINUED | OUTPATIENT
Start: 2025-06-27 | End: 2025-07-01 | Stop reason: HOSPADM

## 2025-06-27 RX ORDER — MONTELUKAST SODIUM 10 MG/1
10 TABLET ORAL NIGHTLY
Status: DISCONTINUED | OUTPATIENT
Start: 2025-06-27 | End: 2025-07-01 | Stop reason: HOSPADM

## 2025-06-27 RX ORDER — HYDROXYZINE PAMOATE 50 MG/1
50 CAPSULE ORAL 3 TIMES DAILY PRN
Status: DISCONTINUED | OUTPATIENT
Start: 2025-06-27 | End: 2025-07-01 | Stop reason: HOSPADM

## 2025-06-27 RX ORDER — MAGNESIUM HYDROXIDE/ALUMINUM HYDROXICE/SIMETHICONE 120; 1200; 1200 MG/30ML; MG/30ML; MG/30ML
30 SUSPENSION ORAL EVERY 6 HOURS PRN
Status: DISCONTINUED | OUTPATIENT
Start: 2025-06-27 | End: 2025-07-01 | Stop reason: HOSPADM

## 2025-06-27 RX ORDER — TRAZODONE HYDROCHLORIDE 50 MG/1
50 TABLET ORAL NIGHTLY
Status: DISCONTINUED | OUTPATIENT
Start: 2025-06-27 | End: 2025-07-01 | Stop reason: HOSPADM

## 2025-06-27 RX ORDER — SPIRONOLACTONE 25 MG/1
25 TABLET ORAL DAILY
Status: DISCONTINUED | OUTPATIENT
Start: 2025-06-27 | End: 2025-07-01 | Stop reason: HOSPADM

## 2025-06-27 RX ORDER — CARVEDILOL 3.12 MG/1
3.12 TABLET ORAL 2 TIMES DAILY
Status: DISCONTINUED | OUTPATIENT
Start: 2025-06-27 | End: 2025-07-01 | Stop reason: HOSPADM

## 2025-06-27 RX ORDER — ISOSORBIDE MONONITRATE 30 MG/1
30 TABLET, EXTENDED RELEASE ORAL DAILY
Status: DISCONTINUED | OUTPATIENT
Start: 2025-06-27 | End: 2025-07-01 | Stop reason: HOSPADM

## 2025-06-27 RX ORDER — ESCITALOPRAM OXALATE 10 MG/1
10 TABLET ORAL DAILY
Status: DISCONTINUED | OUTPATIENT
Start: 2025-06-27 | End: 2025-07-01 | Stop reason: HOSPADM

## 2025-06-27 RX ORDER — TAMSULOSIN HYDROCHLORIDE 0.4 MG/1
0.4 CAPSULE ORAL DAILY
Status: DISCONTINUED | OUTPATIENT
Start: 2025-06-27 | End: 2025-07-01 | Stop reason: HOSPADM

## 2025-06-27 RX ORDER — ACETAMINOPHEN 325 MG/1
650 TABLET ORAL EVERY 4 HOURS PRN
Status: DISCONTINUED | OUTPATIENT
Start: 2025-06-27 | End: 2025-07-01 | Stop reason: HOSPADM

## 2025-06-27 RX ADMIN — Medication 400 MG: at 20:39

## 2025-06-27 RX ADMIN — ISOSORBIDE MONONITRATE 30 MG: 30 TABLET, EXTENDED RELEASE ORAL at 20:39

## 2025-06-27 RX ADMIN — TAMSULOSIN HYDROCHLORIDE 0.4 MG: 0.4 CAPSULE ORAL at 20:39

## 2025-06-27 RX ADMIN — MONTELUKAST 10 MG: 10 TABLET, FILM COATED ORAL at 20:33

## 2025-06-27 RX ADMIN — AMLODIPINE BESYLATE 5 MG: 5 TABLET ORAL at 20:33

## 2025-06-27 RX ADMIN — CARVEDILOL 3.12 MG: 3.12 TABLET, FILM COATED ORAL at 20:33

## 2025-06-27 RX ADMIN — SPIRONOLACTONE 25 MG: 25 TABLET ORAL at 20:39

## 2025-06-27 RX ADMIN — LEVETIRACETAM 500 MG: 500 TABLET, FILM COATED ORAL at 20:33

## 2025-06-27 RX ADMIN — ACETAMINOPHEN 650 MG: 325 TABLET ORAL at 20:33

## 2025-06-27 RX ADMIN — ESCITALOPRAM OXALATE 10 MG: 10 TABLET ORAL at 20:39

## 2025-06-27 RX ADMIN — ASPIRIN 81 MG: 81 TABLET, CHEWABLE ORAL at 20:39

## 2025-06-27 RX ADMIN — TRAZODONE HYDROCHLORIDE 50 MG: 50 TABLET ORAL at 20:33

## 2025-06-27 ASSESSMENT — PAIN SCALES - GENERAL: PAINLEVEL_OUTOF10: 0

## 2025-06-27 ASSESSMENT — SLEEP AND FATIGUE QUESTIONNAIRES
SLEEP PATTERN: DIFFICULTY FALLING ASLEEP
AVERAGE NUMBER OF SLEEP HOURS: 2
DO YOU HAVE DIFFICULTY SLEEPING: YES
DO YOU USE A SLEEP AID: NO

## 2025-06-27 ASSESSMENT — LIFESTYLE VARIABLES
HOW MANY STANDARD DRINKS CONTAINING ALCOHOL DO YOU HAVE ON A TYPICAL DAY: PATIENT DOES NOT DRINK
HOW OFTEN DO YOU HAVE A DRINK CONTAINING ALCOHOL: NEVER

## 2025-06-27 ASSESSMENT — PAIN - FUNCTIONAL ASSESSMENT: PAIN_FUNCTIONAL_ASSESSMENT: NONE - DENIES PAIN

## 2025-06-27 NOTE — PLAN OF CARE
Problem: Seizure Precautions  Goal: Remains free of injury related to seizures activity  6/27/2025 1641 by Catie Rehman, RN  Outcome: Progressing  6/27/2025 1640 by Catie Rehman RN  Outcome: Progressing  6/27/2025 1640 by Catie Rehman RN  Outcome: Progressing     Problem: Safety - Adult  Goal: Free from fall injury  6/27/2025 1640 by Catie Rehman, RN  Outcome: Progressing  6/27/2025 1640 by Catie Rehman RN  Outcome: Progressing

## 2025-06-27 NOTE — ED NOTES
ED TO INPATIENT SBAR HANDOFF    Patient Name: David Alfaro   Preferred Name: David  : 1965  59 y.o.   Family/Caregiver Present: no   Code Status Order: Prior  PO Status: NPO:No  Telemetry Order:   C-SSRS: Risk of Suicide: High Risk  Sitter no Suicidal Ideation  Restraints:     Sepsis Risk Score Sepsis V2 Risk Score: 0.8    Situation  Chief Complaint   Patient presents with    Suicide Attempt     Brief Description of Patient's Condition: Reported SI attempt via drugs yesterday. Pt denies SI today. Endorses a variety of hallucinations (not command).   Aox4, ambulatory with rollator.   Mental Status: oriented, alert, coherent, and logical  Arrived from:Home  Imaging:   No orders to display     Abnormal labs:   Abnormal Labs Reviewed   URINE DRUG SCREEN - Abnormal; Notable for the following components:       Result Value    Cocaine, Urine Positive (*)     THC, TH-Cannabinol, Urine Positive (*)     All other components within normal limits   URINALYSIS - Abnormal; Notable for the following components:    Protein, UA 30 (*)     All other components within normal limits   CBC WITH AUTO DIFFERENTIAL - Abnormal; Notable for the following components:    WBC 3.6 (*)     Neutrophils Absolute 1.75 (*)     All other components within normal limits   SALICYLATE LEVEL - Abnormal; Notable for the following components:    Salicylate Lvl <1.7 (*)     All other components within normal limits   ACETAMINOPHEN LEVEL - Abnormal; Notable for the following components:    Acetaminophen Level <2 (*)     All other components within normal limits   URINALYSIS, MICRO - Abnormal; Notable for the following components:    Mucus, UA Trace (*)     All other components within normal limits       Background  Allergies:   Allergies   Allergen Reactions    Codeine Swelling     Tongue swelling    Penicillins Swelling     Tongue Swelling     History:   Past Medical History:   Diagnosis Date    Benign prostatic hyperplasia with lower urinary

## 2025-06-27 NOTE — BSMART NOTE
Writer reviewed tele-psych disposition with provider Dr. Steinberg and she as in agreement with plan.  Please note patient is currently pending medical clearance.      Writer spoke to patient at bedside in room#18 and he was in agreement with plan stating, \"if that's what they say I need\".  Per patient he would like to be reviewed for [facility] due to wanting to stay in the area to be close to his niece for family support.      Per patient he currently uses a walker but no other medical devices/CPAP.  Per patient he attends to ADLs on his own without any limitations or concerns.     Please note per patient he is hard of hearing but does not use any hearing aids as he does not like the \"frequencies\" they provided.  Per patient he was recommended for hearing implants but has not started that process.  Per patient it is helpful to him when he is able to read lips to better communicate with others.      Writer updated ACCESS.

## 2025-06-27 NOTE — VIRTUAL HEALTH
David Alfaro  [id number]  1965     Social Work Behavioral Health Crisis Assessment    06/27/25    Chief Complaint: Suicide Attempt     HPI: Patient is a 59 y.o. Black /  male who presents on 6/27/25 for a suicide attempt. Per staff, pt was sent from [facility] for a suicide attempt via overdose yesterday. Records show hx of Depression, Psychosis Unspecified, Polysubstance Use.     Upon assessment pt is calm and agreeable to meet with writer via [place]. He is A&Ox4. When asked about reason for presentation pt states \"I've been having some bad issues over the last couple weeks and I just kind of lost it\". Pt says he's been in treatment with [facility] and has had some interpersonal issues with some of the other group participants. Pt states he \"snapped\" because he was tired of people taking about him and he attempted to commit suicide by overdose on cocaine. He denies current SI, however says it \"comes and goes\" and he acknowledges what he did as an intentional attempt to end his life. He reports multiple previous attempts, most recent in January (prior to last night). He endorses auditory hallucinations of voices and visual hallucinations of shadows. He endorses increased depression, SI, hallucinations, poor sleep, stress. He has been hospitalized multiple times and is connected to outpatient tx. He says he has been compliant with medications and recently had a medication change but unsure what it was. He says he is also prescribed Trazodone for sleep but doesn't feel it is helping and says he hasn't slept well in a while. He reports a positive support system and gave permission to contact his god sister. Pt currently denies SI/HI, substance use, access to weapons.    Collateral: God Sister- [phone]- states writer can call her but cannot tell her about his suicide attempt- voicemail left requesting return call     Past Psychiatric History:  Previous Diagnoses/symptoms: Depression,

## 2025-06-27 NOTE — ED PROVIDER NOTES
physician as NORMAL   [HP]   1249 Ethanol Lvl: <10  Lab work interpreted independently by ER physician as NORMAL   [HP]   1249 Acetaminophen Level(!): <2  Lab work interpreted independently by ER physician as NORMAL   [HP]   1249 Salicyclic Acid(!): <1.7  Lab work interpreted independently by ER physician as NORMAL   [HP]   1249 Comprehensive Metabolic Panel:    Sodium 138   Potassium 3.8   Chloride 103   CARBON DIOXIDE 28   Anion Gap 7   Glucose 98   BUN,BUNPL 16   Creatinine 1.05   Bun/Cre 15   Est, Glom Filt Rate 81   Calcium 9.5   Total Bilirubin 0.7   AST 24   ALT 37   Alkaline Phosphatase 63   Total Protein 7.7   Albumin 4.3   Globulin 3.4   Albumin/Globulin Ratio 1.3  Lab work interpreted independently by ER physician as NORMAL   [HP]   1249 CBC with Auto Differential(!):    WBC 3.6(!)   RBC 4.40   Hemoglobin Quant 14.8   Hematocrit 42.6   MCV 96.8   MCH 33.6   MCHC 34.7   RDW 12.2   Platelet Count 197   MPV 9.0   Nucleated Red Blood Cells 0.0   Nucleated Red Blood Cells 0.00   Neutrophils % 49.0   Lymphocyte % 40.1   Monocytes % 9.2   Eosinophils % 0.6   Basophils % 0.8   Immature Granulocytes % 0.3   Neutrophils Absolute 1.75(!)   Lymphocytes Absolute 1.43   Monocytes Absolute 0.33   Eosinophils Absolute 0.02   Basophils Absolute 0.03   Immature Granulocytes Absolute 0.01   Differential Type AUTOMATED  No severe anemia or leukocytosis [HP]   1249 Urinalysis(!):    Color, UA Yellow/Straw   Appearance Clear   Specific Gravity, UA 1.020   pH, Urine 5.0   Protein, UA 30(!)   Glucose, Ur Negative   Ketones, Urine Negative   Bilirubin, Urine Negative   Blood, Urine Negative   Urobilinogen 0.1   Nitrite, Urine Negative   Leukocyte Esterase, Urine Negative  No evidence of UTI [HP]   1249 Patient is medically cleared for behavioral health treatment and evaluation [HP]      ED Course User Index  [HP] Radha Steinberg MD       Clinical Management Tools:  Not Applicable    Smoking Cessation: Not Applicable    Patient

## 2025-06-27 NOTE — BH NOTE
This 59 year old  male is being admitted to BehavioralHealth under the professional services of Dr.Koduri jurado an admitting diagnosis of Major Depression wiith suicidal ideations.Client is pleasant upon approach.Alert and oriented x 3.Appearance is neat and clean.Client reports that he ha been going through some stuff the last few weeks and had some thoughts to harm himself but used some cocaine to calm his mind down.He adamantly denied being suicidal or homicidal.Denies auditory or visual hallucinations.Client verbalizes that he needs to be outta here by the third of thee month.Reports he was in [facility] for treatment  and peers were talking about him and picking on him.Reports that he became frustrated and started having thoughts to hurt them or himself.Reports that he has had time to calm down and has no more suicidal thoughts.Reports that he needs to get his meds reordered.He does admit to a history of seizures.Admits to feeling depressed and has used some cocaine in the past.Client will be placed on close observation for safety.

## 2025-06-27 NOTE — BSMART NOTE
Per Melisa with ACCESS, patient accepted by Dr. Abarca, room 242-2, report [phone].  Writer informed RN Fili and provider Dr. Steinberg of update.

## 2025-06-27 NOTE — ED TRIAGE NOTES
Pt presents at behest of [facility] staff for eval after SI attempt yesterday. Pt initially states he consumed cocaine but later ammended to \"I don't really know what I took\". Pt states a specific incident with other persons precipitated his attempt. Pt states \"I'm doing better today. I don't have those thoughts\".

## 2025-06-27 NOTE — PLAN OF CARE
Problem: Seizure Precautions  Goal: Remains free of injury related to seizures activity  6/27/2025 1640 by Catie Rehman, RN  Outcome: Progressing  6/27/2025 1640 by Catie Rehman RN  Outcome: Progressing     Problem: Safety - Adult  Goal: Free from fall injury  6/27/2025 1640 by Catie Rehman, RN  Outcome: Progressing  6/27/2025 1640 by Catie Rehman, RN  Outcome: Progressing

## 2025-06-27 NOTE — PLAN OF CARE
Problem: Safety - Adult  Goal: Free from fall injury  6/27/2025 1640 by Catie Rehman, RN  Outcome: Progressing  6/27/2025 1640 by Catie Rehman RN  Outcome: Progressing     Problem: Seizure Precautions  Goal: Remains free of injury related to seizures activity  6/27/2025 1640 by Catie Rehman, RN  Outcome: Progressing  6/27/2025 1640 by Catie Rehman, RN  Outcome: Progressing

## 2025-06-27 NOTE — BSMART NOTE
Writer met with patient at bedside in room #18.  Writer introduced self/role and provided patient with support and assistance during ED stay.      Writer explained tele-psych process to patient and he had no questions or concerns at this time and stated, \"I'm doing better then I was last night at least\".      Please note patient is hard of hearing and expressed that it is helpful when he can read lips.      Patient is pending tele-psych assessment and disposition at this time.

## 2025-06-28 PROCEDURE — 1240000000 HC EMOTIONAL WELLNESS R&B

## 2025-06-28 PROCEDURE — 6370000000 HC RX 637 (ALT 250 FOR IP): Performed by: PSYCHIATRY & NEUROLOGY

## 2025-06-28 RX ORDER — HYDRALAZINE HYDROCHLORIDE 10 MG/1
10 TABLET, FILM COATED ORAL EVERY 6 HOURS PRN
Status: DISCONTINUED | OUTPATIENT
Start: 2025-06-28 | End: 2025-07-01 | Stop reason: HOSPADM

## 2025-06-28 RX ADMIN — Medication 400 MG: at 08:30

## 2025-06-28 RX ADMIN — ISOSORBIDE MONONITRATE 30 MG: 30 TABLET, EXTENDED RELEASE ORAL at 08:30

## 2025-06-28 RX ADMIN — SPIRONOLACTONE 25 MG: 25 TABLET ORAL at 08:31

## 2025-06-28 RX ADMIN — AMLODIPINE BESYLATE 5 MG: 5 TABLET ORAL at 21:16

## 2025-06-28 RX ADMIN — TAMSULOSIN HYDROCHLORIDE 0.4 MG: 0.4 CAPSULE ORAL at 08:31

## 2025-06-28 RX ADMIN — ESCITALOPRAM OXALATE 10 MG: 10 TABLET ORAL at 08:31

## 2025-06-28 RX ADMIN — LEVETIRACETAM 500 MG: 500 TABLET, FILM COATED ORAL at 21:16

## 2025-06-28 RX ADMIN — CARVEDILOL 3.12 MG: 3.12 TABLET, FILM COATED ORAL at 21:16

## 2025-06-28 RX ADMIN — TRAZODONE HYDROCHLORIDE 50 MG: 50 TABLET ORAL at 21:16

## 2025-06-28 RX ADMIN — LEVETIRACETAM 500 MG: 500 TABLET, FILM COATED ORAL at 08:31

## 2025-06-28 RX ADMIN — PANTOPRAZOLE SODIUM 40 MG: 40 TABLET, DELAYED RELEASE ORAL at 21:19

## 2025-06-28 RX ADMIN — CARVEDILOL 3.12 MG: 3.12 TABLET, FILM COATED ORAL at 08:30

## 2025-06-28 RX ADMIN — ASPIRIN 81 MG: 81 TABLET, CHEWABLE ORAL at 08:31

## 2025-06-28 RX ADMIN — HYDROXYZINE PAMOATE 50 MG: 50 CAPSULE ORAL at 21:16

## 2025-06-28 RX ADMIN — MONTELUKAST 10 MG: 10 TABLET, FILM COATED ORAL at 21:16

## 2025-06-28 NOTE — BH NOTE
Patient alert and oriented x 3. Patient isolative to his room. He denies SI/HI/AVH. He also denies anxiety and depression. He stated he had leg pain 5/10 due to muscle spasms . Patient reported poor sleep stated \" I have slept in a couple days\" . He was given scheduled medications and PRNs to help with sleep. Will continue to monitor patient every 15 minutes for saftey rounds.

## 2025-06-28 NOTE — GROUP NOTE
Group Therapy Note    Date: 6/28/2025    Group Start Time: 1550  Group End Time: 1635  Group Topic: Recreational    SSR 2 BH NON ACUTE    Payal Puentes        Group Therapy Note    Facilitated leisure skills group to reinforce positive coping and to manage mood through music, social interaction, group activities and art task       Attendees: 7/12       Patient's Goal:  Attend group daily     Notes:  Pt was receptive to listening to music and a song he selected while working on leisure task.  Interacted with staff     Status After Intervention:  Improved    Participation Level: Active Listener and Interactive    Participation Quality: Appropriate and Attentive      Speech:  normal      Thought Process/Content: Logical      Affective Functioning: Congruent      Mood: calm      Level of consciousness:  Attentive      Response to Learning: Progressing to goal      Endings: None Reported    Modes of Intervention: Socialization and Activity      Discipline Responsible: Recreational Therapist      Signature:  PAUL Chavez

## 2025-06-28 NOTE — PROGRESS NOTES
Progress Note  Date:2025       Room:[facility]  Patient Name:David Alfaro     YOB: 1965     Age:59 y.o.        Subjective    Subjective   Patient expresses that he has been having difficulty for the past 2 weeks at his [facility] substance abuse rehab.  Patient had expressed he felt like killing himself.  He also has expressed he used cocaine to kill himself.  Patient has been having poor sleep poor appetite low energy and suicidal thoughts.  Patient expresses he has been continuing to feel slightly better and that he volunteered to come in for admission.    Status examination-patient is alert oriented to place.  Patient presents anxious labile.  Acknowledges depression.  Denies having any command auditory hallucinations today denies having any active suicidal thoughts today.  Insight judgment coping remains impaired.    Review of Systems  Objective         Vitals Last 24 Hours:  TEMPERATURE:  Temp  Av °F (36.7 °C)  Min: 97.5 °F (36.4 °C)  Max: 98.5 °F (36.9 °C)  RESPIRATIONS RANGE: Resp  Av.5  Min: 16  Max: 17  PULSE OXIMETRY RANGE: SpO2  Av %  Min: 98 %  Max: 100 %  PULSE RANGE: Pulse  Av.5  Min: 75  Max: 80  BLOOD PRESSURE RANGE: Systolic (24hrs), Av , Min:123 , Max:162   ; Diastolic (24hrs), Av, Min:97, Max:101    I/O (24Hr):  No intake or output data in the 24 hours ending 25 1224  Objective  Labs/Imaging/Diagnostics    Labs:  CBC:  Recent Labs     25  1137   WBC 3.6*   RBC 4.40   HGB 14.8   HCT 42.6   MCV 96.8   RDW 12.2        CHEMISTRIES:  Recent Labs     25  1137      K 3.8      CO2 28   BUN 16   CREATININE 1.05   GLUCOSE 98     PT/INR:No results for input(s): \"PROTIME\", \"INR\" in the last 72 hours.  APTT:No results for input(s): \"APTT\" in the last 72 hours.  LIVER PROFILE:  Recent Labs     25  1137   AST 24   ALT 37   BILITOT 0.7   ALKPHOS 63       Imaging Last 24 Hours:  No results found.  Assessment//Plan

## 2025-06-28 NOTE — BH NOTE
DAYSHIFT NOTE:     Patient up and eats his meals in the dayroom. Patient is pleasant on approach. Patient presents as hard of hearing. Patient denies having any depression and or anxiety. Denies SI/HI. Denies AH/VH. Patient is medication compliant. Patient uses a rollator to walk around on the unit. Patient took a shower today and complained about the hospital provided toothbrush being too hard for his gums. Patient did not attend groups today. Close observations continued to ensure patient safety.

## 2025-06-28 NOTE — CONSULTS
Consult    Patient: David Alfaro MRN: [id number]  SSN: [id number]    YOB: 1965  Age: 59 y.o.  Sex: male      Subjective:      Chief Complaint   Patient presents with    Suicide Attempt        David Alfaro is a 59 y.o. male with PMH of BPH, CVA, substance use, COPD, HTN, ARDEN, GERD, HLD, bipolar 1 disorder, seizures, recurrent major depression who is being seen for suicidal thoughts with attempted overdose on cocaine.  This had occurred day prior to admission, upon presentation no longer suicidal.  He has a history of struggling with mental health, and reported worsening recently.  He was evaluated in the ED and felt to be medically stable for admission to the psychiatric unit.  Hospital medicine was subsequently consulted for H&P.    Subjective:  Patient seen in psych unit for the first time.  Chart reviewed and overnight events discussed with RN.  Patient reported feeling well, no significant complaints aside from mild shortness of breath as he did not have his inhaler.  He was eating breakfast normally and did not provide any physical complaints, denying chest pain, abdominal pain, nausea, vomiting, headache.  Past Medical History:   Diagnosis Date    Benign prostatic hyperplasia with lower urinary tract symptoms 03/15/2024    Cerebral artery occlusion with cerebral infarction ([facility])     Closed displaced fracture of base of fifth metacarpal bone of right hand 12/13/2022    Cocaine abuse ([facility]) 12/25/2023    Colostomy and enterostomy malfunction ([facility]) 06/15/2022    COPD (chronic obstructive pulmonary disease) (HCC)     Essential hypertension     ARDEN (generalized anxiety disorder)     GERD (gastroesophageal reflux disease)     History of cerebrovascular accident (CVA) with residual deficit 09/01/2024    Mild depressed bipolar 1 disorder ([facility]) 10/13/2020    Mixed hyperlipidemia     DELTA on CPAP     Seizures (HCC)     Severe recurrent major depression without psychotic features ([facility])

## 2025-06-28 NOTE — PLAN OF CARE
Problem: Safety - Adult  Goal: Free from fall injury  6/27/2025 1640 by Catie Rehman, RN  Outcome: Progressing  6/27/2025 1640 by Catie Rehman, RN  Outcome: Progressing     Problem: Chronic Conditions and Co-morbidities  Goal: Patient's chronic conditions and co-morbidity symptoms are monitored and maintained or improved  Outcome: Progressing  Flowsheets (Taken 6/27/2025 1608 by Catie Rehman, RN)  Care Plan - Patient's Chronic Conditions and Co-Morbidity Symptoms are Monitored and Maintained or Improved: Monitor and assess patient's chronic conditions and comorbid symptoms for stability, deterioration, or improvement

## 2025-06-28 NOTE — H&P
[address]  [address]                                PSYCH H&P      PATIENT NAME: JONNA EDWARD            : 1965  MED REC NO: [id number]                       ROOM: 242  ACCOUNT NO: [id number]                       ADMIT DATE: 2025  PROVIDER: Eliu Abarca MD      HISTORY OF PRESENT ILLNESS:  This is a 59-year-old  male patient admitted to Behavioral Health Unit voluntarily from [facility] ED where he was sent by his counselor at [facility].  The patient says he is having difficulty for last 2 weeks.  Apparently he is in a rehab program and he thought that peers were giving hard time picking on him, talking about him.  He wanted to kill himself.  He says he used cocaine to kill himself.  Having poor sleep, poor appetite, low energy level, suicidal thoughts.  He says he does not have a suicidal thought now.  A little bit frustrated that being sent here.  He said he told the counselor  supposed to share with him that he was a little bit surprised and disappointed he was sent here.  Clarified with him that his safety is a priority.  He says he sees a psychiatrist and gets medications, but could not come up with any names.    PAST HISTORY:  Getting help for many years, had multiple prior hospitalizations; here, [facility], [facility], [facility], [facility].    SOCIAL HISTORY:  10th grade education.  Currently unemployed.  Gets disability.  Some support system, he called as a godsister.  He lives in [place].    TRAUMA HISTORY:  None.    SURGICAL HISTORY:  Hypertension, history of stroke, history of 3 seizures, history of prostate problem.    ALLERGIES TO MEDICATIONS:  Codeine, penicillin.      LABORATORY DATA:  Drug screen positive for cocaine and THC.  Urinalysis:  Protein 30, otherwise unremarkable.  CBC:  WBC 3.6 thousand, neutrophils

## 2025-06-28 NOTE — GROUP NOTE
Group Therapy Note    Date: 6/28/2025    Group Start Time: 1105  Group End Time: 1150  Group Topic: Education Group - Inpatient    SSR 2  NON ACUTE    Payal Puentes        Group Therapy Note    Facilitated discussion focused on defining and sharing examples of different types of automatic negative thoughts and how they affect moods and behaviors       Attendees: 6/11       Notes:  Encouraged but did not attend    Discipline Responsible: Recreational Therapist      Signature:  PAUL Chavez

## 2025-06-29 PROCEDURE — 6370000000 HC RX 637 (ALT 250 FOR IP): Performed by: PSYCHIATRY & NEUROLOGY

## 2025-06-29 PROCEDURE — 1240000000 HC EMOTIONAL WELLNESS R&B

## 2025-06-29 PROCEDURE — 6370000000 HC RX 637 (ALT 250 FOR IP): Performed by: PHYSICIAN ASSISTANT

## 2025-06-29 RX ORDER — LISINOPRIL 10 MG/1
10 TABLET ORAL DAILY
Status: DISCONTINUED | OUTPATIENT
Start: 2025-06-29 | End: 2025-06-29

## 2025-06-29 RX ORDER — LISINOPRIL 20 MG/1
20 TABLET ORAL DAILY
Status: DISCONTINUED | OUTPATIENT
Start: 2025-06-29 | End: 2025-07-01 | Stop reason: HOSPADM

## 2025-06-29 RX ORDER — ALBUTEROL SULFATE 90 UG/1
2 INHALANT RESPIRATORY (INHALATION) EVERY 6 HOURS PRN
Status: DISCONTINUED | OUTPATIENT
Start: 2025-06-29 | End: 2025-07-01 | Stop reason: HOSPADM

## 2025-06-29 RX ADMIN — AMLODIPINE BESYLATE 5 MG: 5 TABLET ORAL at 21:25

## 2025-06-29 RX ADMIN — PANTOPRAZOLE SODIUM 40 MG: 40 TABLET, DELAYED RELEASE ORAL at 06:50

## 2025-06-29 RX ADMIN — HYDROXYZINE PAMOATE 50 MG: 50 CAPSULE ORAL at 21:25

## 2025-06-29 RX ADMIN — CARVEDILOL 3.12 MG: 3.12 TABLET, FILM COATED ORAL at 08:47

## 2025-06-29 RX ADMIN — LEVETIRACETAM 500 MG: 500 TABLET, FILM COATED ORAL at 08:47

## 2025-06-29 RX ADMIN — TRAZODONE HYDROCHLORIDE 50 MG: 50 TABLET ORAL at 21:25

## 2025-06-29 RX ADMIN — CARVEDILOL 3.12 MG: 3.12 TABLET, FILM COATED ORAL at 21:25

## 2025-06-29 RX ADMIN — ISOSORBIDE MONONITRATE 30 MG: 30 TABLET, EXTENDED RELEASE ORAL at 08:47

## 2025-06-29 RX ADMIN — MONTELUKAST 10 MG: 10 TABLET, FILM COATED ORAL at 21:25

## 2025-06-29 RX ADMIN — SPIRONOLACTONE 25 MG: 25 TABLET ORAL at 08:47

## 2025-06-29 RX ADMIN — LISINOPRIL 20 MG: 20 TABLET ORAL at 12:57

## 2025-06-29 RX ADMIN — Medication 400 MG: at 08:47

## 2025-06-29 RX ADMIN — LEVETIRACETAM 500 MG: 500 TABLET, FILM COATED ORAL at 21:25

## 2025-06-29 RX ADMIN — ASPIRIN 81 MG: 81 TABLET, CHEWABLE ORAL at 08:47

## 2025-06-29 RX ADMIN — ESCITALOPRAM OXALATE 10 MG: 10 TABLET ORAL at 08:47

## 2025-06-29 RX ADMIN — Medication 2 PUFF: at 12:57

## 2025-06-29 RX ADMIN — TAMSULOSIN HYDROCHLORIDE 0.4 MG: 0.4 CAPSULE ORAL at 08:47

## 2025-06-29 NOTE — GROUP NOTE
Group Therapy Note    Date: 6/29/2025    Group Start Time: 1105  Group End Time: 1150  Group Topic: Education Group - Inpatient    SSR 2  NON ACUTE    Payal Puentes        Group Therapy Note    Facilitated group to introduce information on the “benefits of having a positive mental attitude and how it correlates with self-esteem”       Attendees: 4/12      Notes:  Encouraged but did not attend    Discipline Responsible: Recreational Therapist      Signature:  PAUL Chavez

## 2025-06-29 NOTE — GROUP NOTE
Group Therapy Note    Date: 6/29/2025    Group Start Time: 1555  Group End Time: 1645  Group Topic: Recreational    SSR 2  NON ACUTE    Payal Puentes        Group Therapy Note    Facilitated leisure skills group to reinforce positive coping and to manage mood through music, social interaction, group activities and art task        Attendees: 6/12       Patient's Goal:  Attend group daily     Notes:  Pt was receptive to listening to music and songs he selected. Interacted with staff. Declined to select leisure task to work on      Status After Intervention:  Improved    Participation Level: Active Listener and Interactive    Participation Quality: Appropriate      Speech:  normal      Thought Process/Content: Logical      Affective Functioning: Congruent      Mood: calm      Level of consciousness:  Alert      Response to Learning: Progressing to goal      Endings: None Reported    Modes of Intervention: Socialization and Activity      Discipline Responsible: Recreational Therapist      Signature:  PAUL Chavez

## 2025-06-29 NOTE — PROGRESS NOTES
Progress Note  Date:2025       Room:[facility]  Patient Name:David Alfaro     YOB: 1965     Age:59 y.o.        Subjective    Subjective   Patient has been compliant with medications.  He is from [facility] substance abuse rehab. Patient expresses he has been continuing to feel slightly better and that he volunteered to come in for admission.    Status examination-patient is alert oriented to place  Acknowledges depression.  Denies having any command auditory hallucinations today denies having any active suicidal thoughts today.  Insight judgment coping remains impaired.    Review of Systems  Objective         Vitals Last 24 Hours:  TEMPERATURE:  Temp  Av.1 °F (36.7 °C)  Min: 97.9 °F (36.6 °C)  Max: 98.2 °F (36.8 °C)  RESPIRATIONS RANGE: Resp  Av  Min: 18  Max: 18  PULSE OXIMETRY RANGE: SpO2  Av %  Min: 98 %  Max: 98 %  PULSE RANGE: Pulse  Av.5  Min: 64  Max: 79  BLOOD PRESSURE RANGE: Systolic (24hrs), Av , Min:139 , Max:141   ; Diastolic (24hrs), Av, Min:109, Max:109    I/O (24Hr):  No intake or output data in the 24 hours ending 25 1416  Objective:  Vital signs: (most recent): Blood pressure (!) 141/109, pulse 64, temperature 97.9 °F (36.6 °C), resp. rate 18, height 1.88 m (6' 2\"), weight 79.4 kg (175 lb), SpO2 98%.      Labs/Imaging/Diagnostics    Labs:  CBC:  Recent Labs     25  1137   WBC 3.6*   RBC 4.40   HGB 14.8   HCT 42.6   MCV 96.8   RDW 12.2        CHEMISTRIES:  Recent Labs     25  1137      K 3.8      CO2 28   BUN 16   CREATININE 1.05   GLUCOSE 98     PT/INR:No results for input(s): \"PROTIME\", \"INR\" in the last 72 hours.  APTT:No results for input(s): \"APTT\" in the last 72 hours.  LIVER PROFILE:  Recent Labs     25  1137   AST 24   ALT 37   BILITOT 0.7   ALKPHOS 63       Imaging Last 24 Hours:  No results found.  Assessment//Plan           Hospital Problems           Last Modified POA    * (Principal) Suicidal

## 2025-06-29 NOTE — PLAN OF CARE
Problem: Safety - Adult  Goal: Free from fall injury  6/29/2025 0019 by Mariana Grande RN  Outcome: Progressing  6/28/2025 1122 by Prabha Rosen, RN  Outcome: Progressing     Problem: Chronic Conditions and Co-morbidities  Goal: Patient's chronic conditions and co-morbidity symptoms are monitored and maintained or improved  6/29/2025 0019 by Mariana Grande RN  Outcome: Progressing  6/28/2025 1122 by Prabha Rosen, RN  Outcome: Progressing     Problem: Discharge Planning  Goal: Discharge to home or other facility with appropriate resources  6/29/2025 0019 by Mariana Grande RN  Outcome: Progressing  6/28/2025 1122 by Prabha Rosen, RN  Outcome: Progressing     Problem: Seizure Precautions  Goal: Remains free of injury related to seizures activity  6/29/2025 0019 by Mariana Grande RN  Outcome: Progressing  6/28/2025 1122 by Prabha Rosen, RN  Outcome: Progressing

## 2025-06-29 NOTE — BH NOTE
DAYSHIFT NOTE:     Patient sitting in the dayroom with rollator and going on and off to sleep while sitting and watching TV. Patient is pleasant on approach and greets staff. Patient is hard of hearing so therefore he talks loudly. Patient denies having any depression and or anxiety. Denies SI/HI. Denies AH/VH. Patient is medication compliant. Patient's blood pressure continues to be elevated this morning so this writer contacted ILDEFONSO CARDENAS [address] and starting patient back on his lisinopril. Patient took a long nap today and then got up to eat his lunch and had a period of incontinence where he requested a diaper and was able to go and clean himself up in bathroom. Patient was given PRN albuterol inhaler for shortness of breath with exertion. Effective. 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 Patient inquired about being able to use his phone tomorrow to pay his bills on time. 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 Patient is very pleasant on approach with a positive attitude. Patient has been on the phone at times today calling his \"god sister\" and his sister Carmen. Patient sits in the dayroom at times. Close observations continued to ensure patient safety.

## 2025-06-29 NOTE — PROGRESS NOTES
Spiritual Health History and Assessment/Progress Note  [facility]    Loneliness/Social Isolation,  , Life Adjustments, Adjustment to illness,      Name: David Alfaro MRN: [id number]    Age: 59 y.o.     Sex: male   Language: English   Jew: None   Suicidal ideation     Date: 6/29/2025            Total Time Calculated: 20 min              Spiritual Assessment began in SSR 2 BEHA [facility] ACUTE        Referral/Consult From: Nurse   Encounter Overview/Reason: Loneliness/Social Isolation  Service Provided For: Patient    Talia, Belief, Meaning:   Patient identifies as spiritual, is connected with a talia tradition or spiritual practice, and has beliefs or practices that help with coping during difficult times  Family/Friends No family/friends present      Importance and Influence:  Patient has spiritual/personal beliefs that influence decisions regarding their health  Family/Friends No family/friends present    Community:  Patient feels well-supported. Support system includes: Children, Friends, and Extended family  Family/Friends No family/friends present    Assessment and Plan of Care:   This  in to visit this Patient at this time. Patient states he was just leaving group and it was good. Patient shared life/review/legacy including family, work, being of Mu-ism talia and reason for being admitted to the unit. States he is feeling better than when he came in and that he must make decisions that will impact his health and overall well-being. Shared his emotions and feelings of having a friend that helps him including his family. Listened attentively providing time and space for reflection and sharing of life's sacred events. Provided expressions of encouragement and comfort. Maintained ministry of presence. Consulted with nurse.  available upon request.  Patient Interventions include: Facilitated expression of thoughts and feelings, Explored spiritual coping/struggle/distress, Engaged

## 2025-06-30 PROCEDURE — 6370000000 HC RX 637 (ALT 250 FOR IP): Performed by: PSYCHIATRY & NEUROLOGY

## 2025-06-30 PROCEDURE — 1240000000 HC EMOTIONAL WELLNESS R&B

## 2025-06-30 PROCEDURE — 6370000000 HC RX 637 (ALT 250 FOR IP): Performed by: PHYSICIAN ASSISTANT

## 2025-06-30 RX ORDER — BENZOCAINE/MENTHOL 6 MG-10 MG
LOZENGE MUCOUS MEMBRANE 3 TIMES DAILY
Status: DISCONTINUED | OUTPATIENT
Start: 2025-06-30 | End: 2025-07-01 | Stop reason: HOSPADM

## 2025-06-30 RX ADMIN — CARVEDILOL 3.12 MG: 3.12 TABLET, FILM COATED ORAL at 20:43

## 2025-06-30 RX ADMIN — CARVEDILOL 3.12 MG: 3.12 TABLET, FILM COATED ORAL at 08:47

## 2025-06-30 RX ADMIN — LISINOPRIL 20 MG: 20 TABLET ORAL at 08:47

## 2025-06-30 RX ADMIN — Medication 400 MG: at 08:47

## 2025-06-30 RX ADMIN — LEVETIRACETAM 500 MG: 500 TABLET, FILM COATED ORAL at 08:47

## 2025-06-30 RX ADMIN — SPIRONOLACTONE 25 MG: 25 TABLET ORAL at 08:47

## 2025-06-30 RX ADMIN — MONTELUKAST 10 MG: 10 TABLET, FILM COATED ORAL at 20:43

## 2025-06-30 RX ADMIN — LEVETIRACETAM 500 MG: 500 TABLET, FILM COATED ORAL at 20:43

## 2025-06-30 RX ADMIN — TRAZODONE HYDROCHLORIDE 50 MG: 50 TABLET ORAL at 20:42

## 2025-06-30 RX ADMIN — ISOSORBIDE MONONITRATE 30 MG: 30 TABLET, EXTENDED RELEASE ORAL at 08:47

## 2025-06-30 RX ADMIN — TAMSULOSIN HYDROCHLORIDE 0.4 MG: 0.4 CAPSULE ORAL at 08:47

## 2025-06-30 RX ADMIN — AMLODIPINE BESYLATE 5 MG: 5 TABLET ORAL at 20:42

## 2025-06-30 RX ADMIN — ESCITALOPRAM OXALATE 10 MG: 10 TABLET ORAL at 08:47

## 2025-06-30 RX ADMIN — HYDROCORTISONE: 1 CREAM TOPICAL at 21:14

## 2025-06-30 RX ADMIN — Medication 2 PUFF: at 20:48

## 2025-06-30 RX ADMIN — ASPIRIN 81 MG: 81 TABLET, CHEWABLE ORAL at 08:47

## 2025-06-30 RX ADMIN — PANTOPRAZOLE SODIUM 40 MG: 40 TABLET, DELAYED RELEASE ORAL at 05:58

## 2025-06-30 NOTE — BH NOTE
Client is pleasant and cooperative.Alert and oriented x 3.Client has a good appetite and reports sleeping well.Denies feeling suicidal or homicidal.Appearance is neat and clean.Social with select peers.Reports that he has gotta be out of here by the third to pay bills and receive his check.Ambulates with the assistance of a walker.Remains on close observation for safety.

## 2025-06-30 NOTE — GROUP NOTE
Group Therapy Note    Date: 6/30/2025    Group Start Time: 1535  Group End Time: 1635  Group Topic: Recreational    SSR 2 BH NON ACUTE    Payal Puentes        Group Therapy Note    Facilitated leisure skills group to reinforce positive coping and to manage mood through music, social interaction, group activities and art task        Attendees: 6/10       Patient's Goal:  Attend group daily     Notes:   Pt was receptive to listening to music and songs he selected. Interacted with staff. Declined to select leisure task to work on      Status After Intervention:  Improved    Participation Level: Active Listener and Interactive    Participation Quality: Appropriate      Speech:  normal      Thought Process/Content: Logical      Affective Functioning: Congruent      Mood: calm      Level of consciousness:  Alert      Response to Learning: Progressing to goal      Endings: None Reported    Modes of Intervention: Socialization and Activity      Discipline Responsible: Recreational Therapist      Signature:  PAUL Chavez

## 2025-06-30 NOTE — BH NOTE
PSA PART II ADDITIONAL INFORMATION        Access To Fire Arms: No    Substance Use: Yes    Last Use: 6-27-25    Type of Substance: marijuana and cocaine    Frequency of Use: daily     Request to See : yes    If yes, notified : No    Guardian:No    Guardian Contact:pt is his own legal guardian     Release of Information Signed: Yes    Release of Information Signed For: Shirley Hayward (ERNIE) [phone] and Mile Nation [phone]

## 2025-06-30 NOTE — CARE COORDINATION
06/30/25 1814   ITP   Date of Plan 06/30/25   Date of Next Review 07/07/25   Primary Diagnosis Code Suicidal ideation R45.851   Barriers to Treatment Other (comment);Need for psychoeducation  (substance use)   Strengths Incorporated in Plan Verbal;Community supports;Acknowledging need for assistance   Plan of Care   Long Term Goal (LTG) Stated in patient/guardian terms \"to work on myself\"   Short Term Goal 1   Short Term Goal 1 Client will learn and demonstrate improved bourndaries   Baseline Functioning pt reported that he \"does everything for everyone else and then gets burnt out\"   Target Pt will identify two unhealthy boundaries that affect his daily life and creat healthy ones   Objectives Client will participate in individual therapy   Intervention  Indvidual therapy   Frequency daily   Measured by Behavioral data;Self report;Staff observation   Staff Responsible [facility] staff   Intervention 2 Group therapy   Frequency daily   Measured by Self report;Behavioral data;Staff observation   Staff Responsible [facility] staff;Clinical staff   Intervention 3 Family involvement in treatment plan   Frequency prior to discharge   Measured by Behavioral data   Staff Responsible [facility] staff   STG Goal 1 Status: Patient Appears to be  Progressing toward treatment plan goal   Short Term Goal 2   Short Term Goal 2 Client will learn and demonstrate effective coping skills   Baseline Functioning pt reported using substances to cope   Target Pt will learn and utilize two new healthy coping skills   Objectives Client will participate in individual therapy;Client will participate in group therapy   Intervention  Indvidual therapy   Frequency daily   Measured by Behavioral data;Self report;Staff observation   Staff Responsible [facility] staff   Intervention 2 Group therapy   Frequency daily   Measured by Behavioral data;Self report;Staff observation   Staff Responsible Clinical staff;[facility] staff   Intervention 3 Referral to community services

## 2025-06-30 NOTE — GROUP NOTE
Group Therapy Note    Date: 6/30/2025    Group Start Time: 1115  Group End Time: 1155  Group Topic: Education Group - Inpatient    SSR 2  NON ACUTE    Payal Puentes        Group Therapy Note    Facilitated group to focus on defining different types of defense mechanisms and being able to recognize examples of some defenses that are used to cope with stress and anxiety       Attendees: 5/12       Patient's Goal:  Attend group daily     Notes:  Receptive to information discussed and engaged. was able to recognize the examples of different types of defenses and acknowledged using some of them in the past and shared a defense he used prior to admission. Receptive to positive/ healthy ways of coping     Status After Intervention:  Improved    Participation Level: Active Listener and Interactive    Participation Quality: Appropriate, Attentive, and Sharing      Speech:  normal      Thought Process/Content: Logical      Affective Functioning: Congruent      Mood: calm      Level of consciousness:  Attentive      Response to Learning: Able to verbalize current knowledge/experience and Progressing to goal      Endings: None Reported    Modes of Intervention: Education and Support      Discipline Responsible: Recreational Therapist      Signature:  PAUL Chavez

## 2025-06-30 NOTE — BH NOTE
PSYCHOSOCIAL ASSESSMENT  :Patient identifying info:   David Alfaro is a 59 y.o., male admitted 6/27/2025 10:12 AM     Presenting problem and precipitating factors: Pt was admitted to the [facility] due to commit suicide by OD on cocaine. Pt reported having difficulties with friends and family. Pt reported that he \"takes care of everyone else and no cares about me\". Pt has poor boundaries and relationships with others. He uses substances (cocaine and THC) and refuses residential treatment. Pt is currently with [facility].     Mental status assessment: Pt presented with a broad but depressed affect and congruent mood. Pts thoughts were slightly racing, went into details about his life story while answering a direct question. Pts speech was fast and garbled. He reported that he has been having a difficult time with his memory lately. He did not appear to have a cognitive delay. Pt has blaming insight and poor judgement.     Strengths/Recreation/Coping Skills:Pt stated \"used to be work\"    Collateral information: Shirley Hayward () [phone] and Mile Nation [phone]     Current psychiatric /substance abuse providers and contact info: [facility]    Previous psychiatric/substance abuse providers and response to treatment: Pt has over \"15 attempted SI\" and multiple hospitalizations     Family history of mental illness or substance abuse: Pt reported substance use    Substance abuse history:    Social History     Tobacco Use    Smoking status: Former    Smokeless tobacco: Never    Tobacco comments:     Not a smoker   Substance Use Topics    Alcohol use: Not Currently     Alcohol/week: 2.0 standard drinks of alcohol       History of biomedical complications associated with substance abuse: Pt denied     Patient's current acceptance of treatment or motivation for change: Pt has poor judgement and refused residential substance use treatment    Family constellation: Pt reported \"everyone is passed by two

## 2025-07-01 VITALS
TEMPERATURE: 97.7 F | BODY MASS INDEX: 22.46 KG/M2 | SYSTOLIC BLOOD PRESSURE: 117 MMHG | WEIGHT: 175 LBS | HEIGHT: 74 IN | RESPIRATION RATE: 16 BRPM | HEART RATE: 50 BPM | OXYGEN SATURATION: 100 % | DIASTOLIC BLOOD PRESSURE: 77 MMHG

## 2025-07-01 PROCEDURE — 6370000000 HC RX 637 (ALT 250 FOR IP): Performed by: PSYCHIATRY & NEUROLOGY

## 2025-07-01 PROCEDURE — 6370000000 HC RX 637 (ALT 250 FOR IP): Performed by: PHYSICIAN ASSISTANT

## 2025-07-01 RX ORDER — AMLODIPINE BESYLATE 5 MG/1
5 TABLET ORAL DAILY
Qty: 30 TABLET | Refills: 1 | Status: SHIPPED | OUTPATIENT
Start: 2025-07-01 | End: 2025-07-31

## 2025-07-01 RX ORDER — TAMSULOSIN HYDROCHLORIDE 0.4 MG/1
0.4 CAPSULE ORAL DAILY
Qty: 30 CAPSULE | Refills: 3 | Status: SHIPPED | OUTPATIENT
Start: 2025-07-02

## 2025-07-01 RX ORDER — TRAZODONE HYDROCHLORIDE 50 MG/1
50 TABLET ORAL NIGHTLY
Qty: 30 TABLET | Refills: 1 | Status: SHIPPED | OUTPATIENT
Start: 2025-07-01

## 2025-07-01 RX ORDER — BENZOCAINE/MENTHOL 6 MG-10 MG
LOZENGE MUCOUS MEMBRANE
Qty: 30 G | Refills: 1 | Status: SHIPPED | OUTPATIENT
Start: 2025-07-01 | End: 2025-07-08

## 2025-07-01 RX ORDER — LEVETIRACETAM 500 MG/1
500 TABLET ORAL 2 TIMES DAILY
Qty: 60 TABLET | Refills: 1 | Status: SHIPPED | OUTPATIENT
Start: 2025-07-01

## 2025-07-01 RX ORDER — ASPIRIN 81 MG/1
81 TABLET, CHEWABLE ORAL DAILY
Qty: 30 TABLET | Refills: 1 | Status: SHIPPED | OUTPATIENT
Start: 2025-07-02

## 2025-07-01 RX ORDER — LISINOPRIL 20 MG/1
20 TABLET ORAL DAILY
Qty: 30 TABLET | Refills: 3 | Status: SHIPPED | OUTPATIENT
Start: 2025-07-02

## 2025-07-01 RX ORDER — CARVEDILOL 3.12 MG/1
3.12 TABLET ORAL 2 TIMES DAILY
Qty: 60 TABLET | Refills: 3 | Status: SHIPPED | OUTPATIENT
Start: 2025-07-01

## 2025-07-01 RX ORDER — ISOSORBIDE MONONITRATE 30 MG/1
30 TABLET, EXTENDED RELEASE ORAL DAILY
Qty: 30 TABLET | Refills: 1 | Status: SHIPPED | OUTPATIENT
Start: 2025-07-02

## 2025-07-01 RX ORDER — ESCITALOPRAM OXALATE 10 MG/1
10 TABLET ORAL DAILY
Qty: 30 TABLET | Refills: 1 | Status: SHIPPED | OUTPATIENT
Start: 2025-07-01 | End: 2025-07-31

## 2025-07-01 RX ORDER — MONTELUKAST SODIUM 10 MG/1
10 TABLET ORAL NIGHTLY
Qty: 30 TABLET | Refills: 1 | Status: SHIPPED | OUTPATIENT
Start: 2025-07-01 | End: 2025-07-31

## 2025-07-01 RX ORDER — ALBUTEROL SULFATE 90 UG/1
2 INHALANT RESPIRATORY (INHALATION) EVERY 6 HOURS PRN
Qty: 18 G | Refills: 1 | Status: SHIPPED | OUTPATIENT
Start: 2025-07-01

## 2025-07-01 RX ORDER — FLUTICASONE PROPIONATE AND SALMETEROL 250; 50 UG/1; UG/1
1 POWDER RESPIRATORY (INHALATION) EVERY 12 HOURS
Qty: 60 EACH | Refills: 1 | Status: SHIPPED | OUTPATIENT
Start: 2025-07-01

## 2025-07-01 RX ORDER — SPIRONOLACTONE 25 MG/1
25 TABLET ORAL DAILY
Qty: 30 TABLET | Refills: 3 | Status: SHIPPED | OUTPATIENT
Start: 2025-07-02

## 2025-07-01 RX ORDER — PANTOPRAZOLE SODIUM 40 MG/1
40 TABLET, DELAYED RELEASE ORAL
Qty: 30 TABLET | Refills: 3 | Status: SHIPPED | OUTPATIENT
Start: 2025-07-02

## 2025-07-01 RX ADMIN — LISINOPRIL 20 MG: 20 TABLET ORAL at 08:59

## 2025-07-01 RX ADMIN — LEVETIRACETAM 500 MG: 500 TABLET, FILM COATED ORAL at 08:59

## 2025-07-01 RX ADMIN — CARVEDILOL 3.12 MG: 3.12 TABLET, FILM COATED ORAL at 08:59

## 2025-07-01 RX ADMIN — TAMSULOSIN HYDROCHLORIDE 0.4 MG: 0.4 CAPSULE ORAL at 08:59

## 2025-07-01 RX ADMIN — ASPIRIN 81 MG: 81 TABLET, CHEWABLE ORAL at 08:59

## 2025-07-01 RX ADMIN — ESCITALOPRAM OXALATE 10 MG: 10 TABLET ORAL at 08:59

## 2025-07-01 RX ADMIN — ISOSORBIDE MONONITRATE 30 MG: 30 TABLET, EXTENDED RELEASE ORAL at 08:59

## 2025-07-01 RX ADMIN — PANTOPRAZOLE SODIUM 40 MG: 40 TABLET, DELAYED RELEASE ORAL at 06:31

## 2025-07-01 RX ADMIN — Medication 2 PUFF: at 06:49

## 2025-07-01 RX ADMIN — Medication 400 MG: at 08:59

## 2025-07-01 RX ADMIN — SPIRONOLACTONE 25 MG: 25 TABLET ORAL at 08:59

## 2025-07-01 NOTE — BH NOTE
Patient discharged in stable mental and physical health. Patient verbalized understanding of discharge instructions. No physical complaints at time of discharge. Safety plan reviewed and agreed upon by patient. Belongings from unit safe and storage returned to patient. Denies SI/HI/AVH. No paranoia, no delusions. Escorted off unit at 1645, leaving facility in a private vehicle with his sister.

## 2025-07-01 NOTE — GROUP NOTE
Group Therapy Note    Date: 7/1/2025    Group Start Time: 1130  Group End Time: 1200  Group Topic: Process Group - Inpatient    SSR 2 BEHA [facility] ACUTE    Suzanne Lindsey MSW        Group Therapy Note: Facilitator encouraged the group to share overall emotions and values. Discussed the importance of regulating emotions to manage stress.    Attendees: 4       Patient's Goal:  To attend groups    Notes:  Pt shared that he has always been a helper and has difficulty saying \"no\" to the point people take advantage of him. Pt puts up a \"tough front\" underneath its clear pt has experienced a lot of emotional pain. He admits this. He said he cries watching ants move food.     Status After Intervention:  Improved    Participation Level: Active Listener and Interactive    Participation Quality: Appropriate, Attentive, and Sharing      Speech:  pressured and loud      Thought Process/Content: Perseverating      Affective Functioning: Congruent      Mood: dysphoric      Level of consciousness:  Alert, Oriented x4, and Attentive      Response to Learning: Able to verbalize current knowledge/experience, Able to verbalize/acknowledge new learning, Able to retain information, Capable of insight, Able to change behavior, and Progressing to goal      Endings: None Reported    Modes of Intervention: Education, Support, and Socialization      Discipline Responsible: /Counselor      Signature:  FAVIO GREEN

## 2025-07-01 NOTE — BH NOTE
DISCHARGE SUMMARY    NAME:David Alfaro  : 1965  MRN: [id number]    The patient David Alfaro exhibits the ability to control behavior in a less restrictive environment.  Patient's level of functioning is improving.  No assaultive/destructive behavior has been observed for the past 24 hours.  No suicidal/homicidal threat or behavior has been observed for the past 24 hours.  There is no evidence of serious medication side effects.  Patient has not been in physical or protective restraints for at least the past 24 hours.    If weapons involved, how are they secured? N/a    Is patient aware of and in agreement with discharge plan? yes    Arrangements for medication:  Prescriptions will be sent to pharmacy on file     Copy of discharge instructions to provider?:  yes    Arrangements for transportation home:  Pt will be picked up by family    Keep all follow up appointments as scheduled, continue to take prescribed medications per physician instructions.  Mental health crisis number:  911 or your local mental health crisis line number at [phone]      Mental Health Emergency WARM LINE      [phone])      M-F: 9am to 9pm      Sat & Sun: 5pm - 9pm  National suicide prevention lines:                             3-151-DTNFVGN (1-104-147-6025)       7-091-679-TALK ([phone])    Crisis Text Line:  Text HOME to 745010

## 2025-07-01 NOTE — BH NOTE
Behavioral Health Treatment Team Note     Patient goal(s) for today: \"to pay my bills\"  Treatment team focus/goals: continue medication management, group therapy, maintain ADLs and provide a safe discharge    Progress note: Pt presented with a bright, cooperative affect and congruent mood. Pt denied any SI/HI/Avh at the time and was orientated x4. Pt was smiling appropriately when speaking with the writer. He reported that his niece is calling to ask him for his debit card and he does not feel comfortable giving her the card. Writer reminded him about the healthy boundaries. Writer reached out to pt CM from [facility] and left a VM. An inpatient level of care is needed to further stabilize the pt     LOS:  4  Expected LOS: 5-7 days     Insurance info/prescription coverage:  Medicare/Medicaid   Date of last family contact:  7-1-25    Family requesting physician contact today:  No  Discharge plan:  stabilize on medications  Guns in the home:  No   Outpatient provider(s):  [facility]    Participating treatment team members: David Alfaro, * (assigned SW), Sis Vann LMSW

## 2025-07-01 NOTE — BH NOTE
Behavioral Health Transition Record to Provider    Patient Name: David Alfaro  YOB: 1965  Medical Record Number: [id number]  Date of Admission: 6/27/2025  Date of Discharge: 7-1-25    Attending Provider: Eliu Abarca MD  Discharging Provider: Dr. Abarca  To contact this individual call [phone] and ask the  to page.  If unavailable, ask to be transferred to Behavioral Health Provider on call.  A Behavioral Health Provider will be available on call 24/7 and during holidays.    Primary Care Provider: Lakisha Montes APRN - NP    Allergies   Allergen Reactions    Codeine Swelling     Tongue swelling    Penicillins Swelling     Tongue Swelling       Reason for Admission: [facility] due to commit suicide by OD on cocaine. Pt reported having difficulties with friends and family. Pt reported that he \"takes care of everyone else and no cares about me\". Pt has poor boundaries and relationships with others. He uses substances (cocaine and THC) and refuses residential treatment. Pt is currently with [facility].     Admission Diagnosis: Suicidal ideation [R45.851]  Suicide attempt by inadequate means, initial encounter ([facility]) [X83.8XXA]  Major depression with psychotic features ([facility]) [F32.3]    * No surgery found *    Results for orders placed or performed during the hospital encounter of 06/27/25   Urine Drug Screen   Result Value Ref Range    Amphetamine, Urine Negative Negative      Barbiturates, Urine Negative Negative      Benzodiazepines, Urine Negative Negative      Cocaine, Urine Positive (A) Negative      Methadone, Urine Negative Negative      Opiates, Urine Negative Negative      Phencyclidine, Urine Negative Negative      THC, TH-Cannabinol, Urine Positive (A) Negative      Comments:        This test is a screen for drugs of abuse in a medical setting only (i.e., they are unconfirmed results and as such must not be used for non-medical purposes,

## 2025-07-01 NOTE — PLAN OF CARE
Problem: Safety - Adult  Goal: Free from fall injury  Outcome: Adequate for Discharge     Problem: Chronic Conditions and Co-morbidities  Goal: Patient's chronic conditions and co-morbidity symptoms are monitored and maintained or improved  Outcome: Adequate for Discharge     Problem: Discharge Planning  Goal: Discharge to home or other facility with appropriate resources  Outcome: Adequate for Discharge     Problem: Seizure Precautions  Goal: Remains free of injury related to seizures activity  Outcome: Adequate for Discharge

## 2025-07-01 NOTE — BH NOTE
Progress note for June 30, 2025 patient moved from the acute side to the progressive side patient's walks around with the wrong later fairly good compliant with the medication and attending the groups he is hoping to get out by third of the month so that he can get his check and pay the bills complying with the medication not suicidal and not homicidal no hallucinations is at times labile garbled speech no agitation and no aggression and no suicidal or homicidal thoughts continued inpatient level of care indicated to stabilize him he is not interested in going to inpatient residential substance abuse program thank you no side effects vital signs pulse 76 blood pressure 132/72 temperature 98.2 respiration 18 O2 saturation 96%

## 2025-07-01 NOTE — PLAN OF CARE
David is A&O x 4. Ambulates with rollator. Denies depression and anxiety. Pt denies SI/HI and AVH. Pt complains of pruritis on right knee from rash. PRN hydrocortisone was given. Pt tolerated and educated on application. David complains of urinary frequency from his medication. Urinal offered pt declined. Pt denied having pain. David was experiencing SOB after ambulating. Albuterol treatment was given once at night and again in the morning due to increased SOB. Pt tolerated. Fluid and snacks were offered. Staff will continue care plan as ordered.

## 2025-07-02 NOTE — DISCHARGE SUMMARY
[address]  [address]                            DISCHARGE SUMMARY      PATIENT NAME: JONNA EDWARD            : 1965  MED REC NO: [id number]                       ROOM: 243  ACCOUNT NO: [id number]                       ADMIT DATE: 2025  PROVIDER: Eliu Dutta MD    DISCHARGE DATE:  2025    ATTENDING PHYSICIAN:  ELIU DUTTA    Please make reference to my initial psychiatric H and P.    HISTORY:  This is a 59-year-old  male patient admitted to Behavioral Health Unit voluntarily from [facility] ED where he was seen by his counselor at [facility].  The patient says he is having trouble for 2 weeks.  Apparently, he was in substance abuse residential rehab program and thought that the peers were giving hard time picking on him and talking about him that he wanted to kill himself.  He did that by taking cocaine, tried to kill himself and he told the therapist who sent him here.  He also felt that people tried to take advantage and steal his money.  He is there for his family, but they are no there.  They are only there to steal his money and take advantage of him.  Had multiple prior hospitalizations at [facility], [facility], [facility].  Tenth grade education, currently unemployed, gets disability.  Has one godsister.  He lives in [place].  Trauma history, none.  Medical history; hypertension, history of stroke, history of 3 seizures, history of prostate problem.  Allergies to medication, codeine and penicillin.  Drug screen positive for cocaine and THC. Urinalysis; protein 30, otherwise unremarkable.  CBC; WBC 3600, neutrophils 1.75, absolute, otherwise unremarkable.  CMP unremarkable.  Liver functions unremarkable.  Ethanol level 10.  Troponin 7.  Salicylate 1.7.  Acetaminophen level 2.    COURSE AND TREATMENT:  The patient

## (undated) DEVICE — SMARTGOWN SURGICAL GOWN, XL, LONG: Brand: CONVERTORS

## (undated) DEVICE — SPONGE LAP SFT 18X18 IN X RAY DETECTABLE

## (undated) DEVICE — K-WIRE: Brand: ASNIS

## (undated) DEVICE — BASIC SINGLE BASIN-LF: Brand: MEDLINE INDUSTRIES, INC.

## (undated) DEVICE — SUTURE PROL SZ 3-0 L18IN NONABSORBABLE BLU L19MM PC-5 3/8 8632G

## (undated) DEVICE — COUNTER NDL 40 COUNT HLD 70 FOAM BLK ADH W/ MAG

## (undated) DEVICE — KIT ARMOR C DRP COLLAPSIBLE AND SELF EXP TOP CVR FOR FLUOROSCOPIC

## (undated) DEVICE — SPONGE GZ 4X4 IN 16-PLY DETECTABLE W/ DMT MSTR TAG

## (undated) DEVICE — HOOK LOCK LATEX FREE ELASTIC BANDAGE 4INX5YD

## (undated) DEVICE — ARGYLE FRAZIER SURGICAL SUCTION INSTRUMENT 10 FR/CH (3.3 MM): Brand: ARGYLE

## (undated) DEVICE — INTENDED FOR TISSUE SEPARATION, AND OTHER PROCEDURES THAT REQUIRE A SHARP SURGICAL BLADE TO PUNCTURE OR CUT.: Brand: BARD-PARKER SAFETY BLADES SIZE 15, STERILE

## (undated) DEVICE — SMARTGOWN SURGICAL GOWN, LARGE: Brand: CONVERTORS

## (undated) DEVICE — LOCKING SCREW, FULLY THREADED,T8
Type: IMPLANTABLE DEVICE | Site: FOOT | Status: NON-FUNCTIONAL
Brand: VARIAX
Removed: 2024-07-16

## (undated) DEVICE — PACK,ORTHOPEDIC III,AURORA: Brand: MEDLINE

## (undated) DEVICE — HOLDING PIN: Brand: ANCHORAGE

## (undated) DEVICE — SYRINGE,EAR/ULCER, 2 OZ, STERILE: Brand: MEDLINE

## (undated) DEVICE — SOLUTION IRRIG 1000ML 0.9% SOD CHL USP POUR PLAS BTL

## (undated) DEVICE — ELECTRODE NDL 2.8IN COAT VALLEYLAB

## (undated) DEVICE — ZIMMER® STERILE DISPOSABLE TOURNIQUET CUFF WITH PROTECTIVE SLEEVE AND PLC, DUAL PORT, SINGLE BLADDER, 18 IN. (46 CM)

## (undated) DEVICE — ELECTRODE PT RET AD L9FT HI MOIST COND ADH HYDRGEL CORDED

## (undated) DEVICE — GAUZE,SPONGE,4"X4",16PLY,STRL,LF,10/TRAY: Brand: MEDLINE

## (undated) DEVICE — SUTURE VICRYL + SZ 3-0 L27IN ABSRB UD L26MM SH 1/2 CIR VCP416H

## (undated) DEVICE — INTENDED FOR TISSUE SEPARATION, AND OTHER PROCEDURES THAT REQUIRE A SHARP SURGICAL BLADE TO PUNCTURE OR CUT.: Brand: BARD-PARKER SAFETY BLADES SIZE 10, STERILE

## (undated) DEVICE — TABLE COVER: Brand: CONVERTORS

## (undated) DEVICE — DEVON TUBE HOLDER FIXED TOUCH FASTEN STRAP: Brand: DEVON

## (undated) DEVICE — BANDAGE COBAN 4 IN COMPR W4INXL5YD FOAM COHESIVE QUIK STK SELF ADH SFT

## (undated) DEVICE — DRILL BIT, AO, SCALED: Brand: VARIAX

## (undated) DEVICE — KIT OR TURNOVER

## (undated) DEVICE — BANDAGE,GAUZE,BULKEE II,4.5"X4.1YD,STRL: Brand: MEDLINE

## (undated) DEVICE — Device

## (undated) DEVICE — TUBING SUCT L10FT DIA0.25IN UNIV W/ SCALLOPED FEM CONN

## (undated) DEVICE — MARKER,SKIN,WI/RULER AND LABELS: Brand: MEDLINE

## (undated) DEVICE — STERILE POLYISOPRENE POWDER-FREE SURGICAL GLOVES: Brand: PROTEXIS

## (undated) DEVICE — BLADE ES ELASTOMERIC COAT INSUL DURABLE BEND UPTO 90DEG

## (undated) DEVICE — PACK PROCEDURE SURG MAJ W/ BASIN LF

## (undated) DEVICE — CAST BOOT: Brand: DEROYAL

## (undated) DEVICE — TOWEL,OR,DSP,ST,BLUE,STD,4/PK,20PK/CS: Brand: MEDLINE

## (undated) DEVICE — MAGNETIC INSTR DRAPE 20X16: Brand: MEDLINE INDUSTRIES, INC.

## (undated) DEVICE — OCCLUSIVE GAUZE STRIP OVERWRAP,3% BISMUTH TRIBROMOPHENATE IN PETROLATUM BLEND: Brand: XEROFORM

## (undated) DEVICE — APPLICATOR MEDICATED 26 CC SOLUTION HI LT ORNG CHLORAPREP